# Patient Record
Sex: FEMALE | Race: AMERICAN INDIAN OR ALASKA NATIVE | NOT HISPANIC OR LATINO | URBAN - METROPOLITAN AREA
[De-identification: names, ages, dates, MRNs, and addresses within clinical notes are randomized per-mention and may not be internally consistent; named-entity substitution may affect disease eponyms.]

---

## 2017-04-10 ENCOUNTER — EMERGENCY (EMERGENCY)
Facility: HOSPITAL | Age: 37
LOS: 1 days | Discharge: HOME | End: 2017-04-10
Attending: EMERGENCY MEDICINE | Admitting: EMERGENCY MEDICINE
Payer: COMMERCIAL

## 2017-04-10 ENCOUNTER — EMERGENCY (EMERGENCY)
Facility: HOSPITAL | Age: 37
LOS: 1 days | Discharge: LEFT BEFORE DISCHARGE | End: 2017-04-10
Admitting: EMERGENCY MEDICINE

## 2017-04-10 VITALS
HEART RATE: 104 BPM | OXYGEN SATURATION: 97 % | SYSTOLIC BLOOD PRESSURE: 130 MMHG | TEMPERATURE: 98 F | RESPIRATION RATE: 19 BRPM | DIASTOLIC BLOOD PRESSURE: 89 MMHG

## 2017-04-10 DIAGNOSIS — R07.89 OTHER CHEST PAIN: ICD-10-CM

## 2017-04-10 LAB
ALBUMIN SERPL ELPH-MCNC: 4.6 G/DL — SIGNIFICANT CHANGE UP (ref 3.3–5)
ALP SERPL-CCNC: 86 U/L — SIGNIFICANT CHANGE UP (ref 40–120)
ALT FLD-CCNC: 21 U/L RC — SIGNIFICANT CHANGE UP (ref 10–45)
ANION GAP SERPL CALC-SCNC: 20 MMOL/L — HIGH (ref 5–17)
APPEARANCE UR: CLEAR — SIGNIFICANT CHANGE UP
APTT BLD: 36.3 SEC — SIGNIFICANT CHANGE UP (ref 27.5–37.4)
AST SERPL-CCNC: 28 U/L — SIGNIFICANT CHANGE UP (ref 10–40)
BASE EXCESS BLDV CALC-SCNC: 3 MMOL/L — HIGH (ref -2–2)
BASOPHILS # BLD AUTO: 0.1 K/UL — SIGNIFICANT CHANGE UP (ref 0–0.2)
BASOPHILS NFR BLD AUTO: 1.5 % — SIGNIFICANT CHANGE UP (ref 0–2)
BILIRUB SERPL-MCNC: 0.1 MG/DL — LOW (ref 0.2–1.2)
BILIRUB UR-MCNC: NEGATIVE — SIGNIFICANT CHANGE UP
BLD GP AB SCN SERPL QL: NEGATIVE — SIGNIFICANT CHANGE UP
BUN SERPL-MCNC: 8 MG/DL — SIGNIFICANT CHANGE UP (ref 7–23)
CA-I BLD-SCNC: 1.04 MMOL/L — LOW (ref 1.05–1.34)
CA-I SERPL-SCNC: 1.17 MMOL/L — SIGNIFICANT CHANGE UP (ref 1.12–1.3)
CALCIUM SERPL-MCNC: 9.1 MG/DL — SIGNIFICANT CHANGE UP (ref 8.4–10.5)
CHLORIDE BLDV-SCNC: 104 MMOL/L — SIGNIFICANT CHANGE UP (ref 96–108)
CHLORIDE SERPL-SCNC: 100 MMOL/L — SIGNIFICANT CHANGE UP (ref 96–108)
CK MB CFR SERPL CALC: 1 NG/ML — SIGNIFICANT CHANGE UP (ref 0–3.8)
CO2 BLDV-SCNC: 30 MMOL/L — SIGNIFICANT CHANGE UP (ref 22–30)
CO2 SERPL-SCNC: 22 MMOL/L — SIGNIFICANT CHANGE UP (ref 22–31)
COLOR SPEC: YELLOW — SIGNIFICANT CHANGE UP
CREAT SERPL-MCNC: 0.65 MG/DL — SIGNIFICANT CHANGE UP (ref 0.5–1.3)
D DIMER BLD IA.RAPID-MCNC: <150 NG/ML DDU — SIGNIFICANT CHANGE UP
DIFF PNL FLD: NEGATIVE — SIGNIFICANT CHANGE UP
EOSINOPHIL # BLD AUTO: 0.2 K/UL — SIGNIFICANT CHANGE UP (ref 0–0.5)
EOSINOPHIL NFR BLD AUTO: 2.4 % — SIGNIFICANT CHANGE UP (ref 0–6)
EPI CELLS # UR: SIGNIFICANT CHANGE UP /HPF
ETHANOL SERPL-MCNC: 243 MG/DL — HIGH (ref 0–10)
GAS PNL BLDV: 143 MMOL/L — SIGNIFICANT CHANGE UP (ref 136–145)
GAS PNL BLDV: SIGNIFICANT CHANGE UP
GAS PNL BLDV: SIGNIFICANT CHANGE UP
GLUCOSE BLDV-MCNC: 101 MG/DL — HIGH (ref 70–99)
GLUCOSE SERPL-MCNC: 102 MG/DL — HIGH (ref 70–99)
GLUCOSE UR QL: NEGATIVE — SIGNIFICANT CHANGE UP
HCG SERPL-ACNC: <2 MIU/ML — SIGNIFICANT CHANGE UP
HCO3 BLDV-SCNC: 28 MMOL/L — SIGNIFICANT CHANGE UP (ref 21–29)
HCT VFR BLD CALC: 37.7 % — SIGNIFICANT CHANGE UP (ref 34.5–45)
HCT VFR BLDA CALC: 40 % — SIGNIFICANT CHANGE UP (ref 39–50)
HGB BLD CALC-MCNC: 13 G/DL — SIGNIFICANT CHANGE UP (ref 11.5–15.5)
HGB BLD-MCNC: 12.9 G/DL — SIGNIFICANT CHANGE UP (ref 11.5–15.5)
INR BLD: 0.92 RATIO — SIGNIFICANT CHANGE UP (ref 0.88–1.16)
KETONES UR-MCNC: ABNORMAL
LACTATE BLDV-MCNC: 3.1 MMOL/L — HIGH (ref 0.7–2)
LEUKOCYTE ESTERASE UR-ACNC: NEGATIVE — SIGNIFICANT CHANGE UP
LYMPHOCYTES # BLD AUTO: 3.7 K/UL — HIGH (ref 1–3.3)
LYMPHOCYTES # BLD AUTO: 51.3 % — HIGH (ref 13–44)
MAGNESIUM SERPL-MCNC: 2 MG/DL — SIGNIFICANT CHANGE UP (ref 1.6–2.6)
MCHC RBC-ENTMCNC: 25.5 PG — LOW (ref 27–34)
MCHC RBC-ENTMCNC: 34.4 GM/DL — SIGNIFICANT CHANGE UP (ref 32–36)
MCV RBC AUTO: 74.3 FL — LOW (ref 80–100)
MICROCYTES BLD QL: SLIGHT — SIGNIFICANT CHANGE UP
MONOCYTES # BLD AUTO: 0.3 K/UL — SIGNIFICANT CHANGE UP (ref 0–0.9)
MONOCYTES NFR BLD AUTO: 3.5 % — SIGNIFICANT CHANGE UP (ref 2–14)
NEUTROPHILS # BLD AUTO: 3 K/UL — SIGNIFICANT CHANGE UP (ref 1.8–7.4)
NEUTROPHILS NFR BLD AUTO: 41.3 % — LOW (ref 43–77)
NITRITE UR-MCNC: NEGATIVE — SIGNIFICANT CHANGE UP
OVALOCYTES BLD QL SMEAR: SLIGHT — SIGNIFICANT CHANGE UP
PCO2 BLDV: 49 MMHG — SIGNIFICANT CHANGE UP (ref 35–50)
PH BLDV: 7.38 — SIGNIFICANT CHANGE UP (ref 7.35–7.45)
PH UR: 6.5 — SIGNIFICANT CHANGE UP (ref 4.8–8)
PLAT MORPH BLD: NORMAL — SIGNIFICANT CHANGE UP
PLATELET # BLD AUTO: 276 K/UL — SIGNIFICANT CHANGE UP (ref 150–400)
PO2 BLDV: 39 MMHG — SIGNIFICANT CHANGE UP (ref 25–45)
POTASSIUM BLDV-SCNC: 3.9 MMOL/L — SIGNIFICANT CHANGE UP (ref 3.5–5)
POTASSIUM SERPL-MCNC: 4.2 MMOL/L — SIGNIFICANT CHANGE UP (ref 3.5–5.3)
POTASSIUM SERPL-SCNC: 4.2 MMOL/L — SIGNIFICANT CHANGE UP (ref 3.5–5.3)
PROT SERPL-MCNC: 7.9 G/DL — SIGNIFICANT CHANGE UP (ref 6–8.3)
PROT UR-MCNC: 30 MG/DL
PROTHROM AB SERPL-ACNC: 10 SEC — SIGNIFICANT CHANGE UP (ref 9.8–12.7)
RBC # BLD: 5.08 M/UL — SIGNIFICANT CHANGE UP (ref 3.8–5.2)
RBC # FLD: 12.8 % — SIGNIFICANT CHANGE UP (ref 10.3–14.5)
RBC BLD AUTO: ABNORMAL
RBC CASTS # UR COMP ASSIST: SIGNIFICANT CHANGE UP /HPF (ref 0–2)
RH IG SCN BLD-IMP: POSITIVE — SIGNIFICANT CHANGE UP
RH IG SCN BLD-IMP: POSITIVE — SIGNIFICANT CHANGE UP
SAO2 % BLDV: 64 % — LOW (ref 67–88)
SODIUM SERPL-SCNC: 142 MMOL/L — SIGNIFICANT CHANGE UP (ref 135–145)
SP GR SPEC: 1.02 — SIGNIFICANT CHANGE UP (ref 1.01–1.02)
TROPONIN T SERPL-MCNC: <0.01 NG/ML — SIGNIFICANT CHANGE UP (ref 0–0.06)
UROBILINOGEN FLD QL: NEGATIVE — SIGNIFICANT CHANGE UP
WBC # BLD: 7.2 K/UL — SIGNIFICANT CHANGE UP (ref 3.8–10.5)
WBC # FLD AUTO: 7.2 K/UL — SIGNIFICANT CHANGE UP (ref 3.8–10.5)
WBC UR QL: SIGNIFICANT CHANGE UP /HPF (ref 0–5)

## 2017-04-10 PROCEDURE — 99242 OFF/OP CONSLTJ NEW/EST SF 20: CPT | Mod: GC

## 2017-04-10 PROCEDURE — 70450 CT HEAD/BRAIN W/O DYE: CPT | Mod: 26

## 2017-04-10 PROCEDURE — 99285 EMERGENCY DEPT VISIT HI MDM: CPT

## 2017-04-10 PROCEDURE — 74174 CTA ABD&PLVS W/CONTRAST: CPT | Mod: 26

## 2017-04-10 PROCEDURE — 71275 CT ANGIOGRAPHY CHEST: CPT | Mod: 26

## 2017-04-10 NOTE — ED PROVIDER NOTE - NEUROLOGICAL, MLM
Alert and oriented, no focal deficits, no motor deficits. Decreased sensation to right ulnar distribution, elbow to shoulder.

## 2017-04-10 NOTE — ED PROVIDER NOTE - PROGRESS NOTE DETAILS
pt with nutcracker phenomenon on ct; surgery to evaluate; pt with nutcracker phenomenon on ct; surgery to evaluate; neuro consult pending pt evaluated by vascular at bedside and pt can follow-up as outpatient for nutcracker phenomenon; with Dr. Handley at [phone] Pt evaluated by neuro at bedside; will r/o dvt with ultrasound; if negative per neuro, can d/c home with outpatient follow-up emg/evaluation **ATTENDING ADDENDUM (Dr. Femi Gaspar): received handoff from Tutu earlier in ED course. PLAN: followup US and reevaluate patient. US noted: negative for deep venous thrombosis at this time. Anticipatory guidance provided. Agree with discharge home with close outpatient followup with primary care physician/provider and with medications (if appropriate, if clinically indicated, and as prescribed, as noted in EMR).

## 2017-04-10 NOTE — ED PROVIDER NOTE - CARE PLAN
Principal Discharge DX:	Arm pain  Goal:	ATTENDING ADDENDUM (Dr. Femi Gaspar): Goals of care include resolution of emergent/urgent symptoms and concerns, and restoration to baseline level of homeostasis.  Instructions for follow-up, activity and diet:	ATTENDING ADDENDUM (Dr. Femi Gaspar): (1) anticipatory guidance provided  (2) rest  (3) outpatient follow-up with your primary care physician/provider (4) return if symptoms worsen, persist, or do not resolve (5) medications, if indicated, as prescribed

## 2017-04-10 NOTE — ED PROVIDER NOTE - NS ED MD SCRIBE ATTENDING SCRIBE SECTIONS
VITAL SIGNS( Pullset)/PAST MEDICAL/SURGICAL/SOCIAL HISTORY/DISPOSITION/HIV/REVIEW OF SYSTEMS/HISTORY OF PRESENT ILLNESS/PHYSICAL EXAM

## 2017-04-10 NOTE — ED PROVIDER NOTE - DETAILS:
[age] year old female presents with chest pain, back pain, right arm numbness since 2:30PM today. No headache, nausea, vomiting. Occurred while on flight. Denies shortness of breath. On exam: decreased sensation in right arm, no motor deficits. DDx includes dissection. Plan: CTA Chest, Abd/Pel, CT Head, Cardiac enzymes, D-Dimer, EKG, reassess.

## 2017-04-10 NOTE — ED PROVIDER NOTE - CONDUCTED A DETAILED DISCUSSION WITH PATIENT AND/OR GUARDIAN REGARDING, MDM
radiology results/**ATTENDING ADDENDUM (Dr. Femi Gaspar): Anticipatory guidance provided./lab results

## 2017-04-10 NOTE — ED PROVIDER NOTE - OBJECTIVE STATEMENT
36 year old female with no significant past medical history presents to the Emergency Department complaining of chest pain, palpitations and right arm numbness since 14:30 while on flight from  today. Denies fever, chills, nausea, vomiting, diaphoresis, shortness of breath, headache, blurry vision, or any other complaints. 36 year old female with no significant past medical history presents to the Emergency Department complaining of chest pain, palpitations and right arm numbness since 14:30 while on flight from  today. Daily 3-6oz vodka intake (last drink today on flight). Daily smoker.   Denies fever, chills, nausea, vomiting, diaphoresis, shortness of breath, headache, blurry vision, or any other complaints. 36 year old female with no significant past medical history presents to the Emergency Department complaining of chest pain, palpitations and right arm numbness since 14:30 while on flight from  today. Daily 3-6oz vodka intake (last drink today on flight). Daily smoker. Also admits to lower back pain x3 days.   Denies fever, chills, nausea, vomiting, diaphoresis, shortness of breath, headache, blurry vision, or any other complaints.

## 2017-04-10 NOTE — ED ADULT NURSE NOTE - OBJECTIVE STATEMENT
36y female pt, no PMH, arrived to ED c/o chest pain with right arm numbness started around 1430 today while on flight from  today. Pt is daily drinker, mostly Vodka, +palpitation,  no chills/fever, no nausea/vomit, no sob, lungs are CTA, no neuro deficits.

## 2017-04-10 NOTE — ED PROVIDER NOTE - MEDICAL DECISION MAKING DETAILS
36 year old female presents with chest pain, back pain, right arm numbness since 2:30PM today. No headache, nausea, vomiting. Occurred while on flight. Denies shortness of breath. On exam: decreased sensation in right arm, no motor deficits. DDx includes dissection. Plan: CTA Chest, Abd/Pel, CT Head, Cardiac enzymes, D-Dimer, EKG, reassess.

## 2017-04-10 NOTE — ED PROVIDER NOTE - PLAN OF CARE
ATTENDING ADDENDUM (Dr. Femi Gaspar): Goals of care include resolution of emergent/urgent symptoms and concerns, and restoration to baseline level of homeostasis. ATTENDING ADDENDUM (Dr. Femi Gaspar): (1) anticipatory guidance provided  (2) rest  (3) outpatient follow-up with your primary care physician/provider (4) return if symptoms worsen, persist, or do not resolve (5) medications, if indicated, as prescribed

## 2017-04-11 VITALS
OXYGEN SATURATION: 96 % | DIASTOLIC BLOOD PRESSURE: 74 MMHG | RESPIRATION RATE: 16 BRPM | HEART RATE: 82 BPM | SYSTOLIC BLOOD PRESSURE: 122 MMHG | TEMPERATURE: 98 F

## 2017-04-11 LAB — TROPONIN T SERPL-MCNC: <0.01 NG/ML — SIGNIFICANT CHANGE UP (ref 0–0.06)

## 2017-04-11 PROCEDURE — 85730 THROMBOPLASTIN TIME PARTIAL: CPT

## 2017-04-11 PROCEDURE — 93971 EXTREMITY STUDY: CPT | Mod: 26

## 2017-04-11 PROCEDURE — 80053 COMPREHEN METABOLIC PANEL: CPT

## 2017-04-11 PROCEDURE — 99284 EMERGENCY DEPT VISIT MOD MDM: CPT | Mod: 25

## 2017-04-11 PROCEDURE — 82435 ASSAY OF BLOOD CHLORIDE: CPT

## 2017-04-11 PROCEDURE — 93971 EXTREMITY STUDY: CPT

## 2017-04-11 PROCEDURE — 84484 ASSAY OF TROPONIN QUANT: CPT

## 2017-04-11 PROCEDURE — 74174 CTA ABD&PLVS W/CONTRAST: CPT

## 2017-04-11 PROCEDURE — 81001 URINALYSIS AUTO W/SCOPE: CPT

## 2017-04-11 PROCEDURE — 83605 ASSAY OF LACTIC ACID: CPT

## 2017-04-11 PROCEDURE — 85027 COMPLETE CBC AUTOMATED: CPT

## 2017-04-11 PROCEDURE — 84132 ASSAY OF SERUM POTASSIUM: CPT

## 2017-04-11 PROCEDURE — 83735 ASSAY OF MAGNESIUM: CPT

## 2017-04-11 PROCEDURE — 82803 BLOOD GASES ANY COMBINATION: CPT

## 2017-04-11 PROCEDURE — 86850 RBC ANTIBODY SCREEN: CPT

## 2017-04-11 PROCEDURE — 86900 BLOOD TYPING SEROLOGIC ABO: CPT

## 2017-04-11 PROCEDURE — 82553 CREATINE MB FRACTION: CPT

## 2017-04-11 PROCEDURE — 84702 CHORIONIC GONADOTROPIN TEST: CPT

## 2017-04-11 PROCEDURE — 86901 BLOOD TYPING SEROLOGIC RH(D): CPT

## 2017-04-11 PROCEDURE — 85379 FIBRIN DEGRADATION QUANT: CPT

## 2017-04-11 PROCEDURE — 82330 ASSAY OF CALCIUM: CPT

## 2017-04-11 PROCEDURE — 85014 HEMATOCRIT: CPT

## 2017-04-11 PROCEDURE — 71275 CT ANGIOGRAPHY CHEST: CPT

## 2017-04-11 PROCEDURE — 84295 ASSAY OF SERUM SODIUM: CPT

## 2017-04-11 PROCEDURE — 80307 DRUG TEST PRSMV CHEM ANLYZR: CPT

## 2017-04-11 PROCEDURE — 85610 PROTHROMBIN TIME: CPT

## 2017-04-11 PROCEDURE — 70450 CT HEAD/BRAIN W/O DYE: CPT

## 2017-04-11 PROCEDURE — 82947 ASSAY GLUCOSE BLOOD QUANT: CPT

## 2020-03-06 NOTE — ED ADULT NURSE NOTE - NO SIGNIFICANT PAST SURGICAL HISTORY
Patient notified via [facility] message.     <<----- Click to add NO significant Past Surgical History

## 2020-09-23 NOTE — ED ADULT NURSE NOTE - TEMPLATE LIST FOR HEAD TO TOE ASSESSMENT
MEDICARE WELLNESS VISIT NOTE      HISTORY OF PRESENT ILLNESS:   Mamie Harrison presents for her Subsequent Annual Medicare Wellness Visit.   She has no current complaints or concerns.      Patient Care Team:  Viri Reyna NP as PCP - General (Nurse Practitioner)        Patient Active Problem List   Diagnosis   • Essential hypertension   • Hyperlipidemia   • Impaired fasting blood sugar   • Heart murmur, systolic         Past Medical History:   Diagnosis Date   • Essential hypertension    • Heart murmur, systolic 9/23/2020         History reviewed. No pertinent surgical history.      Social History     Tobacco Use   • Smoking status: Never Smoker   • Smokeless tobacco: Never Used   Substance Use Topics   • Alcohol use: No   • Drug use: No     Drug use:    Drug Use:    No              Family History   Problem Relation Age of Onset   • Cancer Mother         colon   • Other Father         sepsis   • Heart disease Brother    • COPD Brother    • Cancer Sister         peritoneal   • Cancer Sister         oral/smoker   • Other Sister         health concerns/ patient unsure       Current Outpatient Medications   Medication Sig Dispense Refill   • metoPROLOL succinate (TOPROL-XL) 100 MG 24 hr tablet Take 1 tablet by mouth daily. 90 tablet 3   • metoPROLOL succinate (TOPROL-XL) 50 MG 24 hr tablet Take 1 tablet by mouth daily. 90 tablet 0   • spironolactone (ALDACTONE) 50 MG tablet Take 1 tablet by mouth daily. 90 tablet 3   • chlorthalidone (HYGROTEN) 50 MG tablet Take 2 tablets by mouth daily. 180 tablet 3   • Multiple Vitamins-Minerals (MULTIVITAMIN & MINERAL PO)      • VITAMIN D, CHOLECALCIFEROL, PO      • CALCIUM CARBONATE PO        No current facility-administered medications for this visit.         The following items on the Medicare Health Risk Assessment were found to be positive  1.) Do you have an Advance directive, living will, or power of  for health care document that contains your wishes for end  of life care?: No, refused     6 b.) How many servings of High Fiber / Whole Grain Foods to you have each day ( 1 serving = 1 cup cold cereal, 1/2 cup cooked cereal, 1 slice bread): 1 per day.  Encouraged to get 2-3 servings daily         Vision and Hearing screens:    Hearing Screening    125Hz 250Hz 500Hz 1000Hz 2000Hz 3000Hz 4000Hz 6000Hz 8000Hz   Right ear:            Left ear:            Comments: Patient passed bilateral finger rub test     Vision Screening Comments: Patient sees opthalmology yearly.       Advance Directive:   The patient has the following documents:  No Advance Directives on file. Patient offered documents.    Cognitive Assessment: no evidence of cognitive dysfunction by direct observation    Recent PHQ 2/9 Score    PHQ 2:  Date Adult PHQ 2 Score Adult PHQ 2 Interpretation   9/23/2020 0 No further screening needed         DEPRESSION ASSESSMENT/PLAN:  Depression screening is negative no further plan needed.     Body mass index is 24.75 kg/m².    BMI ASSESSMENT/PLAN:  Patient BMI is within normal range.       Needed follow up:  None    See orders.   See Patient Instructions section.   Return in about 1 year (around 9/23/2021) for Medicare Wellness Visit.     OUTPATIENT PROGRESS NOTE    CHIEF COMPLAINT  Medicare Wellness Visit      SUBJECTIVE  She presents for follow-up of chronic medical conditions.  She is accompanied by her  with permission.  She has hypertension which is well controlled on metoprolol 150 mg daily, chlorthalidone 100 mg daily, and spironolactone 50 mg daily.  Denies any medication side effects.  She has been working toward weight reduction which has been successful since she was noted to have an elevated blood sugar.  Most recently was 122 fasting with A1c of 5.5.  She is due for several preventive health procedures but refuses all of them stating she is not interested in doing any of them.  She does not have an Advance directive and feels she does not need 1 as she  wants everything done.  She was previously found to have systolic heart murmur but refuses echocardiogram.    MEDICATIONS  Medications were reviewed and updated today.    HISTORIES  I have personally reviewed and updated the following EMR sections: Allergies, Problem List, Past Medical History, Past Surgical History, Social History and Family History    REVIEW OF SYSTEMS  Balance of 13 systems are reviewed and otherwise negative unless listed above    PHYSICAL EXAM  Visit Vitals  /82 (BP Location: LUE - Left upper extremity, Patient Position: Sitting, Cuff Size: Regular)   Pulse 76   Temp 98 °F (36.7 °C) (Temporal)   Resp 17   Ht 5' 5.5\" (1.664 m)   Wt 68.5 kg   SpO2 98%   BMI 24.75 kg/m²     PHYSICAL EXAM  HEENT: Normocephalic, atraumatic. External ear canals within normal limits. Tympanic membranes pearly gray with good light reflex bilaterally. Nasal mucosa normal. Buccal mucosa pink and moist. Posterior oropharynx within normal limits.  Eyes: PERRLA (pupils equal, round, and reactive to light and accommodation). EOMs (extraocular movements) intact. Sclerae nonicteric.  Neck: Supple without lymphadenopathy, thyromegaly or bruits.  Lungs: Clear to auscultation bilaterally.  Heart: Rate and rhythm regular with grade 1/6 systolic murmur.  Abdomen: Soft, nontender, nondistended. No masses or HSM (hepatosplenomegaly) noted. Bowel sounds normoactive.  Extremities: Without edema.  Skin: Warm and dry without rash.  Neurologic: Cranial nerves II through XII grossly intact. Gait normal.      LABORATORY  I have reviewed the pertinent laboratory tests. These are the pertinent findings:  Component      Latest Ref Rng & Units 9/17/2020 9/17/2020           8:18 AM  8:18 AM   Fasting Status      Hours 12 12   Sodium      135 - 145 mmol/L 139    Potassium      3.4 - 5.1 mmol/L 4.2    Chloride      98 - 107 mmol/L 101    CO2      21 - 32 mmol/L 28    ANION GAP      10 - 20 mmol/L 14    Glucose      65 - 99 mg/dL 122 (H)     BUN      6 - 20 mg/dL 23 (H)    Creatinine      0.51 - 0.95 mg/dL 0.93    Glomerular Filtration Rate      >90 mL/min/1.73m2 61 (L)    BUN/CREATININE RATIO      7 - 25 25    TOTAL BILIRUBIN      0.2 - 1.0 mg/dL 0.6    AST/SGOT      <=37 Units/L 19    ALK PHOSPHATASE      45 - 117 Units/L 71    Albumin      3.6 - 5.1 g/dL 4.4    TOTAL PROTEIN      6.4 - 8.2 g/dL 7.8    GLOBULIN      2.0 - 4.0 g/dL 3.4    A/G Ratio, Serum      1.0 - 2.4 1.3    ALT/SGPT      <64 Units/L 21    CALCIUM      8.4 - 10.2 mg/dL 9.6    CHOLESTEROL      <=199 mg/dL  210 (H)   TRIGLYCERIDE      <=149 mg/dL  90   HDL      >=50 mg/dL  71   CALCULATED LDL      <=129 mg/dL  121   CALCULATED NON HDL      mg/dL  139   CHOL/HDL      <=4.4  3.0   GLYCOHEMOGLOBIN A1C      4.5 - 5.6 % 5.5    ·     IMAGING  I have reviewed the pertinent imaging study reports. These are the pertinent findings:  · Refuses mammogram bone density echocardiogram.    ASSESSMENT/PLAN  Essential hypertension  - metoPROLOL succinate (TOPROL-XL) 50 MG 24 hr tablet; Take 1 tablet by mouth daily.  Dispense: 90 tablet; Refill: 0  - spironolactone (ALDACTONE) 50 MG tablet; Take 1 tablet by mouth daily.  Dispense: 90 tablet; Refill: 3  - chlorthalidone (HYGROTEN) 50 MG tablet; Take 2 tablets by mouth daily.  Dispense: 180 tablet; Refill: 3  - COMPREHENSIVE METABOLIC PANEL; Future  - OFFICE/OUTPT VISIT EST LEVEL IV  Blood pressure at goal.  No medication changes.  Mixed hyperlipidemia  - COMPREHENSIVE METABOLIC PANEL; Future  - LIPID PANEL WITH REFLEX; Future  - OFFICE/OUTPT VISIT EST LEVEL IV  At goal.  Continue with healthy diet and lifestyle  Impaired fasting blood sugar  - COMPREHENSIVE METABOLIC PANEL; Future  - GLYCOHEMOGLOBIN; Future  - OFFICE/OUTPT VISIT EST LEVEL IV   Diet controlled.  Encouraged to continue working on healthy diet and exercise to reduce the risk of diabetes  Medicare annual wellness visit, subsequent    Heart murmur, systolic  Encouraged obtaining  echocardiogram for further evaluation however she “want to think about it”   Cardiac

## 2022-07-30 ENCOUNTER — INPATIENT (INPATIENT)
Facility: HOSPITAL | Age: 42
LOS: 1 days | Discharge: LEFT BEFORE DISCHARGE | DRG: 600 | End: 2022-08-01
Attending: INTERNAL MEDICINE | Admitting: STUDENT IN AN ORGANIZED HEALTH CARE EDUCATION/TRAINING PROGRAM
Payer: MEDICAID

## 2022-07-30 VITALS
DIASTOLIC BLOOD PRESSURE: 94 MMHG | SYSTOLIC BLOOD PRESSURE: 154 MMHG | HEART RATE: 91 BPM | WEIGHT: 100.09 LBS | HEIGHT: 61 IN | RESPIRATION RATE: 20 BRPM | TEMPERATURE: 99 F | OXYGEN SATURATION: 98 %

## 2022-07-30 LAB
ALBUMIN SERPL ELPH-MCNC: 4.5 G/DL — SIGNIFICANT CHANGE UP (ref 3.3–5)
ALP SERPL-CCNC: 258 U/L — HIGH (ref 40–120)
ALT FLD-CCNC: 80 U/L — HIGH (ref 10–45)
ANION GAP SERPL CALC-SCNC: 16 MMOL/L — SIGNIFICANT CHANGE UP (ref 5–17)
ANISOCYTOSIS BLD QL: SIGNIFICANT CHANGE UP
AST SERPL-CCNC: 397 U/L — HIGH (ref 10–40)
BASOPHILS # BLD AUTO: 0 K/UL — SIGNIFICANT CHANGE UP (ref 0–0.2)
BASOPHILS NFR BLD AUTO: 0 % — SIGNIFICANT CHANGE UP (ref 0–2)
BILIRUB SERPL-MCNC: 1.5 MG/DL — HIGH (ref 0.2–1.2)
BUN SERPL-MCNC: 6 MG/DL — LOW (ref 7–23)
CALCIUM SERPL-MCNC: 9.4 MG/DL — SIGNIFICANT CHANGE UP (ref 8.4–10.5)
CHLORIDE SERPL-SCNC: 97 MMOL/L — SIGNIFICANT CHANGE UP (ref 96–108)
CO2 SERPL-SCNC: 27 MMOL/L — SIGNIFICANT CHANGE UP (ref 22–31)
CREAT SERPL-MCNC: 0.44 MG/DL — LOW (ref 0.5–1.3)
EGFR: 123 ML/MIN/1.73M2 — SIGNIFICANT CHANGE UP
ELLIPTOCYTES BLD QL SMEAR: SLIGHT — SIGNIFICANT CHANGE UP
EOSINOPHIL # BLD AUTO: 0 K/UL — SIGNIFICANT CHANGE UP (ref 0–0.5)
EOSINOPHIL NFR BLD AUTO: 0 % — SIGNIFICANT CHANGE UP (ref 0–6)
ETHANOL SERPL-MCNC: <10 MG/DL — SIGNIFICANT CHANGE UP (ref 0–10)
GLUCOSE SERPL-MCNC: 120 MG/DL — HIGH (ref 70–99)
HCT VFR BLD CALC: 32.3 % — LOW (ref 34.5–45)
HGB BLD-MCNC: 10.6 G/DL — LOW (ref 11.5–15.5)
HIV 1 & 2 AB SERPL IA.RAPID: SIGNIFICANT CHANGE UP
HYPOCHROMIA BLD QL: SLIGHT — SIGNIFICANT CHANGE UP
LYMPHOCYTES # BLD AUTO: 0.88 K/UL — LOW (ref 1–3.3)
LYMPHOCYTES # BLD AUTO: 9.6 % — LOW (ref 13–44)
MANUAL SMEAR VERIFICATION: SIGNIFICANT CHANGE UP
MCHC RBC-ENTMCNC: 25.4 PG — LOW (ref 27–34)
MCHC RBC-ENTMCNC: 32.8 GM/DL — SIGNIFICANT CHANGE UP (ref 32–36)
MCV RBC AUTO: 77.3 FL — LOW (ref 80–100)
MICROCYTES BLD QL: SLIGHT — SIGNIFICANT CHANGE UP
MONOCYTES # BLD AUTO: 0.39 K/UL — SIGNIFICANT CHANGE UP (ref 0–0.9)
MONOCYTES NFR BLD AUTO: 4.3 % — SIGNIFICANT CHANGE UP (ref 2–14)
NEUTROPHILS # BLD AUTO: 7.89 K/UL — HIGH (ref 1.8–7.4)
NEUTROPHILS NFR BLD AUTO: 86.1 % — HIGH (ref 43–77)
OVALOCYTES BLD QL SMEAR: SLIGHT — SIGNIFICANT CHANGE UP
PLAT MORPH BLD: NORMAL — SIGNIFICANT CHANGE UP
PLATELET # BLD AUTO: 202 K/UL — SIGNIFICANT CHANGE UP (ref 150–400)
POIKILOCYTOSIS BLD QL AUTO: SLIGHT — SIGNIFICANT CHANGE UP
POTASSIUM SERPL-MCNC: 4 MMOL/L — SIGNIFICANT CHANGE UP (ref 3.5–5.3)
POTASSIUM SERPL-SCNC: 4 MMOL/L — SIGNIFICANT CHANGE UP (ref 3.5–5.3)
PROT SERPL-MCNC: 7.7 G/DL — SIGNIFICANT CHANGE UP (ref 6–8.3)
RBC # BLD: 4.18 M/UL — SIGNIFICANT CHANGE UP (ref 3.8–5.2)
RBC # FLD: 21.7 % — HIGH (ref 10.3–14.5)
RBC BLD AUTO: ABNORMAL
SARS-COV-2 RNA SPEC QL NAA+PROBE: SIGNIFICANT CHANGE UP
SCHISTOCYTES BLD QL AUTO: SLIGHT — SIGNIFICANT CHANGE UP
SODIUM SERPL-SCNC: 140 MMOL/L — SIGNIFICANT CHANGE UP (ref 135–145)
STOMATOCYTES BLD QL SMEAR: SLIGHT — SIGNIFICANT CHANGE UP
TARGETS BLD QL SMEAR: SIGNIFICANT CHANGE UP
TROPONIN T, HIGH SENSITIVITY RESULT: <6 NG/L — SIGNIFICANT CHANGE UP (ref 0–51)
WBC # BLD: 9.16 K/UL — SIGNIFICANT CHANGE UP (ref 3.8–10.5)
WBC # FLD AUTO: 9.16 K/UL — SIGNIFICANT CHANGE UP (ref 3.8–10.5)

## 2022-07-30 PROCEDURE — 76642 ULTRASOUND BREAST LIMITED: CPT | Mod: 26,RT

## 2022-07-30 PROCEDURE — 99285 EMERGENCY DEPT VISIT HI MDM: CPT | Mod: 25

## 2022-07-30 PROCEDURE — 71046 X-RAY EXAM CHEST 2 VIEWS: CPT | Mod: 26

## 2022-07-30 RX ORDER — SODIUM CHLORIDE 9 MG/ML
1000 INJECTION, SOLUTION INTRAVENOUS ONCE
Refills: 0 | Status: COMPLETED | OUTPATIENT
Start: 2022-07-30 | End: 2022-07-30

## 2022-07-30 RX ORDER — DIAZEPAM 5 MG
5 TABLET ORAL ONCE
Refills: 0 | Status: DISCONTINUED | OUTPATIENT
Start: 2022-07-30 | End: 2022-07-30

## 2022-07-30 RX ADMIN — Medication 5 MILLIGRAM(S): at 21:04

## 2022-07-30 RX ADMIN — SODIUM CHLORIDE 1000 MILLILITER(S): 9 INJECTION, SOLUTION INTRAVENOUS at 21:04

## 2022-07-30 NOTE — ED PROVIDER NOTE - CLINICAL SUMMARY MEDICAL DECISION MAKING FREE TEXT BOX
43yo F w/ no pmh presenting with chest tightness and palpitations. Started yesterday, L sided, non radiating. Pt currently has symptoms however it is better than before. She has breast cyst that appears infected, c/f abscess given erythema and ttp spreading towards nipple. Will get labs, trop, US breast.

## 2022-07-30 NOTE — ED PROVIDER NOTE - CARE PLAN
1 Principal Discharge DX:	Alcohol withdrawal  Secondary Diagnosis:	Breast abscess  Secondary Diagnosis:	Cellulitis of breast

## 2022-07-30 NOTE — ED PROVIDER NOTE - OBJECTIVE STATEMENT
41yo F w/ no pmh presenting with palpitations. Last night pt started having chest tightness, L sided, non radiating associated with palpitations. Vomited once this morning. She has no sob, fever, abd pain. Also had R breast cyst that was drained last year, now for 2w it's been getting bigger, more red and painful. Redness has spread towards nipple.

## 2022-07-30 NOTE — ED ADULT TRIAGE NOTE - CHIEF COMPLAINT QUOTE
palpitations since last night that woke her up from her sleep. also complaining of a R sided breast abscess x 2 weeks getting bigger and tender to touch

## 2022-07-30 NOTE — ED PROVIDER NOTE - CHILD ABUSE FACILITY
Mental Health Visit Note    Patient: Anahi Stockton    : 1980 MRN: [id number]    2020    Start time: 3:05pm    Stop Time: 3:58pm   Session # Intake Session 1    Session Type: Patient is presenting for an Individual session.    Anahi Stockton is a 40 y.o. female is being seen today for    Chief Complaint   Patient presents with     Intake     Initial visit to establish care   .     Telemedicine Visit: The patient's condition can be safely assessed and treated via synchronous audio and visual telemedicine encounter.      Reason for Telemedicine Visit: Services only offered telehealth    Originating Site (Patient Location): Patient's home    Distant Site (Provider Location): Provider Remote Setting    Consent:  The patient/guardian has verbally consented to: the potential risks and benefits of telemedicine (video visit) versus in person care; bill my insurance or make self-payment for services provided; and responsibility for payment of non-covered services.     Mode of Communication:  Video Conference via [employer] | VIP    As the provider I attest to compliance with applicable laws and regulations related to telemedicine.    Those present for this visit patient and therapist    New symptoms or complaints: body aches, body pain, dizziness, depression and anxiety    Functional Impairment:   Personal: 4  Family: 4  Work: 4  Social:4          ASSESSMENT: Current Emotional / Mental Status (status of significant symptoms):              Risk status (Self / Other harm or suicidal ideation)              Patient denies risks to personal safety              Patient reports the following current or recent suicidal ideation or behaviors: thoughts of being better off dead with no intent to self-harm.              Patient denies current or recent homicidal ideation or behaviors.              Patient denies current or recent self injurious behavior or ideation.              Patient denies other safety concerns.               "Patient denies risk factors              Patient identifies protective factors including her family relationships              Recommended that patient call 911 or go to the local ED should there be a change in any of these risk factors.                Attitude:                                   Cooperative               Orientation:                             x3              Speech                          Rate / Production:       Normal/ Responsive Emotional Talkative                          Volume:                       Normal               Mood:                                      Depressed               Thought Content:                    Clear               Thought Form:                        Coherent  Logical               Insight:                                     Good       Patient's impression of their current status:   The patient described reasons for engaging in therapy services during today's session. Patient reports pain all over, body aches, dizziness. Restless leg syndrome. Pain at night causing her to have difficulty staying asleep (describes it as nerve pain or weird sensation like a tingling - pins and needles). Patient believes that she may be overeating and this is impacting her body image and sense of self. Patient used to enjoy cooking and no longer has the energy to do this activity. Patient reports thoughts of being better off dead or not waking up but no plans or intent to self-harm. \"I want to live for my family.\"    The patient believes that the symptoms began in December.   The patient has engaged in psychotherapy services before over 10 years ago when her  was going through chemotherapy.   Patient states \"Right now I am just desperate to get better and get my life back.\"    Therapist impression of patients current state:   The patient presented for a virtual initial intake session with this provider via [employer] Patient is a 40 year old female. Patient was referred by  " "Esdras to engage in individual psychotherapy to address symptoms of anxiety and depression related to undiagnosed medical problems. The patient was cooperative and open-minded throughout the intake and easily engaged in dialogue. Therapist and patient reviewed consent and privacy policy. Patient provided consent per verbal agreement to participate in services. The patient has not engaged in outpatient psychotherapy services in the community in the past 10 years so there is no diagnostic assessment on file or available. The patient completed the following questionnaires for session today: WHODAS, PHQ-9, MING-7, and C-SSRS. No concerns about patient's safety or the safety of others per assessment today.  Therapist and patient will further review patient's history during the next follow-up encounter in order to complete a standard diagnostic assessment. Goals for treatment will be established after the 2nd or 3rd follow-up session with this provider. The patient plans to follow-up with psychotropic medication management with her PCP. Patient is scheduled to initiate psychiatry services next month.     Type of psychotherapeutic technique provided: CBT and supportive counseling and information gathering    Progress toward short term goals: Progress as expected, with patient openly expressing and sharing her story with the therapist. Patient appears motivated and eager to participate in services.    Review of long term goals: Not done at today's visit  \"I want to get back to work\"    Diagnosis:  1. Adjustment disorder with mixed anxiety and depressed mood      Plan and Follow up:   Therapist and patient will conduct an additional session needed to complete a Standard Diagnostic Assessment.  Patient is willing to participate in ongoing psychotherapy sessions in order to manage symptoms of anxiety and depression associated with medical condition.  Patient has requested help in process of filing for short-term disability " leave from work.   Therapist will contact patient and provide following contact information to request medical records:  [phone]  Fax - [phone]  [email or web address]    Discharge Criteria/Planning: Patient will continue with follow-up until therapy can be discontinued without return of signs and symptoms.    I have reviewed the note as documented above.  This accurately captures the substance of my conversation with the patient.  As the provider I attest to compliance with applicable laws and regulations related to telemedicine.  Performed and documented by Performed and documented by IK Josue 5/20/2020   [facility]

## 2022-07-30 NOTE — ED PROVIDER NOTE - ATTENDING CONTRIBUTION TO CARE
The patient is a 30y Female complaining of  I, Doug Martinez, performed a history and physical exam of the patient and discussed their management with the resident and /or advanced care provider. I reviewed the resident and /or ACP's note and agree with the documented findings and plan of care. I was present and available for all procedures.    Last drink was 2 days ago and endorses withdrawal like sxs and would like detox and given 5 of valium. Patient with rt breast mass with redness and will obtain US to eval and surgery c/s. Patient will need admission.

## 2022-07-30 NOTE — ED PROVIDER NOTE - PROGRESS NOTE DETAILS
Raj MOON (PGY-3)  pt noted to have elevated LFT's. upon speaking to pt, she states she has had this in the past due to her chronic heavy drinking. drinks 1 pint vodka daily; last use 2 days ago. will give iv valium and reassess symptoms. currently pending breast U/S to eval for abscess Raj MOON (PGY-3)  pt is attempting to stop drinking and would require admission for etoh w/drawal. spoke to gen surg team regarding breast abscess. will order pt for doxy Raj MOON (PGY-3)  spoke to hospitalist who will accept admission under tele for etoh w/drawal. surgery currently at bedside draining abscess

## 2022-07-31 DIAGNOSIS — D64.9 ANEMIA, UNSPECIFIED: ICD-10-CM

## 2022-07-31 DIAGNOSIS — R74.01 ELEVATION OF LEVELS OF LIVER TRANSAMINASE LEVELS: ICD-10-CM

## 2022-07-31 DIAGNOSIS — F10.239 ALCOHOL DEPENDENCE WITH WITHDRAWAL, UNSPECIFIED: ICD-10-CM

## 2022-07-31 DIAGNOSIS — Z29.9 ENCOUNTER FOR PROPHYLACTIC MEASURES, UNSPECIFIED: ICD-10-CM

## 2022-07-31 DIAGNOSIS — N61.1 ABSCESS OF THE BREAST AND NIPPLE: ICD-10-CM

## 2022-07-31 LAB
ALBUMIN SERPL ELPH-MCNC: 4.2 G/DL — SIGNIFICANT CHANGE UP (ref 3.3–5)
ALP SERPL-CCNC: 236 U/L — HIGH (ref 40–120)
ALT FLD-CCNC: 58 U/L — HIGH (ref 10–45)
AMPHET UR-MCNC: NEGATIVE — SIGNIFICANT CHANGE UP
ANION GAP SERPL CALC-SCNC: 13 MMOL/L — SIGNIFICANT CHANGE UP (ref 5–17)
AST SERPL-CCNC: 217 U/L — HIGH (ref 10–40)
BARBITURATES UR SCN-MCNC: NEGATIVE — SIGNIFICANT CHANGE UP
BASOPHILS # BLD AUTO: 0.04 K/UL — SIGNIFICANT CHANGE UP (ref 0–0.2)
BASOPHILS NFR BLD AUTO: 0.6 % — SIGNIFICANT CHANGE UP (ref 0–2)
BENZODIAZ UR-MCNC: NEGATIVE — SIGNIFICANT CHANGE UP
BILIRUB SERPL-MCNC: 2.1 MG/DL — HIGH (ref 0.2–1.2)
BUN SERPL-MCNC: 5 MG/DL — LOW (ref 7–23)
CALCIUM SERPL-MCNC: 9.5 MG/DL — SIGNIFICANT CHANGE UP (ref 8.4–10.5)
CHLORIDE SERPL-SCNC: 99 MMOL/L — SIGNIFICANT CHANGE UP (ref 96–108)
CO2 SERPL-SCNC: 26 MMOL/L — SIGNIFICANT CHANGE UP (ref 22–31)
COCAINE METAB.OTHER UR-MCNC: NEGATIVE — SIGNIFICANT CHANGE UP
CREAT SERPL-MCNC: 0.33 MG/DL — LOW (ref 0.5–1.3)
EGFR: 132 ML/MIN/1.73M2 — SIGNIFICANT CHANGE UP
EOSINOPHIL # BLD AUTO: 0.07 K/UL — SIGNIFICANT CHANGE UP (ref 0–0.5)
EOSINOPHIL NFR BLD AUTO: 1 % — SIGNIFICANT CHANGE UP (ref 0–6)
GLUCOSE SERPL-MCNC: 142 MG/DL — HIGH (ref 70–99)
HCT VFR BLD CALC: 32.1 % — LOW (ref 34.5–45)
HGB BLD-MCNC: 10.4 G/DL — LOW (ref 11.5–15.5)
IMM GRANULOCYTES NFR BLD AUTO: 0.3 % — SIGNIFICANT CHANGE UP (ref 0–1.5)
LYMPHOCYTES # BLD AUTO: 1.17 K/UL — SIGNIFICANT CHANGE UP (ref 1–3.3)
LYMPHOCYTES # BLD AUTO: 17.5 % — SIGNIFICANT CHANGE UP (ref 13–44)
MAGNESIUM SERPL-MCNC: 1.3 MG/DL — LOW (ref 1.6–2.6)
MCHC RBC-ENTMCNC: 25.4 PG — LOW (ref 27–34)
MCHC RBC-ENTMCNC: 32.4 GM/DL — SIGNIFICANT CHANGE UP (ref 32–36)
MCV RBC AUTO: 78.3 FL — LOW (ref 80–100)
METHADONE UR-MCNC: NEGATIVE — SIGNIFICANT CHANGE UP
MONOCYTES # BLD AUTO: 0.53 K/UL — SIGNIFICANT CHANGE UP (ref 0–0.9)
MONOCYTES NFR BLD AUTO: 7.9 % — SIGNIFICANT CHANGE UP (ref 2–14)
NEUTROPHILS # BLD AUTO: 4.87 K/UL — SIGNIFICANT CHANGE UP (ref 1.8–7.4)
NEUTROPHILS NFR BLD AUTO: 72.7 % — SIGNIFICANT CHANGE UP (ref 43–77)
NRBC # BLD: 0 /100 WBCS — SIGNIFICANT CHANGE UP (ref 0–0)
OPIATES UR-MCNC: NEGATIVE — SIGNIFICANT CHANGE UP
OXYCODONE UR-MCNC: NEGATIVE — SIGNIFICANT CHANGE UP
PCP SPEC-MCNC: SIGNIFICANT CHANGE UP
PCP UR-MCNC: NEGATIVE — SIGNIFICANT CHANGE UP
PHOSPHATE SERPL-MCNC: 2.6 MG/DL — SIGNIFICANT CHANGE UP (ref 2.5–4.5)
PLATELET # BLD AUTO: 161 K/UL — SIGNIFICANT CHANGE UP (ref 150–400)
POTASSIUM SERPL-MCNC: 3.4 MMOL/L — LOW (ref 3.5–5.3)
POTASSIUM SERPL-SCNC: 3.4 MMOL/L — LOW (ref 3.5–5.3)
PROT SERPL-MCNC: 7.3 G/DL — SIGNIFICANT CHANGE UP (ref 6–8.3)
RBC # BLD: 4.1 M/UL — SIGNIFICANT CHANGE UP (ref 3.8–5.2)
RBC # FLD: 21.4 % — HIGH (ref 10.3–14.5)
SODIUM SERPL-SCNC: 138 MMOL/L — SIGNIFICANT CHANGE UP (ref 135–145)
THC UR QL: NEGATIVE — SIGNIFICANT CHANGE UP
TSH SERPL-MCNC: 1.78 UIU/ML — SIGNIFICANT CHANGE UP (ref 0.27–4.2)
WBC # BLD: 6.7 K/UL — SIGNIFICANT CHANGE UP (ref 3.8–10.5)
WBC # FLD AUTO: 6.7 K/UL — SIGNIFICANT CHANGE UP (ref 3.8–10.5)

## 2022-07-31 PROCEDURE — 99222 1ST HOSP IP/OBS MODERATE 55: CPT

## 2022-07-31 PROCEDURE — 99223 1ST HOSP IP/OBS HIGH 75: CPT | Mod: GC

## 2022-07-31 PROCEDURE — 10061 I&D ABSCESS COMP/MULTIPLE: CPT

## 2022-07-31 RX ORDER — DIAZEPAM 5 MG
5 TABLET ORAL
Refills: 0 | Status: DISCONTINUED | OUTPATIENT
Start: 2022-07-31 | End: 2022-08-01

## 2022-07-31 RX ORDER — MAGNESIUM SULFATE 500 MG/ML
2 VIAL (ML) INJECTION ONCE
Refills: 0 | Status: COMPLETED | OUTPATIENT
Start: 2022-07-31 | End: 2022-07-31

## 2022-07-31 RX ORDER — FOLIC ACID 0.8 MG
1 TABLET ORAL DAILY
Refills: 0 | Status: DISCONTINUED | OUTPATIENT
Start: 2022-07-31 | End: 2022-08-01

## 2022-07-31 RX ORDER — VANCOMYCIN HCL 1 G
750 VIAL (EA) INTRAVENOUS EVERY 12 HOURS
Refills: 0 | Status: DISCONTINUED | OUTPATIENT
Start: 2022-07-31 | End: 2022-08-01

## 2022-07-31 RX ORDER — THIAMINE MONONITRATE (VIT B1) 100 MG
500 TABLET ORAL
Refills: 0 | Status: DISCONTINUED | OUTPATIENT
Start: 2022-07-31 | End: 2022-08-01

## 2022-07-31 RX ORDER — NICOTINE POLACRILEX 2 MG
1 GUM BUCCAL ONCE
Refills: 0 | Status: COMPLETED | OUTPATIENT
Start: 2022-07-31 | End: 2022-07-31

## 2022-07-31 RX ORDER — DIAZEPAM 5 MG
5 TABLET ORAL ONCE
Refills: 0 | Status: DISCONTINUED | OUTPATIENT
Start: 2022-07-31 | End: 2022-07-31

## 2022-07-31 RX ORDER — ACETAMINOPHEN 500 MG
650 TABLET ORAL EVERY 6 HOURS
Refills: 0 | Status: DISCONTINUED | OUTPATIENT
Start: 2022-07-31 | End: 2022-08-01

## 2022-07-31 RX ADMIN — Medication 500 MILLIGRAM(S): at 18:43

## 2022-07-31 RX ADMIN — Medication 1 PATCH: at 11:30

## 2022-07-31 RX ADMIN — Medication 1 PATCH: at 19:25

## 2022-07-31 RX ADMIN — Medication 25 GRAM(S): at 18:44

## 2022-07-31 RX ADMIN — Medication 100 MILLIGRAM(S): at 00:05

## 2022-07-31 RX ADMIN — Medication 1 MILLIGRAM(S): at 12:06

## 2022-07-31 RX ADMIN — Medication 500 MILLIGRAM(S): at 12:06

## 2022-07-31 RX ADMIN — Medication 5 MILLIGRAM(S): at 04:40

## 2022-07-31 RX ADMIN — Medication 1 PATCH: at 00:50

## 2022-07-31 RX ADMIN — Medication 250 MILLIGRAM(S): at 18:43

## 2022-07-31 RX ADMIN — Medication 1 TABLET(S): at 12:06

## 2022-07-31 NOTE — CONSULT NOTE ADULT - ATTENDING COMMENTS
42 year old lady w/ PMHx of alcohol use disorder who presents with 2 days of chest pain and palpitations a/w palpitations, hand tremors, "jitters" and 1 ep of vomiting yesterday morning. pt. also c/o of a R sided breast cyst that has gotten larger, more painful/tender, and redder over the past 2 weeks. She states she had a breast abscess that was drained by dermatology last year. She denies fevers, chills, nipple discharge. Breast u/s was performed showing a "heterogenous collection in the R breast s/p  bedside I+D and ID consulted for the same    WORKUP   Xray Chest (07.30): Clear lungs.  US Breast Limited, Right (07.30.): Heterogeneous collection in the right breast with increased vascularity and with tract extending to the skin surface, suggestive of abscess. Neoplasm difficult to exclude.    DIAGNOSIS and IMPRESSION  Rt Breast cellulitis and Abscess s/p I&D  Elevated Transaminases  Alcohol Withdrawal    Afebrile with no leukocytosis  s/p bedside I&D today  s/p doxy IV x 1  Currently on vancomycin  IVPB 750 every 12 hours (7/31 - )  Pt unclear how the infection started, possibly from an ingrown hair.     RECOMMENDATIONS  Will f/u breast abscess cx   f/u blood Cx  c/w Vancomycin for now  Please check Vancomycin trough before 4th sequential dose. Target trough levels 10-15  c/w wound care  trend LFT's , likely elevated due to Etoh abuse.     Micha Grewal  Please contact through MS Teams   If no response or past 5 pm/weekend call [phone].

## 2022-07-31 NOTE — ED ADULT NURSE REASSESSMENT NOTE - NS ED NURSE REASSESS COMMENT FT1
Pt resting, pt calm, reports slight anxiety but overall calm, VSS, CIWA score of 2, will cont to monitor, comfort and safety maintained.

## 2022-07-31 NOTE — ED ADULT NURSE NOTE - DOES PATIENT HAVE ADVANCE DIRECTIVE
Discharge Summary/Instructions after an Endoscopic Procedure  Patient Name: Forest Lopez  Patient MRN: [id number]  Patient YOB: 1951 Friday, March 25, 2022  Carlos Uriarte MD  RESTRICTIONS:  During your procedure today, you received medications for sedation.  These   medications may affect your judgment, balance and coordination.  Therefore,   for 24 hours, you have the following restrictions:   - DO NOT drive a car, operate machinery, make legal/financial decisions,   sign important papers or drink alcohol.    ACTIVITY:  Today: no heavy lifting, straining or running due to procedural   sedation/anesthesia.  The following day: return to full activity including work.  DIET:  Eat and drink normally unless instructed otherwise.     TREATMENT FOR COMMON SIDE EFFECTS:  - Mild abdominal pain, nausea, belching, bloating or excessive gas:  rest,   eat lightly and use a heating pad.  - Sore Throat: treat with throat lozenges and/or gargle with warm salt   water.  - Because air was used during the procedure, expelling large amounts of air   from your rectum or belching is normal.  - If a bowel prep was taken, you may not have a bowel movement for 1-3 days.    This is normal.  SYMPTOMS TO WATCH FOR AND REPORT TO YOUR PHYSICIAN:  1. Abdominal pain or bloating, other than gas cramps.  2. Chest pain.  3. Back pain.  4. Signs of infection such as: chills or fever occurring within 24 hours   after the procedure.  5. Rectal bleeding, which would show as bright red, maroon, or black stools.   (A tablespoon of blood from the rectum is not serious, especially if   hemorrhoids are present.)  6. Vomiting.  7. Weakness or dizziness.  GO DIRECTLY TO THE NEAREST EMERGENCY ROOM IF YOU HAVE ANY OF THE FOLLOWING:      Difficulty breathing              Chills and/or fever over 101 F   Persistent vomiting and/or vomiting blood   Severe abdominal pain   Severe chest pain   Black, tarry stools   Bleeding- more than one  tablespoon   Any other symptom or condition that you feel may need urgent attention  Your doctor recommends these additional instructions:  If any biopsies were taken, your doctors clinic will contact you in 1 to 2   weeks with any results.  - [facility] to [facility] for [facility] care.   - Resume previous diet.   - Continue present medications.  For questions, problems or results please call your physician - Carlos Uriarte MD at Work:  [phone].  [facility], EMERGENCY ROOM PHONE NUMBER: [phone], [facility]   [phone].  IF A COMPLICATION OR EMERGENCY SITUATION ARISES AND YOU ARE UNABLE TO REACH   YOUR PHYSICIAN - GO DIRECTLY TO THE EMERGENCY ROOM.  Carlos Uriarte MD  3/25/2022 7:08:01 AM  This report has been verified and signed electronically.  Dear patient,  As a result of recent federal legislation (The Federal Cures Act), you may   receive lab or pathology results from your procedure in your [facility]   account before your physician is able to contact you. Your physician or   their representative will relay the results to you with their   recommendations at their soonest availability.  Thank you,  [employer]ATION   No

## 2022-07-31 NOTE — CONSULT NOTE ADULT - SUBJECTIVE AND OBJECTIVE BOX
Patient is a 42y old  Female who presents with a chief complaint of alcohol withdrawal, breast abscess (31 Jul 2022 10:03)    HPI: Ms Zepeda is a 42 year old lady w/ PMHx of alcohol use disorder who presents with 2 days of chest pain and palpitations. On 7/30, pt. reported having L sided nonradiating, nonpleuritic chest tightness a/w palpitations, hand tremors, "jitters" and 1 ep of vomiting yesterday morning. Pt. states she has a long-standing hx of alcohol use since the age of 27, about 1 pint of vodka daily (last drink Thursday 7/28). The longest the pt. has gone without consuming alcohol was 7 days of detox followed by 30 days of rehab in 2018. She states she lost her job as a  this past May and her binge drinking episodes have worsened since then. Pt. also reports poor appetite, stating she eats one small meal per day. She states she is interested in detoxing during this admission. She denies headaches, visual changes, fevers, chills, cough, SOB, nausea, seizure-like activity, visual or tactile hallucinations, suicidal or homicidal ideation.  Of note, pt. also c/o of a R sided breast cyst that has gotten larger, more painful/tender, and redder over the past 2 weeks. She states she had a breast abscess that was drained by dermatology last year. She denies fevers, chills, nipple discharge.   In the ED, the pt was afebrile and hemodynamically stable. Labs notable for transaminitis (, ALT 80, , Tbili 1.5). Breast u/s was performed showing a "heterogenous collection in the R breast with increased vascularity and tract extending to the skin surface, suggestive of abscess." Surgery was consulted and a bedside I+D was performed. Pt. given doxycyline 100mg x 1 and diazepam x 2. ID consulted for the same       REVIEW OF SYSTEMS  [  ] ROS unobtainable because:    [ x ] All other systems negative except as noted below    Constitutional:  [ ] fever [ ] chills  [ ] weight loss  [ ]night sweat  [ ]poor appetite/PO intake [ ]fatigue   Skin:  [ ] rash [ ] phlebitis	  Eyes: [ ] icterus [ ] pain  [ ] discharge	  ENMT: [ ] sore throat  [ ] thrush [ ] ulcers [ ] exudates [ ]anosmia  Respiratory: [ ] dyspnea [ ] hemoptysis [ ] cough [ ] sputum	  Cardiovascular:  [ ] chest pain [ ] palpitations [ ] edema	  Gastrointestinal:  [ ] nausea [ ] vomiting [ ] diarrhea [ ] constipation [ ] pain	  Genitourinary:  [ ] dysuria [ ] frequency [ ] hematuria [ ] discharge [ ] flank pain  [ ] incontinence  Musculoskeletal:  [ ] myalgias [ ] arthralgias [ ] arthritis  [ ] back pain  Neurological:  [ ] headache [ ] weakness [ ] seizures  [ ] confusion/altered mental status    prior hospital charts reviewed [V]  primary team notes reviewed [V]  other consultant notes reviewed [V]    PAST MEDICAL & SURGICAL HISTORY:  History of alcohol use disorder 1 pint of vodka/day  No significant past surgical history      SOCIAL HISTORY:  - Denied smoking/vaping/alcohol/recreational drug use    FAMILY HISTORY:  No pertinent family history in first degree relatives        Allergies  No Known Allergies        ANTIMICROBIALS:  vancomycin  IVPB 750 every 12 hours      ANTIMICROBIALS (past 90 days):  MEDICATIONS  (STANDING):  doxycycline IVPB   100 mL/Hr IV Intermittent (07-31-22 @ 00:05)        OTHER MEDS:   MEDICATIONS  (STANDING):  acetaminophen     Tablet .. 650 every 6 hours PRN  diazepam  Injectable 5 every 1 hour PRN      VITALS:  Vital Signs Last 24 Hrs  T(F): 98.1 (07-31-22 @ 16:08), Max: 99.4 (07-30-22 @ 17:15)    Vital Signs Last 24 Hrs  HR: 68 (07-31-22 @ 16:08) (66 - 91)  BP: 129/79 (07-31-22 @ 16:08) (118/65 - 154/94)  RR: 18 (07-31-22 @ 16:08)  SpO2: 99% (07-31-22 @ 16:08) (94% - 99%)  Wt(kg): --    EXAM:    GA: NAD, AOx3  HEENT: oral cavity no lesion  CV: nl S1/S2, no RMG  Lungs: CTAB, No distress  Abd: BS+, soft, nontender, no rebounding pain  Ext: no edema  Neuro: No focal deficits  Skin: Intact  IV: no phlebitis    Labs:                        10.4   6.70  )-----------( 161      ( 31 Jul 2022 11:02 )             32.1     07-31    138  |  99  |  5<L>  ----------------------------<  142<H>  3.4<L>   |  26  |  0.33<L>    Ca    9.5      31 Jul 2022 11:02  Phos  2.6     07-31  Mg     1.3     07-31    TPro  7.3  /  Alb  4.2  /  TBili  2.1<H>  /  DBili  x   /  AST  217<H>  /  ALT  58<H>  /  AlkPhos  236<H>  07-31      WBC Trend:  WBC Count: 6.70 (07-31-22 @ 11:02)  WBC Count: 9.16 (07-30-22 @ 19:22)      Auto Neutrophil #: 4.87 K/uL (07-31-22 @ 11:02)  Auto Neutrophil #: 7.89 K/uL (07-30-22 @ 19:22)      Creatine Trend:  Creatinine, Serum: 0.33 (07-31)  Creatinine, Serum: 0.44 (07-30)      Liver Biochemical Testing Trend:  Alanine Aminotransferase (ALT/SGPT): 58 *H* (07-31)  Alanine Aminotransferase (ALT/SGPT): 80 *H* (07-30)  Aspartate Aminotransferase (AST/SGOT): 217 (07-31-22 @ 11:02)  Aspartate Aminotransferase (AST/SGOT): 397 (07-30-22 @ 19:22)  Bilirubin Total, Serum: 2.1 (07-31)  Bilirubin Total, Serum: 1.5 (07-30)      Trend LDH      Auto Eosinophil %: 1.0 % (07-31-22 @ 11:02)  Auto Eosinophil %: 0.0 % (07-30-22 @ 19:22)          MICROBIOLOGY:        COVID-19 PCR: NotDetec (07-30-22 @ 19:23)      RADIOLOGY:  imaging below personally reviewed    < from: Xray Chest 2 Views PA/Lat (07.30.22 @ 18:57) >  Clear lungs.  < end of copied text >    < from: US Breast Limited, Right (07.30.22 @ 21:56) >  Heterogeneous collection in the right breast with increased vascularity and with tract extending to the skin surface, suggestive of abscess. Neoplasm difficult to exclude.  < end of copied text >     Patient is a 42y old  Female who presents with a chief complaint of alcohol withdrawal, breast abscess (31 Jul 2022 10:03)    HPI: Ms Zepeda is a 42 year old lady w/ PMHx of alcohol use disorder who presents with 2 days of chest pain and palpitations. On 7/30, pt. reported having L sided nonradiating, nonpleuritic chest tightness a/w palpitations, hand tremors, "jitters" and 1 ep of vomiting yesterday morning. Pt. states she has a long-standing hx of alcohol use since the age of 27, about 1 pint of vodka daily (last drink Thursday 7/28). Of note, pt. also c/o of a R sided breast cyst that has gotten larger, more painful/tender, and redder over the past 2 weeks. She states she had a breast abscess that was drained by dermatology last year. She denies fevers, chills, nipple discharge.   In the ED, the pt was afebrile and hemodynamically stable. Labs notable for transaminitis (, ALT 80, , Tbili 1.5). Breast u/s was performed showing a "heterogenous collection in the R breast with increased vascularity and tract extending to the skin surface, suggestive of abscess." Surgery was consulted and a bedside I+D was performed. Pt. given doxycyline 100mg x 1 and diazepam x 2. ID consulted for the same.  Pt unclear how the infection started, possibly from an ingrown hair.        REVIEW OF SYSTEMS  [  ] ROS unobtainable because:    [ x ] All other systems negative except as noted below    Constitutional:  [ ] fever [ ] chills  [ ] weight loss  [ ]night sweat  [ ]poor appetite/PO intake [ ]fatigue   Skin:  [ ] rash [ ] phlebitis	  Eyes: [ ] icterus [ ] pain  [ ] discharge	  ENMT: [ ] sore throat  [ ] thrush [ ] ulcers [ ] exudates [ ]anosmia  Respiratory: [ ] dyspnea [ ] hemoptysis [ ] cough [ ] sputum	  Cardiovascular:  [ ] chest pain [ ] palpitations [ ] edema	  Gastrointestinal:  [ ] nausea [ ] vomiting [ ] diarrhea [ ] constipation [ ] pain	  Genitourinary:  [ ] dysuria [ ] frequency [ ] hematuria [ ] discharge [ ] flank pain  [ ] incontinence  Musculoskeletal:  [ ] myalgias [ ] arthralgias [ ] arthritis  [ ] back pain  Neurological:  [ ] headache [ ] weakness [ ] seizures  [ ] confusion/altered mental status    prior hospital charts reviewed [V]  primary team notes reviewed [V]  other consultant notes reviewed [V]    PAST MEDICAL & SURGICAL HISTORY:  History of alcohol use disorder 1 pint of vodka/day  No significant past surgical history      SOCIAL HISTORY:  - Denied smoking/vaping/alcohol/recreational drug use    FAMILY HISTORY:  No pertinent family history in first degree relatives        Allergies  No Known Allergies        ANTIMICROBIALS:  vancomycin  IVPB 750 every 12 hours      ANTIMICROBIALS (past 90 days):  MEDICATIONS  (STANDING):  doxycycline IVPB   100 mL/Hr IV Intermittent (07-31-22 @ 00:05)        OTHER MEDS:   MEDICATIONS  (STANDING):  acetaminophen     Tablet .. 650 every 6 hours PRN  diazepam  Injectable 5 every 1 hour PRN      VITALS:  Vital Signs Last 24 Hrs  T(F): 98.1 (07-31-22 @ 16:08), Max: 99.4 (07-30-22 @ 17:15)    Vital Signs Last 24 Hrs  HR: 68 (07-31-22 @ 16:08) (66 - 91)  BP: 129/79 (07-31-22 @ 16:08) (118/65 - 154/94)  RR: 18 (07-31-22 @ 16:08)  SpO2: 99% (07-31-22 @ 16:08) (94% - 99%)  Wt(kg): --    EXAM:    Constitutional: Not in acute distress  Eyes: pupils bilaterally reactive to light. No icterus.  Oral cavity: Clear, no lesions  Neck: No neck vein distension noted  RS: Chest clear to auscultation bilaterally. No wheeze/rhonchi/crepitations.  CVS: S1, S2 heard. Regular rate and rhythm. No murmurs/rubs/gallops.  Abdomen: Soft. No guarding/rigidity/tenderness.  : No acute abnormalities  Extremities: Warm. No pedal edema  Skin: Right breast medial upper quadrant with a 3x2 cm area of erythema warmth, tenderness and induration with drain  Vascular: No evidence of phlebitis  Neuro: Alert, oriented to time/place/person  Cranial nerves 2-12 grossly normal. No focal abnormalities      Labs:                        10.4   6.70  )-----------( 161      ( 31 Jul 2022 11:02 )             32.1     07-31    138  |  99  |  5<L>  ----------------------------<  142<H>  3.4<L>   |  26  |  0.33<L>    Ca    9.5      31 Jul 2022 11:02  Phos  2.6     07-31  Mg     1.3     07-31    TPro  7.3  /  Alb  4.2  /  TBili  2.1<H>  /  DBili  x   /  AST  217<H>  /  ALT  58<H>  /  AlkPhos  236<H>  07-31      WBC Trend:  WBC Count: 6.70 (07-31-22 @ 11:02)  WBC Count: 9.16 (07-30-22 @ 19:22)      Auto Neutrophil #: 4.87 K/uL (07-31-22 @ 11:02)  Auto Neutrophil #: 7.89 K/uL (07-30-22 @ 19:22)      Creatine Trend:  Creatinine, Serum: 0.33 (07-31)  Creatinine, Serum: 0.44 (07-30)      Liver Biochemical Testing Trend:  Alanine Aminotransferase (ALT/SGPT): 58 *H* (07-31)  Alanine Aminotransferase (ALT/SGPT): 80 *H* (07-30)  Aspartate Aminotransferase (AST/SGOT): 217 (07-31-22 @ 11:02)  Aspartate Aminotransferase (AST/SGOT): 397 (07-30-22 @ 19:22)  Bilirubin Total, Serum: 2.1 (07-31)  Bilirubin Total, Serum: 1.5 (07-30)      Trend LDH      Auto Eosinophil %: 1.0 % (07-31-22 @ 11:02)  Auto Eosinophil %: 0.0 % (07-30-22 @ 19:22)          MICROBIOLOGY:        COVID-19 PCR: NotDetec (07-30-22 @ 19:23)      RADIOLOGY:  imaging below personally reviewed    < from: Xray Chest 2 Views PA/Lat (07.30.22 @ 18:57) >  Clear lungs.  < end of copied text >    < from: US Breast Limited, Right (07.30.22 @ 21:56) >  Heterogeneous collection in the right breast with increased vascularity and with tract extending to the skin surface, suggestive of abscess. Neoplasm difficult to exclude.  < end of copied text >     Patient is a 42y old  Female who presents with a chief complaint of alcohol withdrawal, breast abscess (31 Jul 2022 10:03)    HPI: Ms Zepeda is a 42 year old lady w/ PMHx of alcohol use disorder who presents with 2 days of chest pain and palpitations. On 7/30, pt. reported having L sided nonradiating, nonpleuritic chest tightness a/w palpitations, hand tremors, "jitters" and 1 ep of vomiting yesterday morning. Pt. states she has a long-standing hx of alcohol use since the age of 27, about 1 pint of vodka daily (last drink Thursday 7/28). Of note, pt. also c/o of a R sided breast cyst that has gotten larger, more painful/tender, and redder over the past 2 weeks. She states she had a breast abscess that was drained by dermatology last year. She denies fevers, chills, nipple discharge.   In the ED, the pt was afebrile and hemodynamically stable. Labs notable for transaminitis (, ALT 80, , Tbili 1.5). Breast u/s was performed showing a "heterogenous collection in the R breast with increased vascularity and tract extending to the skin surface, suggestive of abscess." Surgery was consulted and a bedside I+D was performed. Pt. given doxycyline 100mg x 1 and diazepam x 2. ID consulted for the same.  Pt unclear how the infection started, possibly from an ingrown hair.        REVIEW OF SYSTEMS  [  ] ROS unobtainable because:    [ x ] All other systems negative except as noted below    Constitutional:  [ ] fever [ ] chills    Skin:  [ ] rash [ ] phlebitis, rest per hpi 	  Eyes: [ ] icterus [ ] pain  [ ] discharge	  ENMT: [ ] sore throat  [ ] thrush  Respiratory: [ ] dyspnea [ ] cough [ ] sputum	  Cardiovascular:  [ ] chest pain [ ] palpitations   Gastrointestinal:  [ ] nausea [ ] vomiting [ ] diarrhea [ ] constipation [ ] pain	  Genitourinary:  [ ] dysuria [ ] frequency   Musculoskeletal: [ ] arthritis  [ ] back pain  Neurological:  [ ] headache  [ ] confusion/altered mental status  Extremities: No swelling       prior hospital charts reviewed [V]  primary team notes reviewed [V]  other consultant notes reviewed [V]    PAST MEDICAL & SURGICAL HISTORY:  History of alcohol use disorder 1 pint of vodka/day  No significant past surgical history      SOCIAL HISTORY:  - + EtOH abuse, current smoker. lives alone.       FAMILY HISTORY:  No pertinent family history of breast abscess in first degree relatives        Allergies  No Known Allergies        ANTIMICROBIALS:  vancomycin  IVPB 750 every 12 hours      ANTIMICROBIALS (past 90 days):  MEDICATIONS  (STANDING):  doxycycline IVPB   100 mL/Hr IV Intermittent (07-31-22 @ 00:05)        OTHER MEDS:   MEDICATIONS  (STANDING):  acetaminophen     Tablet .. 650 every 6 hours PRN  diazepam  Injectable 5 every 1 hour PRN      VITALS:  Vital Signs Last 24 Hrs  T(F): 98.1 (07-31-22 @ 16:08), Max: 99.4 (07-30-22 @ 17:15)    Vital Signs Last 24 Hrs  HR: 68 (07-31-22 @ 16:08) (66 - 91)  BP: 129/79 (07-31-22 @ 16:08) (118/65 - 154/94)  RR: 18 (07-31-22 @ 16:08)  SpO2: 99% (07-31-22 @ 16:08) (94% - 99%)  Wt(kg): --    EXAM:    Constitutional: Not in acute distress  Eyes: No icterus.  Oral cavity: Clear, no lesions  Neck: No neck vein distension noted  RS: Chest clear to auscultation bilaterally.   CVS: S1, S2 normal   Abdomen: Soft. No tenderness.  : No cordero   Extremities: Warm. No pedal edema  Skin: Right breast medial upper quadrant with a 3x2 cm area of erythema warmth, tenderness and induration with drain  Vascular: No evidence of phlebitis  Neuro: Alert, oriented to time/place/person        Labs:                        10.4   6.70  )-----------( 161      ( 31 Jul 2022 11:02 )             32.1     07-31    138  |  99  |  5<L>  ----------------------------<  142<H>  3.4<L>   |  26  |  0.33<L>    Ca    9.5      31 Jul 2022 11:02  Phos  2.6     07-31  Mg     1.3     07-31    TPro  7.3  /  Alb  4.2  /  TBili  2.1<H>  /  DBili  x   /  AST  217<H>  /  ALT  58<H>  /  AlkPhos  236<H>  07-31      WBC Trend:  WBC Count: 6.70 (07-31-22 @ 11:02)  WBC Count: 9.16 (07-30-22 @ 19:22)      Auto Neutrophil #: 4.87 K/uL (07-31-22 @ 11:02)  Auto Neutrophil #: 7.89 K/uL (07-30-22 @ 19:22)      Creatine Trend:  Creatinine, Serum: 0.33 (07-31)  Creatinine, Serum: 0.44 (07-30)      Liver Biochemical Testing Trend:  Alanine Aminotransferase (ALT/SGPT): 58 *H* (07-31)  Alanine Aminotransferase (ALT/SGPT): 80 *H* (07-30)  Aspartate Aminotransferase (AST/SGOT): 217 (07-31-22 @ 11:02)  Aspartate Aminotransferase (AST/SGOT): 397 (07-30-22 @ 19:22)  Bilirubin Total, Serum: 2.1 (07-31)  Bilirubin Total, Serum: 1.5 (07-30)      Trend LDH      Auto Eosinophil %: 1.0 % (07-31-22 @ 11:02)  Auto Eosinophil %: 0.0 % (07-30-22 @ 19:22)          MICROBIOLOGY:        COVID-19 PCR: NotDetec (07-30-22 @ 19:23)      RADIOLOGY:  imaging below personally reviewed except U/S     < from: Xray Chest 2 Views PA/Lat (07.30.22 @ 18:57) >  Clear lungs.      < from: US Breast Limited, Right (07.30.22 @ 21:56) >  Heterogeneous collection in the right breast with increased vascularity and with tract extending to the skin surface, suggestive of abscess. Neoplasm difficult to exclude.

## 2022-07-31 NOTE — H&P ADULT - NSHPPHYSICALEXAM_GEN_ALL_CORE
VITALS:   T(C): 36.7 (07-31-22 @ 07:12), Max: 37.4 (07-30-22 @ 17:15)  HR: 66 (07-31-22 @ 07:12) (66 - 91)  BP: 141/79 (07-31-22 @ 07:12) (125/87 - 154/94)  RR: 22 (07-31-22 @ 07:12) (17 - 22)  SpO2: 99% (07-31-22 @ 07:12) (94% - 99%)    GENERAL: NAD, anxious-appearing  HEENT: AT/NC, EOMI, PERRLA, conjunctiva and sclera clear; no scleral icterus  HEART: S1, S2, regular rate and rhythm, no murmurs, rubs, or gallops  LUNGS: Unlabored respirations.  Clear to auscultation bilaterally, no crackles, wheezing, or rhonchi  ABDOMEN: Soft, nondistended, +BS, +RUQ tenderness on deep palpation  EXTREMITIES: 2+ peripheral pulses bilaterally. No clubbing, cyanosis, or edema  NERVOUS SYSTEM:  A&Ox3; b/l hand tremors, no tongue fasciculations  SKIN: R breast erythema and tenderness overlying incision; small drain in place

## 2022-07-31 NOTE — H&P ADULT - PROBLEM SELECTOR PLAN 2
Pt. with R breast cyst with tenderness to touch, erythema, worse since the past 2 weeks; s/p surgical incision and drainage in ED and doxycycline 100mg x1  - US R breast (7/30): Heterogeneous collection in the right breast with increased vascularity   and with tract extending to the skin surface, suggestive of abscess; neoplasm cannot be ruled out  - f/u breast abscess cx  - f/u BCx  - c/w doxycycline 100mg BID  - possible mammogram to r/o neoplasm Pt. with R breast cyst with tenderness to touch, erythema, worse since the past 2 weeks; s/p surgical incision and drainage in ED and doxycycline 100mg x1  - US R breast (7/30): Heterogeneous collection in the right breast with increased vascularity   and with tract extending to the skin surface, suggestive of abscess; neoplasm cannot be ruled out  - ID consulted; appreciate recs  - f/u breast abscess cx  - f/u BCx  - c/w doxycycline 100mg BID  - possible mammogram to r/o neoplasm Pt. with R breast cyst with tenderness to touch, erythema, worse since the past 2 weeks; s/p surgical incision and drainage in ED and doxycycline 100mg x1  - US R breast (7/30): Heterogeneous collection in the right breast with increased vascularity   and with tract extending to the skin surface, suggestive of abscess; neoplasm cannot be ruled out  - ID consulted; appreciate recs  - f/u breast abscess cx  - f/u BCx  - started vancomycin for MRSA coverage for now  - possible mammogram to r/o neoplasm

## 2022-07-31 NOTE — H&P ADULT - NSHPREVIEWOFSYSTEMS_GEN_ALL_CORE
CONSTITUTIONAL: No weakness, fevers or chills  EYES/ENT: No visual changes;  No vertigo or throat pain   NECK: No pain or stiffness  RESPIRATORY: No cough, wheezing, hemoptysis; No shortness of breath  CARDIOVASCULAR: +chest pain, +palpitations  GASTROINTESTINAL: No abdominal or epigastric pain. No nausea, vomiting, or hematemesis; No diarrhea or constipation. No melena or hematochezia.  GENITOURINARY: No dysuria, frequency or hematuria  NEUROLOGICAL: +tremors; +anxiety; No numbness or weakness  SKIN: +R breast erythema

## 2022-07-31 NOTE — H&P ADULT - PROBLEM SELECTOR PLAN 3
Pt. noted to have elevated LFTs on admission (, ALT 80, ), likely in the setting of chronic alcohol use disorder  - trend LFTs  - f/u hepatitis panel  - f/u RUQ u/s  - continue to monitor Pt. noted to have elevated LFTs on admission (, ALT 80, ), likely in the setting of chronic alcohol use disorder  - trend LFTs and INR  - f/u hepatitis panel  - Maddrey score for possible steroid administration  - f/u RUQ u/s  - continue to monitor

## 2022-07-31 NOTE — CONSULT NOTE ADULT - ASSESSMENT
Ms Zepeda is a 42 year old lady w/ PMHx of alcohol use disorder who presents with 2 days of chest pain and palpitations a/w palpitations, hand tremors, "jitters" and 1 ep of vomiting yesterday morning. pt. also c/o of a R sided breast cyst that has gotten larger, more painful/tender, and redder over the past 2 weeks. She states she had a breast abscess that was drained by dermatology last year. She denies fevers, chills, nipple discharge. Breast u/s was performed showing a "heterogenous collection in the R breast s/p  bedside I+D and ID consulted for the same    WORKUP   Xray Chest (07.30): Clear lungs.  US Breast Limited, Right (07.30.): Heterogeneous collection in the right breast with increased vascularity and with tract extending to the skin surface, suggestive of abscess. Neoplasm difficult to exclude.    DIAGNOSIS and IMPRESSION  ·	Breast Abscess  ·	Elevated Transaminases  ·	Alcohol Withdrawal    Afebrile with no leukocytosis  s/p bedside I&D today  s/p doxy IV x 1  Currently on vancomycin  IVPB 750 every 12 hours (7/31 - )    RECOMMENDATIONS  Will f/u breast abscess cx and Blood Cx      PT TO BE SEEN. PRELIM NOTE  PENDING RECS. PLEASE WAIT FOR FINAL RECS AFTER DISCUSSION WITH ATTENDINGChris Brown MD, PGY5  ID fellow  [employer] Teams Preferred  After 5pm/weekends call [phone]   Ms Zepeda is a 42 year old lady w/ PMHx of alcohol use disorder who presents with 2 days of chest pain and palpitations a/w palpitations, hand tremors, "jitters" and 1 ep of vomiting yesterday morning. pt. also c/o of a R sided breast cyst that has gotten larger, more painful/tender, and redder over the past 2 weeks. She states she had a breast abscess that was drained by dermatology last year. She denies fevers, chills, nipple discharge. Breast u/s was performed showing a "heterogenous collection in the R breast s/p  bedside I+D and ID consulted for the same    WORKUP   Xray Chest (07.30): Clear lungs.  US Breast Limited, Right (07.30.): Heterogeneous collection in the right breast with increased vascularity and with tract extending to the skin surface, suggestive of abscess. Neoplasm difficult to exclude.    DIAGNOSIS and IMPRESSION  ·	Rt Breast Abscess s/p I&D  ·	Elevated Transaminases  ·	Alcohol Withdrawal    Afebrile with no leukocytosis  s/p bedside I&D today  s/p doxy IV x 1  Currently on vancomycin  IVPB 750 every 12 hours (7/31 - )  Pt unclear how the infection started, possibly from an ingrown hair.     RECOMMENDATIONS  Will f/u breast abscess cx and Blood Cx  c/w Vancomycin for now  Please check Vancomycin trough before 4th sequential dose. Target trough levels 10-15    Pt seen and examined. Case d/w attending and primary team    Aroldo Brown MD, PGY5  ID fellow  [employer] Teams Preferred  After 5pm/weekends call [phone]

## 2022-07-31 NOTE — CONSULT NOTE ADULT - SUBJECTIVE AND OBJECTIVE BOX
Breast Surgery Consult  Consulting surgical team: [facility]  Consulting attending: Dr. Barroso    HPI:  Pt is a 41 yo F w/ PMH of alcohol abuse who presents to the ER with palpitations and a R breast abscess. Pt reports she had a similar breast abscess a few years ago that was drained by her dermatologist. She denies fevers/chills. She reports pain at the site. She reports the site has been red, denies drainage.     PAST MEDICAL HISTORY:  No pertinent past medical history        PAST SURGICAL HISTORY:  No significant past surgical history        MEDICATIONS:      ALLERGIES:  No Known Allergies      VITALS & I/Os:  Vital Signs Last 24 Hrs  T(C): 37.3 (31 Jul 2022 00:00), Max: 37.4 (30 Jul 2022 17:15)  T(F): 99.1 (31 Jul 2022 00:00), Max: 99.4 (30 Jul 2022 17:15)  HR: 68 (31 Jul 2022 00:00) (68 - 91)  BP: 131/82 (31 Jul 2022 00:00) (125/87 - 154/94)  BP(mean): --  RR: 18 (31 Jul 2022 00:00) (17 - 20)  SpO2: 96% (31 Jul 2022 00:00) (94% - 98%)    Parameters below as of 31 Jul 2022 00:00  Patient On (Oxygen Delivery Method): room air        I&O's Summary      PHYSICAL EXAM:  General: No acute distress  Respiratory: Nonlabored  Cardiovascular: RRR  Breast: large 4cm x 4cm area of fluctuance over medial right breast, tender, no drainage, erythematous  Abdominal: Soft, nondistended, nontender. No rebound or guarding. No organomegaly, no palpable mass.  Extremities: Warm    LABS:                        10.6   9.16  )-----------( 202      ( 30 Jul 2022 19:22 )             32.3     07-30    140  |  97  |  6<L>  ----------------------------<  120<H>  4.0   |  27  |  0.44<L>    Ca    9.4      30 Jul 2022 19:22    TPro  7.7  /  Alb  4.5  /  TBili  1.5<H>  /  DBili  x   /  AST  397<H>  /  ALT  80<H>  /  AlkPhos  258<H>  07-30    Lactate:                  IMAGING:  < from: US Breast Limited, Right (07.30.22 @ 21:56) >  IMPRESSION:    Heterogeneous collection in the right breast with increased vascularity   and with tract extending to the skin surface, suggestive of abscess.   Neoplasm difficult to exclude.    Please note this is not a substitute for dedicated breast ultrasound. As   clinically indicated, breast surgery consultation should be obtained,   with referral to a mammography service for dedicated breast ultrasound   and or mammograms as warranted to exclude neoplasm.    < end of copied text >

## 2022-07-31 NOTE — H&P ADULT - NSHPLABSRESULTS_GEN_ALL_CORE
LABS:                        10.6   9.16  )-----------( 202      ( 30 Jul 2022 19:22 )             32.3     07-30    140  |  97  |  6<L>  ----------------------------<  120<H>  4.0   |  27  |  0.44<L>    Ca    9.4      30 Jul 2022 19:22    TPro  7.7  /  Alb  4.5  /  TBili  1.5<H>  /  DBili  x   /  AST  397<H>  /  ALT  80<H>  /  AlkPhos  258<H>  07-30    RADIOLOGY:  < from: US Breast Limited, Right (07.30.22 @ 21:56) >    IMPRESSION:    Heterogeneous collection in the right breast with increased vascularity   and with tract extending to the skin surface, suggestive of abscess.   Neoplasm difficult to exclude.    Please note this is not a substitute for dedicated breast ultrasound. As   clinically indicated, breast surgery consultation should be obtained,   with referral to a mammography service for dedicated breast ultrasound   and or mammograms as warranted to exclude neoplasm. LABS:                        10.4   6.70  )-----------( 161      ( 31 Jul 2022 11:02 )             32.1     07-31    138  |  99  |  5<L>  ----------------------------<  142<H>  3.4<L>   |  26  |  0.33<L>    Ca    9.5      31 Jul 2022 11:02  Phos  2.6     07-31  Mg     1.3     07-31    TPro  7.3  /  Alb  4.2  /  TBili  2.1<H>  /  DBili  x   /  AST  217<H>  /  ALT  58<H>  /  AlkPhos  236<H>  07-31    RADIOLOGY:  < from: US Breast Limited, Right (07.30.22 @ 21:56) >    IMPRESSION:    Heterogeneous collection in the right breast with increased vascularity   and with tract extending to the skin surface, suggestive of abscess.   Neoplasm difficult to exclude.    Please note this is not a substitute for dedicated breast ultrasound. As   clinically indicated, breast surgery consultation should be obtained,   with referral to a mammography service for dedicated breast ultrasound   and or mammograms as warranted to exclude neoplasm.

## 2022-07-31 NOTE — H&P ADULT - ATTENDING COMMENTS
42F with ETOH abuse disorder p/w L breast abscess, s/p I&D.  Concern for impending ETOH withdrawal given degree of daily ETOH use  Alcohol level 0. Elevated LFTs  -Given liver disease, valium PRN symptom triggered CIWA  -Trend LFTs daily; if uptrending, consider treatment for alcoholic hepatitis  -cont doxycycline for abscess; f/u cultures  -ID consult  -SW consult for ETOH cessation services  -F/u U/S RUQ  Rest as documented above    [facility] Division of Hospital Medicine  Marianne Gautam MD  Pager (M-F, 8A-5P): [phone]  Other Times:  [phone]

## 2022-07-31 NOTE — H&P ADULT - SOCIAL HISTORY: ALCOHOL USE
current alcohol use disorder, 1 pint of vodka/day since the age of 27, last drink 7/28  longest period without alcohol: 7 days of detox + 30 days of rehab in 2018

## 2022-07-31 NOTE — H&P ADULT - PROBLEM SELECTOR PLAN 1
Pt. w/ alcohol use disorder (1 pint of vodka/daily, last drink 7/28) who p/w hand tremors, jitters, anxiety, palpitations, and chest pain  - LILLIAN <10  - f/u tox screen  - c/w symptom-triggered Librium 50mg q1h PRN  - c/w thiamine 500mg TID, folic acid 1mg qd  - continue to monitor Pt. w/ alcohol use disorder (1 pint of vodka/daily, last drink 7/28) who p/w hand tremors, jitters, anxiety, palpitations, and chest pain  - LILLIAN <10; tox screen negative  - c/w symptom-triggered Librium 50mg q1h PRN  - c/w thiamine 500mg TID, folic acid 1mg qd  - continue to monitor Pt. w/ alcohol use disorder (1 pint of vodka/daily, last drink 7/28) who p/w hand tremors, jitters, anxiety, palpitations, and chest pain  - LILLIAN <10; tox screen negative  - c/w CIWA taper w/ Librium  - c/w thiamine 500mg TID, folic acid 1mg qd  - continue to monitor Pt. w/ alcohol use disorder (1 pint of vodka/daily, last drink 7/28) who p/w hand tremors, jitters, anxiety, palpitations, and chest pain  - LILLIAN <10; tox screen negative  - c/w symptom-triggered Valium  - c/w thiamine 500mg TID, folic acid 1mg qd  - continue to monitor

## 2022-07-31 NOTE — CONSULT NOTE ADULT - ASSESSMENT
Pt is a 41 yo F w/ PMH of alcohol abuse who presents to the ER with palpitations and a R breast abscess, drained at bedside and penrose left in place.     -if pt is admitted will follow  -no further surgical intervention    To be discussed w/ Dr. Barroso  [facility], [id number]

## 2022-07-31 NOTE — H&P ADULT - HISTORY OF PRESENT ILLNESS
42F w/ PMHx of alcohol use disorder who presents with 2 days of chest pain and palpitations. On 7/30, pt. reported having L sided nonradiating, nonpleuritic chest tightness a/w palpitations, hand tremors, "jitters" and 1 ep of vomiting yesterday morning. Pt. states she has a long-standing hx of alcohol use since the age of 27, about 1 pint of vodka daily (last drink Thursday 7/28). The longest the pt. has gone without consuming alcohol was 7 days of detox followed by 30 days of rehab in 2018. She states she lost her job as a  this past May and her binge drinking episodes have worsened since then. Pt. also reports poor appetite, stating she eats one small meal per day. She states she is interested in detoxing during this admission. She denies headaches, visual changes, fevers, chills, cough, SOB, nausea, seizure-like activity, visual or tactile hallucinations, suicidal or homicidal ideation.      Of note, pt. also c/o of a R sided breast cyst that has gotten larger, more painful/tender, and redder over the past 2 weeks. She states she had a breast abscess that was drained by dermatology last year. She denies fevers, chills, nipple discharge.     In the ED, the pt was afebrile and hemodynamically stable. Labs notable for transaminitis (, ALT 80, , Tbili 1.5). Breast u/s was performed showing a "heterogenous collection in the R breast with increased vascularity and tract extending to the skin surface, suggestive of abscess." Surgery was consulted and a bedside I+D was performed. Pt. given doxycyline 100mg x 1 and diazepam x 2.

## 2022-07-31 NOTE — ED ADULT NURSE NOTE - OBJECTIVE STATEMENT
Patient complains of chest pain, palpitations, and abscess on right breast and another on the back of her neck. Patient is AOx4, and ambulatory. She reports drinking frequently, and is a current smoker.

## 2022-07-31 NOTE — ED ADULT NURSE NOTE - NSFALLRSKPASTHIST_ED_ALL_ED
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  \par Testosterone level is pending.  He took IM testosterone 7 days prior.\par PSA is 1.05.\par 2.  Hashimoto's thyroiditis/primary hypothyroidism\par TFTs are normal.  Continue levothyroxine 100 mcg daily.\par 3.  Osteoporosis\par Check DEXA.\par He was told to start Prolia in the past but did not take it due to dental work.\par 4.  Thyroid nodule\par Thyroid ultrasound from February 1, 2019 showed 7 x 5 x 6 mm questionable exophytic thyroid nodule inferior to the right lower pole (parathyroid adenoma or lymph node).\par Check thyroid ultrasound.\par 5.  Hyperlipidemia/hypertriglyceridemia\par He is on Crestor 10 mg daily and ezetimibe 10 mg daily.  Per patient Vascepa is not covered by insurance and is currently awaiting appeal.\par 6.  Vitamin D insufficiency\par Replete on recent blood work.\par 7.  Prediabetes/obesity\par Continue lifestyle modification.  He recently joined weight watchers and purchased a "[employer] bike.
no

## 2022-07-31 NOTE — H&P ADULT - ASSESSMENT
42F w/ PMHx of alcohol use disorder (1 pint vodka/daily, last drink 7/28) who presents with 2 days of chest pain, palpitations, jitters, anxiety, hand tremors, found to have R breast abscess s/p I+D and transaminitis; admitted for alcohol withdrawal, on symptom-triggered Librium.  42F w/ PMHx of alcohol use disorder (1 pint vodka/daily, last drink 7/28) who presents with 2 days of chest pain, palpitations, jitters, anxiety, hand tremors, found to have R breast abscess s/p I+D and transaminitis; admitted for alcohol withdrawal, on CIWA taper.  42F w/ PMHx of alcohol use disorder (1 pint vodka/daily, last drink 7/28) who presents with 2 days of chest pain, palpitations, jitters, anxiety, hand tremors, found to have R breast abscess s/p I+D and transaminitis; admitted for alcohol withdrawal, on symptom-triggered Valium.

## 2022-07-31 NOTE — H&P ADULT - PROBLEM SELECTOR PLAN 4
DVT PPx: none; pt. is ambulatory  Diet: regular  Dispo: pending clinical improvement Pt. with microcytic anemia (Hgb 10.6 on admission); possibly iron deficiency in the setting of malnutrition  - f/u iron studies, B12, folate  - trend CBC and transfuse if Hgb <7

## 2022-08-01 ENCOUNTER — TRANSCRIPTION ENCOUNTER (OUTPATIENT)
Age: 42
End: 2022-08-01

## 2022-08-01 VITALS
RESPIRATION RATE: 18 BRPM | TEMPERATURE: 98 F | SYSTOLIC BLOOD PRESSURE: 118 MMHG | OXYGEN SATURATION: 96 % | DIASTOLIC BLOOD PRESSURE: 77 MMHG | HEART RATE: 87 BPM

## 2022-08-01 LAB
ALBUMIN SERPL ELPH-MCNC: 4.4 G/DL — SIGNIFICANT CHANGE UP (ref 3.3–5)
ALP SERPL-CCNC: 262 U/L — HIGH (ref 40–120)
ALT FLD-CCNC: 51 U/L — HIGH (ref 10–45)
ANION GAP SERPL CALC-SCNC: 13 MMOL/L — SIGNIFICANT CHANGE UP (ref 5–17)
AST SERPL-CCNC: 183 U/L — HIGH (ref 10–40)
BASOPHILS # BLD AUTO: 0.06 K/UL — SIGNIFICANT CHANGE UP (ref 0–0.2)
BASOPHILS NFR BLD AUTO: 0.9 % — SIGNIFICANT CHANGE UP (ref 0–2)
BILIRUB SERPL-MCNC: 1.9 MG/DL — HIGH (ref 0.2–1.2)
BUN SERPL-MCNC: 10 MG/DL — SIGNIFICANT CHANGE UP (ref 7–23)
CALCIUM SERPL-MCNC: 10.2 MG/DL — SIGNIFICANT CHANGE UP (ref 8.4–10.5)
CHLORIDE SERPL-SCNC: 98 MMOL/L — SIGNIFICANT CHANGE UP (ref 96–108)
CO2 SERPL-SCNC: 26 MMOL/L — SIGNIFICANT CHANGE UP (ref 22–31)
CREAT SERPL-MCNC: 0.44 MG/DL — LOW (ref 0.5–1.3)
EGFR: 123 ML/MIN/1.73M2 — SIGNIFICANT CHANGE UP
EOSINOPHIL # BLD AUTO: 0.1 K/UL — SIGNIFICANT CHANGE UP (ref 0–0.5)
EOSINOPHIL NFR BLD AUTO: 1.5 % — SIGNIFICANT CHANGE UP (ref 0–6)
FERRITIN SERPL-MCNC: 476 NG/ML — HIGH (ref 15–150)
FOLATE SERPL-MCNC: 4.4 NG/ML — LOW
GLUCOSE SERPL-MCNC: 105 MG/DL — HIGH (ref 70–99)
HAV IGM SER-ACNC: SIGNIFICANT CHANGE UP
HBV CORE IGM SER-ACNC: SIGNIFICANT CHANGE UP
HBV SURFACE AG SER-ACNC: SIGNIFICANT CHANGE UP
HCT VFR BLD CALC: 33.1 % — LOW (ref 34.5–45)
HCV AB S/CO SERPL IA: 0.09 S/CO — SIGNIFICANT CHANGE UP (ref 0–0.99)
HCV AB SERPL-IMP: SIGNIFICANT CHANGE UP
HGB BLD-MCNC: 10.7 G/DL — LOW (ref 11.5–15.5)
IMM GRANULOCYTES NFR BLD AUTO: 0.6 % — SIGNIFICANT CHANGE UP (ref 0–1.5)
IRON SATN MFR SERPL: 42 % — SIGNIFICANT CHANGE UP (ref 14–50)
IRON SATN MFR SERPL: 92 UG/DL — SIGNIFICANT CHANGE UP (ref 30–160)
LYMPHOCYTES # BLD AUTO: 1.92 K/UL — SIGNIFICANT CHANGE UP (ref 1–3.3)
LYMPHOCYTES # BLD AUTO: 29 % — SIGNIFICANT CHANGE UP (ref 13–44)
MAGNESIUM SERPL-MCNC: 1.9 MG/DL — SIGNIFICANT CHANGE UP (ref 1.6–2.6)
MCHC RBC-ENTMCNC: 25.2 PG — LOW (ref 27–34)
MCHC RBC-ENTMCNC: 32.3 GM/DL — SIGNIFICANT CHANGE UP (ref 32–36)
MCV RBC AUTO: 77.9 FL — LOW (ref 80–100)
MONOCYTES # BLD AUTO: 0.51 K/UL — SIGNIFICANT CHANGE UP (ref 0–0.9)
MONOCYTES NFR BLD AUTO: 7.7 % — SIGNIFICANT CHANGE UP (ref 2–14)
MRSA PCR RESULT.: SIGNIFICANT CHANGE UP
NEUTROPHILS # BLD AUTO: 3.98 K/UL — SIGNIFICANT CHANGE UP (ref 1.8–7.4)
NEUTROPHILS NFR BLD AUTO: 60.3 % — SIGNIFICANT CHANGE UP (ref 43–77)
NRBC # BLD: 0 /100 WBCS — SIGNIFICANT CHANGE UP (ref 0–0)
PHOSPHATE SERPL-MCNC: 4.8 MG/DL — HIGH (ref 2.5–4.5)
PLATELET # BLD AUTO: 205 K/UL — SIGNIFICANT CHANGE UP (ref 150–400)
POTASSIUM SERPL-MCNC: 4.2 MMOL/L — SIGNIFICANT CHANGE UP (ref 3.5–5.3)
POTASSIUM SERPL-SCNC: 4.2 MMOL/L — SIGNIFICANT CHANGE UP (ref 3.5–5.3)
PROT SERPL-MCNC: 7.6 G/DL — SIGNIFICANT CHANGE UP (ref 6–8.3)
RBC # BLD: 4.25 M/UL — SIGNIFICANT CHANGE UP (ref 3.8–5.2)
RBC # FLD: 21.6 % — HIGH (ref 10.3–14.5)
S AUREUS DNA NOSE QL NAA+PROBE: SIGNIFICANT CHANGE UP
SODIUM SERPL-SCNC: 137 MMOL/L — SIGNIFICANT CHANGE UP (ref 135–145)
TIBC SERPL-MCNC: 220 UG/DL — SIGNIFICANT CHANGE UP (ref 220–430)
TRANSFERRIN SERPL-MCNC: 184 MG/DL — LOW (ref 200–360)
UIBC SERPL-MCNC: 128 UG/DL — SIGNIFICANT CHANGE UP (ref 110–370)
VIT B12 SERPL-MCNC: 1401 PG/ML — HIGH (ref 232–1245)
WBC # BLD: 6.61 K/UL — SIGNIFICANT CHANGE UP (ref 3.8–10.5)
WBC # FLD AUTO: 6.61 K/UL — SIGNIFICANT CHANGE UP (ref 3.8–10.5)

## 2022-08-01 PROCEDURE — 99239 HOSP IP/OBS DSCHRG MGMT >30: CPT | Mod: GC

## 2022-08-01 PROCEDURE — 99232 SBSQ HOSP IP/OBS MODERATE 35: CPT

## 2022-08-01 PROCEDURE — 76705 ECHO EXAM OF ABDOMEN: CPT | Mod: 26

## 2022-08-01 RX ORDER — NICOTINE POLACRILEX 2 MG
1 GUM BUCCAL
Qty: 0 | Refills: 0 | DISCHARGE
Start: 2022-08-01

## 2022-08-01 RX ORDER — AMPICILLIN SODIUM AND SULBACTAM SODIUM 250; 125 MG/ML; MG/ML
3 INJECTION, POWDER, FOR SUSPENSION INTRAMUSCULAR; INTRAVENOUS ONCE
Refills: 0 | Status: COMPLETED | OUTPATIENT
Start: 2022-08-01 | End: 2022-08-01

## 2022-08-01 RX ORDER — LANOLIN ALCOHOL/MO/W.PET/CERES
5 CREAM (GRAM) TOPICAL AT BEDTIME
Refills: 0 | Status: DISCONTINUED | OUTPATIENT
Start: 2022-08-01 | End: 2022-08-01

## 2022-08-01 RX ORDER — THIAMINE MONONITRATE (VIT B1) 100 MG
1 TABLET ORAL
Qty: 30 | Refills: 0
Start: 2022-08-01 | End: 2022-08-30

## 2022-08-01 RX ORDER — FOLIC ACID 0.8 MG
1 TABLET ORAL
Qty: 30 | Refills: 0
Start: 2022-08-01 | End: 2022-08-30

## 2022-08-01 RX ORDER — NICOTINE POLACRILEX 2 MG
1 GUM BUCCAL DAILY
Refills: 0 | Status: DISCONTINUED | OUTPATIENT
Start: 2022-08-01 | End: 2022-08-01

## 2022-08-01 RX ORDER — AMPICILLIN SODIUM AND SULBACTAM SODIUM 250; 125 MG/ML; MG/ML
3 INJECTION, POWDER, FOR SUSPENSION INTRAMUSCULAR; INTRAVENOUS EVERY 6 HOURS
Refills: 0 | Status: DISCONTINUED | OUTPATIENT
Start: 2022-08-01 | End: 2022-08-01

## 2022-08-01 RX ORDER — AMPICILLIN SODIUM AND SULBACTAM SODIUM 250; 125 MG/ML; MG/ML
INJECTION, POWDER, FOR SUSPENSION INTRAMUSCULAR; INTRAVENOUS
Refills: 0 | Status: DISCONTINUED | OUTPATIENT
Start: 2022-08-01 | End: 2022-08-01

## 2022-08-01 RX ADMIN — Medication 1 PATCH: at 00:25

## 2022-08-01 RX ADMIN — Medication 1 PATCH: at 01:09

## 2022-08-01 RX ADMIN — Medication 5 MILLIGRAM(S): at 01:04

## 2022-08-01 RX ADMIN — Medication 500 MILLIGRAM(S): at 05:39

## 2022-08-01 RX ADMIN — Medication 250 MILLIGRAM(S): at 05:40

## 2022-08-01 NOTE — PROGRESS NOTE ADULT - PROBLEM SELECTOR PLAN 3
Pt. noted to have elevated LFTs on admission (, ALT 80, ), likely in the setting of chronic alcohol use disorder  - f/u hepatitis panel  - f/u RUQ u/s  - trend LFTs and INR  - continue to monitor Pt. noted to have elevated LFTs on admission (, ALT 80, ), likely in the setting of chronic alcohol use disorder  - f/u hepatitis panel  - RUQ u/s (8/1): smooth contour; hepatomegaly with diffuse hepatic steatosis  - trend LFTs and INR  - continue to monitor

## 2022-08-01 NOTE — CHART NOTE - NSCHARTNOTEFT_GEN_A_CORE
Called down to ED by nursing to speak to pt. who wishes to leave AMA. Pt. admitted for R breast abscess and alcohol withdrawal, s/p I+D and doxycycline in ED. Prelim cultures growing Enterococcus faecalis and abx were changed from vanc to unasyn. Explained to the pt. the need for admission in order to treat her breast abscess. Explained the risks of worsening infection, sepsis, and septic shock if the pt. left AMA without adequate antibiotic therapy. Also explained the need for a TTE given pt's pSVT overnight.     Pt. states she cannot stay in the ED for another night; states she has not had a proper night's sleep since Saturday. Pt. has full capacity and expresses understanding of the risks of signing out AMA. She states she will follow up outpatient with any specialists that she might require, including her PCP, infectious disease, and general surgery.

## 2022-08-01 NOTE — DISCHARGE NOTE PROVIDER - NSDCMRMEDTOKEN_GEN_ALL_CORE_FT
amoxicillin-clavulanate 500 mg-125 mg oral tablet: 1 tab(s) orally every 8 hours   folic acid 1 mg oral tablet: 1 tab(s) orally once a day  Multiple Vitamins oral tablet: 1 tab(s) orally once a day  nicotine 7 mg/24 hr transdermal film, extended release: 1 patch transdermal once a day, As Needed  thiamine 100 mg oral tablet: 1 tab(s) orally once a day

## 2022-08-01 NOTE — PROVIDER CONTACT NOTE (OTHER) - BACKGROUND
PMH alcohol use disorder; last drink 7/28. Current admission for chest pain, palpitations, jitters, anxiety, hand tremors. Admitted for alcohol withdrawal.

## 2022-08-01 NOTE — DISCHARGE NOTE NURSING/CASE MANAGEMENT/SOCIAL WORK - PATIENT PORTAL LINK FT
You can access the FollowMyHealth Patient Portal offered by [facility] by registering at the following website: [email or web address]. By joining [facility]’s FollowMyHealth portal, you will also be able to view your health information using other applications (apps) compatible with our system.

## 2022-08-01 NOTE — PROGRESS NOTE ADULT - PROBLEM SELECTOR PLAN 1
Pt. w/ alcohol use disorder (1 pint of vodka/daily, last drink 7/28) who p/w hand tremors, jitters, anxiety, palpitations, and chest pain  - LILLIAN <10; tox screen negative  - c/w symptom-triggered Valium 5mg IVP for CIWA > 8  - c/w thiamine 500mg TID, folic acid 1mg qd  - continue to monitor

## 2022-08-01 NOTE — DISCHARGE NOTE PROVIDER - NSDCFUADDAPPT_GEN_ALL_CORE_FT
Please follow up with surgery (Dr. Barbi Barroso) within 1 week.    Please follow up with your PCP (Dr. Kraus) within 2 weeks.    Please follow up infectious disease (Dr. Adam Donnelly) within 2 weeks.    Please follow up with gynecology to obtain a mammogram at your earliest convenience.

## 2022-08-01 NOTE — SBIRT NOTE ADULT - NSSBIRTALCPASSREFTXDET_GEN_A_CORE
LMSW met with patient to discuss her drinking. Patient noted she drinks 1 pint of vodka daily over the past 2 weeks, but prior she drank only on Fridays.  Patient noted due to recently losing her jib she drinks out of boredom. LMSW discussed with patient abstaining from drinking and tools needed. During the discussion patient was unable to identify triggers to her drinking.  Per patient she completed detox in 2018 at [facility] and she does not want to return.

## 2022-08-01 NOTE — PROGRESS NOTE ADULT - ASSESSMENT
42 year old lady w/ PMHx of alcohol use disorder who presents with 2 days of chest pain and palpitations a/w palpitations, hand tremors, "jitters" and 1 ep of vomiting yesterday morning. pt. also c/o of a R sided breast cyst that has gotten larger, more painful/tender, and redder over the past 2 weeks. She states she had a breast abscess that was drained by dermatology last year. She denies fevers, chills, nipple discharge. Breast u/s was performed showing a "heterogenous collection in the R breast s/p  bedside I+D and ID consulted for the same    WORKUP   Xray Chest (07.30): Clear lungs.  US Breast Limited, Right (07.30.): Heterogeneous collection in the right breast with increased vascularity and with tract extending to the skin surface, suggestive of abscess. Neoplasm difficult to exclude.    DIAGNOSIS and IMPRESSION  Rt Breast cellulitis and Abscess s/p I&D  Elevated Transaminases  Alcohol Withdrawal  positive culture finding       s/p bedside I&D  Pt unclear how the infection started, possibly from an ingrown hair.         RECOMMENDATIONS  prelim breast abscess cx with e fecalis   switched vanco to unasyn   f/u blood Cx, NTD   c/w wound care  trend LFT's , likely elevated due to Etoh abuse.     pt wants to sign out AMA, based on prelim cx can be switched to augmentin for 10 days     Plan discussed with Medicine resident     Micha Grewal  Please contact through MS Teams   If no response or past 5 pm/weekend call [phone].

## 2022-08-01 NOTE — PROGRESS NOTE ADULT - SUBJECTIVE AND OBJECTIVE BOX
***************************************************************  Rakesh Solis MD (PGY-1)  Internal Medicine  Pager: [phone]  ***************************************************************    PROGRESS NOTE:     Patient is a 42y old  Female who presents with a chief complaint of alcohol withdrawal, breast abscess (31 Jul 2022 16:54)      SUBJECTIVE / OVERNIGHT EVENTS: Patient seen and examined at bedside. No acute overnight events. This morning, the patient is comfortable and doing well. No acute complaints. Denies fevers, chills, N/V/D, chest pain, SOB, abdominal pain.    MEDICATIONS  (STANDING):  folic acid 1 milliGRAM(s) Oral daily  melatonin 5 milliGRAM(s) Oral at bedtime  multivitamin 1 Tablet(s) Oral daily  nicotine -   7 mG/24Hr(s) Patch 1 Patch Transdermal daily  thiamine 500 milliGRAM(s) Oral <User Schedule>  vancomycin  IVPB 750 milliGRAM(s) IV Intermittent every 12 hours    MEDICATIONS  (PRN):  acetaminophen     Tablet .. 650 milliGRAM(s) Oral every 6 hours PRN Temp greater or equal to 38C (100.4F), Mild Pain (1 - 3)  diazepam  Injectable 5 milliGRAM(s) IV Push every 1 hour PRN If CIWA >=8      CAPILLARY BLOOD GLUCOSE    I&O's Summary    PHYSICAL EXAM:  Vital Signs Last 24 Hrs  T(C): 36.8 (01 Aug 2022 05:10), Max: 37 (31 Jul 2022 12:10)  T(F): 98.2 (01 Aug 2022 05:10), Max: 98.6 (31 Jul 2022 12:10)  HR: 87 (01 Aug 2022 05:10) (68 - 98)  BP: 118/77 (01 Aug 2022 05:10) (118/65 - 137/80)  BP(mean): --  RR: 18 (01 Aug 2022 05:10) (18 - 18)  SpO2: 96% (01 Aug 2022 05:10) (96% - 99%)    Parameters below as of 01 Aug 2022 05:10  Patient On (Oxygen Delivery Method): room air    GENERAL: NAD, anxious-appearing  HEENT: AT/NC, EOMI, PERRLA, conjunctiva and sclera clear; no scleral icterus  HEART: S1, S2, regular rate and rhythm, no murmurs, rubs, or gallops  LUNGS: Unlabored respirations.  Clear to auscultation bilaterally, no crackles, wheezing, or rhonchi  ABDOMEN: Soft, nondistended, +BS, +RUQ tenderness on deep palpation  EXTREMITIES: 2+ peripheral pulses bilaterally. No clubbing, cyanosis, or edema  NERVOUS SYSTEM:  A&Ox3; b/l hand tremors, no tongue fasciculations  SKIN: R breast erythema and tenderness overlying incision; small drain in place    LABS:                        10.4   6.70  )-----------( 161      ( 31 Jul 2022 11:02 )             32.1     07-31    138  |  99  |  5<L>  ----------------------------<  142<H>  3.4<L>   |  26  |  0.33<L>    Ca    9.5      31 Jul 2022 11:02  Phos  2.6     07-31  Mg     1.3     07-31    TPro  7.3  /  Alb  4.2  /  TBili  2.1<H>  /  DBili  x   /  AST  217<H>  /  ALT  58<H>  /  AlkPhos  236<H>  07-31    RADIOLOGY & ADDITIONAL TESTS:  Results Reviewed:   Imaging Personally Reviewed:  Electrocardiogram Personally Reviewed:  Tele:    COORDINATION OF CARE:  Care Discussed with Consultants/Other Providers [Y/N]:  Prior or Outpatient Records Reviewed [Y/N]:   ***************************************************************  Rakesh Solis MD (PGY-1)  Internal Medicine  Pager: [phone]  ***************************************************************    PROGRESS NOTE:     Patient is a 42y old  Female who presents with a chief complaint of alcohol withdrawal, breast abscess (31 Jul 2022 16:54)    SUBJECTIVE / OVERNIGHT EVENTS: Patient seen and examined at bedside. Pt. w/ 1 ep of pSVT on tele; asymptomatic. This morning, the patient is comfortable and doing well. Reports extremely poor sleep. States the melatonin she got last night did not help. Wants to leave AMA. Denies fevers, chills, N/V/D, chest pain, SOB, abdominal pain.    MEDICATIONS  (STANDING):  ampicillin/sulbactam  IVPB      ampicillin/sulbactam  IVPB 3 Gram(s) IV Intermittent every 6 hours  folic acid 1 milliGRAM(s) Oral daily  melatonin 5 milliGRAM(s) Oral at bedtime  multivitamin 1 Tablet(s) Oral daily  nicotine -   7 mG/24Hr(s) Patch 1 Patch Transdermal daily  thiamine 500 milliGRAM(s) Oral <User Schedule>    MEDICATIONS  (PRN):  acetaminophen     Tablet .. 650 milliGRAM(s) Oral every 6 hours PRN Temp greater or equal to 38C (100.4F), Mild Pain (1 - 3)  diazepam  Injectable 5 milliGRAM(s) IV Push every 1 hour PRN If CIWA >=8    CAPILLARY BLOOD GLUCOSE    I&O's Summary    PHYSICAL EXAM:  Vital Signs Last 24 Hrs  T(C): 36.8 (01 Aug 2022 05:10), Max: 37 (31 Jul 2022 12:10)  T(F): 98.2 (01 Aug 2022 05:10), Max: 98.6 (31 Jul 2022 12:10)  HR: 87 (01 Aug 2022 05:10) (68 - 98)  BP: 118/77 (01 Aug 2022 05:10) (118/65 - 137/80)  BP(mean): --  RR: 18 (01 Aug 2022 05:10) (18 - 18)  SpO2: 96% (01 Aug 2022 05:10) (96% - 99%)    Parameters below as of 01 Aug 2022 05:10  Patient On (Oxygen Delivery Method): room air    GENERAL: NAD, anxious-appearing  HEENT: AT/NC, EOMI, PERRLA, conjunctiva and sclera clear; no scleral icterus  HEART: S1, S2, regular rate and rhythm, no murmurs, rubs, or gallops  LUNGS: Unlabored respirations.  Clear to auscultation bilaterally, no crackles, wheezing, or rhonchi  ABDOMEN: Soft, nondistended, +BS, +RUQ tenderness on deep palpation  EXTREMITIES: 2+ peripheral pulses bilaterally. No clubbing, cyanosis, or edema  NERVOUS SYSTEM:  A&Ox3; b/l hand tremors; improving, no tongue fasciculations  SKIN: R breast erythema and tenderness overlying incision; small drain in place    LABS:                        10.7   6.61  )-----------( 205      ( 01 Aug 2022 07:45 )             33.1     08-01    137  |  98  |  10  ----------------------------<  105<H>  4.2   |  26  |  0.44<L>    Ca    10.2      01 Aug 2022 07:45  Phos  4.8     08-01  Mg     1.9     08-01    TPro  7.6  /  Alb  4.4  /  TBili  1.9<H>  /  DBili  x   /  AST  183<H>  /  ALT  51<H>  /  AlkPhos  262<H>  08-01    RADIOLOGY & ADDITIONAL TESTS:  Results Reviewed:   Imaging Personally Reviewed:  Electrocardiogram Personally Reviewed:  Tele:    COORDINATION OF CARE:  Care Discussed with Consultants/Other Providers [Y/N]:  Prior or Outpatient Records Reviewed [Y/N]:

## 2022-08-01 NOTE — PROGRESS NOTE ADULT - PROBLEM SELECTOR PLAN 5
DVT PPx: none; pt. is ambulatory  Diet: regular  Dispo: pending clinical improvement Adequate: hears normal conversation without difficulty

## 2022-08-01 NOTE — PROGRESS NOTE ADULT - ATTENDING COMMENTS
42F w/ PMHx of alcohol use disorder (1 pint vodka/daily, last drink 7/28) who presents with 2 days of chest pain, palpitations, jitters, anxiety, hand tremors, found to have R breast abscess s/p I+D with growth of Enterococcus f on ABX,  admitted for alcohol withdrawal, on symptom-triggered  and antibitioc therapy.      # Alcohol use disorder with uncomplicated ETOH withdrawal ( CIWA 2-6 with last one =2) , LILLIAN <10     CW symptoms triggered Diazepam     MOnirot LFT     CW thiamine , Folic Acid , MVI      for oupt rehab or support group    # Rt Breast Abscess        S/P Incision and drainage with growth of enterococcus       ID rec is appreciated       CW Unasyn and f/up sensitivity       F./up Blood CX   # Microcytosis with normal iron       Noted to be present in 2017      F/up oupt, might need Hem Electrophoresis for possible Thalassemia      Hemo is stable       Transfuse for hem <7 , or acute bleed with hemodynamic instability or for symptomatic anemia  Rest is as per above     Marilee Wheeler   Hospitalist   [phone] 42F w/ PMHx of alcohol use disorder (1 pint vodka/daily, last drink 7/28) who presents with 2 days of chest pain, palpitations, jitters, anxiety, hand tremors, found to have R breast abscess s/p I+D with growth of Enterococcus f on ABX,  admitted for alcohol withdrawal, on symptom-triggered  and antibitioc therapy.      # Alcohol use disorder with uncomplicated ETOH withdrawal ( CIWA 2-6 with last one =2) , LILLIAN <10     CW symptoms triggered Diazepam     MOnirot LFT     CW thiamine , Folic Acid , MVI      for oupt rehab or support group    # Rt Breast Abscess        S/P Incision and drainage with growth of enterococcus       ID rec is appreciated       CW Unasyn and f/up sensitivity       F./up Blood CX   # Microcytosis with normal iron       Noted to be present in 2017      F/up oupt, might need Hem Electrophoresis for possible Thalassemia      Hemo is stable       Transfuse for hem <7 , or acute bleed with hemodynamic instability or for symptomatic anemia    Patient is requesting to leave the hosp AMA and the medical consequences are explained to her and she understands , need Med, ID, Surg, wound care f/up   Rest is as per above     Marilee Wheeler   Hospitalist   [phone]

## 2022-08-01 NOTE — DISCHARGE NOTE PROVIDER - NSFOLLOWUPCLINICS_GEN_ALL_ED_FT
[facility]  OB-GYN  [address] [place] [address]  Phone: [phone]  Fax:   Follow Up Time: Routine

## 2022-08-01 NOTE — DISCHARGE NOTE PROVIDER - NSDCCPTREATMENT_GEN_ALL_CORE_FT
PRINCIPAL PROCEDURE  Procedure: Limited ultrasound of right breast  Findings and Treatment: IMPRESSION:  Heterogeneous collection in the right breast with increased vascularity   and with tract extending to the skin surface, suggestive of abscess.   Neoplasm difficult to exclude.  Please note this is not a substitute for dedicated breast ultrasound. As   clinically indicated, breast surgery consultation should be obtained,   with referral to a mammography service for dedicated breast ultrasound   and or mammograms as warranted to exclude neoplasm      SECONDARY PROCEDURE  Procedure: Right upper quadrant ultrasound  Findings and Treatment: IMPRESSION:  Hepatomegaly with diffuse hepatic steatosis. Smooth contour.  Partially contracted gallbladder. Otherwise, unremarkable.

## 2022-08-01 NOTE — DISCHARGE NOTE PROVIDER - HOSPITAL COURSE
42F w/ PMHx of alcohol use disorder who presents with 2 days of chest pain and palpitations. On 7/30, pt. reported having L sided nonradiating, nonpleuritic chest tightness a/w palpitations, hand tremors, "jitters" and 1 ep of vomiting yesterday morning. Pt. states she has a long-standing hx of alcohol use since the age of 27, about 1 pint of vodka daily (last drink Thursday 7/28). The longest the pt. has gone without consuming alcohol was 7 days of detox followed by 30 days of rehab in 2018. She states she lost her job as a  this past May and her binge drinking episodes have worsened since then. Pt. also reports poor appetite, stating she eats one small meal per day. She states she is interested in detoxing during this admission. She denies headaches, visual changes, fevers, chills, cough, SOB, nausea, seizure-like activity, visual or tactile hallucinations, suicidal or homicidal ideation.      Of note, pt. also c/o of a R sided breast cyst that has gotten larger, more painful/tender, and redder over the past 2 weeks. She states she had a breast abscess that was drained by dermatology last year. She denies fevers, chills, nipple discharge.     In the ED, the pt was afebrile and hemodynamically stable. Labs notable for transaminitis (, ALT 80, , Tbili 1.5). Breast u/s was performed showing a "heterogenous collection in the R breast with increased vascularity and tract extending to the skin surface, suggestive of abscess." Surgery was consulted and a bedside I+D was performed. Pt. given doxycyline 100mg x 1 and diazepam x 2. 42F w/ PMHx of alcohol use disorder who presented with 2 days of chest pain and palpitations. On 7/30, pt. reported having L sided nonradiating, nonpleuritic chest tightness a/w palpitations, hand tremors, "jitters" and 1 episode of vomiting the morning before admission. Pt. states she has had a long-standing hx of alcohol use since the age of 27, about 1 pint of vodka daily (last drink  3 days before admission). The longest the pt. has gone without consuming alcohol was 7 days of detox followed by 30 days of rehab in 2018. She stated she lost her job as a  this past May and her binge drinking episodes have worsened since then. Pt. also reports poor appetite, stating she eats one small meal per day. She states she is interested in detoxing. Pt. also was complaining of a R sided breast cyst that has gotten larger, more painful/tender, and redder over the past 2 weeks. She stated that she had a breast abscess that was drained by dermatology last year. She denied fevers, chills, nipple discharge. Labs in the ED were notable for transaminitis (, ALT 80, , Tbili 1.5). Breast u/s was performed showing a "heterogenous collection in the R breast with increased vascularity and tract extending to the skin surface, suggestive of abscess." Surgery was consulted and a bedside I+D was performed. Pt was given doxycyline 100mg x 1 and diazepam x 2.  ID was consulted and recommended vancomycin.    Surgery was consulted and performed an I&D with drain in place. She was placed on  a CIWA symptom triggered protocol with CIWAs 6-7 at admission down to 1-2 the following morning. Wound cultures grew enterococcus and vancomycin was initially continued until MRSA swab was negative and then she was switched to unasyn.     Patient would like to leave AMA and understands the risks of leaving including sepsis progression, since we do not have sensitivities of the wound culture. She understands she will need nursing care for the drain in her breast and this may not be able to be set up in time. She will be discharged on 10 days of augmentin with recommended follow up with infectious disease, mammography/GYN consult to determine the underlying cause of her abscesses. Social work was consulted to provide patient with alcohol use disorder recommendations. 42F w/ PMHx of alcohol use disorder who presented with 2 days of chest pain and palpitations. On 7/30, pt. reported having L sided nonradiating, nonpleuritic chest tightness a/w palpitations, hand tremors, "jitters" and 1 episode of vomiting the morning before admission. Pt. states she has had a long-standing hx of alcohol use since the age of 27, about 1 pint of vodka daily (last drink  3 days before admission). The longest the pt. has gone without consuming alcohol was 7 days of detox followed by 30 days of rehab in 2018. She stated she lost her job as a  this past May and her binge drinking episodes have worsened since then. Pt. also reports poor appetite, stating she eats one small meal per day. She states she is interested in detoxing. Pt. also was complaining of a R sided breast cyst that has gotten larger, more painful/tender, and redder over the past 2 weeks. She stated that she had a breast abscess that was drained by dermatology last year. She denied fevers, chills, nipple discharge. Labs in the ED were notable for transaminitis (, ALT 80, , Tbili 1.5). Breast u/s was performed showing a "heterogenous collection in the R breast with increased vascularity and tract extending to the skin surface, suggestive of abscess." Surgery was consulted and a bedside I+D was performed. Pt was given doxycyline 100mg x 1 and diazepam x 2.  ID was consulted and recommended vancomycin.    Surgery was consulted and performed an I&D with drain in place. She was placed on  a CIWA symptom triggered protocol with CIWAs 6-7 at admission down to 1-2 the following morning. Wound cultures grew enterococcus and vancomycin was initially continued until MRSA swab was negative and then she was switched to unasyn.     Patient would like to leave AMA and understands the risks of leaving including sepsis progression, since we do not have sensitivities of the wound culture. She understands she will need nursing care for the drain in her breast and this may not be able to be set up in time. She will be discharged on 10 days of augmentin with recommended follow up with infectious disease, mammography/GYN consult to determine the underlying cause of her abscesses. Social work was consulted to provide patient with alcohol use disorder recommendations.  Patient understands the consequesnces of her decision to leave against medical advice

## 2022-08-01 NOTE — DISCHARGE NOTE PROVIDER - CARE PROVIDERS DIRECT ADDRESSES
,DirectAddress_Unknown,[email or web address],DirectAddress_Unknown

## 2022-08-01 NOTE — PROGRESS NOTE ADULT - ASSESSMENT
42F w/ PMHx of alcohol use disorder (1 pint vodka/daily, last drink 7/28) who presents with 2 days of chest pain, palpitations, jitters, anxiety, hand tremors, found to have R breast abscess s/p I+D and transaminitis; admitted for alcohol withdrawal, on symptom-triggered Valium.

## 2022-08-01 NOTE — PROGRESS NOTE ADULT - PROBLEM SELECTOR PLAN 4
Pt. with microcytic anemia (Hgb 10.6 on admission); possibly iron deficiency in the setting of malnutrition  - f/u iron studies, B12, folate  - trend CBC and transfuse if Hgb <7 Pt. with microcytic anemia (Hgb 10.6 on admission); possibly iron deficiency in the setting of malnutrition vs thalassemia  - elevated B12, normal Fe, low folate  - trend CBC and transfuse if Hgb <7

## 2022-08-01 NOTE — DISCHARGE NOTE NURSING/CASE MANAGEMENT/SOCIAL WORK - NSDCPEFALRISK_GEN_ALL_CORE
For information on Fall & Injury Prevention, visit: [email or web address] OR  [email or web address] OR  https://www.cdc.gov/steadi/patient.html

## 2022-08-01 NOTE — PROGRESS NOTE ADULT - SUBJECTIVE AND OBJECTIVE BOX
SURGERY  Pager: [phone]    STATUS POST:  I&D of breast abscess    POST OPERATIVE DAY #1      INTERVAL EVENTS/SUBJECTIVE: No acute events overnight. Patient last CIWA documented at 2, HDS, no complaints. Endorses some continued tenderness around abscess site.     ______________________________________________  OBJECTIVE:   T(C): 36.8 (08-01-22 @ 05:10), Max: 37 (07-31-22 @ 12:10)  HR: 87 (08-01-22 @ 05:10) (68 - 98)  BP: 118/77 (08-01-22 @ 05:10) (118/65 - 137/80)  RR: 18 (08-01-22 @ 05:10) (18 - 18)  SpO2: 96% (08-01-22 @ 05:10) (96% - 99%)  Wt(kg): --  CAPILLARY BLOOD GLUCOSE        I&O's Detail      Physical exam:  Gen: resting in bed comfortably in NAD  Chest: no increased WOB, regular inspiratory effort. Dressing c/d/i, penrose in place, some erythema with tenderness to palpation, no fluctuance appreciated  Abdomen: Soft, nontender, nondistended  Vascular: MONICOP, SATHISH x4  NEURO: awake, alert  ______________________________________________  LABS:  CBC Full  -  ( 01 Aug 2022 07:45 )  WBC Count : 6.61 K/uL  RBC Count : 4.25 M/uL  Hemoglobin : 10.7 g/dL  Hematocrit : 33.1 %  Platelet Count - Automated : 205 K/uL  Mean Cell Volume : 77.9 fl  Mean Cell Hemoglobin : 25.2 pg  Mean Cell Hemoglobin Concentration : 32.3 gm/dL  Auto Neutrophil # : 3.98 K/uL  Auto Lymphocyte # : 1.92 K/uL  Auto Monocyte # : 0.51 K/uL  Auto Eosinophil # : 0.10 K/uL  Auto Basophil # : 0.06 K/uL  Auto Neutrophil % : 60.3 %  Auto Lymphocyte % : 29.0 %  Auto Monocyte % : 7.7 %  Auto Eosinophil % : 1.5 %  Auto Basophil % : 0.9 %    08-01    137  |  98  |  10  ----------------------------<  105<H>  4.2   |  26  |  0.44<L>    Ca    10.2      01 Aug 2022 07:45  Phos  4.8     08-01  Mg     1.9     08-01    TPro  7.6  /  Alb  4.4  /  TBili  1.9<H>  /  DBili  x   /  AST  183<H>  /  ALT  51<H>  /  AlkPhos  262<H>  08-01    _____________________________________________  RADIOLOGY:

## 2022-08-01 NOTE — PROGRESS NOTE ADULT - PROBLEM SELECTOR PLAN 2
Pt. with R breast cyst with tenderness to touch, erythema, worse since the past 2 weeks; s/p surgical incision and drainage in ED and doxycycline 100mg x1  - US R breast (7/30): heterogeneous collection in the right breast with increased vascularity   and with tract extending to the skin surface, suggestive of abscess; neoplasm cannot be ruled out  - ID consulted; appreciate recs  - f/u breast abscess cx  - f/u BCx  - c/w vancomycin for MRSA coverage for now Pt. with R breast cyst with tenderness to touch, erythema, worse since the past 2 weeks; s/p surgical incision and drainage in ED and doxycycline 100mg x1  - US R breast (7/30): heterogeneous collection in the right breast with increased vascularity   and with tract extending to the skin surface, suggestive of abscess; neoplasm cannot be ruled out  - ID consulted; appreciate recs  - breast abscess cx (7/31): prelim growing enterococcus  - BCx (7/31): NGTD  - switched vancomycin to unasyn; pt. to be d/c on augmentin x 10d per ID

## 2022-08-01 NOTE — DISCHARGE NOTE PROVIDER - NSDCCPCAREPLAN_GEN_ALL_CORE_FT
PRINCIPAL DISCHARGE DIAGNOSIS  Diagnosis: Breast abscess  Assessment and Plan of Treatment: You came to the ED with chest pain and a Right breast cyst that was painful, tender, red, and getting larger for the past 2 weeks. You had a similar cyst/abscess that was drained by dermatology 1 year ago. In the ED, you had a breast ultrasound done which showed a fluid collection. You were seen by the surgery team who drained the abscess at bedside. The culture was sent which grew back Enterococcus. You were started on antibiotics which were adjusted after being seen by infectious disease. Please continue to take your antibiotics for 10 days and follow up with general surgery, infectious disease, and your primary care doctor when you leave the hospital. You also also follow up gynecology as an outpatient to obtain a mammogram. If you have worsening fevers, chills, breast pain/tenderness or discharge from the surgical site, please return back to the ED.      SECONDARY DISCHARGE DIAGNOSES  Diagnosis: Alcohol withdrawal  Assessment and Plan of Treatment: You have a history of long-standing alcohol use disorder and came in with jitters, chest pain, palpitations, anxiety, hand tremors. You received 2 doses of valium in the ED to help with withdrawal symptoms. Your symptoms were monitered and you improved over the course of your admission. Upon discharge, follow up with your primary care doctor. Continue to take folic acid and thiamine. If you have worsening jitters, tremors, anxiety, or seizures or hallucinations, please return to the hospital.

## 2022-08-01 NOTE — DISCHARGE NOTE PROVIDER - CARE PROVIDER_API CALL
Barbi Barroso)  Surgery  [address] [place] [address]  Phone: [phone]  Fax: [phone]  Follow Up Time: 1 week    Micha Lowe)  Infectious Disease; Internal Medicine  [address] [place] [address]  Phone: [phone]  Fax: [phone]  Follow Up Time: 2 weeks    EugeneElif [place]  [address] [place] [address]  Phone: [phone]  Fax: (   )    -  Follow Up Time: 2 weeks

## 2022-08-01 NOTE — PROGRESS NOTE ADULT - ASSESSMENT
42F w/ PMH of alcohol abuse p/w to the ER with palpitations and a R breast abscess, drained at bedside and penrose left in place. Admitted for EtOH withdrawal.     Recommendations:  - BCx, abscess cx pending  - Patient with no leukocytosis, afebrile, no systemic signs of infection, recommend transition to Augmentin PO on discharge   - Please follow up with Dr. Barbi Barroso at [address] [place] [address]; [phone] to make an appointment    [facility]  [facility] 42F w/ PMH of alcohol abuse p/w to the ER with palpitations and a R breast abscess, drained at bedside and penrose left in place. Admitted for EtOH withdrawal.     Recommendations:  - BCx, abscess cx pending  - Patient with no leukocytosis, afebrile, no systemic signs of infection, recommend transition to Augmentin PO on discharge   - Encourage warm compress and massage  - Please follow up with Dr. Barbi Barroso at [address] [place] [address]; [phone] to make an appointment    [facility]  [id number]

## 2022-08-01 NOTE — DISCHARGE NOTE PROVIDER - PROVIDER TOKENS
PROVIDER:[TOKEN:[[id number]],FOLLOWUP:[1 week]],PROVIDER:[TOKEN:[[id number]],FOLLOWUP:[2 weeks]],FREE:[LAST:[Eugene-Logan],FIRST:[Elif],PHONE:[phone]],FAX:[(   )    -],ADDRESS:[[address] [place] [address]],FOLLOWUP:[2 weeks]]

## 2022-08-01 NOTE — PROGRESS NOTE ADULT - REASON FOR ADMISSION
alcohol withdrawal, breast abscess

## 2022-08-01 NOTE — PROGRESS NOTE ADULT - SUBJECTIVE AND OBJECTIVE BOX
42y old  Female who presents with a chief complaint of alcohol withdrawal, breast abscess (01 Aug 2022 14:41)      Interval history:  Afebrile, upset about still being in ER, wants to be discharged.     Allergies:   No Known Allergies    Antimicrobials:  ampicillin/sulbactam  IVPB      ampicillin/sulbactam  IVPB 3 Gram(s) IV Intermittent every 6 hours      REVIEW OF SYSTEMS:  improving redness and pain.    No SOB  No abdominal pain  No rash.       Vital Signs Last 24 Hrs  T(C): 36.8 (08-01-22 @ 05:10), Max: 36.9 (08-01-22 @ 00:15)  T(F): 98.2 (08-01-22 @ 05:10), Max: 98.4 (08-01-22 @ 00:15)  HR: 87 (08-01-22 @ 05:10) (87 - 98)  BP: 118/77 (08-01-22 @ 05:10) (118/77 - 123/86)  BP(mean): --  RR: 18 (08-01-22 @ 05:10) (18 - 18)  SpO2: 96% (08-01-22 @ 05:10) (96% - 98%)      PHYSICAL EXAM:  Pt in no acute distress, alert, awake.   rt breast open wound with penrose, improved erythema and surrounding induration   non distended abdomen  no edema LE   no phlebitis                             10.7   6.61  )-----------( 205      ( 01 Aug 2022 07:45 )             33.1   08-01    137  |  98  |  10  ----------------------------<  105<H>  4.2   |  26  |  0.44<L>    Ca    10.2      01 Aug 2022 07:45  Phos  4.8     08-01  Mg     1.9     08-01    TPro  7.6  /  Alb  4.4  /  TBili  1.9<H>  /  DBili  x   /  AST  183<H>  /  ALT  51<H>  /  AlkPhos  262<H>  08-01      LIVER FUNCTIONS - ( 01 Aug 2022 07:45 )  Alb: 4.4 g/dL / Pro: 7.6 g/dL / ALK PHOS: 262 U/L / ALT: 51 U/L / AST: 183 U/L / GGT: x               Culture - Blood (collected 31 Jul 2022 10:50)  Source: .Blood Blood-Peripheral  Preliminary Report (01 Aug 2022 15:02):    No growth to date.    Culture - Blood (collected 31 Jul 2022 10:40)  Source: .Blood Blood-Peripheral  Preliminary Report (01 Aug 2022 15:02):    No growth to date.    Culture - Abscess with Gram Stain (collected 31 Jul 2022 01:44)  Source: .Abscess Other  Preliminary Report (01 Aug 2022 09:32):    Moderate Enterococcus faecalis        Radiology:  < from: US Abdomen Upper Quadrant Right (08.01.22 @ 12:50) >  IMPRESSION:    Hepatomegaly with diffuse hepatic steatosis. Smooth contour.    Partially contracted gallbladder. Otherwise, unremarkable.

## 2022-08-01 NOTE — CHART NOTE - NSCHARTNOTEFT_GEN_A_CORE
Called by RN for PSVT on tele and anxiety Pt seen and examined at bedside    Pt resting on bed comfortably. No longer in PSVT. Reported to have PSVT for about 1 min after she got up to use the restroom. Pt reports increased anxiety and sweats with hand tremor. She denied any palpitations, chest pain, SoB, nausea/vomiting, headache, audio/visual/tactile hallucinations.     Allergies    No Known Allergies    Intolerances        Vitals  Vital Signs Last 24 Hrs  T(C): 36.9 (01 Aug 2022 00:15), Max: 37 (31 Jul 2022 12:10)  T(F): 98.4 (01 Aug 2022 00:15), Max: 98.6 (31 Jul 2022 12:10)  HR: 98 (01 Aug 2022 00:15) (66 - 98)  BP: 123/86 (01 Aug 2022 00:15) (118/65 - 142/91)  BP(mean): --  RR: 18 (01 Aug 2022 00:15) (17 - 22)  SpO2: 98% (01 Aug 2022 00:15) (98% - 99%)    Parameters below as of 01 Aug 2022 00:15  Patient On (Oxygen Delivery Method): room air          General: WN/WD NAD  Respiratory: CTA B/L, no wheezes, rales, rhonchi  CV: RRR, S1S2, no murmurs, rubs or gallops  Abdominal: Soft, NT, ND +BS  Extremities: No edema, warm, well perfused,  + peripheral pulses  Neurology: A&Ox3, nonfocal       LABS:                        10.4   6.70  )-----------( 161      ( 31 Jul 2022 11:02 )             32.1     07-31    138  |  99  |  5<L>  ----------------------------<  142<H>  3.4<L>   |  26  |  0.33<L>    Ca    9.5      31 Jul 2022 11:02  Phos  2.6     07-31  Mg     1.3     07-31    TPro  7.3  /  Alb  4.2  /  TBili  2.1<H>  /  DBili  x   /  AST  217<H>  /  ALT  58<H>  /  AlkPhos  236<H>  07-31              RADIOLOGY      A/P:     #Alcohol withdrawal  -Increased anxiety with noticeable tremor  -Subjective sweats  -CIWA =8 (+3 tremor; +1 sweats; +4 anxiety)  -Valium per CIWA protocol  -Continue to monitor    #PSVT  -PSVT non sustained. Pt asymptomatic and HD stable  -Valsalva maneuver for further episodes   -Continue to monitor on tele for prolonged/sustained SVT. Called by RN for PSVT on tele and anxiety Pt seen and examined at bedside    Pt resting on bed comfortably. No longer in PSVT. Reported to have PSVT for about 1 min after she got up to use the restroom. Pt reports increased anxiety and sweats with hand tremor. She denied any palpitations, chest pain, SoB, nausea/vomiting, headache, audio/visual/tactile hallucinations.     Allergies    No Known Allergies    Intolerances        Vitals  Vital Signs Last 24 Hrs  T(C): 36.9 (01 Aug 2022 00:15), Max: 37 (31 Jul 2022 12:10)  T(F): 98.4 (01 Aug 2022 00:15), Max: 98.6 (31 Jul 2022 12:10)  HR: 98 (01 Aug 2022 00:15) (66 - 98)  BP: 123/86 (01 Aug 2022 00:15) (118/65 - 142/91)  BP(mean): --  RR: 18 (01 Aug 2022 00:15) (17 - 22)  SpO2: 98% (01 Aug 2022 00:15) (98% - 99%)    Parameters below as of 01 Aug 2022 00:15  Patient On (Oxygen Delivery Method): room air          General: WN/WD NAD  Respiratory: CTA B/L, no wheezes, rales, rhonchi  CV: RRR, S1S2, no murmurs, rubs or gallops  Abdominal: Soft, NT, ND +BS  Extremities: No edema, warm, well perfused,  + peripheral pulses  Neurology: A&Ox3, nonfocal       LABS:                        10.4   6.70  )-----------( 161      ( 31 Jul 2022 11:02 )             32.1     07-31    138  |  99  |  5<L>  ----------------------------<  142<H>  3.4<L>   |  26  |  0.33<L>    Ca    9.5      31 Jul 2022 11:02  Phos  2.6     07-31  Mg     1.3     07-31    TPro  7.3  /  Alb  4.2  /  TBili  2.1<H>  /  DBili  x   /  AST  217<H>  /  ALT  58<H>  /  AlkPhos  236<H>  07-31              RADIOLOGY      A/P:     #Alcohol withdrawal  -Increased anxiety with noticeable tremor  -Subjective sweats  -CIWA =7-8 (+3 tremor; +1 sweats; +3-4 anxiety)  -Continue to monitor. If increasing CIWA give Valium per protocol    #PSVT  -PSVT non sustained. Pt asymptomatic and HD stable  -Valsalva maneuver for further episodes   -Continue to monitor on tele for prolonged/sustained SVT can consider adenosine.

## 2022-08-02 LAB
-  AMPICILLIN: SIGNIFICANT CHANGE UP
-  TETRACYCLINE: SIGNIFICANT CHANGE UP
-  VANCOMYCIN: SIGNIFICANT CHANGE UP
CULTURE RESULTS: SIGNIFICANT CHANGE UP
METHOD TYPE: SIGNIFICANT CHANGE UP
ORGANISM # SPEC MICROSCOPIC CNT: SIGNIFICANT CHANGE UP
ORGANISM # SPEC MICROSCOPIC CNT: SIGNIFICANT CHANGE UP
SPECIMEN SOURCE: SIGNIFICANT CHANGE UP

## 2022-08-05 LAB
CULTURE RESULTS: SIGNIFICANT CHANGE UP
CULTURE RESULTS: SIGNIFICANT CHANGE UP
SPECIMEN SOURCE: SIGNIFICANT CHANGE UP
SPECIMEN SOURCE: SIGNIFICANT CHANGE UP

## 2022-08-08 ENCOUNTER — NON-APPOINTMENT (OUTPATIENT)
Age: 42
End: 2022-08-08

## 2022-08-08 ENCOUNTER — TRANSCRIPTION ENCOUNTER (OUTPATIENT)
Age: 42
End: 2022-08-08

## 2022-08-08 ENCOUNTER — APPOINTMENT (OUTPATIENT)
Dept: SURGICAL ONCOLOGY | Facility: CLINIC | Age: 42
End: 2022-08-08

## 2022-08-08 VITALS
HEART RATE: 95 BPM | HEIGHT: 61 IN | WEIGHT: 99 LBS | DIASTOLIC BLOOD PRESSURE: 72 MMHG | OXYGEN SATURATION: 98 % | BODY MASS INDEX: 18.69 KG/M2 | RESPIRATION RATE: 15 BRPM | TEMPERATURE: 98.6 F | SYSTOLIC BLOOD PRESSURE: 107 MMHG

## 2022-08-08 DIAGNOSIS — N61.1 ABSCESS OF THE BREAST AND NIPPLE: ICD-10-CM

## 2022-08-08 DIAGNOSIS — Z78.9 OTHER SPECIFIED HEALTH STATUS: ICD-10-CM

## 2022-08-08 DIAGNOSIS — Z72.89 OTHER PROBLEMS RELATED TO LIFESTYLE: ICD-10-CM

## 2022-08-08 DIAGNOSIS — F17.200 NICOTINE DEPENDENCE, UNSPECIFIED, UNCOMPLICATED: ICD-10-CM

## 2022-08-08 PROBLEM — Z00.00 ENCOUNTER FOR PREVENTIVE HEALTH EXAMINATION: Status: ACTIVE | Noted: 2022-08-08

## 2022-08-08 PROBLEM — Z87.898 PERSONAL HISTORY OF OTHER SPECIFIED CONDITIONS: Chronic | Status: ACTIVE | Noted: 2022-07-31

## 2022-08-08 PROCEDURE — 99024 POSTOP FOLLOW-UP VISIT: CPT

## 2022-08-08 RX ORDER — AMOXICILLIN AND CLAVULANATE POTASSIUM 500; 125 MG/1; MG/1
500-125 TABLET, FILM COATED ORAL
Qty: 30 | Refills: 0 | Status: ACTIVE | COMMUNITY
Start: 2022-08-01

## 2022-08-08 RX ORDER — FOLIC ACID 1 MG/1
1 TABLET ORAL
Qty: 30 | Refills: 0 | Status: ACTIVE | COMMUNITY
Start: 2022-08-01

## 2022-08-08 NOTE — PHYSICAL EXAM
[Normocephalic] : normocephalic [Atraumatic] : atraumatic [EOMI] : extra ocular movement intact [PERRL] : pupils equal, round and reactive to light [Sclera nonicteric] : sclera nonicteric [Supple] : supple [No Supraclavicular Adenopathy] : no supraclavicular adenopathy [No Cervical Adenopathy] : no cervical adenopathy [No Thyromegaly] : no thyromegaly [Examined in the supine and seated position] : examined in the supine and seated position [No dominant masses] : no dominant masses in right breast  [No dominant masses] : no dominant masses left breast [No Nipple Retraction] : no left nipple retraction [No Nipple Discharge] : no left nipple discharge [No Axillary Lymphadenopathy] : no left axillary lymphadenopathy [No Edema] : no edema [No Rashes] : no rashes [No Ulceration] : no ulceration [Symmetrical] : symmetrical [Bra Size: ___] : Bra Size: [unfilled] [Grade 2] : Ptosis Grade 2 [Breast Mass Right Breast ___cm] : no masses [Breast Mass Left Breast ___cm] : no masses [Breast Nipple Inversion] : nipples not inverted [Breast Nipple Retraction] : nipples not retracted [Breast Nipple Flattening] : nipples not flattened [Breast Nipple Fissures] : nipples not fissured [de-identified] : non-labored respirations  [de-identified] : upper inner breast I&D site with penrose. No surrounding erythema, no fluctuance

## 2022-08-08 NOTE — HISTORY OF PRESENT ILLNESS
[FreeTextEntry1] : The patient is a 42 year F is here for evaluation regarding Right breast abscess, here for initial visit. \par \par She presented to the ER on 7/30/2022 for breast abscess and ETOH withdrawal and discharged on 8/1/2022. Breast abscess was I&D at bedside and drain was placed. She was discharged home on 10 days of Augmentin. \par \par Most recent imaging: \par \par 7/30/2022 R US (NW)\par - R 3:00 near chest wall 4.8 x 1.3 x 4.3 cm heterogeneous collection containing internal isoechoic foci w/ increase vascularity and a thin tract to the skin surface. Suggestive of abscess. Neoplasm difficult to exclude.\par \par 7/30/2022 Culture\par  - Enteroccoccus faecalis, sensitive to ampicillin\par  - Numerous peptoniphilus harei\par \par She reports that she has had a similar episode in the same area of the same breast that was drained by a dermatologist at the time. This is her second episode.\par She notes that since drainage, she has not had very much output. Denies fevers/chills. She continues to drink alcohol.\par \par She denies any other medical or surgical history. She was recently told she had alcoholic hepatitis.\par \par She is currently taking thiamine, folic acid, and Augmentin.\par \par She is currently smoking 1/2 ppd x 25 years. She drinks 8 oz of liquor daily, her usual amount.\par \par She denies any family history of breast cancer or other cancers.

## 2022-08-08 NOTE — CONSULT LETTER
[Dear  ___] : Dear  [unfilled], [Courtesy Letter:] : I had the pleasure of seeing your patient, [unfilled], in my office today. [Please see my note below.] : Please see my note below. [Sincerely,] : Sincerely, [FreeTextEntry3] : Barbi Barroso MD\par Breast Surgeon\par Division of Surgical Oncology\par Department of Surgery\par [address] [place] [address]\[place] [address] \par Tel: [phone]\par Fax: [phone]\par Email: [email or web address]

## 2022-08-08 NOTE — ASSESSMENT
[FreeTextEntry1] : The patient is a 42 year F here with a Right breast abscess, here for evaluation and drain removal.\par \par Exam today shows a clean I&D site with penrose drain, removed without incident. Cavity was irrigated with normal saline, and dry dressing placed on top.\par \par We discussed that she is due for a baseline screening mammogram which she can start as soon as this wound heals on her breast. She will follow-up with her PCP, as well as ID.\par \par Plan: \par - warm compress, massage TID\par - Complete course of antibiotics\par - screening mammogram once wound heals\par - RTO PRN

## 2022-08-08 NOTE — PAST MEDICAL HISTORY
[Menstruating] : The patient is menstruating [Menarche Age ____] : age at menarche was [unfilled] [Approximately ___] : the LMP was approximately [unfilled] [Irregular Cycle Intervals] : are  irregular [Total Preg ___] : G[unfilled] [FreeTextEntry4] : longstanding irregular periods [FreeTextEntry6] : none [FreeTextEntry7] : 1 month, not currently [FreeTextEntry8] : n/a

## 2022-08-11 PROCEDURE — 84466 ASSAY OF TRANSFERRIN: CPT

## 2022-08-11 PROCEDURE — 87205 SMEAR GRAM STAIN: CPT

## 2022-08-11 PROCEDURE — 82607 VITAMIN B-12: CPT

## 2022-08-11 PROCEDURE — 87077 CULTURE AEROBIC IDENTIFY: CPT

## 2022-08-11 PROCEDURE — 86703 HIV-1/HIV-2 1 RESULT ANTBDY: CPT

## 2022-08-11 PROCEDURE — 82746 ASSAY OF FOLIC ACID SERUM: CPT

## 2022-08-11 PROCEDURE — 84484 ASSAY OF TROPONIN QUANT: CPT

## 2022-08-11 PROCEDURE — 99285 EMERGENCY DEPT VISIT HI MDM: CPT

## 2022-08-11 PROCEDURE — 83735 ASSAY OF MAGNESIUM: CPT

## 2022-08-11 PROCEDURE — U0005: CPT

## 2022-08-11 PROCEDURE — 87040 BLOOD CULTURE FOR BACTERIA: CPT

## 2022-08-11 PROCEDURE — 80307 DRUG TEST PRSMV CHEM ANLYZR: CPT

## 2022-08-11 PROCEDURE — 80074 ACUTE HEPATITIS PANEL: CPT

## 2022-08-11 PROCEDURE — 82728 ASSAY OF FERRITIN: CPT

## 2022-08-11 PROCEDURE — 76705 ECHO EXAM OF ABDOMEN: CPT

## 2022-08-11 PROCEDURE — 85025 COMPLETE CBC W/AUTO DIFF WBC: CPT

## 2022-08-11 PROCEDURE — U0003: CPT

## 2022-08-11 PROCEDURE — 83540 ASSAY OF IRON: CPT

## 2022-08-11 PROCEDURE — 36415 COLL VENOUS BLD VENIPUNCTURE: CPT

## 2022-08-11 PROCEDURE — 87641 MR-STAPH DNA AMP PROBE: CPT

## 2022-08-11 PROCEDURE — 80053 COMPREHEN METABOLIC PANEL: CPT

## 2022-08-11 PROCEDURE — 96375 TX/PRO/DX INJ NEW DRUG ADDON: CPT

## 2022-08-11 PROCEDURE — 87640 STAPH A DNA AMP PROBE: CPT

## 2022-08-11 PROCEDURE — 87075 CULTR BACTERIA EXCEPT BLOOD: CPT

## 2022-08-11 PROCEDURE — 71046 X-RAY EXAM CHEST 2 VIEWS: CPT

## 2022-08-11 PROCEDURE — 87070 CULTURE OTHR SPECIMN AEROBIC: CPT

## 2022-08-11 PROCEDURE — 76642 ULTRASOUND BREAST LIMITED: CPT

## 2022-08-11 PROCEDURE — 84100 ASSAY OF PHOSPHORUS: CPT

## 2022-08-11 PROCEDURE — 96374 THER/PROPH/DIAG INJ IV PUSH: CPT

## 2022-08-11 PROCEDURE — 83550 IRON BINDING TEST: CPT

## 2022-08-11 PROCEDURE — 84443 ASSAY THYROID STIM HORMONE: CPT

## 2022-08-11 PROCEDURE — 87186 SC STD MICRODIL/AGAR DIL: CPT

## 2023-01-26 ENCOUNTER — INPATIENT (INPATIENT)
Facility: HOSPITAL | Age: 43
LOS: 3 days | Discharge: HOME | DRG: 177 | End: 2023-01-30
Attending: INTERNAL MEDICINE | Admitting: STUDENT IN AN ORGANIZED HEALTH CARE EDUCATION/TRAINING PROGRAM
Payer: MEDICAID

## 2023-01-26 VITALS
OXYGEN SATURATION: 97 % | HEART RATE: 120 BPM | HEIGHT: 61 IN | TEMPERATURE: 98 F | DIASTOLIC BLOOD PRESSURE: 80 MMHG | WEIGHT: 89.95 LBS | RESPIRATION RATE: 20 BRPM | SYSTOLIC BLOOD PRESSURE: 114 MMHG

## 2023-01-26 DIAGNOSIS — R11.2 NAUSEA WITH VOMITING, UNSPECIFIED: ICD-10-CM

## 2023-01-26 LAB
ALBUMIN SERPL ELPH-MCNC: 5.2 G/DL — HIGH (ref 3.3–5)
ALP SERPL-CCNC: 102 U/L — SIGNIFICANT CHANGE UP (ref 40–120)
ALT FLD-CCNC: 59 U/L — HIGH (ref 10–45)
ANION GAP SERPL CALC-SCNC: 20 MMOL/L — HIGH (ref 5–17)
ANISOCYTOSIS BLD QL: SLIGHT — SIGNIFICANT CHANGE UP
APPEARANCE UR: ABNORMAL
AST SERPL-CCNC: 142 U/L — HIGH (ref 10–40)
BACTERIA # UR AUTO: NEGATIVE — SIGNIFICANT CHANGE UP
BASE EXCESS BLDV CALC-SCNC: 25.5 MMOL/L — HIGH (ref -2–3)
BASOPHILS # BLD AUTO: 0 K/UL — SIGNIFICANT CHANGE UP (ref 0–0.2)
BASOPHILS NFR BLD AUTO: 0 % — SIGNIFICANT CHANGE UP (ref 0–2)
BILIRUB SERPL-MCNC: 1 MG/DL — SIGNIFICANT CHANGE UP (ref 0.2–1.2)
BILIRUB UR-MCNC: NEGATIVE — SIGNIFICANT CHANGE UP
BUN SERPL-MCNC: 33 MG/DL — HIGH (ref 7–23)
CA-I SERPL-SCNC: 0.98 MMOL/L — LOW (ref 1.15–1.33)
CALCIUM SERPL-MCNC: 11.1 MG/DL — HIGH (ref 8.4–10.5)
CHLORIDE BLDV-SCNC: 72 MMOL/L — LOW (ref 96–108)
CHLORIDE SERPL-SCNC: <70 MMOL/L — LOW (ref 96–108)
CO2 BLDV-SCNC: 52 MMOL/L — HIGH (ref 22–26)
CO2 SERPL-SCNC: 37 MMOL/L — HIGH (ref 22–31)
COLOR SPEC: YELLOW — SIGNIFICANT CHANGE UP
CREAT SERPL-MCNC: 1.31 MG/DL — HIGH (ref 0.5–1.3)
DIFF PNL FLD: NEGATIVE — SIGNIFICANT CHANGE UP
EGFR: 52 ML/MIN/1.73M2 — LOW
EOSINOPHIL # BLD AUTO: 0 K/UL — SIGNIFICANT CHANGE UP (ref 0–0.5)
EOSINOPHIL NFR BLD AUTO: 0 % — SIGNIFICANT CHANGE UP (ref 0–6)
EPI CELLS # UR: 5 /HPF — SIGNIFICANT CHANGE UP
FLUAV AG NPH QL: SIGNIFICANT CHANGE UP
FLUBV AG NPH QL: SIGNIFICANT CHANGE UP
GAS PNL BLDV: 120 MMOL/L — CRITICAL LOW (ref 136–145)
GAS PNL BLDV: SIGNIFICANT CHANGE UP
GAS PNL BLDV: SIGNIFICANT CHANGE UP
GLUCOSE BLDV-MCNC: 114 MG/DL — HIGH (ref 70–99)
GLUCOSE SERPL-MCNC: 119 MG/DL — HIGH (ref 70–99)
GLUCOSE UR QL: NEGATIVE — SIGNIFICANT CHANGE UP
HCG SERPL-ACNC: <2 MIU/ML — SIGNIFICANT CHANGE UP
HCO3 BLDV-SCNC: 50 MMOL/L — CRITICAL HIGH (ref 22–29)
HCT VFR BLD CALC: 40.4 % — SIGNIFICANT CHANGE UP (ref 34.5–45)
HCT VFR BLDA CALC: 42 % — SIGNIFICANT CHANGE UP (ref 34.5–46.5)
HGB BLD CALC-MCNC: 14.1 G/DL — SIGNIFICANT CHANGE UP (ref 11.7–16.1)
HGB BLD-MCNC: 13.5 G/DL — SIGNIFICANT CHANGE UP (ref 11.5–15.5)
HYALINE CASTS # UR AUTO: 0 /LPF — SIGNIFICANT CHANGE UP (ref 0–2)
HYPOCHROMIA BLD QL: SLIGHT — SIGNIFICANT CHANGE UP
KETONES UR-MCNC: ABNORMAL
LACTATE BLDV-MCNC: 2.5 MMOL/L — HIGH (ref 0.5–2)
LEUKOCYTE ESTERASE UR-ACNC: NEGATIVE — SIGNIFICANT CHANGE UP
LIDOCAIN IGE QN: 44 U/L — SIGNIFICANT CHANGE UP (ref 7–60)
LYMPHOCYTES # BLD AUTO: 1.35 K/UL — SIGNIFICANT CHANGE UP (ref 1–3.3)
LYMPHOCYTES # BLD AUTO: 11.3 % — LOW (ref 13–44)
MACROCYTES BLD QL: SLIGHT — SIGNIFICANT CHANGE UP
MAGNESIUM SERPL-MCNC: 1.8 MG/DL — SIGNIFICANT CHANGE UP (ref 1.6–2.6)
MANUAL SMEAR VERIFICATION: SIGNIFICANT CHANGE UP
MCHC RBC-ENTMCNC: 23.9 PG — LOW (ref 27–34)
MCHC RBC-ENTMCNC: 33.4 GM/DL — SIGNIFICANT CHANGE UP (ref 32–36)
MCV RBC AUTO: 71.4 FL — LOW (ref 80–100)
MONOCYTES # BLD AUTO: 0.31 K/UL — SIGNIFICANT CHANGE UP (ref 0–0.9)
MONOCYTES NFR BLD AUTO: 2.6 % — SIGNIFICANT CHANGE UP (ref 2–14)
NEUTROPHILS # BLD AUTO: 10.32 K/UL — HIGH (ref 1.8–7.4)
NEUTROPHILS NFR BLD AUTO: 84.4 % — HIGH (ref 43–77)
NEUTS BAND # BLD: 1.7 % — SIGNIFICANT CHANGE UP (ref 0–8)
NITRITE UR-MCNC: NEGATIVE — SIGNIFICANT CHANGE UP
OVALOCYTES BLD QL SMEAR: SLIGHT — SIGNIFICANT CHANGE UP
PCO2 BLDV: 49 MMHG — HIGH (ref 39–42)
PH BLDV: 7.62 — CRITICAL HIGH (ref 7.32–7.43)
PH UR: 6.5 — SIGNIFICANT CHANGE UP (ref 5–8)
PHOSPHATE SERPL-MCNC: 1.5 MG/DL — LOW (ref 2.5–4.5)
PLAT MORPH BLD: NORMAL — SIGNIFICANT CHANGE UP
PLATELET # BLD AUTO: 129 K/UL — LOW (ref 150–400)
PO2 BLDV: 37 MMHG — SIGNIFICANT CHANGE UP (ref 25–45)
POIKILOCYTOSIS BLD QL AUTO: SIGNIFICANT CHANGE UP
POLYCHROMASIA BLD QL SMEAR: SLIGHT — SIGNIFICANT CHANGE UP
POTASSIUM BLDV-SCNC: 5.6 MMOL/L — HIGH (ref 3.5–5.1)
POTASSIUM SERPL-MCNC: 2.9 MMOL/L — CRITICAL LOW (ref 3.5–5.3)
POTASSIUM SERPL-SCNC: 2.9 MMOL/L — CRITICAL LOW (ref 3.5–5.3)
PROT SERPL-MCNC: 9.2 G/DL — HIGH (ref 6–8.3)
PROT UR-MCNC: ABNORMAL
RBC # BLD: 5.66 M/UL — HIGH (ref 3.8–5.2)
RBC # FLD: 13.5 % — SIGNIFICANT CHANGE UP (ref 10.3–14.5)
RBC BLD AUTO: ABNORMAL
RBC CASTS # UR COMP ASSIST: 5 /HPF — HIGH (ref 0–4)
RSV RNA NPH QL NAA+NON-PROBE: SIGNIFICANT CHANGE UP
SAO2 % BLDV: 70.5 % — SIGNIFICANT CHANGE UP (ref 67–88)
SARS-COV-2 RNA SPEC QL NAA+PROBE: DETECTED
SODIUM SERPL-SCNC: 125 MMOL/L — LOW (ref 135–145)
SP GR SPEC: 1.01 — SIGNIFICANT CHANGE UP (ref 1.01–1.02)
TARGETS BLD QL SMEAR: SIGNIFICANT CHANGE UP
TROPONIN T, HIGH SENSITIVITY RESULT: 8 NG/L — SIGNIFICANT CHANGE UP (ref 0–51)
UROBILINOGEN FLD QL: NEGATIVE — SIGNIFICANT CHANGE UP
WBC # BLD: 11.99 K/UL — HIGH (ref 3.8–10.5)
WBC # FLD AUTO: 11.99 K/UL — HIGH (ref 3.8–10.5)
WBC UR QL: 3 /HPF — SIGNIFICANT CHANGE UP (ref 0–5)

## 2023-01-26 PROCEDURE — 71046 X-RAY EXAM CHEST 2 VIEWS: CPT | Mod: 26

## 2023-01-26 PROCEDURE — 99285 EMERGENCY DEPT VISIT HI MDM: CPT

## 2023-01-26 PROCEDURE — 99223 1ST HOSP IP/OBS HIGH 75: CPT

## 2023-01-26 RX ORDER — POTASSIUM CHLORIDE 20 MEQ
40 PACKET (EA) ORAL ONCE
Refills: 0 | Status: COMPLETED | OUTPATIENT
Start: 2023-01-26 | End: 2023-01-26

## 2023-01-26 RX ORDER — SODIUM CHLORIDE 9 MG/ML
1000 INJECTION, SOLUTION INTRAVENOUS ONCE
Refills: 0 | Status: COMPLETED | OUTPATIENT
Start: 2023-01-26 | End: 2023-01-26

## 2023-01-26 RX ORDER — ONDANSETRON 8 MG/1
4 TABLET, FILM COATED ORAL ONCE
Refills: 0 | Status: COMPLETED | OUTPATIENT
Start: 2023-01-26 | End: 2023-01-26

## 2023-01-26 RX ORDER — ACETAMINOPHEN 500 MG
600 TABLET ORAL ONCE
Refills: 0 | Status: COMPLETED | OUTPATIENT
Start: 2023-01-26 | End: 2023-01-26

## 2023-01-26 RX ORDER — POTASSIUM PHOSPHATE, MONOBASIC POTASSIUM PHOSPHATE, DIBASIC 236; 224 MG/ML; MG/ML
15 INJECTION, SOLUTION INTRAVENOUS ONCE
Refills: 0 | Status: COMPLETED | OUTPATIENT
Start: 2023-01-26 | End: 2023-01-26

## 2023-01-26 RX ORDER — FAMOTIDINE 10 MG/ML
20 INJECTION INTRAVENOUS ONCE
Refills: 0 | Status: COMPLETED | OUTPATIENT
Start: 2023-01-26 | End: 2023-01-26

## 2023-01-26 RX ADMIN — SODIUM CHLORIDE 1000 MILLILITER(S): 9 INJECTION, SOLUTION INTRAVENOUS at 16:59

## 2023-01-26 RX ADMIN — POTASSIUM PHOSPHATE, MONOBASIC POTASSIUM PHOSPHATE, DIBASIC 62.5 MILLIMOLE(S): 236; 224 INJECTION, SOLUTION INTRAVENOUS at 21:01

## 2023-01-26 RX ADMIN — SODIUM CHLORIDE 1000 MILLILITER(S): 9 INJECTION, SOLUTION INTRAVENOUS at 21:15

## 2023-01-26 RX ADMIN — Medication 1 TABLET(S): at 19:53

## 2023-01-26 RX ADMIN — Medication 600 MILLIGRAM(S): at 23:22

## 2023-01-26 RX ADMIN — Medication 600 MILLIGRAM(S): at 22:36

## 2023-01-26 RX ADMIN — Medication 40 MILLIEQUIVALENT(S): at 19:53

## 2023-01-26 RX ADMIN — FAMOTIDINE 20 MILLIGRAM(S): 10 INJECTION INTRAVENOUS at 17:46

## 2023-01-26 RX ADMIN — SODIUM CHLORIDE 2000 MILLILITER(S): 9 INJECTION, SOLUTION INTRAVENOUS at 19:58

## 2023-01-26 RX ADMIN — SODIUM CHLORIDE 1000 MILLILITER(S): 9 INJECTION, SOLUTION INTRAVENOUS at 21:14

## 2023-01-26 RX ADMIN — ONDANSETRON 4 MILLIGRAM(S): 8 TABLET, FILM COATED ORAL at 16:55

## 2023-01-26 RX ADMIN — Medication 240 MILLIGRAM(S): at 21:25

## 2023-01-26 NOTE — ED PROVIDER NOTE - CARE PLAN
Principal Discharge DX:	Nausea and vomiting  Secondary Diagnosis:	Moderate dehydration   1 Principal Discharge DX:	Nausea and vomiting  Secondary Diagnosis:	Moderate dehydration  Secondary Diagnosis:	Acute kidney failure  Secondary Diagnosis:	Dehydration with hyponatremia  Secondary Diagnosis:	Acute hypokalemia

## 2023-01-26 NOTE — ED PROVIDER NOTE - OBJECTIVE STATEMENT
43 y/o F presents to the emergency department for 4 days of chest pain, fevers, chills, nausea and vomiting.  Patient states she tested positive for COVID about 10 days ago.  Patient endorses 4 days ago started having midsternal sharp chest pain worse after vomiting.  Patient states she has vomited more than 20 times in the last 4 days, unable to keep any solids or liquids down.  Patient states she has had fevers over the last 4 days, worse at night.  Patient states symptoms are minimally relieved with Tylenol.  Patient denies headaches, vision changes, syncope, hemoptysis, hematemesis, diarrhea/constipation, dysuria, hematuria.  Patient denies any recent travel.

## 2023-01-26 NOTE — H&P ADULT - PROBLEM SELECTOR PLAN 4
-likely iso volume contraction d/t intractable n/v  -improved s/p 2L LR in ED  -will c/w NS at 125cc  -repeat VBG in AM

## 2023-01-26 NOTE — ED PROVIDER NOTE - PHYSICAL EXAMINATION
Constitutional: VS reviewed, tachycardic. Alert and orientedx3, nontoxic appearing  HEENT: Atraumatic, EOMI, PERRL  CV: Tachycardic, TTP of midsternal chest, no bruising or rashes  Lungs: Clear and equal bilaterally, no wheezes, rales or crackles  Abdomen: Soft, nondistended, nontender  MSK: No deformities  Skin: Warm and dry. As visualized no rashes, lesions, bruising or erythema  Neuro: Strength 5/5 in all extremities.   Lymph: No pitting edema in extremities.

## 2023-01-26 NOTE — H&P ADULT - PROBLEM SELECTOR PLAN 5
-most likely d/t volume depletion iso vomiting  -now improving s/p IVF  -c/w IV hydration for few hours  -avoid nephrotoxins and renally dose meds

## 2023-01-26 NOTE — H&P ADULT - NSHPPHYSICALEXAM_GEN_ALL_CORE
Vital Signs Last 24 Hrs  T(C): 37 (26 Jan 2023 23:37), Max: 37.3 (26 Jan 2023 17:13)  T(F): 98.6 (26 Jan 2023 23:37), Max: 99.2 (26 Jan 2023 17:13)  HR: 80 (27 Jan 2023 00:05) (80 - 120)  BP: 97/66 (27 Jan 2023 00:05) (97/66 - 114/80)  BP(mean): 71 (27 Jan 2023 00:05) (69 - 74)  RR: 18 (27 Jan 2023 00:05) (16 - 20)  SpO2: 99% (27 Jan 2023 00:07) (91% - 99%)    Parameters below as of 27 Jan 2023 00:07  Patient On (Oxygen Delivery Method): nasal cannula  O2 Flow (L/min): 1

## 2023-01-26 NOTE — H&P ADULT - PROBLEM SELECTOR PLAN 3
-pt notably hypoxic down to 86% on RA  -will treat with dex 10mg x10-days and remdesivir x 5-days  -f/u inflammatory markers including D-dimer, CK, ferritin, ldh  -antitussives prn  -tylenol prn for fever

## 2023-01-26 NOTE — ED PROVIDER NOTE - PROGRESS NOTE DETAILS
Mariela Hewitt PGY1: Pt reassessed and endorses improvement of symptoms. Abnormal labs noted and pt started on electrolyte repletion. Pt will require admission for electrolyte abnormalities.

## 2023-01-26 NOTE — H&P ADULT - HISTORY OF PRESENT ILLNESS
42F no PMH p/w 4-day h/o intractable nausea and vomiting. She tested positive for covid last week after having flu-like sxs including body aches and fever. However, in the past 4-days her course took a turn for the worse when she started having multiple episodes of  nbnb emesis associated with oral intake to the point even drinking water would cause her to vomit.  She reports up to 20 episodes per day. She's also had a non-productive cough and a pressure-like chest pain associated with a sensation of her heart beat. She reports minimal oral intake. States having covid back in November but sxs were not this severe. Pt is vaxxed x2 but not boosted.

## 2023-01-26 NOTE — H&P ADULT - PROBLEM SELECTOR PLAN 2
-suspect GI manifestations of covid-19  -c/b electrolyte abnormalities and metabolic alkalosis d/t volume contraction  -now improved and able to tolerate PO including eating half a sandwich  -c/w zofran prn

## 2023-01-26 NOTE — H&P ADULT - NSHPLABSRESULTS_GEN_ALL_CORE
13.5   11.99 )-----------( 129      ( 2023 17:21 )             40.4           132<L>  |  81<L>  |  28<H>  ----------------------------<  109<H>  6.4<HH>   |  35<H>  |  1.27    Ca    9.4      2023 00:08  Phos  10.3       Mg     1.8         TPro  6.7  /  Alb  4.3  /  TBili  0.8  /  DBili  x   /  AST  102<H>  /  ALT  44  /  AlkPhos  77              LIVER FUNCTIONS - ( 2023 00:08 )  Alb: 4.3 g/dL / Pro: 6.7 g/dL / ALK PHOS: 77 U/L / ALT: 44 U/L / AST: 102 U/L / GGT: x                 Urinalysis Basic - ( 2023 20:49 )    Color: Yellow / Appearance: Slightly Turbid / S.014 / pH: x  Gluc: x / Ketone: Small  / Bili: Negative / Urobili: Negative   Blood: x / Protein: 100 mg/dl / Nitrite: Negative   Leuk Esterase: Negative / RBC: 5 /hpf / WBC 3 /HPF   Sq Epi: x / Non Sq Epi: 5 /hpf / Bacteria: Negative      I have personally reviewed imaging, CXR w/o e/o acute pulmonary disease

## 2023-01-26 NOTE — H&P ADULT - PROBLEM SELECTOR PLAN 1
-pt notably hypoxic to 86% on RA  -likely iso covid-19  -improved with 2L NC saturating >95%   -c/w supplemental O2 and wean as tolerated  -continuous pulse ox monitoring  -will manage for covid-19 as below

## 2023-01-26 NOTE — ED ADULT NURSE NOTE - OBJECTIVE STATEMENT
Pt complaining of multiple complaints.  Pt reports chest pain, sensation of heart racing, fevers, nausea, vomiting x 4 days, reportedly tested positive for covid on home test 6 days ago, pt also had confirmed covid infection in Nov 2022.  Hx alcohol abuse reports that she is now only drinking once a week, last drink 1/20.  Pt conversive, abdomen soft/non-distended/non-tender, skin warm, dry, intact, no LE edema, no rashes, denies shortness of breath, cough, abdominal pain,  diarrhea, urinary symptoms.  Took 2 advil at 1300.  Refusing rectal temMD Isauro pineda aware.

## 2023-01-26 NOTE — ED PROVIDER NOTE - CLINICAL SUMMARY MEDICAL DECISION MAKING FREE TEXT BOX
42-year-old female presents emergency department for 4 days of chest pain, fevers, chills, nausea and vomiting more than 20 times.  Patient unable to tolerate p.o. intake.  Fevers worse at night.  Patient states chest pain is sharp, worse after vomiting.  Lungs clear, heart no murmurs.  No crepitus of chest.  Differential diagnoses include but not limited to viral illness, myocarditis, pneumomediastinum, gastroenteritis.  Will get labs, CXR.  Will give IV fluids for dehydration, Zofran and IV Tylenol. Dispo admission if patient unable to tolerate p.o. versus home with outpatient follow-up.

## 2023-01-27 DIAGNOSIS — R11.2 NAUSEA WITH VOMITING, UNSPECIFIED: ICD-10-CM

## 2023-01-27 DIAGNOSIS — Z29.9 ENCOUNTER FOR PROPHYLACTIC MEASURES, UNSPECIFIED: ICD-10-CM

## 2023-01-27 DIAGNOSIS — U07.1 COVID-19: ICD-10-CM

## 2023-01-27 DIAGNOSIS — E87.3 ALKALOSIS: ICD-10-CM

## 2023-01-27 DIAGNOSIS — N17.9 ACUTE KIDNEY FAILURE, UNSPECIFIED: ICD-10-CM

## 2023-01-27 DIAGNOSIS — J96.01 ACUTE RESPIRATORY FAILURE WITH HYPOXIA: ICD-10-CM

## 2023-01-27 DIAGNOSIS — E87.8 OTHER DISORDERS OF ELECTROLYTE AND FLUID BALANCE, NOT ELSEWHERE CLASSIFIED: ICD-10-CM

## 2023-01-27 LAB
ALBUMIN SERPL ELPH-MCNC: 4.3 G/DL — SIGNIFICANT CHANGE UP (ref 3.3–5)
ALP SERPL-CCNC: 77 U/L — SIGNIFICANT CHANGE UP (ref 40–120)
ALT FLD-CCNC: 44 U/L — SIGNIFICANT CHANGE UP (ref 10–45)
ANION GAP SERPL CALC-SCNC: 10 MMOL/L — SIGNIFICANT CHANGE UP (ref 5–17)
ANION GAP SERPL CALC-SCNC: 13 MMOL/L — SIGNIFICANT CHANGE UP (ref 5–17)
ANION GAP SERPL CALC-SCNC: 16 MMOL/L — SIGNIFICANT CHANGE UP (ref 5–17)
AST SERPL-CCNC: 102 U/L — HIGH (ref 10–40)
BASE EXCESS BLDV CALC-SCNC: 13.1 MMOL/L — HIGH (ref -2–3)
BASE EXCESS BLDV CALC-SCNC: 20.5 MMOL/L — HIGH (ref -2–3)
BILIRUB SERPL-MCNC: 0.8 MG/DL — SIGNIFICANT CHANGE UP (ref 0.2–1.2)
BLOOD GAS VENOUS - CREATININE: SIGNIFICANT CHANGE UP MG/DL (ref 0.5–1.3)
BLOOD GAS VENOUS - CREATININE: SIGNIFICANT CHANGE UP MG/DL (ref 0.5–1.3)
BUN SERPL-MCNC: 28 MG/DL — HIGH (ref 7–23)
BUN SERPL-MCNC: 28 MG/DL — HIGH (ref 7–23)
BUN SERPL-MCNC: 31 MG/DL — HIGH (ref 7–23)
CA-I SERPL-SCNC: 1.07 MMOL/L — LOW (ref 1.15–1.33)
CA-I SERPL-SCNC: 1.12 MMOL/L — LOW (ref 1.15–1.33)
CALCIUM SERPL-MCNC: 9.2 MG/DL — SIGNIFICANT CHANGE UP (ref 8.4–10.5)
CALCIUM SERPL-MCNC: 9.4 MG/DL — SIGNIFICANT CHANGE UP (ref 8.4–10.5)
CALCIUM SERPL-MCNC: 9.8 MG/DL — SIGNIFICANT CHANGE UP (ref 8.4–10.5)
CHLORIDE BLDV-SCNC: 86 MMOL/L — LOW (ref 96–108)
CHLORIDE BLDV-SCNC: 91 MMOL/L — LOW (ref 96–108)
CHLORIDE SERPL-SCNC: 81 MMOL/L — LOW (ref 96–108)
CHLORIDE SERPL-SCNC: 85 MMOL/L — LOW (ref 96–108)
CHLORIDE SERPL-SCNC: 89 MMOL/L — LOW (ref 96–108)
CHLORIDE UR-SCNC: <20 MMOL/L — SIGNIFICANT CHANGE UP
CK SERPL-CCNC: 45 U/L — SIGNIFICANT CHANGE UP (ref 25–170)
CO2 BLDV-SCNC: 40 MMOL/L — HIGH (ref 22–26)
CO2 BLDV-SCNC: 48 MMOL/L — HIGH (ref 22–26)
CO2 SERPL-SCNC: 35 MMOL/L — HIGH (ref 22–31)
CO2 SERPL-SCNC: 35 MMOL/L — HIGH (ref 22–31)
CO2 SERPL-SCNC: 37 MMOL/L — HIGH (ref 22–31)
CREAT SERPL-MCNC: 0.99 MG/DL — SIGNIFICANT CHANGE UP (ref 0.5–1.3)
CREAT SERPL-MCNC: 1.23 MG/DL — SIGNIFICANT CHANGE UP (ref 0.5–1.3)
CREAT SERPL-MCNC: 1.27 MG/DL — SIGNIFICANT CHANGE UP (ref 0.5–1.3)
CRP SERPL-MCNC: <3 MG/L — SIGNIFICANT CHANGE UP (ref 0–4)
D DIMER BLD IA.RAPID-MCNC: 212 NG/ML DDU — SIGNIFICANT CHANGE UP
EGFR: 54 ML/MIN/1.73M2 — LOW
EGFR: 56 ML/MIN/1.73M2 — LOW
EGFR: 73 ML/MIN/1.73M2 — SIGNIFICANT CHANGE UP
FERRITIN SERPL-MCNC: 427 NG/ML — HIGH (ref 15–150)
FOLATE SERPL-MCNC: 5.7 NG/ML — SIGNIFICANT CHANGE UP
GAS PNL BLDV: 128 MMOL/L — LOW (ref 136–145)
GAS PNL BLDV: 131 MMOL/L — LOW (ref 136–145)
GAS PNL BLDV: SIGNIFICANT CHANGE UP
GLUCOSE BLDC GLUCOMTR-MCNC: 110 MG/DL — HIGH (ref 70–99)
GLUCOSE BLDC GLUCOMTR-MCNC: 125 MG/DL — HIGH (ref 70–99)
GLUCOSE BLDC GLUCOMTR-MCNC: 136 MG/DL — HIGH (ref 70–99)
GLUCOSE BLDC GLUCOMTR-MCNC: 225 MG/DL — HIGH (ref 70–99)
GLUCOSE BLDV-MCNC: 106 MG/DL — HIGH (ref 70–99)
GLUCOSE BLDV-MCNC: 116 MG/DL — HIGH (ref 70–99)
GLUCOSE SERPL-MCNC: 108 MG/DL — HIGH (ref 70–99)
GLUCOSE SERPL-MCNC: 109 MG/DL — HIGH (ref 70–99)
GLUCOSE SERPL-MCNC: 112 MG/DL — HIGH (ref 70–99)
HCO3 BLDV-SCNC: 38 MMOL/L — HIGH (ref 22–29)
HCO3 BLDV-SCNC: 46 MMOL/L — CRITICAL HIGH (ref 22–29)
HCT VFR BLDA CALC: 32 % — LOW (ref 34.5–46.5)
HCT VFR BLDA CALC: 34 % — LOW (ref 34.5–46.5)
HGB BLD CALC-MCNC: 10.5 G/DL — LOW (ref 11.7–16.1)
HGB BLD CALC-MCNC: 11.3 G/DL — LOW (ref 11.7–16.1)
LACTATE BLDV-MCNC: 0.9 MMOL/L — SIGNIFICANT CHANGE UP (ref 0.5–2)
LACTATE BLDV-MCNC: 2.2 MMOL/L — HIGH (ref 0.5–2)
LDH SERPL L TO P-CCNC: 174 U/L — SIGNIFICANT CHANGE UP (ref 50–242)
MAGNESIUM SERPL-MCNC: 3 MG/DL — HIGH (ref 1.6–2.6)
OSMOLALITY SERPL: 286 MOSMOL/KG — SIGNIFICANT CHANGE UP (ref 275–300)
OSMOLALITY UR: 348 MOS/KG — SIGNIFICANT CHANGE UP (ref 300–900)
PCO2 BLDV: 50 MMHG — HIGH (ref 39–42)
PCO2 BLDV: 55 MMHG — HIGH (ref 39–42)
PH BLDV: 7.49 — HIGH (ref 7.32–7.43)
PH BLDV: 7.53 — HIGH (ref 7.32–7.43)
PHOSPHATE SERPL-MCNC: 10.3 MG/DL — HIGH (ref 2.5–4.5)
PHOSPHATE SERPL-MCNC: 2.7 MG/DL — SIGNIFICANT CHANGE UP (ref 2.5–4.5)
PO2 BLDV: 121 MMHG — HIGH (ref 25–45)
PO2 BLDV: 57 MMHG — HIGH (ref 25–45)
POTASSIUM BLDV-SCNC: 3.2 MMOL/L — LOW (ref 3.5–5.1)
POTASSIUM BLDV-SCNC: 6.4 MMOL/L — CRITICAL HIGH (ref 3.5–5.1)
POTASSIUM SERPL-MCNC: 3.2 MMOL/L — LOW (ref 3.5–5.3)
POTASSIUM SERPL-MCNC: 3.5 MMOL/L — SIGNIFICANT CHANGE UP (ref 3.5–5.3)
POTASSIUM SERPL-MCNC: 6.4 MMOL/L — CRITICAL HIGH (ref 3.5–5.3)
POTASSIUM SERPL-SCNC: 3.2 MMOL/L — LOW (ref 3.5–5.3)
POTASSIUM SERPL-SCNC: 3.5 MMOL/L — SIGNIFICANT CHANGE UP (ref 3.5–5.3)
POTASSIUM SERPL-SCNC: 6.4 MMOL/L — CRITICAL HIGH (ref 3.5–5.3)
PROT SERPL-MCNC: 6.7 G/DL — SIGNIFICANT CHANGE UP (ref 6–8.3)
SAO2 % BLDV: 100 % — HIGH (ref 67–88)
SAO2 % BLDV: 89.2 % — HIGH (ref 67–88)
SODIUM SERPL-SCNC: 132 MMOL/L — LOW (ref 135–145)
SODIUM SERPL-SCNC: 134 MMOL/L — LOW (ref 135–145)
SODIUM SERPL-SCNC: 135 MMOL/L — SIGNIFICANT CHANGE UP (ref 135–145)
SODIUM UR-SCNC: 22 MMOL/L — SIGNIFICANT CHANGE UP

## 2023-01-27 PROCEDURE — 99233 SBSQ HOSP IP/OBS HIGH 50: CPT

## 2023-01-27 RX ORDER — FOLIC ACID 0.8 MG
1 TABLET ORAL DAILY
Refills: 0 | Status: DISCONTINUED | OUTPATIENT
Start: 2023-01-27 | End: 2023-01-30

## 2023-01-27 RX ORDER — MAGNESIUM SULFATE 500 MG/ML
2 VIAL (ML) INJECTION ONCE
Refills: 0 | Status: COMPLETED | OUTPATIENT
Start: 2023-01-27 | End: 2023-01-27

## 2023-01-27 RX ORDER — CHLORHEXIDINE GLUCONATE 213 G/1000ML
1 SOLUTION TOPICAL DAILY
Refills: 0 | Status: DISCONTINUED | OUTPATIENT
Start: 2023-01-27 | End: 2023-01-30

## 2023-01-27 RX ORDER — ACETAMINOPHEN 500 MG
650 TABLET ORAL EVERY 6 HOURS
Refills: 0 | Status: DISCONTINUED | OUTPATIENT
Start: 2023-01-27 | End: 2023-01-30

## 2023-01-27 RX ORDER — DEXAMETHASONE 0.5 MG/5ML
6 ELIXIR ORAL DAILY
Refills: 0 | Status: DISCONTINUED | OUTPATIENT
Start: 2023-01-27 | End: 2023-01-29

## 2023-01-27 RX ORDER — DEXTROSE 50 % IN WATER 50 %
25 SYRINGE (ML) INTRAVENOUS ONCE
Refills: 0 | Status: DISCONTINUED | OUTPATIENT
Start: 2023-01-27 | End: 2023-01-29

## 2023-01-27 RX ORDER — LANOLIN ALCOHOL/MO/W.PET/CERES
3 CREAM (GRAM) TOPICAL AT BEDTIME
Refills: 0 | Status: DISCONTINUED | OUTPATIENT
Start: 2023-01-27 | End: 2023-01-30

## 2023-01-27 RX ORDER — GUAIFENESIN/DEXTROMETHORPHAN 600MG-30MG
10 TABLET, EXTENDED RELEASE 12 HR ORAL EVERY 4 HOURS
Refills: 0 | Status: DISCONTINUED | OUTPATIENT
Start: 2023-01-27 | End: 2023-01-30

## 2023-01-27 RX ORDER — SODIUM CHLORIDE 9 MG/ML
1000 INJECTION INTRAMUSCULAR; INTRAVENOUS; SUBCUTANEOUS
Refills: 0 | Status: DISCONTINUED | OUTPATIENT
Start: 2023-01-27 | End: 2023-01-29

## 2023-01-27 RX ORDER — REMDESIVIR 5 MG/ML
200 INJECTION INTRAVENOUS EVERY 24 HOURS
Refills: 0 | Status: COMPLETED | OUTPATIENT
Start: 2023-01-27 | End: 2023-01-27

## 2023-01-27 RX ORDER — HEPARIN SODIUM 5000 [USP'U]/ML
5000 INJECTION INTRAVENOUS; SUBCUTANEOUS EVERY 8 HOURS
Refills: 0 | Status: DISCONTINUED | OUTPATIENT
Start: 2023-01-27 | End: 2023-01-30

## 2023-01-27 RX ORDER — GLUCAGON INJECTION, SOLUTION 0.5 MG/.1ML
1 INJECTION, SOLUTION SUBCUTANEOUS ONCE
Refills: 0 | Status: DISCONTINUED | OUTPATIENT
Start: 2023-01-27 | End: 2023-01-30

## 2023-01-27 RX ORDER — DEXTROSE 50 % IN WATER 50 %
15 SYRINGE (ML) INTRAVENOUS ONCE
Refills: 0 | Status: DISCONTINUED | OUTPATIENT
Start: 2023-01-27 | End: 2023-01-29

## 2023-01-27 RX ORDER — POTASSIUM CHLORIDE 20 MEQ
40 PACKET (EA) ORAL EVERY 4 HOURS
Refills: 0 | Status: COMPLETED | OUTPATIENT
Start: 2023-01-27 | End: 2023-01-27

## 2023-01-27 RX ORDER — INSULIN LISPRO 100/ML
VIAL (ML) SUBCUTANEOUS
Refills: 0 | Status: DISCONTINUED | OUTPATIENT
Start: 2023-01-27 | End: 2023-01-29

## 2023-01-27 RX ORDER — ALBUTEROL 90 UG/1
2 AEROSOL, METERED ORAL EVERY 4 HOURS
Refills: 0 | Status: DISCONTINUED | OUTPATIENT
Start: 2023-01-27 | End: 2023-01-30

## 2023-01-27 RX ORDER — SODIUM CHLORIDE 9 MG/ML
1000 INJECTION, SOLUTION INTRAVENOUS
Refills: 0 | Status: DISCONTINUED | OUTPATIENT
Start: 2023-01-27 | End: 2023-01-29

## 2023-01-27 RX ORDER — THIAMINE MONONITRATE (VIT B1) 100 MG
100 TABLET ORAL DAILY
Refills: 0 | Status: DISCONTINUED | OUTPATIENT
Start: 2023-01-27 | End: 2023-01-30

## 2023-01-27 RX ORDER — REMDESIVIR 5 MG/ML
INJECTION INTRAVENOUS
Refills: 0 | Status: DISCONTINUED | OUTPATIENT
Start: 2023-01-27 | End: 2023-01-30

## 2023-01-27 RX ORDER — ENOXAPARIN SODIUM 100 MG/ML
40 INJECTION SUBCUTANEOUS EVERY 24 HOURS
Refills: 0 | Status: DISCONTINUED | OUTPATIENT
Start: 2023-01-27 | End: 2023-01-27

## 2023-01-27 RX ORDER — ONDANSETRON 8 MG/1
4 TABLET, FILM COATED ORAL EVERY 8 HOURS
Refills: 0 | Status: DISCONTINUED | OUTPATIENT
Start: 2023-01-27 | End: 2023-01-30

## 2023-01-27 RX ORDER — REMDESIVIR 5 MG/ML
100 INJECTION INTRAVENOUS EVERY 24 HOURS
Refills: 0 | Status: DISCONTINUED | OUTPATIENT
Start: 2023-01-28 | End: 2023-01-30

## 2023-01-27 RX ADMIN — Medication 100 MILLIGRAM(S): at 11:50

## 2023-01-27 RX ADMIN — Medication 1 TABLET(S): at 17:51

## 2023-01-27 RX ADMIN — HEPARIN SODIUM 5000 UNIT(S): 5000 INJECTION INTRAVENOUS; SUBCUTANEOUS at 11:51

## 2023-01-27 RX ADMIN — REMDESIVIR 200 MILLIGRAM(S): 5 INJECTION INTRAVENOUS at 09:36

## 2023-01-27 RX ADMIN — Medication 40 MILLIEQUIVALENT(S): at 09:37

## 2023-01-27 RX ADMIN — Medication 3 MILLIGRAM(S): at 21:45

## 2023-01-27 RX ADMIN — HEPARIN SODIUM 5000 UNIT(S): 5000 INJECTION INTRAVENOUS; SUBCUTANEOUS at 05:50

## 2023-01-27 RX ADMIN — SODIUM CHLORIDE 125 MILLILITER(S): 9 INJECTION INTRAMUSCULAR; INTRAVENOUS; SUBCUTANEOUS at 01:24

## 2023-01-27 RX ADMIN — Medication 40 MILLIEQUIVALENT(S): at 11:50

## 2023-01-27 RX ADMIN — Medication 6 MILLIGRAM(S): at 05:50

## 2023-01-27 RX ADMIN — SODIUM CHLORIDE 125 MILLILITER(S): 9 INJECTION INTRAMUSCULAR; INTRAVENOUS; SUBCUTANEOUS at 18:15

## 2023-01-27 RX ADMIN — Medication 2: at 11:50

## 2023-01-27 RX ADMIN — Medication 25 GRAM(S): at 03:46

## 2023-01-27 RX ADMIN — SODIUM CHLORIDE 125 MILLILITER(S): 9 INJECTION INTRAMUSCULAR; INTRAVENOUS; SUBCUTANEOUS at 09:36

## 2023-01-27 RX ADMIN — HEPARIN SODIUM 5000 UNIT(S): 5000 INJECTION INTRAVENOUS; SUBCUTANEOUS at 21:45

## 2023-01-27 RX ADMIN — Medication 1 MILLIGRAM(S): at 11:51

## 2023-01-27 NOTE — ED ADULT NURSE REASSESSMENT NOTE - NS ED NURSE REASSESS COMMENT FT1
Lab called for critical value on CMP K+ 6.4 not hemolyzed. ACP Surinder called and aware. Inpt MD Ward also notified via teams. RN awaiting orders.

## 2023-01-27 NOTE — PATIENT PROFILE ADULT - NSTOBACCOCESSATIONEDU3_GEN_A_NUR
SYLVIA Devries alerted This RN to pt having 2.8 sec of PSVT up to 160's. MD Gregorio notified in rounds at 0050 due to no previous history of PSVT. No new orders placed at this time.   Learning behavioral activities to cope with urges.  For example, distraction and changing routines

## 2023-01-27 NOTE — ED ADULT NURSE REASSESSMENT NOTE - NS ED NURSE REASSESS COMMENT FT1
Lab called for critical value on VBG of K+ 6.4 and BiCarb 46. MD Ward aware of values. No RN interventions at this time, will wait for further labs to result.

## 2023-01-27 NOTE — CHART NOTE - NSCHARTNOTEFT_GEN_A_CORE
Medicine Np note    CC: Critical K+  k+: 6.4    Pt s/p K+ Supplementation  Asymptomatic  Will repeat BMP satt  Will continue to monitor    Surinder Morrison P [place]  [id number]

## 2023-01-28 LAB
ALBUMIN SERPL ELPH-MCNC: 4.3 G/DL — SIGNIFICANT CHANGE UP (ref 3.3–5)
ALP SERPL-CCNC: 98 U/L — SIGNIFICANT CHANGE UP (ref 40–120)
ALT FLD-CCNC: 72 U/L — HIGH (ref 10–45)
ANION GAP SERPL CALC-SCNC: 14 MMOL/L — SIGNIFICANT CHANGE UP (ref 5–17)
AST SERPL-CCNC: 135 U/L — HIGH (ref 10–40)
BILIRUB SERPL-MCNC: 0.7 MG/DL — SIGNIFICANT CHANGE UP (ref 0.2–1.2)
BUN SERPL-MCNC: 17 MG/DL — SIGNIFICANT CHANGE UP (ref 7–23)
CALCIUM SERPL-MCNC: 9.1 MG/DL — SIGNIFICANT CHANGE UP (ref 8.4–10.5)
CHLORIDE SERPL-SCNC: 98 MMOL/L — SIGNIFICANT CHANGE UP (ref 96–108)
CO2 SERPL-SCNC: 24 MMOL/L — SIGNIFICANT CHANGE UP (ref 22–31)
CREAT SERPL-MCNC: 0.58 MG/DL — SIGNIFICANT CHANGE UP (ref 0.5–1.3)
EGFR: 115 ML/MIN/1.73M2 — SIGNIFICANT CHANGE UP
GLUCOSE BLDC GLUCOMTR-MCNC: 113 MG/DL — HIGH (ref 70–99)
GLUCOSE SERPL-MCNC: 184 MG/DL — HIGH (ref 70–99)
HCT VFR BLD CALC: 34.6 % — SIGNIFICANT CHANGE UP (ref 34.5–45)
HGB BLD-MCNC: 10.9 G/DL — LOW (ref 11.5–15.5)
MAGNESIUM SERPL-MCNC: 1.6 MG/DL — SIGNIFICANT CHANGE UP (ref 1.6–2.6)
MCHC RBC-ENTMCNC: 23.6 PG — LOW (ref 27–34)
MCHC RBC-ENTMCNC: 31.5 GM/DL — LOW (ref 32–36)
MCV RBC AUTO: 75.1 FL — LOW (ref 80–100)
MRSA PCR RESULT.: SIGNIFICANT CHANGE UP
NRBC # BLD: 0 /100 WBCS — SIGNIFICANT CHANGE UP (ref 0–0)
PHOSPHATE SERPL-MCNC: 1.1 MG/DL — LOW (ref 2.5–4.5)
PLATELET # BLD AUTO: 91 K/UL — LOW (ref 150–400)
POTASSIUM SERPL-MCNC: 3.8 MMOL/L — SIGNIFICANT CHANGE UP (ref 3.5–5.3)
POTASSIUM SERPL-SCNC: 3.8 MMOL/L — SIGNIFICANT CHANGE UP (ref 3.5–5.3)
PROT SERPL-MCNC: 7 G/DL — SIGNIFICANT CHANGE UP (ref 6–8.3)
RBC # BLD: 4.61 M/UL — SIGNIFICANT CHANGE UP (ref 3.8–5.2)
RBC # FLD: 14.2 % — SIGNIFICANT CHANGE UP (ref 10.3–14.5)
S AUREUS DNA NOSE QL NAA+PROBE: SIGNIFICANT CHANGE UP
SODIUM SERPL-SCNC: 136 MMOL/L — SIGNIFICANT CHANGE UP (ref 135–145)
WBC # BLD: 9.45 K/UL — SIGNIFICANT CHANGE UP (ref 3.8–10.5)
WBC # FLD AUTO: 9.45 K/UL — SIGNIFICANT CHANGE UP (ref 3.8–10.5)

## 2023-01-28 PROCEDURE — 99232 SBSQ HOSP IP/OBS MODERATE 35: CPT

## 2023-01-28 RX ORDER — MAGNESIUM SULFATE 500 MG/ML
1 VIAL (ML) INJECTION ONCE
Refills: 0 | Status: COMPLETED | OUTPATIENT
Start: 2023-01-28 | End: 2023-01-28

## 2023-01-28 RX ORDER — POTASSIUM PHOSPHATE, MONOBASIC POTASSIUM PHOSPHATE, DIBASIC 236; 224 MG/ML; MG/ML
30 INJECTION, SOLUTION INTRAVENOUS ONCE
Refills: 0 | Status: COMPLETED | OUTPATIENT
Start: 2023-01-28 | End: 2023-01-28

## 2023-01-28 RX ADMIN — Medication 3 MILLIGRAM(S): at 22:38

## 2023-01-28 RX ADMIN — Medication 100 MILLIGRAM(S): at 10:44

## 2023-01-28 RX ADMIN — HEPARIN SODIUM 5000 UNIT(S): 5000 INJECTION INTRAVENOUS; SUBCUTANEOUS at 06:21

## 2023-01-28 RX ADMIN — HEPARIN SODIUM 5000 UNIT(S): 5000 INJECTION INTRAVENOUS; SUBCUTANEOUS at 15:15

## 2023-01-28 RX ADMIN — REMDESIVIR 200 MILLIGRAM(S): 5 INJECTION INTRAVENOUS at 11:59

## 2023-01-28 RX ADMIN — Medication 6 MILLIGRAM(S): at 06:21

## 2023-01-28 RX ADMIN — POTASSIUM PHOSPHATE, MONOBASIC POTASSIUM PHOSPHATE, DIBASIC 83.33 MILLIMOLE(S): 236; 224 INJECTION, SOLUTION INTRAVENOUS at 22:32

## 2023-01-28 RX ADMIN — Medication 1 MILLIGRAM(S): at 10:44

## 2023-01-28 RX ADMIN — Medication 1 TABLET(S): at 10:44

## 2023-01-28 RX ADMIN — HEPARIN SODIUM 5000 UNIT(S): 5000 INJECTION INTRAVENOUS; SUBCUTANEOUS at 22:32

## 2023-01-28 RX ADMIN — Medication 100 GRAM(S): at 10:44

## 2023-01-28 RX ADMIN — SODIUM CHLORIDE 125 MILLILITER(S): 9 INJECTION INTRAMUSCULAR; INTRAVENOUS; SUBCUTANEOUS at 10:43

## 2023-01-28 RX ADMIN — CHLORHEXIDINE GLUCONATE 1 APPLICATION(S): 213 SOLUTION TOPICAL at 10:29

## 2023-01-29 LAB
ANION GAP SERPL CALC-SCNC: 12 MMOL/L — SIGNIFICANT CHANGE UP (ref 5–17)
BUN SERPL-MCNC: 13 MG/DL — SIGNIFICANT CHANGE UP (ref 7–23)
CALCIUM SERPL-MCNC: 8.7 MG/DL — SIGNIFICANT CHANGE UP (ref 8.4–10.5)
CHLORIDE SERPL-SCNC: 101 MMOL/L — SIGNIFICANT CHANGE UP (ref 96–108)
CK SERPL-CCNC: 37 U/L — SIGNIFICANT CHANGE UP (ref 25–170)
CO2 SERPL-SCNC: 23 MMOL/L — SIGNIFICANT CHANGE UP (ref 22–31)
CREAT SERPL-MCNC: 0.52 MG/DL — SIGNIFICANT CHANGE UP (ref 0.5–1.3)
D DIMER BLD IA.RAPID-MCNC: 294 NG/ML DDU — HIGH
EGFR: 118 ML/MIN/1.73M2 — SIGNIFICANT CHANGE UP
FERRITIN SERPL-MCNC: 273 NG/ML — HIGH (ref 15–150)
GLUCOSE BLDC GLUCOMTR-MCNC: 116 MG/DL — HIGH (ref 70–99)
GLUCOSE BLDC GLUCOMTR-MCNC: 154 MG/DL — HIGH (ref 70–99)
GLUCOSE SERPL-MCNC: 87 MG/DL — SIGNIFICANT CHANGE UP (ref 70–99)
HCT VFR BLD CALC: 31.5 % — LOW (ref 34.5–45)
HGB BLD-MCNC: 10.1 G/DL — LOW (ref 11.5–15.5)
LDH SERPL L TO P-CCNC: 174 U/L — SIGNIFICANT CHANGE UP (ref 50–242)
MAGNESIUM SERPL-MCNC: 1.4 MG/DL — LOW (ref 1.6–2.6)
MCHC RBC-ENTMCNC: 23.5 PG — LOW (ref 27–34)
MCHC RBC-ENTMCNC: 32.1 GM/DL — SIGNIFICANT CHANGE UP (ref 32–36)
MCV RBC AUTO: 73.4 FL — LOW (ref 80–100)
NRBC # BLD: 0 /100 WBCS — SIGNIFICANT CHANGE UP (ref 0–0)
PHOSPHATE SERPL-MCNC: 5.3 MG/DL — HIGH (ref 2.5–4.5)
PLATELET # BLD AUTO: 81 K/UL — LOW (ref 150–400)
POTASSIUM SERPL-MCNC: 4.1 MMOL/L — SIGNIFICANT CHANGE UP (ref 3.5–5.3)
POTASSIUM SERPL-SCNC: 4.1 MMOL/L — SIGNIFICANT CHANGE UP (ref 3.5–5.3)
RBC # BLD: 4.29 M/UL — SIGNIFICANT CHANGE UP (ref 3.8–5.2)
RBC # FLD: 14.2 % — SIGNIFICANT CHANGE UP (ref 10.3–14.5)
SODIUM SERPL-SCNC: 136 MMOL/L — SIGNIFICANT CHANGE UP (ref 135–145)
WBC # BLD: 12.09 K/UL — HIGH (ref 3.8–10.5)
WBC # FLD AUTO: 12.09 K/UL — HIGH (ref 3.8–10.5)

## 2023-01-29 PROCEDURE — 99233 SBSQ HOSP IP/OBS HIGH 50: CPT

## 2023-01-29 RX ADMIN — CHLORHEXIDINE GLUCONATE 1 APPLICATION(S): 213 SOLUTION TOPICAL at 12:23

## 2023-01-29 RX ADMIN — Medication 1 MILLIGRAM(S): at 11:30

## 2023-01-29 RX ADMIN — HEPARIN SODIUM 5000 UNIT(S): 5000 INJECTION INTRAVENOUS; SUBCUTANEOUS at 06:13

## 2023-01-29 RX ADMIN — Medication 100 MILLIGRAM(S): at 11:30

## 2023-01-29 RX ADMIN — REMDESIVIR 200 MILLIGRAM(S): 5 INJECTION INTRAVENOUS at 11:24

## 2023-01-29 RX ADMIN — Medication 6 MILLIGRAM(S): at 07:24

## 2023-01-29 RX ADMIN — HEPARIN SODIUM 5000 UNIT(S): 5000 INJECTION INTRAVENOUS; SUBCUTANEOUS at 21:15

## 2023-01-29 RX ADMIN — HEPARIN SODIUM 5000 UNIT(S): 5000 INJECTION INTRAVENOUS; SUBCUTANEOUS at 11:25

## 2023-01-29 RX ADMIN — Medication 1 TABLET(S): at 12:17

## 2023-01-29 RX ADMIN — Medication 1: at 12:20

## 2023-01-29 NOTE — PROGRESS NOTE ADULT - NSPROGADDITIONALINFOA_GEN_ALL_CORE
time spent reviewing prior charts, meds, discussing plan with patient= 54    d/w Medicine ACP Ariella Pryor
Discussed with Roe GLASS
Discussed with Roe GLASS

## 2023-01-29 NOTE — PROGRESS NOTE ADULT - PROBLEM SELECTOR PLAN 7
Diet: Regular  -DVT ppx: Lovenox qd

## 2023-01-30 ENCOUNTER — TRANSCRIPTION ENCOUNTER (OUTPATIENT)
Age: 43
End: 2023-01-30

## 2023-01-30 VITALS
RESPIRATION RATE: 18 BRPM | DIASTOLIC BLOOD PRESSURE: 75 MMHG | OXYGEN SATURATION: 100 % | HEART RATE: 79 BPM | TEMPERATURE: 98 F | SYSTOLIC BLOOD PRESSURE: 109 MMHG

## 2023-01-30 DIAGNOSIS — Z02.9 ENCOUNTER FOR ADMINISTRATIVE EXAMINATIONS, UNSPECIFIED: ICD-10-CM

## 2023-01-30 LAB
ANION GAP SERPL CALC-SCNC: 13 MMOL/L — SIGNIFICANT CHANGE UP (ref 5–17)
BUN SERPL-MCNC: 19 MG/DL — SIGNIFICANT CHANGE UP (ref 7–23)
CALCIUM SERPL-MCNC: 9.3 MG/DL — SIGNIFICANT CHANGE UP (ref 8.4–10.5)
CHLORIDE SERPL-SCNC: 99 MMOL/L — SIGNIFICANT CHANGE UP (ref 96–108)
CO2 SERPL-SCNC: 22 MMOL/L — SIGNIFICANT CHANGE UP (ref 22–31)
CREAT SERPL-MCNC: 0.71 MG/DL — SIGNIFICANT CHANGE UP (ref 0.5–1.3)
EGFR: 108 ML/MIN/1.73M2 — SIGNIFICANT CHANGE UP
GLUCOSE SERPL-MCNC: 89 MG/DL — SIGNIFICANT CHANGE UP (ref 70–99)
HCT VFR BLD CALC: 31.5 % — LOW (ref 34.5–45)
HGB BLD-MCNC: 10.4 G/DL — LOW (ref 11.5–15.5)
MCHC RBC-ENTMCNC: 23.7 PG — LOW (ref 27–34)
MCHC RBC-ENTMCNC: 33 GM/DL — SIGNIFICANT CHANGE UP (ref 32–36)
MCV RBC AUTO: 71.9 FL — LOW (ref 80–100)
NRBC # BLD: 0 /100 WBCS — SIGNIFICANT CHANGE UP (ref 0–0)
PLATELET # BLD AUTO: 152 K/UL — SIGNIFICANT CHANGE UP (ref 150–400)
POTASSIUM SERPL-MCNC: 3.5 MMOL/L — SIGNIFICANT CHANGE UP (ref 3.5–5.3)
POTASSIUM SERPL-SCNC: 3.5 MMOL/L — SIGNIFICANT CHANGE UP (ref 3.5–5.3)
RBC # BLD: 4.38 M/UL — SIGNIFICANT CHANGE UP (ref 3.8–5.2)
RBC # FLD: 14.3 % — SIGNIFICANT CHANGE UP (ref 10.3–14.5)
SODIUM SERPL-SCNC: 134 MMOL/L — LOW (ref 135–145)
WBC # BLD: 10.07 K/UL — SIGNIFICANT CHANGE UP (ref 3.8–10.5)
WBC # FLD AUTO: 10.07 K/UL — SIGNIFICANT CHANGE UP (ref 3.8–10.5)

## 2023-01-30 PROCEDURE — 85018 HEMOGLOBIN: CPT

## 2023-01-30 PROCEDURE — 83690 ASSAY OF LIPASE: CPT

## 2023-01-30 PROCEDURE — 85025 COMPLETE CBC W/AUTO DIFF WBC: CPT

## 2023-01-30 PROCEDURE — 96375 TX/PRO/DX INJ NEW DRUG ADDON: CPT

## 2023-01-30 PROCEDURE — 80048 BASIC METABOLIC PNL TOTAL CA: CPT

## 2023-01-30 PROCEDURE — 71046 X-RAY EXAM CHEST 2 VIEWS: CPT

## 2023-01-30 PROCEDURE — 82947 ASSAY GLUCOSE BLOOD QUANT: CPT

## 2023-01-30 PROCEDURE — 82565 ASSAY OF CREATININE: CPT

## 2023-01-30 PROCEDURE — 82803 BLOOD GASES ANY COMBINATION: CPT

## 2023-01-30 PROCEDURE — 80053 COMPREHEN METABOLIC PANEL: CPT

## 2023-01-30 PROCEDURE — 85379 FIBRIN DEGRADATION QUANT: CPT

## 2023-01-30 PROCEDURE — 84702 CHORIONIC GONADOTROPIN TEST: CPT

## 2023-01-30 PROCEDURE — 36415 COLL VENOUS BLD VENIPUNCTURE: CPT

## 2023-01-30 PROCEDURE — 83735 ASSAY OF MAGNESIUM: CPT

## 2023-01-30 PROCEDURE — 84145 PROCALCITONIN (PCT): CPT

## 2023-01-30 PROCEDURE — 83935 ASSAY OF URINE OSMOLALITY: CPT

## 2023-01-30 PROCEDURE — 84425 ASSAY OF VITAMIN B-1: CPT

## 2023-01-30 PROCEDURE — 82728 ASSAY OF FERRITIN: CPT

## 2023-01-30 PROCEDURE — 96374 THER/PROPH/DIAG INJ IV PUSH: CPT

## 2023-01-30 PROCEDURE — 81001 URINALYSIS AUTO W/SCOPE: CPT

## 2023-01-30 PROCEDURE — 84300 ASSAY OF URINE SODIUM: CPT

## 2023-01-30 PROCEDURE — 84484 ASSAY OF TROPONIN QUANT: CPT

## 2023-01-30 PROCEDURE — 82746 ASSAY OF FOLIC ACID SERUM: CPT

## 2023-01-30 PROCEDURE — 85014 HEMATOCRIT: CPT

## 2023-01-30 PROCEDURE — 86140 C-REACTIVE PROTEIN: CPT

## 2023-01-30 PROCEDURE — 83605 ASSAY OF LACTIC ACID: CPT

## 2023-01-30 PROCEDURE — 84132 ASSAY OF SERUM POTASSIUM: CPT

## 2023-01-30 PROCEDURE — 82435 ASSAY OF BLOOD CHLORIDE: CPT

## 2023-01-30 PROCEDURE — 82550 ASSAY OF CK (CPK): CPT

## 2023-01-30 PROCEDURE — 87640 STAPH A DNA AMP PROBE: CPT

## 2023-01-30 PROCEDURE — 84295 ASSAY OF SERUM SODIUM: CPT

## 2023-01-30 PROCEDURE — 82962 GLUCOSE BLOOD TEST: CPT

## 2023-01-30 PROCEDURE — 82330 ASSAY OF CALCIUM: CPT

## 2023-01-30 PROCEDURE — 83930 ASSAY OF BLOOD OSMOLALITY: CPT

## 2023-01-30 PROCEDURE — 99239 HOSP IP/OBS DSCHRG MGMT >30: CPT

## 2023-01-30 PROCEDURE — 82436 ASSAY OF URINE CHLORIDE: CPT

## 2023-01-30 PROCEDURE — 85027 COMPLETE CBC AUTOMATED: CPT

## 2023-01-30 PROCEDURE — 87637 SARSCOV2&INF A&B&RSV AMP PRB: CPT

## 2023-01-30 PROCEDURE — 83615 LACTATE (LD) (LDH) ENZYME: CPT

## 2023-01-30 PROCEDURE — 99285 EMERGENCY DEPT VISIT HI MDM: CPT

## 2023-01-30 PROCEDURE — 87641 MR-STAPH DNA AMP PROBE: CPT

## 2023-01-30 PROCEDURE — 84100 ASSAY OF PHOSPHORUS: CPT

## 2023-01-30 RX ORDER — FOLIC ACID 0.8 MG
1 TABLET ORAL
Qty: 30 | Refills: 0
Start: 2023-01-30 | End: 2023-02-28

## 2023-01-30 RX ORDER — ALBUTEROL 90 UG/1
2 AEROSOL, METERED ORAL
Qty: 360 | Refills: 0
Start: 2023-01-30 | End: 2023-02-28

## 2023-01-30 RX ORDER — GUAIFENESIN/DEXTROMETHORPHAN 600MG-30MG
10 TABLET, EXTENDED RELEASE 12 HR ORAL
Qty: 1800 | Refills: 0
Start: 2023-01-30 | End: 2023-02-28

## 2023-01-30 RX ORDER — ACETAMINOPHEN 500 MG
2 TABLET ORAL
Qty: 0 | Refills: 0 | DISCHARGE
Start: 2023-01-30

## 2023-01-30 RX ORDER — THIAMINE MONONITRATE (VIT B1) 100 MG
1 TABLET ORAL
Qty: 30 | Refills: 0
Start: 2023-01-30 | End: 2023-02-28

## 2023-01-30 RX ADMIN — Medication 1 MILLIGRAM(S): at 11:35

## 2023-01-30 RX ADMIN — HEPARIN SODIUM 5000 UNIT(S): 5000 INJECTION INTRAVENOUS; SUBCUTANEOUS at 05:40

## 2023-01-30 RX ADMIN — Medication 100 MILLIGRAM(S): at 11:36

## 2023-01-30 RX ADMIN — Medication 1 TABLET(S): at 11:36

## 2023-01-30 NOTE — DISCHARGE NOTE PROVIDER - NSDCMRMEDTOKEN_GEN_ALL_CORE_FT
acetaminophen 325 mg oral tablet: 2 tab(s) orally every 6 hours, As needed, Temp greater or equal to 38C (100.4F), Mild Pain (1 - 3)  benzonatate 100 mg oral capsule: 1 cap(s) orally 3 times a day, As needed, Cough  folic acid 1 mg oral tablet: 1 tab(s) orally once a day  Multiple Vitamins oral tablet: 1 tab(s) orally once a day  thiamine 100 mg oral tablet: 1 tab(s) orally once a day

## 2023-01-30 NOTE — DISCHARGE NOTE PROVIDER - CONDITIONS AT DISCHARGE
ELSA LEDBETTER    Patient Age: 56 year old   Refill request by: Pharmacy fax  Refill to be: ePrescribed to [address] pharmacy    Medication requested to be refilled: Pharmacy requesting 90 day supply    LOSARTAN 100MG TABS       WEIGHT AND HEIGHT:   Wt Readings from Last 1 Encounters:   03/08/19 (!) 157.4 kg (347 lb)     Ht Readings from Last 1 Encounters:   06/16/16 6' 1.5\" (1.867 m)     BMI Readings from Last 1 Encounters:   03/08/19 45.16 kg/m²       ALLERGIES: no known allergies.  Current Outpatient Medications   Medication   • metoPROLOL succinate (TOPROL-XL) 100 MG 24 hr tablet   • losartan (COZAAR) 100 MG tablet   • amLODIPine (NORVASC) 5 MG tablet   • Blood Glucose Monitoring Suppl ([employer] CONTOUR NEXT MONITOR) w/Device Kit   • blood glucose (ITALIA CONTOUR NEXT TEST) test strip   • Lancets (MICROLET) Misc   • MULTIPLE VITAMIN PO   • cyclobenzaprine (FLEXERIL) 5 MG tablet   • naproxen (NAPROSYN) 500 MG tablet   • metFORMIN (GLUCOPHAGE) 500 MG tablet   • doxazosin (CARDURA) 4 MG tablet   • hydrochlorothiazide (HYDRODIURIL) 25 MG tablet   • sildenafil (VIAGRA) 100 MG tablet     No current facility-administered medications for this visit.      PHARMACY to use: SEE BELOW          Pharmacy preference(s) on file:   [facility] [address] [place] [id number]  Phone: [phone] Fax: [phone]      CALL BACK INFO: Ok to leave response (including medical information) on answering machine  ROUTING: Patient's physician/staff        PCP: Julio Cesar Melo MD         INS: Payor: [employer]UE [employer] [place] / Plan: PPO [id number] / Product Type: PPO MISC   PATIENT ADDRESS:  [address] [place], [place] [id number]   Cleared by Dr. Gaines

## 2023-01-30 NOTE — DISCHARGE NOTE NURSING/CASE MANAGEMENT/SOCIAL WORK - NSDCPEEMAIL_GEN_ALL_CORE
[facility] for Tobacco Control email [email or web address]

## 2023-01-30 NOTE — PROGRESS NOTE ADULT - PROBLEM SELECTOR PROBLEM 1
Acute respiratory failure with hypoxia
2019 novel coronavirus disease (COVID-19)
Acute respiratory failure with hypoxia
Acute respiratory failure with hypoxia

## 2023-01-30 NOTE — DISCHARGE NOTE PROVIDER - PROVIDER RX CONTACT NUMBER
Pt arrived crying and moaning and complaining of pain.  Shivering also and complained of being cold.  Reassurance given, pain medicine given.   [phone]

## 2023-01-30 NOTE — PROGRESS NOTE ADULT - PROBLEM SELECTOR PROBLEM 2
Intractable vomiting with nausea

## 2023-01-30 NOTE — PROGRESS NOTE ADULT - PROBLEM SELECTOR PLAN 6
D/C home today  45 minutes spent discharging the patient
-K, repleted   -resolved  - monitor electrolytes and supplement as needed
-K, repleted   -f/u repeats in AM and replete as needed
-K, repleted   -resolved  - monitor electrolytes and supplement as needed

## 2023-01-30 NOTE — DISCHARGE NOTE NURSING/CASE MANAGEMENT/SOCIAL WORK - HAS THE PATIENT USED TOBACCO IN THE PAST 30 DAYS?
" Disp Refills Start End RONY   Yes

## 2023-01-30 NOTE — DISCHARGE NOTE NURSING/CASE MANAGEMENT/SOCIAL WORK - HAVE YOU RECEIVED AT LEAST TWO PFIZER AND/OR MODERNA VACCINATIONS (IN ANY COMBINATION) AND/OR ONE JOHNSON & JOHNSON VACCINATION?
====================  CCU MIDNIGHT ROUNDS  ====================    DEUCE BALL  [id number]  Patient is a 75y old  Male who presents with a chief complaint of Shortness of Breath (04 Feb 2018 17:51)      ====================  SUMMARY:  ====================      ====================  NEW EVENTS:  ====================    MEDICATIONS  (STANDING):  aspirin enteric coated 81 milliGRAM(s) Oral daily  atorvastatin 80 milliGRAM(s) Oral at bedtime  aztreonam  IVPB 500 milliGRAM(s) IV Intermittent every 12 hours  docusate sodium 100 milliGRAM(s) Oral daily  glycerin Suppository - Adult 1 Suppository(s) Rectal once  hydrALAZINE 100 milliGRAM(s) Oral every 8 hours  isosorbide   dinitrate Tablet (ISORDIL) 10 milliGRAM(s) Oral three times a day  metoprolol     tartrate 25 milliGRAM(s) Oral two times a day  senna 1 Tablet(s) Oral at bedtime    MEDICATIONS  (PRN):  diazepam    Tablet 10 milliGRAM(s) Oral two times a day PRN anxiety  polyethylene glycol 3350 17 Gram(s) Oral daily PRN Constipation      ====================  VITALS (Last 12 hrs):  ====================    T(C): 36.7 (02-08-18 @ 20:00), Max: 36.8 (02-08-18 @ 16:50)  T(F): 98 (02-08-18 @ 20:00), Max: 98.3 (02-08-18 @ 16:50)  HR: 100 (02-08-18 @ 22:00) (95 - 110)  BP: --  BP(mean): --  ABP: 122/54 (02-08-18 @ 22:00) (122/54 - 152/74)  ABP(mean): 78 (02-08-18 @ 22:00) (78 - 104)  RR: 14 (02-08-18 @ 22:00) (12 - 28)  SpO2: 92% (02-08-18 @ 22:00) (91% - 98%)  Wt(kg): --  CVP(mm Hg): --  CVP(cm H2O): --  CO: --  CI: --  PA: --  PA(mean): --  PCWP: --  SVR: --  PVR: --    I&O's Summary    07 Feb 2018 07:01  -  08 Feb 2018 07:00  --------------------------------------------------------  IN: 162 mL / OUT: 470 mL / NET: -308 mL    08 Feb 2018 07:01  -  08 Feb 2018 22:38  --------------------------------------------------------  IN: 323 mL / OUT: 0 mL / NET: 323 mL    ====================  NEW LABS:  ====================    02-08    134<L>  |  95<L>  |  52<H>  ----------------------------<  138<H>  3.9   |  24  |  5.92<H>    Ca    8.4      08 Feb 2018 03:38  Phos  3.3     02-08  Mg     2.5     02-08    TPro  6.3  /  Alb  3.0<L>  /  TBili  0.4  /  DBili  x   /  AST  20  /  ALT  16  /  AlkPhos  55  02-08    PT/INR - ( 08 Feb 2018 03:39 )   PT: 13.6 sec;   INR: 1.25 ratio       PTT - ( 08 Feb 2018 03:39 )  PTT:41.7 sec    ====================  PLAN:  ====================  -       Lety PATELU NP  Beeper #[id number]  Spectra # [phone] ====================  CCU MIDNIGHT ROUNDS  ====================    DEUCE BALL  [id number]  Patient is a 75y old  Male who presents with a chief complaint of Shortness of Breath (04 Feb 2018 17:51)  ====================  SUMMARY:  ====================  76 yo M AAA (5.5 cm s/p EVAAR 1/29), R kidney neoplasm (incidental finding), prior L kidney neoplasm (L nephrectomy 2008), bladder Ca, known LBBB, HTN, HLD, transferred from [place] w/ SOB, found to have ADHF, multifocal PNA, AKA on CKD req CVV (stopped 1200 2/6)   ====================  NEW EVENTS:  ====================  Euvolemic on exam.  On room air with no orthopnea.  s/p diagnostic cath, found to have TVD.  Denies CP.  No cardiac arrhythmias.    MEDICATIONS  (STANDING):  aspirin enteric coated 81 milliGRAM(s) Oral daily  atorvastatin 80 milliGRAM(s) Oral at bedtime  aztreonam  IVPB 500 milliGRAM(s) IV Intermittent every 12 hours  docusate sodium 100 milliGRAM(s) Oral daily  glycerin Suppository - Adult 1 Suppository(s) Rectal once  hydrALAZINE 100 milliGRAM(s) Oral every 8 hours  isosorbide   dinitrate Tablet (ISORDIL) 10 milliGRAM(s) Oral three times a day  metoprolol     tartrate 25 milliGRAM(s) Oral two times a day  senna 1 Tablet(s) Oral at bedtime    MEDICATIONS  (PRN):  diazepam    Tablet 10 milliGRAM(s) Oral two times a day PRN anxiety  polyethylene glycol 3350 17 Gram(s) Oral daily PRN Constipation      ====================  VITALS (Last 12 hrs):  ====================    T(C): 36.7 (02-08-18 @ 20:00), Max: 36.8 (02-08-18 @ 16:50)  T(F): 98 (02-08-18 @ 20:00), Max: 98.3 (02-08-18 @ 16:50)  HR: 100 (02-08-18 @ 22:00) (95 - 110)  BP: --  BP(mean): --  ABP: 122/54 (02-08-18 @ 22:00) (122/54 - 152/74)  ABP(mean): 78 (02-08-18 @ 22:00) (78 - 104)  RR: 14 (02-08-18 @ 22:00) (12 - 28)  SpO2: 92% (02-08-18 @ 22:00) (91% - 98%)  Wt(kg): --  CVP(mm Hg): --  CVP(cm H2O): --  CO: --  CI: --  PA: --  PA(mean): --  PCWP: --  SVR: --  PVR: --    I&O's Summary    07 Feb 2018 07:01  -  08 Feb 2018 07:00  --------------------------------------------------------  IN: 162 mL / OUT: 470 mL / NET: -308 mL    08 Feb 2018 07:01  -  08 Feb 2018 22:38  --------------------------------------------------------  IN: 323 mL / OUT: 0 mL / NET: 323 mL    ====================  NEW LABS:  ====================    02-08    134<L>  |  95<L>  |  52<H>  ----------------------------<  138<H>  3.9   |  24  |  5.92<H>    Ca    8.4      08 Feb 2018 03:38  Phos  3.3     02-08  Mg     2.5     02-08    TPro  6.3  /  Alb  3.0<L>  /  TBili  0.4  /  DBili  x   /  AST  20  /  ALT  16  /  AlkPhos  55  02-08    PT/INR - ( 08 Feb 2018 03:39 )   PT: 13.6 sec;   INR: 1.25 ratio       PTT - ( 08 Feb 2018 03:39 )  PTT:41.7 sec    ====================  PLAN:  ====================  - Patient with new renal artery stenosis  - s/p C today, found to have TVD (LAD, diag, OM1).  s/p renal angiogram, found to have 99% right renal stenosis  - Plan for CTA of abdomen and pelvis.  Unable to proceed tonight due to no IV access.  Multiple attempts done but only able to obtain g#22.  - s/p HD today with no fluid removal.  Will likely need another one in am after CTA  - Start Heparin drip (Full AC nomogram) for Afib  - Continue ASA, BB, and statin  - Continue Hydralazine and Isordil.  Uptitrate Isordil as BP tolerates  - Monitor strict I & O's  - Monitor electrolytes  - Patient s/p EVAR on 1/29.  Follow up timing of removal of staples.  - Continue Vanco; dose by level    Lety Dyer CCU NP  Beeper #[id number]  Spectra # [phone] ====================  CCU MIDNIGHT ROUNDS  ====================    DEUCE BALL  [id number]  Patient is a 75y old  Male who presents with a chief complaint of Shortness of Breath (04 Feb 2018 17:51)  ====================  SUMMARY:  ====================  74 yo M AAA (5.5 cm s/p EVAAR 1/29), R kidney neoplasm (incidental finding), prior L kidney neoplasm (L nephrectomy 2008), bladder Ca, known LBBB, HTN, HLD, transferred from [place] w/ SOB, found to have ADHF, multifocal PNA, AKA on CKD req CVV (stopped 1200 2/6)   ====================  NEW EVENTS:  ====================  Euvolemic on exam.  On room air with no orthopnea.  s/p diagnostic cath, found to have TVD.  Denies CP.  No cardiac arrhythmias.    MEDICATIONS  (STANDING):  aspirin enteric coated 81 milliGRAM(s) Oral daily  atorvastatin 80 milliGRAM(s) Oral at bedtime  aztreonam  IVPB 500 milliGRAM(s) IV Intermittent every 12 hours  docusate sodium 100 milliGRAM(s) Oral daily  glycerin Suppository - Adult 1 Suppository(s) Rectal once  hydrALAZINE 100 milliGRAM(s) Oral every 8 hours  isosorbide   dinitrate Tablet (ISORDIL) 10 milliGRAM(s) Oral three times a day  metoprolol     tartrate 25 milliGRAM(s) Oral two times a day  senna 1 Tablet(s) Oral at bedtime    MEDICATIONS  (PRN):  diazepam    Tablet 10 milliGRAM(s) Oral two times a day PRN anxiety  polyethylene glycol 3350 17 Gram(s) Oral daily PRN Constipation      ====================  VITALS (Last 12 hrs):  ====================    T(C): 36.7 (02-08-18 @ 20:00), Max: 36.8 (02-08-18 @ 16:50)  T(F): 98 (02-08-18 @ 20:00), Max: 98.3 (02-08-18 @ 16:50)  HR: 100 (02-08-18 @ 22:00) (95 - 110)  BP: --  BP(mean): --  ABP: 122/54 (02-08-18 @ 22:00) (122/54 - 152/74)  ABP(mean): 78 (02-08-18 @ 22:00) (78 - 104)  RR: 14 (02-08-18 @ 22:00) (12 - 28)  SpO2: 92% (02-08-18 @ 22:00) (91% - 98%)  Wt(kg): --  CVP(mm Hg): --  CVP(cm H2O): --  CO: --  CI: --  PA: --  PA(mean): --  PCWP: --  SVR: --  PVR: --    I&O's Summary    07 Feb 2018 07:01  -  08 Feb 2018 07:00  --------------------------------------------------------  IN: 162 mL / OUT: 470 mL / NET: -308 mL    08 Feb 2018 07:01  -  08 Feb 2018 22:38  --------------------------------------------------------  IN: 323 mL / OUT: 0 mL / NET: 323 mL    ====================  NEW LABS:  ====================    02-08    134<L>  |  95<L>  |  52<H>  ----------------------------<  138<H>  3.9   |  24  |  5.92<H>    Ca    8.4      08 Feb 2018 03:38  Phos  3.3     02-08  Mg     2.5     02-08    TPro  6.3  /  Alb  3.0<L>  /  TBili  0.4  /  DBili  x   /  AST  20  /  ALT  16  /  AlkPhos  55  02-08    PT/INR - ( 08 Feb 2018 03:39 )   PT: 13.6 sec;   INR: 1.25 ratio       PTT - ( 08 Feb 2018 03:39 )  PTT:41.7 sec    ====================  PLAN:  ====================  - Patient with new renal artery stenosis  - s/p C today, found to have TVD (LAD, diag, OM1).  s/p renal angiogram, found to have 99% right renal stenosis  - Plan for CTA of abdomen and pelvis.  Unable to proceed tonight due to no IV access.  Multiple attempts done but only able to obtain g#22.  - s/p HD today with no fluid removal.  Will likely need another one in am after CTA  - Start Heparin drip (Full AC nomogram) for Afib  - Continue ASA, BB, and statin  - Continue Hydralazine and Isordil.  Uptitrate Isordil as BP tolerates  - Monitor strict I & O's  - Monitor electrolytes  - Patient s/p EVAR on 1/29.  Follow up timing of removal of staples.  - Continue Vanco; dose by level.  Continue azactam    Lety Dyer CCU NP  Beeper #[id number]  Spectra # [phone] Yes

## 2023-01-30 NOTE — PROGRESS NOTE ADULT - PROBLEM SELECTOR PLAN 5
resolved   -most likely d/t volume depletion iso vomiting  -c/w IV hydration for few hours  -avoid nephrotoxins and renally dose meds
Resolved
resolved   -most likely d/t volume depletion iso vomiting  -avoid nephrotoxins and renally dose meds
resolved   -most likely d/t volume depletion iso vomiting  -avoid nephrotoxins and renally dose meds

## 2023-01-30 NOTE — PROGRESS NOTE ADULT - PROBLEM SELECTOR PLAN 4
-likely iso volume contraction d/t intractable n/v  -improved s/p 2L LR in ED  - continue to monitor now off IVF
Resolved - volume depletion due to nausea and vomiting
-likely iso volume contraction d/t intractable n/v  -improved s/p 2L LR in ED  -will c/w NS at 125cc  -repeat VBG with improvement
-likely iso volume contraction d/t intractable n/v  -improved s/p 2L LR in ED  - continue to monitor

## 2023-01-30 NOTE — DISCHARGE NOTE PROVIDER - NSDCCPCAREPLAN_GEN_ALL_CORE_FT
PRINCIPAL DISCHARGE DIAGNOSIS  Diagnosis: 2019 novel coronavirus disease (COVID-19)  Assessment and Plan of Treatment: You have tested POSITIVE for the novel coronavirus (COVID-19).  You were given Remdesivir in the hospital.   You no longer require hospitalization.  Follow up with your doctor within 2-3 days.   - Return to the ED for any new or worsening symptoms including but not limited to difficulty breathing, severe weakness, confusion, etc.  - Take Tylenol (Acetaminophen) 650mg as needed for pain or fever  - Stay well hydrated.   - Avoid contact with anybody who is sick OR at risk populations including elderly, young, pregnant patients, immune compromised people  - Wash hands frequently in warm soapy water for at least 20 seconds   - Quarantine yourself at home for at least 2 weeks  - If you would like an appointment to be tested for COVID-19 (coronavirus) at one of the testing sites, contact [phone] for an appointment.    Viral Respiratory Infection  A viral respiratory infection is an illness that affects parts of the body used for breathing, like the lungs, nose, and throat. It is caused by a germ called a virus. Symptoms can include runny nose, coughing, sneezing, fatigue, body aches, sore throat, fever, or headache. Over the counter medicine can be used to manage the symptoms but the infection typically goes away on its own in 5 to 10 days.   SEEK IMMEDIATE MEDICAL CARE IF YOU HAVE ANY OF THE FOLLOWING SYMPTOMS: shortness of breath, chest pain, fever over 10 days, or lightheadedness/dizziness.        SECONDARY DISCHARGE DIAGNOSES  Diagnosis: Acute kidney failure  Assessment and Plan of Treatment: Resolved after you were given fluids in the hospital.

## 2023-01-30 NOTE — DISCHARGE NOTE NURSING/CASE MANAGEMENT/SOCIAL WORK - PATIENT PORTAL LINK FT
You can access the FollowMyHealth Patient Portal offered by [facility] by registering at the following website: [email or web address]. By joining [facility]’s FollowMyHealth portal, you will also be able to view your health information using other applications (apps) compatible with our system.

## 2023-01-30 NOTE — PROGRESS NOTE ADULT - PROBLEM SELECTOR PLAN 3
Resolved- volume contraction due to nausea and vomiting
-Can cont with remdesivir for another day (today is D3) but would stop decadron  Suspect steroid related symptoms (inc appetite, anxiety, vivid dreams, shakiness)   currently no long on O2 without SOB, CXR clear on admission.   -f/u inflammatory markers including D-dimer, CK, ferritin, ldh  -antitussives prn, tylenol prn for fever    d/c likely in 24 hours
-will treat with dex 10mg x10-days and remdesivir x 5-days  -f/u inflammatory markers including D-dimer, CK, ferritin, ldh  -antitussives prn  -tylenol prn for fever  - likely DC home once completed with course of remdesivir
-will treat with dex 10mg x10-days and remdesivir x 5-days  -f/u inflammatory markers including D-dimer, CK, ferritin, ldh  -antitussives prn  -tylenol prn for fever

## 2023-01-30 NOTE — PROGRESS NOTE ADULT - PROBLEM SELECTOR PROBLEM 3
2019 novel coronavirus disease (COVID-19)
Metabolic alkalosis
2019 novel coronavirus disease (COVID-19)
2019 novel coronavirus disease (COVID-19)

## 2023-01-30 NOTE — PROGRESS NOTE ADULT - PROBLEM SELECTOR PLAN 1
Complete remdesivir   Satting well on RA- off decadron
-now off NC and saturating well on room air; continue to monitor   -likely iso covid-19   -continuous pulse ox monitoring  -will manage for covid-19 as below
-Satting well on 2L NS 99%  -likely iso covid-19  -improved with 2L NC saturating >95%   -c/w supplemental O2 and wean as tolerated  -continuous pulse ox monitoring  -will manage for covid-19 as below
-now off NC and saturating well on room air; continue to monitor   -likely iso covid-19   -continuous pulse ox monitoring  -will manage for covid-19 as below

## 2023-01-30 NOTE — PROGRESS NOTE ADULT - SUBJECTIVE AND OBJECTIVE BOX
Stewart Gaines MD    Patient is a 42y old  Female who presents with a chief complaint of Fever, N/V (30 Jan 2023 11:09)        SUBJECTIVE / OVERNIGHT EVENTS: Patient feels jittery still from steroids but otherwise well- anxious to be discharged  TELEMETRY: SB/SR/ST 's      MEDICATIONS  (STANDING):  chlorhexidine 2% Cloths 1 Application(s) Topical daily  folic acid 1 milliGRAM(s) Oral daily  glucagon  Injectable 1 milliGRAM(s) IntraMuscular once  heparin   Injectable 5000 Unit(s) SubCutaneous every 8 hours  multivitamin 1 Tablet(s) Oral daily  remdesivir  IVPB   IV Intermittent   remdesivir  IVPB 100 milliGRAM(s) IV Intermittent every 24 hours  thiamine 100 milliGRAM(s) Oral daily    MEDICATIONS  (PRN):  acetaminophen     Tablet .. 650 milliGRAM(s) Oral every 6 hours PRN Temp greater or equal to 38C (100.4F), Mild Pain (1 - 3)  albuterol    90 MICROgram(s) HFA Inhaler 2 Puff(s) Inhalation every 4 hours PRN Shortness of Breath and/or Wheezing  aluminum hydroxide/magnesium hydroxide/simethicone Suspension 30 milliLiter(s) Oral every 4 hours PRN Dyspepsia  benzonatate 100 milliGRAM(s) Oral three times a day PRN Cough  guaifenesin/dextromethorphan Oral Liquid 10 milliLiter(s) Oral every 4 hours PRN Cough  melatonin 3 milliGRAM(s) Oral at bedtime PRN Insomnia  ondansetron Injectable 4 milliGRAM(s) IV Push every 8 hours PRN Nausea and/or Vomiting      Vital Signs Last 24 Hrs  T(C): 36.7 (30 Jan 2023 11:49), Max: 36.8 (29 Jan 2023 21:50)  T(F): 98 (30 Jan 2023 11:49), Max: 98.3 (30 Jan 2023 04:50)  HR: 79 (30 Jan 2023 11:49) (66 - 84)  BP: 109/75 (30 Jan 2023 11:49) (109/75 - 129/83)  BP(mean): --  RR: 18 (30 Jan 2023 11:49) (18 - 18)  SpO2: 100% (30 Jan 2023 11:49) (97% - 100%)    Parameters below as of 30 Jan 2023 11:49  Patient On (Oxygen Delivery Method): room air      CAPILLARY BLOOD GLUCOSE        I&O's Summary    29 Jan 2023 07:01  -  30 Jan 2023 07:00  --------------------------------------------------------  IN: 607 mL / OUT: 0 mL / NET: 607 mL          PHYSICAL EXAM  GENERAL: NAD, well-developed  HEAD:  Atraumatic, normocephalic  EYES: EOMI, PERRLA, conjunctiva and sclera clear  CHEST/LUNG: Clear to auscultation bilaterally; no wheezes  HEART: +S1+S2, regular rate and rhythm  ABDOMEN: Soft, nontender, nondistended; bowel sounds present  EXTREMITIES:  2+ peripheral pulses; no clubbing, cyanosis, or edema  PSYCH: AAOx3      LABS:                        10.4   10.07 )-----------( 152      ( 30 Jan 2023 06:29 )             31.5     01-30    134<L>  |  99  |  19  ----------------------------<  89  3.5   |  22  |  0.71    Ca    9.3      30 Jan 2023 06:29  Phos  5.3     01-29  Mg     1.4     01-29        CARDIAC MARKERS ( 29 Jan 2023 07:00 )  x     / x     / 37 U/L / x     / x                RADIOLOGY & ADDITIONAL TESTS:    Imaging Personally Reviewed:  Consultant(s) Notes Reviewed:    Care Discussed with Consultants/Other Providers:  
[facility] Division of [facility] Medicine  John Toth MD  Available via MS Teams    SUBJECTIVE / OVERNIGHT EVENTS: No acute events overnight. Has some body aches. Patient denies any other new complaints or concerns. Feeling well overall. No SOB or chest pain. Currently saturating well on room air.     Review of Systems:   CONSTITUTIONAL: No fever   EYES: No eye pain, visual disturbances, or discharge  ENMT: No difficulty hearing   RESPIRATORY: No SOB. No cough   CARDIOVASCULAR: No chest pain   GASTROINTESTINAL: No abdominal or epigastric pain. No nausea, vomiting, or hematemesis; No diarrhea    GENITOURINARY: No dysuria   NEUROLOGICAL: No headaches   SKIN: No itching   MUSCULOSKELETAL: mild body aches   PSYCHIATRIC: No depression or anxiety   HEME/LYMPH: No easy bruising or bleeding gums    MEDICATIONS  (STANDING):  chlorhexidine 2% Cloths 1 Application(s) Topical daily  dexAMETHasone  Injectable 6 milliGRAM(s) IV Push daily  dextrose 5%. 1000 milliLiter(s) (100 mL/Hr) IV Continuous <Continuous>  dextrose 50% Injectable 25 Gram(s) IV Push once  folic acid 1 milliGRAM(s) Oral daily  glucagon  Injectable 1 milliGRAM(s) IntraMuscular once  heparin   Injectable 5000 Unit(s) SubCutaneous every 8 hours  insulin lispro (ADMELOG) corrective regimen sliding scale   SubCutaneous three times a day before meals  multivitamin 1 Tablet(s) Oral daily  potassium phosphate IVPB 30 milliMole(s) IV Intermittent once  remdesivir  IVPB   IV Intermittent   remdesivir  IVPB 100 milliGRAM(s) IV Intermittent every 24 hours  sodium chloride 0.9%. 1000 milliLiter(s) (125 mL/Hr) IV Continuous <Continuous>  thiamine 100 milliGRAM(s) Oral daily    MEDICATIONS  (PRN):  acetaminophen     Tablet .. 650 milliGRAM(s) Oral every 6 hours PRN Temp greater or equal to 38C (100.4F), Mild Pain (1 - 3)  albuterol    90 MICROgram(s) HFA Inhaler 2 Puff(s) Inhalation every 4 hours PRN Shortness of Breath and/or Wheezing  aluminum hydroxide/magnesium hydroxide/simethicone Suspension 30 milliLiter(s) Oral every 4 hours PRN Dyspepsia  benzonatate 100 milliGRAM(s) Oral three times a day PRN Cough  dextrose Oral Gel 15 Gram(s) Oral once PRN Blood Glucose LESS THAN 70 milliGRAM(s)/deciliter  guaifenesin/dextromethorphan Oral Liquid 10 milliLiter(s) Oral every 4 hours PRN Cough  melatonin 3 milliGRAM(s) Oral at bedtime PRN Insomnia  ondansetron Injectable 4 milliGRAM(s) IV Push every 8 hours PRN Nausea and/or Vomiting      I&O's Summary    2023 07:01  -  2023 13:39  --------------------------------------------------------  IN: 240 mL / OUT: 0 mL / NET: 240 mL      PHYSICAL EXAM:  Vital Signs Last 24 Hrs  T(C): 36.6 (2023 11:30), Max: 36.7 (2023 21:04)  T(F): 97.8 (2023 11:30), Max: 98.1 (2023 21:04)  HR: 61 (2023 11:30) (61 - 81)  BP: 122/73 (2023 11:30) (110/75 - 137/87)  RR: 18 (2023 11:30) (18 - 18)  SpO2: 96% (2023 11:30) (95% - 98%)    Parameters below as of 2023 11:30  Patient On (Oxygen Delivery Method): room air      CONSTITUTIONAL: NAD, well-developed   EYES: PERRLA; conjunctiva and sclera clear  ENMT: Moist oral mucosa, no pharyngeal injection or exudates   NECK: Supple   RESPIRATORY: Normal respiratory effort; lungs are clear to auscultation bilaterally  CARDIOVASCULAR: Regular rate and rhythm, normal S1 and S2, no murmur   ABDOMEN: Nontender to palpation, normoactive bowel sounds   MUSCULOSKELETAL: no clubbing or cyanosis of digits; no joint swelling or tenderness to palpation  PSYCH: A+O to person, place, and time; affect appropriate  NEUROLOGY: no gross sensory deficits   SKIN: No rashes     LABS:                        10.9   9.45  )-----------( 91       ( 2023 09:27 )             34.6         136  |  98  |  17  ----------------------------<  184<H>  3.8   |  24  |  0.58    Ca    9.1      2023 09:27  Phos  1.1       Mg     1.6         TPro  7.0  /  Alb  4.3  /  TBili  0.7  /  DBili  x   /  AST  135<H>  /  ALT  72<H>  /  AlkPhos  98        CARDIAC MARKERS ( 2023 06:27 )  x     / x     / 45 U/L / x     / x          Urinalysis Basic - ( 2023 20:49 )    Color: Yellow / Appearance: Slightly Turbid / S.014 / pH: x  Gluc: x / Ketone: Small  / Bili: Negative / Urobili: Negative   Blood: x / Protein: 100 mg/dl / Nitrite: Negative   Leuk Esterase: Negative / RBC: 5 /hpf / WBC 3 /HPF   Sq Epi: x / Non Sq Epi: 5 /hpf / Bacteria: Negative        RADIOLOGY & ADDITIONAL TESTS:  New Imaging Personally Reviewed Today:  New Electrocardiogram Personally Reviewed Today:  Other Results Reviewed Today:   Prior or Outpatient Records Reviewed Today with Summary:    COORDINATION OF CARE:  Consultant Communication and Details of Discussion (where applicable):    
[facility] Division of Hospital Medicine  Ciara Kirkpatrick MD  Pager (CHRIS-ISIS, 3T-4G): [phone]  Other Times:  [phone]    Patient is a 42y old  Female who presents with a chief complaint of Fever, N/V (28 Jan 2023 13:21)      SUBJECTIVE / OVERNIGHT EVENTS:  c/o anxiety, vivid dreams and increase in appetite . still with congestion, sore throat bodyaches.   no additional N/V or diarrhea . increase urine freq. no SOB      ADDITIONAL REVIEW OF SYSTEMS: otherwise  neg    MEDICATIONS  (STANDING):  chlorhexidine 2% Cloths 1 Application(s) Topical daily  folic acid 1 milliGRAM(s) Oral daily  glucagon  Injectable 1 milliGRAM(s) IntraMuscular once  heparin   Injectable 5000 Unit(s) SubCutaneous every 8 hours  multivitamin 1 Tablet(s) Oral daily  remdesivir  IVPB   IV Intermittent   remdesivir  IVPB 100 milliGRAM(s) IV Intermittent every 24 hours  thiamine 100 milliGRAM(s) Oral daily    MEDICATIONS  (PRN):  acetaminophen     Tablet .. 650 milliGRAM(s) Oral every 6 hours PRN Temp greater or equal to 38C (100.4F), Mild Pain (1 - 3)  albuterol    90 MICROgram(s) HFA Inhaler 2 Puff(s) Inhalation every 4 hours PRN Shortness of Breath and/or Wheezing  aluminum hydroxide/magnesium hydroxide/simethicone Suspension 30 milliLiter(s) Oral every 4 hours PRN Dyspepsia  benzonatate 100 milliGRAM(s) Oral three times a day PRN Cough  guaifenesin/dextromethorphan Oral Liquid 10 milliLiter(s) Oral every 4 hours PRN Cough  melatonin 3 milliGRAM(s) Oral at bedtime PRN Insomnia  ondansetron Injectable 4 milliGRAM(s) IV Push every 8 hours PRN Nausea and/or Vomiting      CAPILLARY BLOOD GLUCOSE      POCT Blood Glucose.: 154 mg/dL (29 Jan 2023 12:14)  POCT Blood Glucose.: 116 mg/dL (29 Jan 2023 08:16)    I&O's Summary    28 Jan 2023 07:01  -  29 Jan 2023 07:00  --------------------------------------------------------  IN: 700 mL / OUT: 0 mL / NET: 700 mL    29 Jan 2023 07:01  -  29 Jan 2023 14:32  --------------------------------------------------------  IN: 265 mL / OUT: 0 mL / NET: 265 mL        PHYSICAL EXAM:  Vital Signs Last 24 Hrs  T(C): 36.4 (29 Jan 2023 11:48), Max: 36.8 (28 Jan 2023 21:55)  T(F): 97.5 (29 Jan 2023 11:48), Max: 98.3 (29 Jan 2023 05:20)  HR: 74 (29 Jan 2023 11:48) (68 - 85)  BP: 117/78 (29 Jan 2023 11:48) (117/78 - 133/78)  BP(mean): --  RR: 18 (29 Jan 2023 11:48) (17 - 18)  SpO2: 99% (29 Jan 2023 11:48) (96% - 100%)    Parameters below as of 29 Jan 2023 11:48  Patient On (Oxygen Delivery Method): room air      CONSTITUTIONAL: NAD, well-developed   EYES: PERRLA; conjunctiva and sclera clear  ENMT: Moist oral mucosa, no pharyngeal injection or exudates   NECK: Supple   RESPIRATORY: Normal respiratory effort; lungs are clear to auscultation bilaterally  CARDIOVASCULAR: Regular rate and rhythm, normal S1 and S2, no murmur   ABDOMEN: Nontender to palpation, normoactive bowel sounds   MUSCULOSKELETAL: no clubbing or cyanosis of digits; no joint swelling or tenderness to palpation  PSYCH: A+O to person, place, and time; affect appropriate  NEUROLOGY: no gross sensory deficits   SKIN: No rashes     LABS:                        10.1   12.09 )-----------( 81       ( 29 Jan 2023 07:00 )             31.5     01-29    136  |  101  |  13  ----------------------------<  87  4.1   |  23  |  0.52    Ca    8.7      29 Jan 2023 07:00  Phos  5.3     01-29  Mg     1.4     01-29    TPro  7.0  /  Alb  4.3  /  TBili  0.7  /  DBili  x   /  AST  135<H>  /  ALT  72<H>  /  AlkPhos  98  01-28      CARDIAC MARKERS ( 29 Jan 2023 07:00 )  x     / x     / 37 U/L / x     / x                RADIOLOGY & ADDITIONAL TESTS:  Results Reviewed:   Imaging Personally Reviewed:  Electrocardiogram Personally Reviewed:    COORDINATION OF CARE:  Care Discussed with Consultants/Other Providers [Y/N]:  Prior or Outpatient Records Reviewed [Y/N]:  
Patient is a 42y old  Female who presents with a chief complaint of Fever, N/V (2023 23:58)      SUBJECTIVE / OVERNIGHT EVENTS: Patient seen and examined at bedside. She denies any CP, SOB, abd pain and  n/v. Her symptoms have improved, she is now able to tolerate diet well     ROS:  All other review of systems negative    Allergies    No Known Allergies    Intolerances        MEDICATIONS  (STANDING):  dexAMETHasone  Injectable 6 milliGRAM(s) IV Push daily  dextrose 5%. 1000 milliLiter(s) (100 mL/Hr) IV Continuous <Continuous>  dextrose 50% Injectable 25 Gram(s) IV Push once  folic acid 1 milliGRAM(s) Oral daily  glucagon  Injectable 1 milliGRAM(s) IntraMuscular once  heparin   Injectable 5000 Unit(s) SubCutaneous every 8 hours  insulin lispro (ADMELOG) corrective regimen sliding scale   SubCutaneous three times a day before meals  multivitamin 1 Tablet(s) Oral daily  remdesivir  IVPB   IV Intermittent   sodium chloride 0.9%. 1000 milliLiter(s) (125 mL/Hr) IV Continuous <Continuous>  thiamine 100 milliGRAM(s) Oral daily    MEDICATIONS  (PRN):  acetaminophen     Tablet .. 650 milliGRAM(s) Oral every 6 hours PRN Temp greater or equal to 38C (100.4F), Mild Pain (1 - 3)  albuterol    90 MICROgram(s) HFA Inhaler 2 Puff(s) Inhalation every 4 hours PRN Shortness of Breath and/or Wheezing  aluminum hydroxide/magnesium hydroxide/simethicone Suspension 30 milliLiter(s) Oral every 4 hours PRN Dyspepsia  benzonatate 100 milliGRAM(s) Oral three times a day PRN Cough  dextrose Oral Gel 15 Gram(s) Oral once PRN Blood Glucose LESS THAN 70 milliGRAM(s)/deciliter  guaifenesin/dextromethorphan Oral Liquid 10 milliLiter(s) Oral every 4 hours PRN Cough  melatonin 3 milliGRAM(s) Oral at bedtime PRN Insomnia  ondansetron Injectable 4 milliGRAM(s) IV Push every 8 hours PRN Nausea and/or Vomiting      Vital Signs Last 24 Hrs  T(C): 36.7 (2023 12:03), Max: 37.3 (2023 17:13)  T(F): 98.1 (2023 12:03), Max: 99.2 (2023 17:13)  HR: 69 (2023 12:03) (65 - 120)  BP: 107/72 (2023 12:03) (97/66 - 114/80)  BP(mean): 84 (2023 06:47) (69 - 783)  RR: 18 (2023 12:03) (16 - 20)  SpO2: 98% (2023 12:03) (91% - 100%)    Parameters below as of 2023 12:03  Patient On (Oxygen Delivery Method): nasal cannula      CAPILLARY BLOOD GLUCOSE      POCT Blood Glucose.: 225 mg/dL (2023 11:35)  POCT Blood Glucose.: 136 mg/dL (2023 07:57)    I&O's Summary      PHYSICAL EXAM:  GENERAL: NAD, well-developed  HEAD:  Atraumatic, Normocephalic  EYES: EOMI, PERRLA, conjunctiva and sclera clear  NECK: Supple, No JVD  CHEST/LUNG: Clear to auscultation bilaterally; No wheeze  HEART: Regular rate and rhythm; No murmurs, rubs, or gallops  ABDOMEN: Soft, Nontender, Nondistended; Bowel sounds present  EXTREMITIES:  2+ Peripheral Pulses, No clubbing, cyanosis, or edema  NEUROLOGY: AAOx3, non-focal  PSYCH: calm  SKIN: No rashes or lesions    LABS:                        13.5   11.99 )-----------( 129      ( 2023 17:21 )             40.4     27    134<L>  |  89<L>  |  28<H>  ----------------------------<  112<H>  3.2<L>   |  35<H>  |  0.99    Ca    9.2      2023 06:27  Phos  2.7       Mg     3.0         TPro  6.7  /  Alb  4.3  /  TBili  0.8  /  DBili  x   /  AST  102<H>  /  ALT  44  /  AlkPhos  77  27      CARDIAC MARKERS ( 2023 06:27 )  x     / x     / 45 U/L / x     / x          Urinalysis Basic - ( 2023 20:49 )    Color: Yellow / Appearance: Slightly Turbid / S.014 / pH: x  Gluc: x / Ketone: Small  / Bili: Negative / Urobili: Negative   Blood: x / Protein: 100 mg/dl / Nitrite: Negative   Leuk Esterase: Negative / RBC: 5 /hpf / WBC 3 /HPF   Sq Epi: x / Non Sq Epi: 5 /hpf / Bacteria: Negative        RADIOLOGY & ADDITIONAL TESTS:    Care Discussed with Consultants/Other Providers: Medicine ACP

## 2023-01-30 NOTE — DISCHARGE NOTE PROVIDER - HOSPITAL COURSE
42F no PMH p/w 4-day h/o intractable nausea and vomiting.      Problem/Plan - 1:  ·  Problem: Acute respiratory failure with hypoxia.   ·  Plan: -now off NC and saturating well on room air; continue to monitor   -likely iso covid-19   -continuous pulse ox monitoring  -will manage for covid-19 as below.     Problem/Plan - 2:  ·  Problem: Intractable vomiting with nausea.   ·  Plan: -suspect GI manifestations of covid-19, now resolved   -c/b electrolyte abnormalities and metabolic alkalosis d/t volume contraction  - able to tolerate PO - stop IVF  -c/w zofran prn.     Problem/Plan - 3:  ·  Problem: 2019 novel coronavirus disease (COVID-19).   ·  Plan: -Can cont with remdesivir for another day (today is D3) but would stop decadron  Suspect steroid related symptoms (inc appetite, anxiety, vivid dreams, shakiness)   currently no long on O2 without SOB, CXR clear on admission.   -f/u inflammatory markers including D-dimer, CK, ferritin, ldh  -antitussives prn, tylenol prn for fever    d/c likely in 24 hours.     Problem/Plan - 4:  ·  Problem: Metabolic alkalosis.   ·  Plan: -likely iso volume contraction d/t intractable n/v  -improved s/p 2L LR in ED  - continue to monitor now off IVF.     Problem/Plan - 5:  ·  Problem: HANNAH (acute kidney injury).   ·  Plan: resolved   -most likely d/t volume depletion iso vomiting  -avoid nephrotoxins and renally dose meds.     Problem/Plan - 6:  ·  Problem: Electrolyte imbalance.   ·  Plan: -K, repleted   -resolved  - monitor electrolytes and supplement as needed.     Problem/Plan - 7:  ·  Problem: Prophylactic measure.   ·  Plan: Diet: Regular  -DVT ppx: Lovenox qd.    Medically cleared by Dr. Gaines for discharge home.    42F no PMH p/w 4-day h/o intractable nausea and vomiting, found to be COVID+ with multiple electrolyte abnormalities and alkalosis. She was treated with IV fluids and initially with decadron and remdesivir, decadron was discontinued as oxygen requirements returned to RA. She completed course of remdesivir, felt well, had normal electrolyte panel and renal function, and was discharged home.

## 2023-01-30 NOTE — PROGRESS NOTE ADULT - ASSESSMENT
42F no PMH p/w 4-day h/o intractable nausea and vomiting. 
42F no PMH p/w 4-day h/o intractable nausea and vomiting. 
42F p/w COVID in setting of intractable nausea and vomiting with alkalosis and electrolyte abnormalities 
42F no PMH p/w 4-day h/o intractable nausea and vomiting.

## 2023-01-30 NOTE — PROGRESS NOTE ADULT - PROBLEM SELECTOR PLAN 2
-suspect GI manifestations of covid-19, now resolved   -c/b electrolyte abnormalities and metabolic alkalosis d/t volume contraction  - able to tolerate PO - stop IVF  -c/w zofran prn
-suspect GI manifestations of covid-19, now resolved   -c/b electrolyte abnormalities and metabolic alkalosis d/t volume contraction  - able to tolerate PO   -c/w zofran prn
Suspect manifestations of COVID-19, now resolved
-suspect GI manifestations of covid-19, now resolved   -c/b electrolyte abnormalities and metabolic alkalosis d/t volume contraction  - able to tolerate PO   -c/w zofran prn

## 2023-01-30 NOTE — PROGRESS NOTE ADULT - PROBLEM SELECTOR PROBLEM 5
HANNAH (acute kidney injury)
Electrolyte imbalance

## 2023-02-01 LAB — VIT B1 SERPL-MCNC: 51.7 NMOL/L — LOW (ref 66.5–200)

## 2023-05-05 NOTE — DISCHARGE NOTE PROVIDER - NSDCQMPCI_CARD_ALL_CORE
No PAST MEDICAL HISTORY:  Anxiety     Depression     Diverticulitis     Hyperlipidemia     Macular degeneration right eye    MVP (mitral valve prolapse)     OA (osteoarthritis) knee and hip

## 2023-06-29 NOTE — PATIENT PROFILE ADULT - NSTOBACCOCESSATIONEDU2_GEN_A_NUR
Back pain
Develop coping skills.  For example, strategies and lifestyle changes that reduce negative moods, stress, and exposure to smoking cues.

## 2023-08-21 ENCOUNTER — INPATIENT (INPATIENT)
Facility: HOSPITAL | Age: 43
LOS: 6 days | Discharge: HOME | DRG: 896 | End: 2023-08-28
Attending: STUDENT IN AN ORGANIZED HEALTH CARE EDUCATION/TRAINING PROGRAM | Admitting: HOSPITALIST
Payer: MEDICAID

## 2023-08-21 VITALS
TEMPERATURE: 98 F | WEIGHT: 95.02 LBS | HEIGHT: 61 IN | RESPIRATION RATE: 15 BRPM | SYSTOLIC BLOOD PRESSURE: 106 MMHG | HEART RATE: 108 BPM | OXYGEN SATURATION: 98 % | DIASTOLIC BLOOD PRESSURE: 76 MMHG

## 2023-08-21 LAB
ALBUMIN SERPL ELPH-MCNC: 5.2 G/DL — HIGH (ref 3.3–5)
ALP SERPL-CCNC: 198 U/L — HIGH (ref 40–120)
ALT FLD-CCNC: 98 U/L — HIGH (ref 10–45)
ANION GAP SERPL CALC-SCNC: 23 MMOL/L — HIGH (ref 5–17)
ANISOCYTOSIS BLD QL: SLIGHT — SIGNIFICANT CHANGE UP
APAP SERPL-MCNC: <15 UG/ML — SIGNIFICANT CHANGE UP (ref 10–30)
APTT BLD: 35.9 SEC — HIGH (ref 24.5–35.6)
AST SERPL-CCNC: 353 U/L — HIGH (ref 10–40)
B-OH-BUTYR SERPL-SCNC: 1.3 MMOL/L — HIGH
BASE EXCESS BLDV CALC-SCNC: 1.3 MMOL/L — SIGNIFICANT CHANGE UP (ref -2–3)
BASOPHILS # BLD AUTO: 0.17 K/UL — SIGNIFICANT CHANGE UP (ref 0–0.2)
BASOPHILS NFR BLD AUTO: 2.7 % — HIGH (ref 0–2)
BILIRUB SERPL-MCNC: 0.9 MG/DL — SIGNIFICANT CHANGE UP (ref 0.2–1.2)
BUN SERPL-MCNC: 5 MG/DL — LOW (ref 7–23)
CA-I SERPL-SCNC: 1.1 MMOL/L — LOW (ref 1.15–1.33)
CALCIUM SERPL-MCNC: 9.7 MG/DL — SIGNIFICANT CHANGE UP (ref 8.4–10.5)
CHLORIDE BLDV-SCNC: 99 MMOL/L — SIGNIFICANT CHANGE UP (ref 96–108)
CHLORIDE SERPL-SCNC: 94 MMOL/L — LOW (ref 96–108)
CO2 BLDV-SCNC: 28 MMOL/L — HIGH (ref 22–26)
CO2 SERPL-SCNC: 20 MMOL/L — LOW (ref 22–31)
CREAT SERPL-MCNC: 0.39 MG/DL — LOW (ref 0.5–1.3)
EGFR: 126 ML/MIN/1.73M2 — SIGNIFICANT CHANGE UP
ELLIPTOCYTES BLD QL SMEAR: SLIGHT — SIGNIFICANT CHANGE UP
EOSINOPHIL # BLD AUTO: 0.06 K/UL — SIGNIFICANT CHANGE UP (ref 0–0.5)
EOSINOPHIL NFR BLD AUTO: 0.9 % — SIGNIFICANT CHANGE UP (ref 0–6)
ETHANOL SERPL-MCNC: 277 MG/DL — HIGH (ref 0–10)
GAS PNL BLDV: 137 MMOL/L — SIGNIFICANT CHANGE UP (ref 136–145)
GAS PNL BLDV: SIGNIFICANT CHANGE UP
GLUCOSE BLDV-MCNC: 91 MG/DL — SIGNIFICANT CHANGE UP (ref 70–99)
GLUCOSE SERPL-MCNC: 93 MG/DL — SIGNIFICANT CHANGE UP (ref 70–99)
HCO3 BLDV-SCNC: 27 MMOL/L — SIGNIFICANT CHANGE UP (ref 22–29)
HCT VFR BLD CALC: 34.5 % — SIGNIFICANT CHANGE UP (ref 34.5–45)
HCT VFR BLDA CALC: 34 % — LOW (ref 34.5–46.5)
HGB BLD CALC-MCNC: 11.3 G/DL — LOW (ref 11.7–16.1)
HGB BLD-MCNC: 11.4 G/DL — LOW (ref 11.5–15.5)
INR BLD: 1.08 RATIO — SIGNIFICANT CHANGE UP (ref 0.85–1.18)
LACTATE BLDV-MCNC: 4.5 MMOL/L — CRITICAL HIGH (ref 0.5–2)
LYMPHOCYTES # BLD AUTO: 2.46 K/UL — SIGNIFICANT CHANGE UP (ref 1–3.3)
LYMPHOCYTES # BLD AUTO: 39.3 % — SIGNIFICANT CHANGE UP (ref 13–44)
MACROCYTES BLD QL: SLIGHT — SIGNIFICANT CHANGE UP
MAGNESIUM SERPL-MCNC: 1.9 MG/DL — SIGNIFICANT CHANGE UP (ref 1.6–2.6)
MANUAL SMEAR VERIFICATION: SIGNIFICANT CHANGE UP
MCHC RBC-ENTMCNC: 25.7 PG — LOW (ref 27–34)
MCHC RBC-ENTMCNC: 33 GM/DL — SIGNIFICANT CHANGE UP (ref 32–36)
MCV RBC AUTO: 77.7 FL — LOW (ref 80–100)
MONOCYTES # BLD AUTO: 0.33 K/UL — SIGNIFICANT CHANGE UP (ref 0–0.9)
MONOCYTES NFR BLD AUTO: 5.3 % — SIGNIFICANT CHANGE UP (ref 2–14)
NEUTROPHILS # BLD AUTO: 3.24 K/UL — SIGNIFICANT CHANGE UP (ref 1.8–7.4)
NEUTROPHILS NFR BLD AUTO: 50.9 % — SIGNIFICANT CHANGE UP (ref 43–77)
NEUTS BAND # BLD: 0.9 % — SIGNIFICANT CHANGE UP (ref 0–8)
OVALOCYTES BLD QL SMEAR: SLIGHT — SIGNIFICANT CHANGE UP
PCO2 BLDV: 44 MMHG — HIGH (ref 39–42)
PH BLDV: 7.39 — SIGNIFICANT CHANGE UP (ref 7.32–7.43)
PLAT MORPH BLD: NORMAL — SIGNIFICANT CHANGE UP
PLATELET # BLD AUTO: 93 K/UL — LOW (ref 150–400)
PO2 BLDV: 44 MMHG — SIGNIFICANT CHANGE UP (ref 25–45)
POIKILOCYTOSIS BLD QL AUTO: SLIGHT — SIGNIFICANT CHANGE UP
POTASSIUM BLDV-SCNC: 4.5 MMOL/L — SIGNIFICANT CHANGE UP (ref 3.5–5.1)
POTASSIUM SERPL-MCNC: 4.4 MMOL/L — SIGNIFICANT CHANGE UP (ref 3.5–5.3)
POTASSIUM SERPL-SCNC: 4.4 MMOL/L — SIGNIFICANT CHANGE UP (ref 3.5–5.3)
PROT SERPL-MCNC: 9 G/DL — HIGH (ref 6–8.3)
PROTHROM AB SERPL-ACNC: 11.9 SEC — SIGNIFICANT CHANGE UP (ref 9.5–13)
RBC # BLD: 4.44 M/UL — SIGNIFICANT CHANGE UP (ref 3.8–5.2)
RBC # FLD: 20.3 % — HIGH (ref 10.3–14.5)
RBC BLD AUTO: ABNORMAL
SALICYLATES SERPL-MCNC: <2 MG/DL — LOW (ref 15–30)
SAO2 % BLDV: 71.5 % — SIGNIFICANT CHANGE UP (ref 67–88)
SODIUM SERPL-SCNC: 137 MMOL/L — SIGNIFICANT CHANGE UP (ref 135–145)
TARGETS BLD QL SMEAR: SLIGHT — SIGNIFICANT CHANGE UP
WBC # BLD: 6.26 K/UL — SIGNIFICANT CHANGE UP (ref 3.8–10.5)
WBC # FLD AUTO: 6.26 K/UL — SIGNIFICANT CHANGE UP (ref 3.8–10.5)

## 2023-08-21 PROCEDURE — 71045 X-RAY EXAM CHEST 1 VIEW: CPT | Mod: 26

## 2023-08-21 PROCEDURE — 70450 CT HEAD/BRAIN W/O DYE: CPT | Mod: 26,MA

## 2023-08-21 PROCEDURE — 99285 EMERGENCY DEPT VISIT HI MDM: CPT

## 2023-08-21 PROCEDURE — 73620 X-RAY EXAM OF FOOT: CPT | Mod: 26,RT

## 2023-08-21 RX ORDER — SODIUM CHLORIDE 9 MG/ML
2000 INJECTION, SOLUTION INTRAVENOUS
Refills: 0 | Status: DISCONTINUED | OUTPATIENT
Start: 2023-08-21 | End: 2023-08-25

## 2023-08-21 RX ORDER — DIAZEPAM 5 MG
5 TABLET ORAL ONCE
Refills: 0 | Status: DISCONTINUED | OUTPATIENT
Start: 2023-08-21 | End: 2023-08-21

## 2023-08-21 RX ORDER — SODIUM CHLORIDE 9 MG/ML
2000 INJECTION, SOLUTION INTRAVENOUS ONCE
Refills: 0 | Status: DISCONTINUED | OUTPATIENT
Start: 2023-08-21 | End: 2023-08-21

## 2023-08-21 RX ADMIN — Medication 5 MILLIGRAM(S): at 21:52

## 2023-08-21 RX ADMIN — Medication 50 MILLIGRAM(S): at 21:52

## 2023-08-21 RX ADMIN — Medication 5 MILLIGRAM(S): at 20:30

## 2023-08-21 RX ADMIN — SODIUM CHLORIDE 500 MILLILITER(S): 9 INJECTION, SOLUTION INTRAVENOUS at 20:21

## 2023-08-21 NOTE — ED ADULT NURSE NOTE - NSFALLRISKINTERV_ED_ALL_ED
Assistance OOB with selected safe patient handling equipment if applicable/Assistance with ambulation/Communicate fall risk and risk factors to all staff, patient, and family/Monitor gait and stability/Monitor for mental status changes and reorient to person, place, and time, as needed/Provide visual cue: yellow wristband, yellow gown, etc/Reinforce activity limits and safety measures with patient and family/Toileting schedule using arm’s reach rule for commode and bathroom/Use of alarms - bed, stretcher, chair and/or video monitoring/Call bell, personal items and telephone in reach/Instruct patient to call for assistance before getting out of bed/chair/stretcher/Non-slip footwear applied when patient is off stretcher/[place] to call system/Physically safe environment - no spills, clutter or unnecessary equipment/Purposeful Proactive Rounding/Room/bathroom lighting operational, light cord in reach

## 2023-08-21 NOTE — ED PROVIDER NOTE - CLINICAL SUMMARY MEDICAL DECISION MAKING FREE TEXT BOX
middle aged female presents with cp/palps/dizziness and fall in after discontinuing etoh today. no hx withdrawal seizures, but in apparent withdrawal on exam. ecg sinus tachy with no ischemic changes. pt on tele box at time of arrival. will hydrate  and eval CTH for trauma in the setting of likely coagulopathy from etoh. dispo pending pt response.

## 2023-08-21 NOTE — ED PROVIDER NOTE - RAPID ASSESSMENT
44 y/o female hx etoh abuse prior rehab/detox with recent admission to [facility] for withdrawal presenting to the ED for concern of withdrawal. patient reports drinking 1 liter of vodka per day trying to cut back over the last 4-5 days. patient with many episodes of vomiting. patient reports anxiety and palptations, last drink prior to arrival due to severity of symptoms. reports on her last admission she was advised she was hyponatremic, hyperglycemic to 600. no hx seizure. patient somewhat anxious appearing in triage.

## 2023-08-21 NOTE — ED PROVIDER NOTE - PHYSICAL EXAMINATION
general: non-toxic, NAD  HEENT: NCAT, PERRL, oropharyngeal AW patent +tongue fasciculations   Cardiac: tachycardic, no murmurs, 2+ peripheral pulses  Resp: CTAB, normal rate  Abdomen: soft, non-distended, bowel sounds present, no ttp, no rebound or guarding. no organomegaly  Extremities: no peripheral edema, calf tenderness, or leg size discrepancies. ecchymosis appears partially healed to R midfoot. no overlying swelling.   Skin: warm and dry no rashes  Neuro: AAOx4, 5+motor, sensation grossly intact CN 2-12 intact. steady gait.   Psych: mood and affect appropriate. mildly anxious

## 2023-08-21 NOTE — ED ADULT TRIAGE NOTE - CHIEF COMPLAINT QUOTE
pt drinks every day Vodkaq 1 liter  has been drinking since June when she was sober for 25 cold turkey    pt is tremulous

## 2023-08-21 NOTE — ED PROVIDER NOTE - PROGRESS NOTE DETAILS
MARIYA Linares PGY3 pt had worsening palps and tremors. IV valium given with improvement. labs with starvation/alcoholic ketosis. unsafe dc. pt accepted to hospitalist. hospitalist requesting podiatry consult for cuboid fracture. pods paged.

## 2023-08-21 NOTE — ED ADULT NURSE REASSESSMENT NOTE - NS ED NURSE REASSESS COMMENT FT1
1815 Pt requested manager sister came and pt multiple times for her sister needing to see md Pt has tremors  but has been sitting in the wheelchair the whole time vs stable ans documented x Gennaro

## 2023-08-21 NOTE — ED PROVIDER NOTE - OBJECTIVE STATEMENT
42yo female hx etoh use (1L vodka/day) no hx withdrawal seizures presents with cp/palps/dizziness today. she stopped drinking at approximately noon. on the way into the ER fell and hit her head secondary to her dizziness. witnessed fall. no loc. no numbness weakness vomiting or vision changes. no pleurisy or hormone use. has not eaten in 5d because etoh use supresses her appetite. no abd pain.

## 2023-08-21 NOTE — ED PROVIDER NOTE - ATTENDING CONTRIBUTION TO CARE
I was the supervising attending. I have independently seen face-to-face and examined the patient. I have reviewed the history and physical and discussed the MDM with the resident, fellow, RADHA and/or student. I agree with the assessment and plan as presented unless otherwise documented as follows:    43F hx EtOH use disorder presenting with chest discomfort and palpitations. Additionally continuing to drink EtOH, poor appetite, nausea but no vomiting. Had witnessed fall in the ED prior to being evaluated by main team with HT but no LOC. On initial evaluation, appears anxious, with mild signs of EtOH withdrawal, tachycardic. EKG with sinus tachycardia, no ischemic changes. Will follow up on QPA labs/UA, added on additional imaging to evaluate for traumatic injury. Will give fluids and nausea medication and reassess. Likely EtOH withdrawal, possible alcoholic/starvation ketosis. -Deepti Burris MD (Attending)

## 2023-08-21 NOTE — ED PROVIDER NOTE - NS ED ROS FT
Constitutional: no fevers, chills  HEENT: no HA, vision changes, rhinorrhea, sore throat  Cardiac: +chest pain, palpitations  Respiratory: no SOB, cough or hemoptysis  GI: no n/v/d/c, abd pain, bloody or dark stools  : no dysuria, frequency, or hematuria  MSK: no joint pain, neck pain or back pain  Skin: no rashes, jaundice, pruritis  Neuro: no numbness/tingling, weakness, unsteady gait  Psych: no suicidal thoughts  ROS otherwise negative except per HPI

## 2023-08-22 ENCOUNTER — TRANSCRIPTION ENCOUNTER (OUTPATIENT)
Age: 43
End: 2023-08-22

## 2023-08-22 DIAGNOSIS — F10.939 ALCOHOL USE, UNSPECIFIED WITH WITHDRAWAL, UNSPECIFIED: ICD-10-CM

## 2023-08-22 DIAGNOSIS — F17.200 NICOTINE DEPENDENCE, UNSPECIFIED, UNCOMPLICATED: ICD-10-CM

## 2023-08-22 DIAGNOSIS — E88.89 OTHER SPECIFIED METABOLIC DISORDERS: ICD-10-CM

## 2023-08-22 DIAGNOSIS — S92.213A DISPLACED FRACTURE OF CUBOID BONE OF UNSPECIFIED FOOT, INITIAL ENCOUNTER FOR CLOSED FRACTURE: ICD-10-CM

## 2023-08-22 DIAGNOSIS — K70.10 ALCOHOLIC HEPATITIS WITHOUT ASCITES: ICD-10-CM

## 2023-08-22 DIAGNOSIS — D69.6 THROMBOCYTOPENIA, UNSPECIFIED: ICD-10-CM

## 2023-08-22 DIAGNOSIS — Z29.9 ENCOUNTER FOR PROPHYLACTIC MEASURES, UNSPECIFIED: ICD-10-CM

## 2023-08-22 LAB
ALBUMIN SERPL ELPH-MCNC: 4.2 G/DL — SIGNIFICANT CHANGE UP (ref 3.3–5)
ALP SERPL-CCNC: 161 U/L — HIGH (ref 40–120)
ALT FLD-CCNC: 73 U/L — HIGH (ref 10–45)
ANION GAP SERPL CALC-SCNC: 15 MMOL/L — SIGNIFICANT CHANGE UP (ref 5–17)
AST SERPL-CCNC: 222 U/L — HIGH (ref 10–40)
BASE EXCESS BLDV CALC-SCNC: -0.7 MMOL/L — SIGNIFICANT CHANGE UP (ref -2–3)
BASE EXCESS BLDV CALC-SCNC: 1.5 MMOL/L — SIGNIFICANT CHANGE UP (ref -2–3)
BILIRUB DIRECT SERPL-MCNC: 0.3 MG/DL — SIGNIFICANT CHANGE UP (ref 0–0.3)
BILIRUB INDIRECT FLD-MCNC: 0.4 MG/DL — SIGNIFICANT CHANGE UP (ref 0.2–1)
BILIRUB SERPL-MCNC: 0.7 MG/DL — SIGNIFICANT CHANGE UP (ref 0.2–1.2)
BUN SERPL-MCNC: <4 MG/DL — LOW (ref 7–23)
CA-I SERPL-SCNC: 1.05 MMOL/L — LOW (ref 1.15–1.33)
CA-I SERPL-SCNC: 1.11 MMOL/L — LOW (ref 1.15–1.33)
CALCIUM SERPL-MCNC: 8.6 MG/DL — SIGNIFICANT CHANGE UP (ref 8.4–10.5)
CHLORIDE BLDV-SCNC: 103 MMOL/L — SIGNIFICANT CHANGE UP (ref 96–108)
CHLORIDE BLDV-SCNC: 103 MMOL/L — SIGNIFICANT CHANGE UP (ref 96–108)
CHLORIDE SERPL-SCNC: 103 MMOL/L — SIGNIFICANT CHANGE UP (ref 96–108)
CO2 BLDV-SCNC: 25 MMOL/L — SIGNIFICANT CHANGE UP (ref 22–26)
CO2 BLDV-SCNC: 27 MMOL/L — HIGH (ref 22–26)
CO2 SERPL-SCNC: 23 MMOL/L — SIGNIFICANT CHANGE UP (ref 22–31)
CREAT SERPL-MCNC: 0.44 MG/DL — LOW (ref 0.5–1.3)
EGFR: 122 ML/MIN/1.73M2 — SIGNIFICANT CHANGE UP
GAS PNL BLDV: 138 MMOL/L — SIGNIFICANT CHANGE UP (ref 136–145)
GAS PNL BLDV: 140 MMOL/L — SIGNIFICANT CHANGE UP (ref 136–145)
GAS PNL BLDV: SIGNIFICANT CHANGE UP
GLUCOSE BLDC GLUCOMTR-MCNC: 108 MG/DL — HIGH (ref 70–99)
GLUCOSE BLDV-MCNC: 243 MG/DL — HIGH (ref 70–99)
GLUCOSE BLDV-MCNC: 64 MG/DL — LOW (ref 70–99)
GLUCOSE SERPL-MCNC: 68 MG/DL — LOW (ref 70–99)
HCO3 BLDV-SCNC: 24 MMOL/L — SIGNIFICANT CHANGE UP (ref 22–29)
HCO3 BLDV-SCNC: 26 MMOL/L — SIGNIFICANT CHANGE UP (ref 22–29)
HCT VFR BLDA CALC: 30 % — LOW (ref 34.5–46.5)
HCT VFR BLDA CALC: 31 % — LOW (ref 34.5–46.5)
HGB BLD CALC-MCNC: 10.1 G/DL — LOW (ref 11.7–16.1)
HGB BLD CALC-MCNC: 10.2 G/DL — LOW (ref 11.7–16.1)
HOROWITZ INDEX BLDV+IHG-RTO: SIGNIFICANT CHANGE UP
LACTATE BLDV-MCNC: 2.9 MMOL/L — HIGH (ref 0.5–2)
LACTATE BLDV-MCNC: 4.2 MMOL/L — CRITICAL HIGH (ref 0.5–2)
MAGNESIUM SERPL-MCNC: 1.4 MG/DL — LOW (ref 1.6–2.6)
PCO2 BLDV: 39 MMHG — SIGNIFICANT CHANGE UP (ref 39–42)
PCO2 BLDV: 40 MMHG — SIGNIFICANT CHANGE UP (ref 39–42)
PH BLDV: 7.39 — SIGNIFICANT CHANGE UP (ref 7.32–7.43)
PH BLDV: 7.43 — SIGNIFICANT CHANGE UP (ref 7.32–7.43)
PHOSPHATE SERPL-MCNC: 1.7 MG/DL — LOW (ref 2.5–4.5)
PO2 BLDV: 37 MMHG — SIGNIFICANT CHANGE UP (ref 25–45)
PO2 BLDV: 42 MMHG — SIGNIFICANT CHANGE UP (ref 25–45)
POTASSIUM BLDV-SCNC: 3.5 MMOL/L — SIGNIFICANT CHANGE UP (ref 3.5–5.1)
POTASSIUM BLDV-SCNC: 4 MMOL/L — SIGNIFICANT CHANGE UP (ref 3.5–5.1)
POTASSIUM SERPL-MCNC: 3.5 MMOL/L — SIGNIFICANT CHANGE UP (ref 3.5–5.3)
POTASSIUM SERPL-SCNC: 3.5 MMOL/L — SIGNIFICANT CHANGE UP (ref 3.5–5.3)
PROT SERPL-MCNC: 7.2 G/DL — SIGNIFICANT CHANGE UP (ref 6–8.3)
SAO2 % BLDV: 56.5 % — LOW (ref 67–88)
SAO2 % BLDV: 72.9 % — SIGNIFICANT CHANGE UP (ref 67–88)
SODIUM SERPL-SCNC: 141 MMOL/L — SIGNIFICANT CHANGE UP (ref 135–145)

## 2023-08-22 PROCEDURE — 99223 1ST HOSP IP/OBS HIGH 75: CPT

## 2023-08-22 PROCEDURE — 99406 BEHAV CHNG SMOKING 3-10 MIN: CPT

## 2023-08-22 PROCEDURE — 76705 ECHO EXAM OF ABDOMEN: CPT | Mod: 26

## 2023-08-22 RX ORDER — MAGNESIUM SULFATE 500 MG/ML
2 VIAL (ML) INJECTION ONCE
Refills: 0 | Status: COMPLETED | OUTPATIENT
Start: 2023-08-22 | End: 2023-08-22

## 2023-08-22 RX ORDER — LANOLIN ALCOHOL/MO/W.PET/CERES
3 CREAM (GRAM) TOPICAL AT BEDTIME
Refills: 0 | Status: DISCONTINUED | OUTPATIENT
Start: 2023-08-22 | End: 2023-08-28

## 2023-08-22 RX ORDER — THIAMINE MONONITRATE (VIT B1) 100 MG
500 TABLET ORAL THREE TIMES A DAY
Refills: 0 | Status: COMPLETED | OUTPATIENT
Start: 2023-08-22 | End: 2023-08-25

## 2023-08-22 RX ORDER — DIAZEPAM 5 MG
10 TABLET ORAL ONCE
Refills: 0 | Status: DISCONTINUED | OUTPATIENT
Start: 2023-08-22 | End: 2023-08-22

## 2023-08-22 RX ORDER — DIAZEPAM 5 MG
10 TABLET ORAL EVERY 4 HOURS
Refills: 0 | Status: DISCONTINUED | OUTPATIENT
Start: 2023-08-22 | End: 2023-08-27

## 2023-08-22 RX ORDER — POTASSIUM PHOSPHATE, MONOBASIC POTASSIUM PHOSPHATE, DIBASIC 236; 224 MG/ML; MG/ML
15 INJECTION, SOLUTION INTRAVENOUS ONCE
Refills: 0 | Status: COMPLETED | OUTPATIENT
Start: 2023-08-22 | End: 2023-08-22

## 2023-08-22 RX ORDER — NICOTINE POLACRILEX 2 MG
1 GUM BUCCAL DAILY
Refills: 0 | Status: DISCONTINUED | OUTPATIENT
Start: 2023-08-22 | End: 2023-08-28

## 2023-08-22 RX ORDER — DIAZEPAM 5 MG
5 TABLET ORAL EVERY 4 HOURS
Refills: 0 | Status: DISCONTINUED | OUTPATIENT
Start: 2023-08-22 | End: 2023-08-28

## 2023-08-22 RX ORDER — ACETAMINOPHEN 500 MG
650 TABLET ORAL EVERY 6 HOURS
Refills: 0 | Status: DISCONTINUED | OUTPATIENT
Start: 2023-08-22 | End: 2023-08-22

## 2023-08-22 RX ORDER — FOLIC ACID 0.8 MG
1 TABLET ORAL DAILY
Refills: 0 | Status: DISCONTINUED | OUTPATIENT
Start: 2023-08-22 | End: 2023-08-28

## 2023-08-22 RX ORDER — THIAMINE MONONITRATE (VIT B1) 100 MG
100 TABLET ORAL DAILY
Refills: 0 | Status: DISCONTINUED | OUTPATIENT
Start: 2023-08-25 | End: 2023-08-28

## 2023-08-22 RX ADMIN — Medication 105 MILLIGRAM(S): at 22:32

## 2023-08-22 RX ADMIN — Medication 1 PATCH: at 17:09

## 2023-08-22 RX ADMIN — Medication 10 MILLIGRAM(S): at 11:30

## 2023-08-22 RX ADMIN — Medication 25 GRAM(S): at 09:19

## 2023-08-22 RX ADMIN — Medication 105 MILLIGRAM(S): at 16:59

## 2023-08-22 RX ADMIN — POTASSIUM PHOSPHATE, MONOBASIC POTASSIUM PHOSPHATE, DIBASIC 62.5 MILLIMOLE(S): 236; 224 INJECTION, SOLUTION INTRAVENOUS at 11:29

## 2023-08-22 RX ADMIN — Medication 10 MILLIGRAM(S): at 19:51

## 2023-08-22 RX ADMIN — Medication 1 TABLET(S): at 11:30

## 2023-08-22 RX ADMIN — Medication 10 MILLIGRAM(S): at 02:07

## 2023-08-22 RX ADMIN — Medication 1 MILLIGRAM(S): at 11:30

## 2023-08-22 RX ADMIN — Medication 10 MILLIGRAM(S): at 23:05

## 2023-08-22 RX ADMIN — SODIUM CHLORIDE 2000 MILLILITER(S): 9 INJECTION, SOLUTION INTRAVENOUS at 01:55

## 2023-08-22 NOTE — H&P ADULT - TIME BILLING
The necessity of the time spent during the encounter on this date of service was due to:   - Ordering, reviewing, and interpreting labs, testing, and imaging.  - Independently obtaining a review of systems and performing a physical exam  - Reviewing prior hospitalization and where necessary, outpatient records.  - Reviewing consultant recommendations/communicating with consultants  - Counselling and educating patient and family regarding interpretation of aforementioned items and plan of care.

## 2023-08-22 NOTE — H&P ADULT - PROBLEM SELECTOR PLAN 1
- S/p valium 5 mg IV x 2, 10 mg IV x 2, librium 50 mg x 1  - Start high dose thiamine, multivitamin, folic acid  - Symptom triggered valium 5 mg IV q4 for CIWA < 8 and increase of 2 points and valium 10 mg IV q4 for CIWA > 8

## 2023-08-22 NOTE — H&P ADULT - HISTORY OF PRESENT ILLNESS
43F with hx of multiple admissions for etoh use disorder although never had seizures or ICU admission sent to ED by patient's sister for etoh withdrawal. Just prior to admission, pt tripped over uneven sidewalk and fell face forward, lightly striking her head. Her R foot was also bruised but patient does not know if the bruising was present prior to this fall. She describes feeling anxious, nauseated and not eating for 5 days, dizziness. She is interested in etoh rehab on discharge. Pt's last drink was yesterday, she drinks about 1L of vodka per day. She currently feels lightheaded, tremulous, anxious, diaphoretic, but nausea has improved.     In the ED, pt was normotensive, mildly tachycardic, breathing comfortably on RA. She was given diazepam 5 mg IV x 2, 10 mg IV x 1, and librium 50 mg PO x 1.

## 2023-08-22 NOTE — DISCHARGE NOTE PROVIDER - DETAILS OF MALNUTRITION DIAGNOSIS/DIAGNOSES
This patient has been assessed with a concern for Malnutrition and was treated during this hospitalization for the following Nutrition diagnosis/diagnoses:     -  08/23/2023: Severe protein-calorie malnutrition   -  08/23/2023: Underweight (BMI < 19)

## 2023-08-22 NOTE — DISCHARGE NOTE PROVIDER - HOSPITAL COURSE
43F with hx of multiple admissions for etoh use disorder although never had seizures or ICU admission sent to ED by patient's sister for etoh withdrawal. Just prior to admission, pt tripped over uneven sidewalk and fell face forward, lightly striking her head. Her R foot was also bruised but patient does not know if the bruising was present prior to this fall. She describes feeling anxious, nauseated and not eating for 5 days, dizziness. She is interested in etoh rehab on discharge. Pt's last drink was yesterday, she drinks about 1L of vodka per day. She currently feels lightheaded, tremulous, anxious, diaphoretic, but nausea has improved.     In the ED, pt was normotensive, mildly tachycardic, breathing comfortably on RA. She was given diazepam 5 mg IV x 2, 10 mg IV x 1, and librium 50 mg PO x 1.   ENT called to evaluate for left ear discharge. 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 She gets recurrent ear infections associated with tonsillitis during which she gets drainage from left ear. She states this episode of clear/yellow ear drainage began 1 month ago, that was initially associated with ear pain, which has since resolved. She also c/o hearing loss during these episodes. Pt denies fever, chills, n/v, tinnitus, vertigo, HA, SOB, dysphagia, odynophagia, hemoptysis, hoarseness, unintentional weight loss. Afebrile, WBC normal.   Ciprodex ear drops 4 drops to left ear bid for 14 days    - no open lesions or signs of infection b/l, ecchymosis present on the dorsal lateral aspect of right foot.   - right foot xray: acute nondisplaced cuboid fracture   - Ordered cam boot to begin WBAT as patient fairly comfortable, questionable fracture per radiology  - Stable to d/c, follow up in office, will follow while admitted    Pt requesting to wait at her sister's house for admission to substance abuse rehab. She will be discharged to her sister's house.

## 2023-08-22 NOTE — DISCHARGE NOTE PROVIDER - NSDCMRMEDTOKEN_GEN_ALL_CORE_FT
acetaminophen 325 mg oral tablet: 2 tab(s) orally every 6 hours, As needed, Temp greater or equal to 38C (100.4F), Mild Pain (1 - 3)  benzonatate 100 mg oral capsule: 1 cap(s) orally 3 times a day, As needed, Cough  folic acid 1 mg oral tablet: 1 tab(s) orally once a day  Multiple Vitamins oral tablet: 1 tab(s) orally once a day  thiamine 100 mg oral tablet: 1 tab(s) orally once a day   folic acid 1 mg oral tablet: 1 tab(s) orally once a day  Multiple Vitamins oral tablet: 1 tab(s) orally once a day  RLE CAM: Please dispense ONE R  lower extremity prefabricated AFO walker  Ind: s/p R Foot cuboid fracture   Disp: 1 prefabricated AFO walker  thiamine 100 mg oral tablet: 1 tab(s) orally once a day   Ciprodex 0.3%-0.1% otic suspension: 4 drop(s) in each affected ear every 12 hours  famotidine 20 mg oral tablet: 1 tab(s) orally once a day  folic acid 1 mg oral tablet: 1 tab(s) orally once a day  Multiple Vitamins oral tablet: 1 tab(s) orally once a day  nicotine 14 mg/24 hr transdermal film, extended release: 1 patch transdermally once a day  ocular lubricant ophthalmic solution: 1 drop(s) to each affected eye 4 times a day  out patient physical therapy: as directed  pantoprazole 40 mg oral delayed release tablet: 1 tab(s) orally once a day  thiamine 100 mg oral tablet: 1 tab(s) orally once a day

## 2023-08-22 NOTE — CONSULT NOTE ADULT - SUBJECTIVE AND OBJECTIVE BOX
Patient is a 43y old  Female who presents with a chief complaint of alcohol withdrawl     HPI:  44 y/o female hx etoh abuse prior rehab/detox with recent admission to [facility] for withdrawal presenting to the ED for concern of withdrawal. patient reports drinking 1 liter of vodka per day trying to cut back over the last 4-5 days. patient with many episodes of vomiting. patient reports anxiety and palptations, last drink prior to arrival due to severity of symptoms. reports on her last admission she was advised she was hyponatremic, hyperglycemic to 600. no hx seizure. patient somewhat anxious appearing in triage.    PAST MEDICAL & SURGICAL HISTORY:  History of alcohol use disorder  1 pint of vodka/day      No significant past surgical history          MEDICATIONS  (STANDING):  dextrose 5% + sodium chloride 0.9%. 2000 milliLiter(s) (500 mL/Hr) IV Continuous <Continuous>    MEDICATIONS  (PRN):      Allergies    No Known Allergies    Intolerances        VITALS:    Vital Signs Last 24 Hrs  T(C): 37 (22 Aug 2023 04:54), Max: 37 (21 Aug 2023 20:33)  T(F): 98.6 (22 Aug 2023 04:54), Max: 98.6 (21 Aug 2023 20:33)  HR: 99 (22 Aug 2023 04:54) (79 - 108)  BP: 124/85 (22 Aug 2023 04:54) (104/75 - 129/91)  BP(mean): --  RR: 18 (22 Aug 2023 04:54) (15 - 21)  SpO2: 96% (22 Aug 2023 04:54) (95% - 99%)    Parameters below as of 22 Aug 2023 04:54  Patient On (Oxygen Delivery Method): room air        LABS:                          11.4   6.26  )-----------( 93       ( 21 Aug 2023 19:18 )             34.5       08-22    141  |  103  |  <4<L>  ----------------------------<  68<L>  3.5   |  23  |  0.44<L>    Ca    8.6      22 Aug 2023 02:44  Phos  1.7     08-22  Mg     1.4     08-22    TPro  7.2  /  Alb  4.2  /  TBili  0.7  /  DBili  0.3  /  AST  222<H>  /  ALT  73<H>  /  AlkPhos  161<H>  08-22      CAPILLARY BLOOD GLUCOSE      POCT Blood Glucose.: 108 mg/dL (22 Aug 2023 04:08)      PT/INR - ( 21 Aug 2023 19:18 )   PT: 11.9 sec;   INR: 1.08 ratio         PTT - ( 21 Aug 2023 19:18 )  PTT:35.9 sec    LOWER EXTREMITY PHYSICAL EXAM:    Vascular: DP/PT 2/4, B/L, CFT <3 seconds B/L, Temperature gradient warm to cool , B/L.   Neuro: Epicritic sensation intact to the level of digits, B/L.  Musculoskeletal/Ortho: pain upon palpation of lateral aspect of right foot   Skin: no open lesions or signs of infection b/l, ecchymosis present on the dorsal lateral aspect of right foot.       RADIOLOGY & ADDITIONAL STUDIES:    < from: Xray Foot AP + Lateral, Right (08.21.23 @ 20:40) >  ACC: [id number] EXAM:  XR FOOT 2 VIEWS RT   ORDERED BY: CRYSTAL SANDOVAL     PROCEDURE DATE:  08/21/2023          INTERPRETATION:  CLINICAL INDICATION: Right foot pain.    TECHNIQUE: 2 views of the right foot.    COMPARISON: No similar prior comparisons available.    FINDINGS:  Cortical irregularity along the inferolateral aspect of the cuboid.  Soft tissue swelling about the midfoot and hindfoot.    IMPRESSION:  Cortical irregularity along the inferolateral aspect of the cuboid which   may represent an acute nondisplaced fracture.    --- End of Report ---          TALHA MENDEZ MD; Resident Radiologist  This document has been electronically signed.  VANESSA MARIE MD; Attending Radiologist  This document has been electronically signed. Aug 21 2023 9:23PM    < end of copied text >

## 2023-08-22 NOTE — DISCHARGE NOTE PROVIDER - CARE PROVIDER_API CALL
Waterhouse, Joseph Cameron  Podiatric Medicine and Surgery  [address] [place] [address]  Phone: [phone]  Fax: [phone]  Follow Up Time: 2 weeks    Lisandro Monaco  Otolaryngology  [address] [place] [address], [address]  [place] [address]  Phone: [phone]  Fax: [phone]  Follow Up Time: 2 weeks

## 2023-08-22 NOTE — H&P ADULT - PROBLEM SELECTOR PLAN 4
Unclear etiology, given extensive drinking history, may represent early cirrhosis  - f/u abdominal US

## 2023-08-22 NOTE — ED ADULT NURSE REASSESSMENT NOTE - NS ED NURSE REASSESS COMMENT FT1
Pt blood sugar 68 on CMP labwork, notified MD Linares and gave pt crackers and apple juice. Rechecked BGL and now 108. Pt denies feeling cool, diaphoretic, or dizzy.

## 2023-08-22 NOTE — DISCHARGE NOTE PROVIDER - NSDCCPCAREPLAN_GEN_ALL_CORE_FT
PRINCIPAL DISCHARGE DIAGNOSIS  Diagnosis: Alcoholic ketosis  Assessment and Plan of Treatment: attend alcohol abuse treatment center  You will not be receiving any Valium or other controlled substances  avoid alcohol in all forms      SECONDARY DISCHARGE DIAGNOSES  Diagnosis: Cuboid fracture  Assessment and Plan of Treatment: Please follow up with Dr. Waterhouse within 1 week of discharge from the hospital, please call [phone] for appointment and discuss that you recently were seen in the hospital.  Wound Care: Please leave your dressing clean dry intact until your follow up appointment   Weight bearing: Non-weight bearing on right lower extremity    Diagnosis: Tympanic membrane perforation  Assessment and Plan of Treatment: Ciprodex ear drops 4 drops to left ear bid for 14 days  - Follow up outpatient with Otology Dr. Asim Ren or Dr. Lisandro Monaco [address] [place] [address] Rd  [phone].

## 2023-08-22 NOTE — H&P ADULT - NSHPLABSRESULTS_GEN_ALL_CORE
11.4   6.26  )-----------( 93       ( 21 Aug 2023 19:18 )             34.5     08-22    141  |  103  |  <4<L>  ----------------------------<  68<L>  3.5   |  23  |  0.44<L>    Ca    8.6      22 Aug 2023 02:44  Phos  1.7     08-22  Mg     1.4     08-22    TPro  7.2  /  Alb  4.2  /  TBili  0.7  /  DBili  0.3  /  AST  222<H>  /  ALT  73<H>  /  AlkPhos  161<H>  08-22    PT/INR - ( 21 Aug 2023 19:18 )   PT: 11.9 sec;   INR: 1.08 ratio      PTT - ( 21 Aug 2023 19:18 )  PTT:35.9 sec    Venous: 08-22-23 @ 02:35 FiO2: -- Oxygen Sat% 72.9  Venous: 08-21-23 @ 22:35 FiO2: UNKNOWN Oxygen Sat% 56.5  Venous: 08-21-23 @ 19:18 FiO2: -- Oxygen Sat% 71.5    Urinalysis Basic - ( 22 Aug 2023 02:44 )    Color: x / Appearance: x / SG: x / pH: x  Gluc: 68 mg/dL / Ketone: x  / Bili: x / Urobili: x   Blood: x / Protein: x / Nitrite: x   Leuk Esterase: x / RBC: x / WBC x   Sq Epi: x / Non Sq Epi: x / Bacteria: x    CAPILLARY BLOOD GLUCOSE    POCT Blood Glucose.: 108 mg/dL (22 Aug 2023 04:08)    CXR personally reviewed clear lungs  EKG personally reviewed sinus tachy, , QTc 470

## 2023-08-22 NOTE — H&P ADULT - PROBLEM SELECTOR PLAN 2
Extensive smoking hx, 1/2 PPD. 5 minutes of smoking cessation counseling given  - start nicotine patch step 2 (14 mcg/day) x 6 weeks, followed by 7 mcg/day x 2 weeks

## 2023-08-22 NOTE — DISCHARGE NOTE PROVIDER - PROVIDER TOKENS
PROVIDER:[TOKEN:[[id number]],FOLLOWUP:[2 weeks]],PROVIDER:[TOKEN:[[id number]],FOLLOWUP:[2 weeks]]

## 2023-08-22 NOTE — H&P ADULT - NSHPREVIEWOFSYSTEMS_GEN_ALL_CORE
REVIEW OF SYSTEMS:  CONSTITUTIONAL: No fever, chills  EYES: No eye pain, visual disturbances  ENMT:  No difficulty hearing, sinus or throat pain  NECK: No pain or stiffness  RESPIRATORY: No cough, wheezing; No shortness of breath  CARDIOVASCULAR: +chest tightness, palpitations. No dizziness, or leg swelling  GASTROINTESTINAL: No abdominal pain. No nausea, vomiting; No diarrhea or constipation  GENITOURINARY: No dysuria, frequency  NEUROLOGICAL: +lightheadedness, tremors. No headaches  SKIN: No itching, burning, rashes, or lesions   LYMPH NODES: No enlarged glands  ENDOCRINE: No heat or cold intolerance; No hair loss  MUSCULOSKELETAL: No joint pain or swelling; No muscle, back, or extremity pain  PSYCHIATRIC: +anxious  HEME/LYMPH: No easy bruising, or bleeding gums  ALLERGY AND IMMUNOLOGIC: No hives or eczema

## 2023-08-22 NOTE — H&P ADULT - NSHPPHYSICALEXAM_GEN_ALL_CORE
Vital Signs Last 24 Hrs  T(C): 37 (22 Aug 2023 04:54), Max: 37 (21 Aug 2023 20:33)  T(F): 98.6 (22 Aug 2023 04:54), Max: 98.6 (21 Aug 2023 20:33)  HR: 99 (22 Aug 2023 04:54) (79 - 108)  BP: 124/85 (22 Aug 2023 04:54) (104/75 - 129/91)  BP(mean): --  RR: 18 (22 Aug 2023 04:54) (15 - 21)  SpO2: 96% (22 Aug 2023 04:54) (95% - 99%)    Parameters below as of 22 Aug 2023 04:54  Patient On (Oxygen Delivery Method): room air    GENERAL: NAD, well-developed  HEAD:  Atraumatic, Normocephalic  EYES: EOMI, PERRLA, conjunctiva and sclera clear  NECK: Supple, No JVD  CHEST/LUNG: Clear to auscultation bilaterally; No wheeze  HEART: Regular rate and rhythm; No murmurs, rubs, or gallops  ABDOMEN: Soft, Nontender, Nondistended; Bowel sounds present  EXTREMITIES:  2+ Peripheral Pulses, No clubbing, cyanosis, or edema  PSYCH: AAOx3, appropriate affect  NEUROLOGY: non-focal, holland  SKIN: No rashes or lesions Vital Signs Last 24 Hrs  T(C): 37 (22 Aug 2023 04:54), Max: 37 (21 Aug 2023 20:33)  T(F): 98.6 (22 Aug 2023 04:54), Max: 98.6 (21 Aug 2023 20:33)  HR: 99 (22 Aug 2023 04:54) (79 - 108)  BP: 124/85 (22 Aug 2023 04:54) (104/75 - 129/91)  RR: 18 (22 Aug 2023 04:54) (15 - 21)  SpO2: 96% (22 Aug 2023 04:54) (95% - 99%)    Parameters below as of 22 Aug 2023 04:54  Patient On (Oxygen Delivery Method): room air    GENERAL: NAD, comfortable appearing, supine in bed  CHEST/LUNG: Clear to auscultation bilaterally; No wheeze  HEART: Regular rate and rhythm; No murmurs, rubs, or gallops  ABDOMEN: Soft, Nontender, Nondistended; Bowel sounds present  EXTREMITIES:  2+ Peripheral Pulses, No clubbing, cyanosis, or edema. R foot in soft splint  PSYCH: AAOx3, appropriate affect, mildly anxious  NEUROLOGY: non-focal, holland  SKIN: No rashes or lesions

## 2023-08-22 NOTE — DISCHARGE NOTE PROVIDER - NSDCFUADDAPPT_GEN_ALL_CORE_FT
Podiatry Discharge Instructions:  Follow up: Please follow up with Dr. Waterhouse within 1 week of discharge from the hospital, please call [phone] for appointment and discuss that you recently were seen in the hospital.  Wound Care: Please leave your dressing clean dry intact until your follow up appointment   Weight bearing: Non-weight bearing on right lower extremity   Antibiotics: Please continue as instructed.

## 2023-08-22 NOTE — CONSULT NOTE ADULT - ASSESSMENT
43F presents with right foot cuboid fracture   - patient seen and evaluated  - afebrile   - no open lesions or signs of infection b/l, ecchymosis present on the dorsal lateral aspect of right foot.   - right foot xray: acute nondisplaced cuboid fracture   - applied pruett compression and posterior splint  - patient to remain non-weightbearing to right lower extremity   - dressings to remain clean, dry, and intact   - placed PT consult, nonweightbearing status, and elevation orders   - recommend over the counter pain medication as needed for pain  - follow up information provided in discharge provider note   - discussed with attending

## 2023-08-23 DIAGNOSIS — H72.90 UNSPECIFIED PERFORATION OF TYMPANIC MEMBRANE, UNSPECIFIED EAR: ICD-10-CM

## 2023-08-23 DIAGNOSIS — Z86.69 PERSONAL HISTORY OF OTHER DISEASES OF THE NERVOUS SYSTEM AND SENSE ORGANS: ICD-10-CM

## 2023-08-23 LAB
ALBUMIN SERPL ELPH-MCNC: 4.2 G/DL — SIGNIFICANT CHANGE UP (ref 3.3–5)
ALP SERPL-CCNC: 173 U/L — HIGH (ref 40–120)
ALT FLD-CCNC: 61 U/L — HIGH (ref 10–45)
ANION GAP SERPL CALC-SCNC: 15 MMOL/L — SIGNIFICANT CHANGE UP (ref 5–17)
AST SERPL-CCNC: 173 U/L — HIGH (ref 10–40)
BILIRUB SERPL-MCNC: 1.2 MG/DL — SIGNIFICANT CHANGE UP (ref 0.2–1.2)
BUN SERPL-MCNC: <4 MG/DL — LOW (ref 7–23)
CALCIUM SERPL-MCNC: 9.3 MG/DL — SIGNIFICANT CHANGE UP (ref 8.4–10.5)
CHLORIDE SERPL-SCNC: 102 MMOL/L — SIGNIFICANT CHANGE UP (ref 96–108)
CO2 SERPL-SCNC: 20 MMOL/L — LOW (ref 22–31)
CREAT SERPL-MCNC: 0.44 MG/DL — LOW (ref 0.5–1.3)
EGFR: 122 ML/MIN/1.73M2 — SIGNIFICANT CHANGE UP
GLUCOSE SERPL-MCNC: 99 MG/DL — SIGNIFICANT CHANGE UP (ref 70–99)
HCT VFR BLD CALC: 32.1 % — LOW (ref 34.5–45)
HGB BLD-MCNC: 10.3 G/DL — LOW (ref 11.5–15.5)
MAGNESIUM SERPL-MCNC: 1.8 MG/DL — SIGNIFICANT CHANGE UP (ref 1.6–2.6)
MCHC RBC-ENTMCNC: 25.4 PG — LOW (ref 27–34)
MCHC RBC-ENTMCNC: 32.1 GM/DL — SIGNIFICANT CHANGE UP (ref 32–36)
MCV RBC AUTO: 79.3 FL — LOW (ref 80–100)
NRBC # BLD: 0 /100 WBCS — SIGNIFICANT CHANGE UP (ref 0–0)
PHOSPHATE SERPL-MCNC: 3.2 MG/DL — SIGNIFICANT CHANGE UP (ref 2.5–4.5)
PLATELET # BLD AUTO: 54 K/UL — LOW (ref 150–400)
POTASSIUM SERPL-MCNC: 3.5 MMOL/L — SIGNIFICANT CHANGE UP (ref 3.5–5.3)
POTASSIUM SERPL-SCNC: 3.5 MMOL/L — SIGNIFICANT CHANGE UP (ref 3.5–5.3)
PROT SERPL-MCNC: 7.3 G/DL — SIGNIFICANT CHANGE UP (ref 6–8.3)
RBC # BLD: 4.05 M/UL — SIGNIFICANT CHANGE UP (ref 3.8–5.2)
RBC # FLD: 19.7 % — HIGH (ref 10.3–14.5)
SODIUM SERPL-SCNC: 137 MMOL/L — SIGNIFICANT CHANGE UP (ref 135–145)
WBC # BLD: 4 K/UL — SIGNIFICANT CHANGE UP (ref 3.8–10.5)
WBC # FLD AUTO: 4 K/UL — SIGNIFICANT CHANGE UP (ref 3.8–10.5)

## 2023-08-23 PROCEDURE — 99222 1ST HOSP IP/OBS MODERATE 55: CPT

## 2023-08-23 PROCEDURE — 99233 SBSQ HOSP IP/OBS HIGH 50: CPT

## 2023-08-23 RX ORDER — PANTOPRAZOLE SODIUM 20 MG/1
40 TABLET, DELAYED RELEASE ORAL
Refills: 0 | Status: DISCONTINUED | OUTPATIENT
Start: 2023-08-23 | End: 2023-08-28

## 2023-08-23 RX ORDER — NICOTINE POLACRILEX 2 MG
2 GUM BUCCAL
Refills: 0 | Status: DISCONTINUED | OUTPATIENT
Start: 2023-08-23 | End: 2023-08-27

## 2023-08-23 RX ORDER — CIPROFLOXACIN HCL 0.3 %
4 DROPS OPHTHALMIC (EYE) EVERY 12 HOURS
Refills: 0 | Status: DISCONTINUED | OUTPATIENT
Start: 2023-08-23 | End: 2023-08-28

## 2023-08-23 RX ORDER — SODIUM CHLORIDE 9 MG/ML
1000 INJECTION INTRAMUSCULAR; INTRAVENOUS; SUBCUTANEOUS
Refills: 0 | Status: DISCONTINUED | OUTPATIENT
Start: 2023-08-23 | End: 2023-08-25

## 2023-08-23 RX ADMIN — Medication 1 PATCH: at 18:18

## 2023-08-23 RX ADMIN — Medication 1 PATCH: at 11:33

## 2023-08-23 RX ADMIN — Medication 5 MILLIGRAM(S): at 06:15

## 2023-08-23 RX ADMIN — Medication 5 MILLIGRAM(S): at 14:29

## 2023-08-23 RX ADMIN — Medication 1 PATCH: at 14:29

## 2023-08-23 RX ADMIN — Medication 1 PATCH: at 19:01

## 2023-08-23 RX ADMIN — Medication 1 TABLET(S): at 14:30

## 2023-08-23 RX ADMIN — Medication 10 MILLIGRAM(S): at 10:42

## 2023-08-23 RX ADMIN — Medication 1 DROP(S): at 19:01

## 2023-08-23 RX ADMIN — Medication 105 MILLIGRAM(S): at 05:48

## 2023-08-23 RX ADMIN — SODIUM CHLORIDE 75 MILLILITER(S): 9 INJECTION INTRAMUSCULAR; INTRAVENOUS; SUBCUTANEOUS at 16:09

## 2023-08-23 RX ADMIN — Medication 105 MILLIGRAM(S): at 21:40

## 2023-08-23 RX ADMIN — Medication 1 MILLIGRAM(S): at 14:30

## 2023-08-23 RX ADMIN — Medication 105 MILLIGRAM(S): at 14:30

## 2023-08-23 RX ADMIN — Medication 5 MILLIGRAM(S): at 20:18

## 2023-08-23 RX ADMIN — Medication 4 DROP(S): at 20:37

## 2023-08-23 RX ADMIN — Medication 3 MILLIGRAM(S): at 00:37

## 2023-08-23 NOTE — CONSULT NOTE ADULT - ASSESSMENT
42yo female with PMHx etoh use disorder, current smoker admitted for etoh withdrawal with R cuboid fracture. ENT called to evaluate for left ear discharge. 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 She gets recurrent ear infections associated with tonsillitis during which she gets drainage from left ear. She states this episode of clear/yellow ear drainage began 1 month ago, that was initially associated with ear pain, which has since resolved. She also c/o hearing loss during these episodes. Pt denies fever, chills, n/v, tinnitus, vertigo, HA, SOB, dysphagia, odynophagia, hemoptysis, hoarseness, unintentional weight loss. Afebrile, WBC normal.

## 2023-08-23 NOTE — CONSULT NOTE ADULT - PROBLEM SELECTOR RECOMMENDATION 2
- Educated patient on keeping ears as dry as possible, Avoid sticking anything into the ear canal (i.e. q-tips, hairpins to clean ears), Avoid swimming in polluted wells, To keep ears dry patient can use hair dryer to blow warm air back and forth over the ear canal.

## 2023-08-23 NOTE — PHYSICAL THERAPY INITIAL EVALUATION ADULT - GAIT DEVIATIONS NOTED, PT EVAL
Neurosurgery clinic post-op follow-up  Date of visit: 07/25/19       Procedure:    07/24/2018 Dima Suyapa  DIAGNOSES:     1.  L4 to L5 spondylolisthesis with radiculopathy.   2.  Flat back syndrome with pelvic incidence to lumbar lordosis mismatch.       PROCEDURE PERFORMED:   1.  Stealth image-guided L4 to L5 posterior instrumented fusion, modifier 22 for twice normal procedural length due to difficult anatomy with triangulated L5 vertebral body and severely hypertrophic facets.   2.  Insertion of bilateral intervertebral fusion device, L4 to L5, with local harvest autograft.   3.  Bilateral Rodriguez-Cespedes osteotomies, L4 to L5, for kyphosis deformity correction with decompressive bilateral foraminotomies and laminectomies  4.  Bilateral L4 to L5 posterior lateral fusion with local harvest autograft and allograft.       INDICATIONS FOR SURGERY:  Kate Hartman is a very pleasant 60-year-old woman who presented with low back pain and right leg pain and was found to have a grade I, L4 to L5 spondylolisthesis with severe lateral recess and foraminal stenosis, refractory to conservative management.  She had a pelvic incidence of 63 degrees and a lumbar lordosis of 45 degrees for a mismatch of 18 degrees.  She also had a synovial cyst on the right at L4 to L5 causing lateral recess compression.  The patient had a non-symptomatic cervical stenosis, for which we planned to use neural monitoring to ensure no worsening with prone positioning.  After a full discussion of risks versus benefits, she elected to proceed with surgery for L4 to L5 posterior instrumented fusion with interbody fusion and Rodriguez-Cespedes osteotomies for correction of kyphosis at the L4 to L5 level.        HPI: doing well. Denies significant back or leg pain  Review of Systems:  Answers for HPI/ROS submitted by the patient on 7/25/2019   General Symptoms: No  Skin Symptoms: No  HENT Symptoms: No  EYE SYMPTOMS: No  HEART SYMPTOMS: No  LUNG SYMPTOMS:  No  INTESTINAL SYMPTOMS: No  URINARY SYMPTOMS: No  GYNECOLOGIC SYMPTOMS: No  BREAST SYMPTOMS: No  SKELETAL SYMPTOMS: No  BLOOD SYMPTOMS: No  NERVOUS SYSTEM SYMPTOMS: No  MENTAL HEALTH SYMPTOMS: No    Past Medical History:   Diagnosis Date     Arthritis      Hypertension      PONV (postoperative nausea and vomiting)      Past Surgical History:   Procedure Laterality Date     ANKLE SURGERY       C BREAST AUGMENTATION       OPTICAL TRACKING SYSTEM FUSION POSTERIOR SPINE LUMBAR N/A 2018    Procedure: OPTICAL TRACKING SYSTEM FUSION SPINE POSTERIOR LUMBAR ONE LEVEL;  O-Arm/Stealth Assisted Lumbar 4-5 Decompression And Bilateral Transforaminal Interbody Fusion With Rodriguez-Cespedes Osteotomies, Autograft,  Allograft, Neuromonitoring Due To Cervical Spine Stenosis;  Surgeon: Fartun Ch MD;  Location: [facility] OR     ROTATOR CUFF REPAIR RT/LT       Social History     Socioeconomic History     Marital status:      Spouse name: Not on file     Number of children: Not on file     Years of education: Not on file     Highest education level: Not on file   Occupational History     Not on file   Social Needs     Financial resource strain: Not on file     Food insecurity:     Worry: Not on file     Inability: Not on file     Transportation needs:     Medical: Not on file     Non-medical: Not on file   Tobacco Use     Smoking status: Former Smoker     Packs/day: 1.00     Years: 20.00     Pack years: 20.00     Last attempt to quit: 2018     Years since quittin.2     Smokeless tobacco: Never Used   Substance and Sexual Activity     Alcohol use: Yes     Alcohol/week: 5.4 oz     Types: 9 Glasses of wine per week     Drug use: No     Sexual activity: Never   Lifestyle     Physical activity:     Days per week: Not on file     Minutes per session: Not on file     Stress: Not on file   Relationships     Social connections:     Talks on phone: Not on file     Gets together: Not on file     Attends Scientologist  "service: Not on file     Active member of club or organization: Not on file     Attends meetings of clubs or organizations: Not on file     Relationship status: Not on file     Intimate partner violence:     Fear of current or ex partner: Not on file     Emotionally abused: Not on file     Physically abused: Not on file     Forced sexual activity: Not on file   Other Topics Concern     Not on file   Social History Narrative     Not on file     No family history on file.       IMAGING:  xrays 07/25/19 show no instrumentation loosening and stable alignment     EXAM:  /67 (BP Location: Left arm, Patient Position: Sitting, Cuff Size: Adult Regular)   Pulse 68   Temp 97.8  F (36.6  C) (Oral)   Resp 16   Ht 1.554 m (5' 1.18\")   Wt 68.7 kg (151 lb 6.4 oz)   SpO2 97%   BMI 28.44 kg/m      5/5 BLE, incision well-healed     Assessment/Plan:     Kate Hartman is a 61 year old female doing well at 12 months postop from L4-L5 TLIF    PLAN:  Lifting restriction 50 pounds lifelong  followup prn      Fartun Ch MD    [facility] Department of Neurosurgery  Office: [phone]    7/25/2019        " decreased dionna/decreased weight-shifting ability

## 2023-08-23 NOTE — DIETITIAN INITIAL EVALUATION ADULT - OTHER CALCULATIONS
Fluid needs deferred to team.  Estimated needs using upper IBW with consideration for BMI <19, Malnutrition factor, ETOH abuse.

## 2023-08-23 NOTE — DIETITIAN INITIAL EVALUATION ADULT - REASON FOR ADMISSION
Other specified disorder of metabolism    Per chart: 43F with hx of multiple admissions for etoh use disorder although never had seizures or ICU admission sent to ED by patient's sister for etoh withdrawal.

## 2023-08-23 NOTE — DIETITIAN INITIAL EVALUATION ADULT - NSFNSGIIOFT_GEN_A_CORE
Pt endorses ongoing N/V related to ETOH withdrawal; endorses vomiting after breakfast this AM. No Bm documented x admission. No current bowel regimen in place.

## 2023-08-23 NOTE — PHYSICAL THERAPY INITIAL EVALUATION ADULT - ACTIVE RANGE OF MOTION EXAMINATION, REHAB EVAL
R foot n/t in splint/bilateral upper extremity Active ROM was WFL (within functional limits)/Left LE Active ROM was WFL (within functional limits)

## 2023-08-23 NOTE — DIETITIAN INITIAL EVALUATION ADULT - ADD RECOMMEND
1) Continue current diet free of therapeutic restrictions as tolerated.   	- Defer texture/consistency to SLP/team.   2) Recommend providing Ensure Plus 3x/day to optimize protein-energy intake.   3) Continue Multivitamin, thiamine, folic acid daily pending no medical contraindications to address hx of ETOH use.  4) Continue to monitor PO intake, weight, labs, skin, GI status, and diet.  5) Honor food preferences as able.   6) RD remains available for diet education PRN.  7) Malnutrition sticker placed in chart.

## 2023-08-23 NOTE — DIETITIAN INITIAL EVALUATION ADULT - ORAL NUTRITION SUPPLEMENTS
Pt amenable to receiving Ensure Plus oral nutrition supplements 3x/day to optimize protein-energy intake.

## 2023-08-23 NOTE — PHYSICAL THERAPY INITIAL EVALUATION ADULT - ADDITIONAL COMMENTS
JOSE RAMON HAYES  85y Male    Hospital Course: 84 yo Male with PMHx HTN, prostate cancer treated with seed implant, CAD s/p CABG, GERD, diverticulitis, bronchitis, and dementia who was BIBA for AMS after fall vs. syncope. Pt lives alone, has multiples falls in the past year, last seen functioning at baseline on Friday evening by son. On day of admission, pt was found by his son on the floor next to bed, unknown duration of time and any precipitating events. In the ER, he was found dehydrated, CKMB 3666, Troponin 0.02, elevated LFTs, BUN 31, WBC 15.5, afebrile, VSS. CT Head and CT spine negative for infarct/hemorrhage/fracture; CXR negative, Hip XRay negative. He received 3 Liters NS and became somewhat more alert then ripped off his IV line, became agitated, requiring Ativan 1mg and soft restraints to all 4 limbs. He is admitted for observation and possible safe discharge. ECHO shows severe LVH, LVEF 55%, no valvular disease. Last night, pt had 3 episodes of coffee ground emesis. GI was consulted and recommended PPI. Pt has not had any more coffee ground emesis since last night. Psych was also consulted for agitation, recommended Haldol PRN. Per note, pt's daughter reports that pt has been taking Nyquil nightly for sleep. Medicine is consulted for transfer for further management    SUBJECTIVE: Pt seen and evaluated at bedside. Pt says he feels thirsty, otherwise denies headaches, dizziness, CP, SOB, palpitations, cough, abdominal pain, n/v/d/c      Home Medications:  Atorvastatin 40mg qd  Citalopram 40mg qd  Donepezil 10mg qd  Latanoprost ophthalmic drops     PAST MEDICAL & SURGICAL HISTORY:  Falls frequently  Hypertension  Prostate cancer  Depression  Osteopenia  Bronchitis  Diverticulitis  CAD (coronary artery disease)  Dementia  S/P CABG (coronary artery bypass graft)      Family History:  Father - prostate cancer    Social History:   Former smoker - quit 50 years ago  Occasional wine  No illicit drug use  Currently lives alone      T(C): 37.1 (11-28-18 @ 13:40), Max: 37.1 (11-28-18 @ 06:04)  HR: 84 (11-28-18 @ 09:00) (84 - 105)  BP: 121/64 (11-28-18 @ 09:00) (119/66 - 200/89)  RR: 18 (11-28-18 @ 09:00) (16 - 18)  SpO2: 97% (11-28-18 @ 06:54) (94% - 97%)  Wt(kg): --Vital Signs Last 24 Hrs  T(C): 37.1 (28 Nov 2018 13:40), Max: 37.1 (28 Nov 2018 06:04)  T(F): 98.8 (28 Nov 2018 13:40), Max: 98.8 (28 Nov 2018 13:40)  HR: 84 (28 Nov 2018 09:00) (84 - 105)  BP: 121/64 (28 Nov 2018 09:00) (119/66 - 200/89)  BP(mean): --  RR: 18 (28 Nov 2018 09:00) (16 - 18)  SpO2: 97% (28 Nov 2018 06:54) (94% - 97%)    PHYSICAL EXAM:  GENERAL: NAD, well-groomed, well-developed  HEAD:  Atraumatic, Normocephalic  EYES: EOMI, PERRLA, conjunctiva and sclera clear  ENMT: No tonsillar erythema, exudates, or enlargement; Dry mucous membrane  NECK: Supple, No JVD  CV: Regular rate and rhythm; No murmurs, rubs, or gallops  NERVOUS SYSTEM:  Alert & Oriented to person and place, almost states the correct date (states it was 11/29/2018), moving all extremities  PULM: Clear to percussion bilaterally; No rales, rhonchi, wheezing, or rubs  ABDOMEN: Soft, Nontender, Nondistended; Bowel sounds present  EXTREMITIES:  2+ Peripheral Pulses, No clubbing, cyanosis, or edema  SKIN: No rashes or lesions    Consultant(s) Notes Reviewed:  [x ] YES  [ ] NO  Care Discussed with Consultants/Other Providers [ x] YES  [ ] NO    LABS:                        13.3   11.1  )-----------( 212      ( 28 Nov 2018 06:35 )             41.8     11-28    141  |  103  |  29<H>  ----------------------------<  127<H>  3.5   |  25  |  0.67    Ca    8.5      28 Nov 2018 06:36  Mg     1.9     11-28    TPro  6.0  /  Alb  3.1<L>  /  TBili  0.7  /  DBili  <0.2  /  AST  71<H>  /  ALT  44  /  AlkPhos  61  11-28      Urinalysis Basic - ( 26 Nov 2018 16:50 )    Color: Yellow / Appearance: Clear / SG: >=1.030 / pH: x  Gluc: x / Ketone: 40 mg/dL  / Bili: Small / Urobili: 0.2 E.U./dL   Blood: x / Protein: 100 mg/dL / Nitrite: NEGATIVE   Leuk Esterase: NEGATIVE / RBC: 5-10 /HPF / WBC 5-10 /HPF   Sq Epi: x / Non Sq Epi: 5-10 /HPF / Bacteria: Present /HPF      CAPILLARY BLOOD GLUCOSE            Urinalysis Basic - ( 26 Nov 2018 16:50 )    Color: Yellow / Appearance: Clear / SG: >=1.030 / pH: x  Gluc: x / Ketone: 40 mg/dL  / Bili: Small / Urobili: 0.2 E.U./dL   Blood: x / Protein: 100 mg/dL / Nitrite: NEGATIVE   Leuk Esterase: NEGATIVE / RBC: 5-10 /HPF / WBC 5-10 /HPF   Sq Epi: x / Non Sq Epi: 5-10 /HPF / Bacteria: Present /HPF        RADIOLOGY & ADDITIONAL TESTS:    Imaging Personally Reviewed:  [ ] YES  [ ] NO Prior to admission pt reports being independent of all ADL's & functional mobility without AD. Pt resides in apt, 1 flight of stairs to enter, no elevator. Not working. Pt states she will detox in hospital and get d/c and go straight to inpatient rehab (for alcohol), following that pt will resided with sister.

## 2023-08-23 NOTE — PROGRESS NOTE ADULT - PROBLEM SELECTOR PLAN 1
- CIWA 5  - S/p valium 5 mg IV x 2, 10 mg IV x 2, librium 50 mg x 1  - c/w high dose thiamine, multivitamin, folic acid  - c/w Symptom triggered valium 5 mg IV q4 for CIWA < 8 and increase of 2 points and valium 10 mg IV q4 for CIWA > 8

## 2023-08-23 NOTE — DIETITIAN INITIAL EVALUATION ADULT - PERTINENT MEDS FT
MEDICATIONS  (STANDING):  dextrose 5% + sodium chloride 0.9%. 2000 milliLiter(s) (500 mL/Hr) IV Continuous <Continuous>  folic acid 1 milliGRAM(s) Oral daily  multivitamin 1 Tablet(s) Oral daily  nicotine -  14 mG/24Hr(s) Patch 1 Patch Transdermal daily  thiamine IVPB 500 milliGRAM(s) IV Intermittent three times a day    MEDICATIONS  (PRN):  diazepam  Injectable 5 milliGRAM(s) IV Push every 4 hours PRN CIWA score < 8 - Symptom-triggered 2 point increase in CIWA-Ar  diazepam  Injectable 10 milliGRAM(s) IV Push every 4 hours PRN Symptom-triggered when CIWA-Ar score 8 or Greater  melatonin 3 milliGRAM(s) Oral at bedtime PRN Insomnia

## 2023-08-23 NOTE — CONSULT NOTE ADULT - PROBLEM SELECTOR RECOMMENDATION 9
- f/u culture  - Follow up outpatient with Dr. Miner/Saige/John/Jordan [phone]   - call with questions or concerns x [phone] - ciprodex ear drops 4 drops to left ear bid  - CT IAC with contrast  - f/u culture  - Follow up outpatient with Dr. Miner/Saige/John/Jordan [phone]   - call with questions or concerns x [phone] - ciprodex ear drops 4 drops to left ear bid  - CT IAC with contrast  - f/u culture  - Follow up outpatient with Otology Dr. Asim Ren or Dr. Lisandro Monaco [address] [place] [address] Rd  [phone]  - call with questions or concerns x [phone]

## 2023-08-23 NOTE — DIETITIAN INITIAL EVALUATION ADULT - REASON
Nutrition Focused Physical Assessment deferred at this time; Pt distressed at time of visit. RD to reassess as able.

## 2023-08-23 NOTE — PHYSICAL THERAPY INITIAL EVALUATION ADULT - PATIENT/FAMILY AGREES WITH PLAN
pt stating she will be going straight to inpatient rehab (alcohol); if not, pt would benefit from home PT and Rolling walker/yes

## 2023-08-23 NOTE — PHYSICAL THERAPY INITIAL EVALUATION ADULT - PLANNED THERAPY INTERVENTIONS, PT EVAL
GOAL: Pt will be able to Negotiate up/down 10 steps, independently, w/ unilateral rail/and appropriate assistive device, w/reciprocal/step-to gait pattern, in 2 weeks./bed mobility training/strengthening

## 2023-08-23 NOTE — DIETITIAN INITIAL EVALUATION ADULT - PERTINENT LABORATORY DATA
08-23    137  |  102  |  <4<L>  ----------------------------<  99  3.5   |  20<L>  |  0.44<L>    Ca    9.3      23 Aug 2023 06:43  Phos  3.2     08-23  Mg     1.8     08-23    TPro  7.3  /  Alb  4.2  /  TBili  1.2  /  DBili  x   /  AST  173<H>  /  ALT  61<H>  /  AlkPhos  173<H>  08-23

## 2023-08-23 NOTE — DIETITIAN INITIAL EVALUATION ADULT - PERSON TAUGHT/METHOD
Encouraged adequate consumption of meals/supplements to optimize protein-energy intake. Encouraged small/frequent meals, nutrient dense snacks, prioritizing protein foods at meal time. Pt made aware RD remains available PRN./verbal instruction/patient instructed

## 2023-08-23 NOTE — CONSULT NOTE ADULT - SUBJECTIVE AND OBJECTIVE BOX
CC: left ear discharge    HPI: 44yo female with PMHx etoh use disorder, current smoker admitted for etoh withdrawal with R cuboid fracture. ENT called to evaluate for left ear discharge. 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 She gets recurrent ear infections associated with tonsillitis during which she gets drainage from left ear. Pt states she gets prescribed amoxicillin by PMD because ear drops cause a burning sensation in her ear and throat. She states this episode of clear/yellow ear drainage began 1 month ago, that was initially associated with ear pain, which has since resolved. She also c/o hearing loss during these episodes. Pt denies fever, chills, n/v, tinnitus, vertigo, HA, SOB, dysphagia, odynophagia, hemoptysis, hoarseness, unintentional weight loss.       PAST MEDICAL & SURGICAL HISTORY:  History of alcohol use disorder  1 pint of vodka/day      No significant past surgical history        Allergies    No Known Allergies    Intolerances      MEDICATIONS  (STANDING):  dextrose 5% + sodium chloride 0.9%. 2000 milliLiter(s) (500 mL/Hr) IV Continuous <Continuous>  folic acid 1 milliGRAM(s) Oral daily  multivitamin 1 Tablet(s) Oral daily  nicotine -  14 mG/24Hr(s) Patch 1 Patch Transdermal daily  sodium chloride 0.9%. 1000 milliLiter(s) (75 mL/Hr) IV Continuous <Continuous>  thiamine IVPB 500 milliGRAM(s) IV Intermittent three times a day    MEDICATIONS  (PRN):  diazepam  Injectable 10 milliGRAM(s) IV Push every 4 hours PRN Symptom-triggered when CIWA-Ar score 8 or Greater  diazepam  Injectable 5 milliGRAM(s) IV Push every 4 hours PRN CIWA score < 8 - Symptom-triggered 2 point increase in CIWA-Ar  melatonin 3 milliGRAM(s) Oral at bedtime PRN Insomnia      Social History: current smoker    Family history: Pt denies any significant family history     ROS:   ENT: all negative except as noted in HPI   CV: denies palpitations  Pulm: denies SOB, cough, hemoptysis  GI: denies change in apetite, indigestion, n/v  : denies pertinent urinary symptoms, urgency  Neuro: denies numbness/tingling, loss of sensation  Psych: denies anxiety  MS: denies muscle weakness, instability  Heme: denies easy bruising or bleeding  Endo: denies heat/cold intolerance, excessive sweating  Vascular: denies LE edema    Vital Signs Last 24 Hrs  T(C): 36.7 (23 Aug 2023 14:30), Max: 36.9 (23 Aug 2023 00:30)  T(F): 98 (23 Aug 2023 14:30), Max: 98.5 (23 Aug 2023 12:15)  HR: 96 (23 Aug 2023 14:30) (84 - 115)  BP: 113/77 (23 Aug 2023 14:30) (112/72 - 130/87)  BP(mean): --  RR: 18 (23 Aug 2023 14:30) (18 - 20)  SpO2: 98% (23 Aug 2023 14:30) (95% - 98%)    Parameters below as of 23 Aug 2023 14:30  Patient On (Oxygen Delivery Method): room air                              10.3   4.00  )-----------( 54       ( 23 Aug 2023 06:42 )             32.1    08-23    137  |  102  |  <4<L>  ----------------------------<  99  3.5   |  20<L>  |  0.44<L>    Ca    9.3      23 Aug 2023 06:43  Phos  3.2     08-23  Mg     1.8     08-23    TPro  7.3  /  Alb  4.2  /  TBili  1.2  /  DBili  x   /  AST  173<H>  /  ALT  61<H>  /  AlkPhos  173<H>  08-23   PT/INR - ( 21 Aug 2023 19:18 )   PT: 11.9 sec;   INR: 1.08 ratio         PTT - ( 21 Aug 2023 19:18 )  PTT:35.9 sec    PHYSICAL EXAM:  Gen: NAD  Skin: No rashes, bruises, or lesions  Head: Normocephalic, Atraumatic  Face: no edema, erythema, or fluctuance. Parotid glands soft without mass  Eyes: no scleral injection  Ears: Right: ear canal clear, TM intact without effusion or erythema. No evidence of any fluid drainage. No mastoid tenderness, erythema, or ear bulging            Left: ear canal with clear drainage, + TM perforation. No purulence or erythema. No mastoid tenderness, erythema, or ear bulging  Nose: Nares bilaterally patent, no discharge  Mouth: No Stridor / Drooling / Trismus.  Mucosa moist, tongue/uvula midline, oropharynx clear  Neck: Flat, supple, no lymphadenopathy, trachea midline, no masses  Lymphatic: No lymphadenopathy  Resp: breathing easily, no stridor  CV: no peripheral edema/cyanosis  GI: nondistended   Peripheral vascular: no JVD or edema  Neuro: facial nerve intact, no facial droop       CC: left ear discharge    HPI: 42yo female with PMHx etoh use disorder, current smoker admitted for etoh withdrawal with R cuboid fracture. ENT called to evaluate for left ear discharge. 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 She gets recurrent ear infections associated with tonsillitis during which she gets drainage from left ear. Pt states she gets prescribed amoxicillin by PMD because ear drops cause a burning sensation in her ear and throat. She states this episode of clear/yellow ear drainage began 1 month ago, that was initially associated with ear pain, which has since resolved. She also c/o hearing loss during these episodes. Pt denies fever, chills, n/v, tinnitus, vertigo, HA, SOB, dysphagia, odynophagia, hemoptysis, hoarseness, unintentional weight loss.       PAST MEDICAL & SURGICAL HISTORY:  History of alcohol use disorder  1 pint of vodka/day      No significant past surgical history        Allergies    No Known Allergies    Intolerances      MEDICATIONS  (STANDING):  dextrose 5% + sodium chloride 0.9%. 2000 milliLiter(s) (500 mL/Hr) IV Continuous <Continuous>  folic acid 1 milliGRAM(s) Oral daily  multivitamin 1 Tablet(s) Oral daily  nicotine -  14 mG/24Hr(s) Patch 1 Patch Transdermal daily  sodium chloride 0.9%. 1000 milliLiter(s) (75 mL/Hr) IV Continuous <Continuous>  thiamine IVPB 500 milliGRAM(s) IV Intermittent three times a day    MEDICATIONS  (PRN):  diazepam  Injectable 10 milliGRAM(s) IV Push every 4 hours PRN Symptom-triggered when CIWA-Ar score 8 or Greater  diazepam  Injectable 5 milliGRAM(s) IV Push every 4 hours PRN CIWA score < 8 - Symptom-triggered 2 point increase in CIWA-Ar  melatonin 3 milliGRAM(s) Oral at bedtime PRN Insomnia      Social History: current smoker    Family history: Pt denies any significant family history     ROS:   ENT: all negative except as noted in HPI   CV: denies palpitations  Pulm: denies SOB, cough, hemoptysis  GI: denies change in apetite, indigestion, n/v  : denies pertinent urinary symptoms, urgency  Neuro: denies numbness/tingling, loss of sensation  Psych: denies anxiety  MS: denies muscle weakness, instability  Heme: denies easy bruising or bleeding  Endo: denies heat/cold intolerance, excessive sweating  Vascular: denies LE edema    Vital Signs Last 24 Hrs  T(C): 36.7 (23 Aug 2023 14:30), Max: 36.9 (23 Aug 2023 00:30)  T(F): 98 (23 Aug 2023 14:30), Max: 98.5 (23 Aug 2023 12:15)  HR: 96 (23 Aug 2023 14:30) (84 - 115)  BP: 113/77 (23 Aug 2023 14:30) (112/72 - 130/87)  BP(mean): --  RR: 18 (23 Aug 2023 14:30) (18 - 20)  SpO2: 98% (23 Aug 2023 14:30) (95% - 98%)    Parameters below as of 23 Aug 2023 14:30  Patient On (Oxygen Delivery Method): room air                              10.3   4.00  )-----------( 54       ( 23 Aug 2023 06:42 )             32.1    08-23    137  |  102  |  <4<L>  ----------------------------<  99  3.5   |  20<L>  |  0.44<L>    Ca    9.3      23 Aug 2023 06:43  Phos  3.2     08-23  Mg     1.8     08-23    TPro  7.3  /  Alb  4.2  /  TBili  1.2  /  DBili  x   /  AST  173<H>  /  ALT  61<H>  /  AlkPhos  173<H>  08-23   PT/INR - ( 21 Aug 2023 19:18 )   PT: 11.9 sec;   INR: 1.08 ratio         PTT - ( 21 Aug 2023 19:18 )  PTT:35.9 sec    PHYSICAL EXAM:  Gen: NAD  Skin: No rashes, bruises, or lesions  Head: Normocephalic, Atraumatic  Face: no edema, erythema, or fluctuance. Parotid glands soft without mass  Eyes: no scleral injection  Ears: Right:  small amount of cerumen suctioned from right EAC, ear canal clear, TM intact without effusion or erythema. No evidence of any fluid drainage. No mastoid tenderness, erythema, or ear bulging            Left: ear canal with minimal amount of clear drainage, culture sent, + TM perforation. No purulence or erythema. No mastoid tenderness, erythema, or ear bulging  Nose: Nares bilaterally patent, no discharge  Mouth: No Stridor / Drooling / Trismus.  Mucosa moist, tongue/uvula midline, oropharynx clear  Neck: Flat, supple, no lymphadenopathy, trachea midline, no masses  Lymphatic: No lymphadenopathy  Resp: breathing easily, no stridor  CV: no peripheral edema/cyanosis  GI: nondistended   Peripheral vascular: no JVD or edema  Neuro: facial nerve intact, no facial droop

## 2023-08-23 NOTE — DIETITIAN INITIAL EVALUATION ADULT - REASON INDICATOR FOR ASSESSMENT
Nutrition Assessment Warranted for BMI <19.   Source: Pt, Electronic Medical Record.  Chart reviewed, events noted.

## 2023-08-23 NOTE — PHYSICAL THERAPY INITIAL EVALUATION ADULT - STRENGTHENING, PT EVAL
GOAL: Pt will improve RLE strength to 4+/5, for increased limb stability, to improve gait and facilitate stair negotiation in 2 weeks.

## 2023-08-23 NOTE — DIETITIAN INITIAL EVALUATION ADULT - ENERGY INTAKE
Poor (<50%) In house, pt reports poor appetite and PO intake in setting of dislike of institutional foods, lack of appetite, ETOH withdrawal causing N/V with eating. Pt reports consuming some eggs and juice this AM for breakfast and vomiting after. No PO intake information available per flowsheets at this time. Discussed food preferences; Pt reports her sister is bringing a nutrient dense smoothie (banana, peanut butter, almond milk) to the hospital for her today to trial.

## 2023-08-23 NOTE — DIETITIAN INITIAL EVALUATION ADULT - ORAL INTAKE PTA/DIET HISTORY
Pt reports having a low appetite and PO intake at baseline PTA secondary to ETOH abuse; reports when she was consuming very little food (only ~1 slice of pizza or a few chicken nuggets) daily with alcohol consumption that would curb her appetite. Reports consuming little-no food x ~5 days PTA. Denies any therapeutic restrictions; reports she does not eat pork. Denies any known food allergies or intolerances. Denies any micronutrient supplementation at home. Denies any difficulty chewing/swallowing at this time.

## 2023-08-23 NOTE — PHYSICAL THERAPY INITIAL EVALUATION ADULT - NSPTDISCHREC_GEN_A_CORE
Pt states she will detox in hospital and get d/c and go straight to inpatient rehab (for alcohol addiction), following that pt will resided with sister. If not to rehab, Home with physical therapy at home for safety assesssment within home environment and increase strength, balance and endurance to improve all functional mobility.

## 2023-08-23 NOTE — PHYSICAL THERAPY INITIAL EVALUATION ADULT - PERTINENT HX OF CURRENT PROBLEM, REHAB EVAL
43F with hx of multiple admissions for etoh use disorder although never had seizures or ICU admission sent to ED by patient's sister for etoh withdrawal. Just prior to admission, pt tripped over uneven sidewalk and fell face forward, lightly striking her head. Her R foot was also bruised but patient does not know if the bruising was present prior to this fall. She describes feeling anxious, nauseated and not eating for 5 days, dizziness. She is interested in etoh rehab on discharge. Pt's last drink was yesterday, she drinks about 1L of vodka per day. She currently feels lightheaded, tremulous, anxious, diaphoretic, but nausea has improved.     US R upper abdomen: Fatty infiltration of the liver without sonographic evidence of cirrhosis. Gallbladder sludge. CT head: No acute intracranial findings. CXR: Clear lungs. Xray R foot 8/21: Cortical irregularity along the inferolateral aspect of the cuboid which may represent an acute nondisplaced fracture. JAYDEN FINN.

## 2023-08-23 NOTE — DIETITIAN INITIAL EVALUATION ADULT - OTHER INFO
- UBW: ~103 pounds x 1 month ago per pt  - Dosing wt: 95 pounds (8/21)  - Wt hx per [facility] HIE in pounds: 89 (1/26), 99 (8/08/22), 100 (7/30/22).   	- Pt reports possible ~8 lb (7.7%) unintentional weight loss x ~1 month secondary to decreased PO intake with ETOH abuse.   - RD to continue to monitor weight trends as able.   - Nutritionally Pertinent Meds in-house: IVF, thiamine, folic acid, Multivitamin.   - Nutritionally Pertinent Labs: high POCT.

## 2023-08-23 NOTE — CONSULT NOTE ADULT - NS ATTEND AMEND GEN_ALL_CORE FT
ENT consulted for left otorrhea.     Patient with PMHx etoh use disorder, current smoker admitted for etoh withdrawal with R cuboid fracture. 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 She gets recurrent ear infections associated with tonsillitis during which she gets drainage from left ear. She states this episode of clear/yellow ear drainage began 1 month ago, that was initially associated with ear pain, which has since resolved. She also c/o hearing loss during these episodes. Pt denies fever, chills, n/v, tinnitus, vertigo, HA, SOB, dysphagia, odynophagia, hemoptysis, hoarseness, unintentional weight loss. Afebrile, WBC normal.     Left Acute on Chronic Otitis Media with effusion and Left TM perforation  - ciprodex ear drops 4 drops to left ear bid  - CT IAC with contrast  - f/u culture  - Follow up outpatient with Otology Dr. Asim Ren or Dr. Lisandro Monaco [address] [place] [address] Rd  [phone]  - call with questions or concerns x [phone].

## 2023-08-24 DIAGNOSIS — H72.90 UNSPECIFIED PERFORATION OF TYMPANIC MEMBRANE, UNSPECIFIED EAR: ICD-10-CM

## 2023-08-24 LAB
ALBUMIN SERPL ELPH-MCNC: 4.1 G/DL — SIGNIFICANT CHANGE UP (ref 3.3–5)
ALP SERPL-CCNC: 145 U/L — HIGH (ref 40–120)
ALT FLD-CCNC: 65 U/L — HIGH (ref 10–45)
ANION GAP SERPL CALC-SCNC: 13 MMOL/L — SIGNIFICANT CHANGE UP (ref 5–17)
AST SERPL-CCNC: 183 U/L — HIGH (ref 10–40)
BILIRUB SERPL-MCNC: 0.9 MG/DL — SIGNIFICANT CHANGE UP (ref 0.2–1.2)
BUN SERPL-MCNC: <4 MG/DL — LOW (ref 7–23)
CALCIUM SERPL-MCNC: 9.2 MG/DL — SIGNIFICANT CHANGE UP (ref 8.4–10.5)
CHLORIDE SERPL-SCNC: 108 MMOL/L — SIGNIFICANT CHANGE UP (ref 96–108)
CO2 SERPL-SCNC: 21 MMOL/L — LOW (ref 22–31)
CREAT SERPL-MCNC: 0.44 MG/DL — LOW (ref 0.5–1.3)
EGFR: 122 ML/MIN/1.73M2 — SIGNIFICANT CHANGE UP
GLUCOSE SERPL-MCNC: 99 MG/DL — SIGNIFICANT CHANGE UP (ref 70–99)
GRAM STN FLD: SIGNIFICANT CHANGE UP
HCT VFR BLD CALC: 32.8 % — LOW (ref 34.5–45)
HGB BLD-MCNC: 11 G/DL — LOW (ref 11.5–15.5)
MCHC RBC-ENTMCNC: 27.2 PG — SIGNIFICANT CHANGE UP (ref 27–34)
MCHC RBC-ENTMCNC: 33.5 GM/DL — SIGNIFICANT CHANGE UP (ref 32–36)
MCV RBC AUTO: 81.2 FL — SIGNIFICANT CHANGE UP (ref 80–100)
NRBC # BLD: 0 /100 WBCS — SIGNIFICANT CHANGE UP (ref 0–0)
PLATELET # BLD AUTO: 64 K/UL — LOW (ref 150–400)
POTASSIUM SERPL-MCNC: 3.7 MMOL/L — SIGNIFICANT CHANGE UP (ref 3.5–5.3)
POTASSIUM SERPL-SCNC: 3.7 MMOL/L — SIGNIFICANT CHANGE UP (ref 3.5–5.3)
PROT SERPL-MCNC: 6.9 G/DL — SIGNIFICANT CHANGE UP (ref 6–8.3)
RBC # BLD: 4.04 M/UL — SIGNIFICANT CHANGE UP (ref 3.8–5.2)
RBC # FLD: 20.3 % — HIGH (ref 10.3–14.5)
SODIUM SERPL-SCNC: 142 MMOL/L — SIGNIFICANT CHANGE UP (ref 135–145)
SPECIMEN SOURCE: SIGNIFICANT CHANGE UP
WBC # BLD: 5.2 K/UL — SIGNIFICANT CHANGE UP (ref 3.8–10.5)
WBC # FLD AUTO: 5.2 K/UL — SIGNIFICANT CHANGE UP (ref 3.8–10.5)

## 2023-08-24 PROCEDURE — 99232 SBSQ HOSP IP/OBS MODERATE 35: CPT

## 2023-08-24 PROCEDURE — 99239 HOSP IP/OBS DSCHRG MGMT >30: CPT

## 2023-08-24 PROCEDURE — 70481 CT ORBIT/EAR/FOSSA W/DYE: CPT | Mod: 26

## 2023-08-24 RX ORDER — DIAZEPAM 5 MG
5 TABLET ORAL ONCE
Refills: 0 | Status: DISCONTINUED | OUTPATIENT
Start: 2023-08-24 | End: 2023-08-24

## 2023-08-24 RX ADMIN — Medication 1 MILLIGRAM(S): at 12:37

## 2023-08-24 RX ADMIN — Medication 1 DROP(S): at 00:27

## 2023-08-24 RX ADMIN — Medication 105 MILLIGRAM(S): at 21:27

## 2023-08-24 RX ADMIN — Medication 2 MILLIGRAM(S): at 06:49

## 2023-08-24 RX ADMIN — Medication 4 DROP(S): at 19:10

## 2023-08-24 RX ADMIN — Medication 1 DROP(S): at 06:48

## 2023-08-24 RX ADMIN — Medication 1 DROP(S): at 12:38

## 2023-08-24 RX ADMIN — Medication 2 MILLIGRAM(S): at 08:38

## 2023-08-24 RX ADMIN — Medication 1 TABLET(S): at 12:37

## 2023-08-24 RX ADMIN — Medication 2 MILLIGRAM(S): at 15:07

## 2023-08-24 RX ADMIN — Medication 105 MILLIGRAM(S): at 15:07

## 2023-08-24 RX ADMIN — Medication 1 DROP(S): at 19:11

## 2023-08-24 RX ADMIN — Medication 1 PATCH: at 14:23

## 2023-08-24 RX ADMIN — Medication 1 DROP(S): at 23:29

## 2023-08-24 RX ADMIN — Medication 1 PATCH: at 19:49

## 2023-08-24 RX ADMIN — PANTOPRAZOLE SODIUM 40 MILLIGRAM(S): 20 TABLET, DELAYED RELEASE ORAL at 06:49

## 2023-08-24 RX ADMIN — Medication 10 MILLIGRAM(S): at 23:29

## 2023-08-24 RX ADMIN — Medication 2 MILLIGRAM(S): at 19:11

## 2023-08-24 RX ADMIN — Medication 3 MILLIGRAM(S): at 00:27

## 2023-08-24 RX ADMIN — Medication 5 MILLIGRAM(S): at 00:53

## 2023-08-24 RX ADMIN — Medication 5 MILLIGRAM(S): at 08:37

## 2023-08-24 RX ADMIN — Medication 1 PATCH: at 08:33

## 2023-08-24 RX ADMIN — SODIUM CHLORIDE 75 MILLILITER(S): 9 INJECTION INTRAMUSCULAR; INTRAVENOUS; SUBCUTANEOUS at 06:47

## 2023-08-24 RX ADMIN — Medication 4 DROP(S): at 06:48

## 2023-08-24 RX ADMIN — Medication 5 MILLIGRAM(S): at 15:07

## 2023-08-24 RX ADMIN — Medication 2 MILLIGRAM(S): at 00:51

## 2023-08-24 RX ADMIN — Medication 5 MILLIGRAM(S): at 20:33

## 2023-08-24 RX ADMIN — Medication 5 MILLIGRAM(S): at 12:36

## 2023-08-24 RX ADMIN — SODIUM CHLORIDE 75 MILLILITER(S): 9 INJECTION INTRAMUSCULAR; INTRAVENOUS; SUBCUTANEOUS at 23:48

## 2023-08-24 RX ADMIN — Medication 1 PATCH: at 12:37

## 2023-08-24 RX ADMIN — Medication 105 MILLIGRAM(S): at 06:47

## 2023-08-24 RX ADMIN — Medication 2 MILLIGRAM(S): at 12:37

## 2023-08-24 RX ADMIN — Medication 10 MILLIGRAM(S): at 17:38

## 2023-08-24 NOTE — SBIRT NOTE ADULT - NSSBIRTALCPASSREFTXDET_GEN_A_CORE
Patient to attend [facility] (resource recovery center [facility]) [address] [place] [address], [facility], [place] [phone].

## 2023-08-24 NOTE — PROGRESS NOTE ADULT - PROBLEM SELECTOR PLAN 1
- CIWA 5  - c/w high dose thiamine, multivitamin, folic acid  - c/w Symptom triggered valium 5 mg IV q4 for CIWA < 8 and increase of 2 points and valium 10 mg IV q4 for CIWA > 8

## 2023-08-25 LAB
-  AMIKACIN: SIGNIFICANT CHANGE UP
-  AZTREONAM: SIGNIFICANT CHANGE UP
-  CEFEPIME: SIGNIFICANT CHANGE UP
-  CEFTAZIDIME: SIGNIFICANT CHANGE UP
-  CIPROFLOXACIN: SIGNIFICANT CHANGE UP
-  GENTAMICIN: SIGNIFICANT CHANGE UP
-  IMIPENEM: SIGNIFICANT CHANGE UP
-  LEVOFLOXACIN: SIGNIFICANT CHANGE UP
-  MEROPENEM: SIGNIFICANT CHANGE UP
-  PIPERACILLIN/TAZOBACTAM: SIGNIFICANT CHANGE UP
-  TOBRAMYCIN: SIGNIFICANT CHANGE UP
ALBUMIN SERPL ELPH-MCNC: 3.7 G/DL — SIGNIFICANT CHANGE UP (ref 3.3–5)
ALP SERPL-CCNC: 116 U/L — SIGNIFICANT CHANGE UP (ref 40–120)
ALT FLD-CCNC: 74 U/L — HIGH (ref 10–45)
ANION GAP SERPL CALC-SCNC: 16 MMOL/L — SIGNIFICANT CHANGE UP (ref 5–17)
AST SERPL-CCNC: 187 U/L — HIGH (ref 10–40)
BILIRUB SERPL-MCNC: 0.6 MG/DL — SIGNIFICANT CHANGE UP (ref 0.2–1.2)
BUN SERPL-MCNC: 4 MG/DL — LOW (ref 7–23)
CALCIUM SERPL-MCNC: 9 MG/DL — SIGNIFICANT CHANGE UP (ref 8.4–10.5)
CHLORIDE SERPL-SCNC: 104 MMOL/L — SIGNIFICANT CHANGE UP (ref 96–108)
CO2 SERPL-SCNC: 21 MMOL/L — LOW (ref 22–31)
CREAT SERPL-MCNC: 0.43 MG/DL — LOW (ref 0.5–1.3)
EGFR: 123 ML/MIN/1.73M2 — SIGNIFICANT CHANGE UP
GLUCOSE SERPL-MCNC: 77 MG/DL — SIGNIFICANT CHANGE UP (ref 70–99)
HCT VFR BLD CALC: 31.5 % — LOW (ref 34.5–45)
HGB BLD-MCNC: 10 G/DL — LOW (ref 11.5–15.5)
MCHC RBC-ENTMCNC: 26.1 PG — LOW (ref 27–34)
MCHC RBC-ENTMCNC: 31.7 GM/DL — LOW (ref 32–36)
MCV RBC AUTO: 82.2 FL — SIGNIFICANT CHANGE UP (ref 80–100)
METHOD TYPE: SIGNIFICANT CHANGE UP
NRBC # BLD: 0 /100 WBCS — SIGNIFICANT CHANGE UP (ref 0–0)
PLATELET # BLD AUTO: 72 K/UL — LOW (ref 150–400)
POTASSIUM SERPL-MCNC: 3.3 MMOL/L — LOW (ref 3.5–5.3)
POTASSIUM SERPL-SCNC: 3.3 MMOL/L — LOW (ref 3.5–5.3)
PROT SERPL-MCNC: 6.7 G/DL — SIGNIFICANT CHANGE UP (ref 6–8.3)
RBC # BLD: 3.83 M/UL — SIGNIFICANT CHANGE UP (ref 3.8–5.2)
RBC # FLD: 20.3 % — HIGH (ref 10.3–14.5)
SODIUM SERPL-SCNC: 141 MMOL/L — SIGNIFICANT CHANGE UP (ref 135–145)
WBC # BLD: 4.76 K/UL — SIGNIFICANT CHANGE UP (ref 3.8–10.5)
WBC # FLD AUTO: 4.76 K/UL — SIGNIFICANT CHANGE UP (ref 3.8–10.5)

## 2023-08-25 PROCEDURE — 99232 SBSQ HOSP IP/OBS MODERATE 35: CPT

## 2023-08-25 RX ORDER — FAMOTIDINE 10 MG/ML
20 INJECTION INTRAVENOUS DAILY
Refills: 0 | Status: DISCONTINUED | OUTPATIENT
Start: 2023-08-25 | End: 2023-08-28

## 2023-08-25 RX ORDER — SODIUM CHLORIDE 9 MG/ML
1000 INJECTION, SOLUTION INTRAVENOUS
Refills: 0 | Status: DISCONTINUED | OUTPATIENT
Start: 2023-08-25 | End: 2023-08-26

## 2023-08-25 RX ORDER — POTASSIUM CHLORIDE 20 MEQ
40 PACKET (EA) ORAL ONCE
Refills: 0 | Status: COMPLETED | OUTPATIENT
Start: 2023-08-25 | End: 2023-08-25

## 2023-08-25 RX ADMIN — Medication 2 MILLIGRAM(S): at 13:32

## 2023-08-25 RX ADMIN — SODIUM CHLORIDE 100 MILLILITER(S): 9 INJECTION, SOLUTION INTRAVENOUS at 22:10

## 2023-08-25 RX ADMIN — Medication 1 PATCH: at 19:20

## 2023-08-25 RX ADMIN — Medication 1 DROP(S): at 06:38

## 2023-08-25 RX ADMIN — Medication 4 DROP(S): at 06:39

## 2023-08-25 RX ADMIN — Medication 2 MILLIGRAM(S): at 06:40

## 2023-08-25 RX ADMIN — Medication 1 PATCH: at 12:19

## 2023-08-25 RX ADMIN — Medication 5 MILLIGRAM(S): at 13:31

## 2023-08-25 RX ADMIN — Medication 1 TABLET(S): at 12:16

## 2023-08-25 RX ADMIN — PANTOPRAZOLE SODIUM 40 MILLIGRAM(S): 20 TABLET, DELAYED RELEASE ORAL at 06:38

## 2023-08-25 RX ADMIN — Medication 1 PATCH: at 06:55

## 2023-08-25 RX ADMIN — Medication 1 DROP(S): at 12:17

## 2023-08-25 RX ADMIN — Medication 2 MILLIGRAM(S): at 17:23

## 2023-08-25 RX ADMIN — Medication 4 DROP(S): at 17:24

## 2023-08-25 RX ADMIN — SODIUM CHLORIDE 100 MILLILITER(S): 9 INJECTION, SOLUTION INTRAVENOUS at 17:28

## 2023-08-25 RX ADMIN — Medication 2 MILLIGRAM(S): at 06:23

## 2023-08-25 RX ADMIN — Medication 2 MILLIGRAM(S): at 22:05

## 2023-08-25 RX ADMIN — Medication 105 MILLIGRAM(S): at 06:38

## 2023-08-25 RX ADMIN — Medication 40 MILLIEQUIVALENT(S): at 17:23

## 2023-08-25 RX ADMIN — Medication 1 MILLIGRAM(S): at 12:16

## 2023-08-25 RX ADMIN — Medication 5 MILLIGRAM(S): at 22:07

## 2023-08-25 RX ADMIN — Medication 100 MILLIGRAM(S): at 12:17

## 2023-08-25 RX ADMIN — Medication 10 MILLIGRAM(S): at 09:47

## 2023-08-25 RX ADMIN — Medication 2 MILLIGRAM(S): at 09:47

## 2023-08-25 RX ADMIN — Medication 3 MILLIGRAM(S): at 00:17

## 2023-08-25 RX ADMIN — FAMOTIDINE 20 MILLIGRAM(S): 10 INJECTION INTRAVENOUS at 17:32

## 2023-08-25 RX ADMIN — Medication 1 DROP(S): at 17:24

## 2023-08-25 NOTE — PROGRESS NOTE ADULT - PROBLEM SELECTOR PLAN 1
- ciprodex ear drops 4 drops to left ear bid  - Pt is to follow up at [facility] ENT clinic with Dr. Morris. Call [phone] to make appointment. - ciprodex ear drops 4 drops to left ear bid  - Pt is to follow up at [facility] ENT clinic with Dr. Morris. Call [phone] to make appointment, pt information sent to  for follow up. - Ciprodex ear drops 4 drops to left ear bid for 14 days  - F/U culture  - Follow up outpatient with Otology Dr. Asim Ren or Dr. Lisandro Monaco [address] [place] [address] Rd  [phone]

## 2023-08-25 NOTE — PROGRESS NOTE ADULT - NS ATTEND AMEND GEN_ALL_CORE FT
ENT following for left otorrhea.     Patient with PMHx etoh use disorder, current smoker admitted for etoh withdrawal with R cuboid fracture. 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 She gets recurrent ear infections associated with tonsillitis during which she gets drainage from left ear. She states this episode of clear/yellow ear drainage began 1 month ago, that was initially associated with ear pain, which has since resolved. She also c/o hearing loss during these episodes. Pt denies fever, chills, n/v, tinnitus, vertigo, HA, SOB, dysphagia, odynophagia, hemoptysis, hoarseness, unintentional weight loss. Afebrile, WBC normal.     As per radiology CT IAC w/contrast shows Large left-sided tympanic membrane perforation along with chronic left-sided otomastoiditis. No associated bony destruction or erosive change, however, superimposed cholesteatoma formation cannot be fully excluded.    Left Acute on Chronic Otitis Media with effusion and Left TM perforation  - Ciprodex ear drops 4 drops to left ear bid for 14 days  - f/u culture  - Follow [facility] Ot[facility] Dr. Asim Ren [facility] [address]  [phone]  - call with questions or concerns [phone].

## 2023-08-26 LAB
ALBUMIN SERPL ELPH-MCNC: 4.1 G/DL — SIGNIFICANT CHANGE UP (ref 3.3–5)
ALP SERPL-CCNC: 128 U/L — HIGH (ref 40–120)
ALT FLD-CCNC: 85 U/L — HIGH (ref 10–45)
ANION GAP SERPL CALC-SCNC: 13 MMOL/L — SIGNIFICANT CHANGE UP (ref 5–17)
AST SERPL-CCNC: 167 U/L — HIGH (ref 10–40)
BILIRUB SERPL-MCNC: 0.4 MG/DL — SIGNIFICANT CHANGE UP (ref 0.2–1.2)
BUN SERPL-MCNC: 5 MG/DL — LOW (ref 7–23)
CALCIUM SERPL-MCNC: 9.8 MG/DL — SIGNIFICANT CHANGE UP (ref 8.4–10.5)
CHLORIDE SERPL-SCNC: 103 MMOL/L — SIGNIFICANT CHANGE UP (ref 96–108)
CO2 SERPL-SCNC: 23 MMOL/L — SIGNIFICANT CHANGE UP (ref 22–31)
CREAT SERPL-MCNC: 0.42 MG/DL — LOW (ref 0.5–1.3)
EGFR: 124 ML/MIN/1.73M2 — SIGNIFICANT CHANGE UP
GLUCOSE SERPL-MCNC: 93 MG/DL — SIGNIFICANT CHANGE UP (ref 70–99)
HCT VFR BLD CALC: 30.4 % — LOW (ref 34.5–45)
HGB BLD-MCNC: 9.7 G/DL — LOW (ref 11.5–15.5)
MAGNESIUM SERPL-MCNC: 1.5 MG/DL — LOW (ref 1.6–2.6)
MCHC RBC-ENTMCNC: 26.1 PG — LOW (ref 27–34)
MCHC RBC-ENTMCNC: 31.9 GM/DL — LOW (ref 32–36)
MCV RBC AUTO: 81.7 FL — SIGNIFICANT CHANGE UP (ref 80–100)
NRBC # BLD: 0 /100 WBCS — SIGNIFICANT CHANGE UP (ref 0–0)
PLATELET # BLD AUTO: 122 K/UL — LOW (ref 150–400)
POTASSIUM SERPL-MCNC: 3.8 MMOL/L — SIGNIFICANT CHANGE UP (ref 3.5–5.3)
POTASSIUM SERPL-SCNC: 3.8 MMOL/L — SIGNIFICANT CHANGE UP (ref 3.5–5.3)
PROT SERPL-MCNC: 7 G/DL — SIGNIFICANT CHANGE UP (ref 6–8.3)
RBC # BLD: 3.72 M/UL — LOW (ref 3.8–5.2)
RBC # FLD: 20.3 % — HIGH (ref 10.3–14.5)
SODIUM SERPL-SCNC: 139 MMOL/L — SIGNIFICANT CHANGE UP (ref 135–145)
WBC # BLD: 4.93 K/UL — SIGNIFICANT CHANGE UP (ref 3.8–10.5)
WBC # FLD AUTO: 4.93 K/UL — SIGNIFICANT CHANGE UP (ref 3.8–10.5)

## 2023-08-26 PROCEDURE — 99232 SBSQ HOSP IP/OBS MODERATE 35: CPT

## 2023-08-26 RX ORDER — MAGNESIUM SULFATE 500 MG/ML
1 VIAL (ML) INJECTION ONCE
Refills: 0 | Status: COMPLETED | OUTPATIENT
Start: 2023-08-26 | End: 2023-08-26

## 2023-08-26 RX ADMIN — Medication 2 MILLIGRAM(S): at 12:30

## 2023-08-26 RX ADMIN — Medication 10 MILLIGRAM(S): at 14:23

## 2023-08-26 RX ADMIN — Medication 4 DROP(S): at 17:50

## 2023-08-26 RX ADMIN — Medication 1 DROP(S): at 00:24

## 2023-08-26 RX ADMIN — Medication 1 PATCH: at 13:17

## 2023-08-26 RX ADMIN — Medication 1 PATCH: at 12:28

## 2023-08-26 RX ADMIN — Medication 1 TABLET(S): at 12:29

## 2023-08-26 RX ADMIN — Medication 1 PATCH: at 12:25

## 2023-08-26 RX ADMIN — Medication 100 GRAM(S): at 17:48

## 2023-08-26 RX ADMIN — Medication 1 DROP(S): at 17:49

## 2023-08-26 RX ADMIN — FAMOTIDINE 20 MILLIGRAM(S): 10 INJECTION INTRAVENOUS at 12:28

## 2023-08-26 RX ADMIN — Medication 1 DROP(S): at 06:28

## 2023-08-26 RX ADMIN — Medication 1 MILLIGRAM(S): at 12:29

## 2023-08-26 RX ADMIN — Medication 2 MILLIGRAM(S): at 17:49

## 2023-08-26 RX ADMIN — Medication 1 PATCH: at 19:00

## 2023-08-26 RX ADMIN — Medication 1 DROP(S): at 12:30

## 2023-08-26 RX ADMIN — Medication 3 MILLIGRAM(S): at 21:44

## 2023-08-26 RX ADMIN — Medication 10 MILLIGRAM(S): at 20:22

## 2023-08-26 RX ADMIN — Medication 10 MILLIGRAM(S): at 09:19

## 2023-08-26 RX ADMIN — Medication 100 MILLIGRAM(S): at 12:28

## 2023-08-26 RX ADMIN — PANTOPRAZOLE SODIUM 40 MILLIGRAM(S): 20 TABLET, DELAYED RELEASE ORAL at 06:27

## 2023-08-26 RX ADMIN — Medication 2 MILLIGRAM(S): at 09:19

## 2023-08-26 RX ADMIN — SODIUM CHLORIDE 100 MILLILITER(S): 9 INJECTION, SOLUTION INTRAVENOUS at 09:19

## 2023-08-26 RX ADMIN — Medication 4 DROP(S): at 06:29

## 2023-08-26 RX ADMIN — Medication 2 MILLIGRAM(S): at 14:23

## 2023-08-26 NOTE — CHART NOTE - NSCHARTNOTEFT_GEN_A_CORE
Pt seen at bedside to fit with camboot to used after cuboid fx right foot.  Posterior splint removed and boot sized and adjusted. Splint replaced until pt able to be WBAT.  Pt instructed to don and doff  Follow up as needed after discharge    Sha Reece CO  [phone]

## 2023-08-26 NOTE — PROGRESS NOTE ADULT - PROBLEM SELECTOR PLAN 1
- CIWA 5  - c/w high dose thiamine, multivitamin, folic acid  - c/w Symptom triggered valium 5 mg IV q4 for CIWA < 8 and increase of 2 points and valium 10 mg IV q4 for CIWA > 8  - in the last 24 hours, received 10 mg of valium at 9 am, and 5mg at 10 pm and 1 pm

## 2023-08-27 LAB
ANION GAP SERPL CALC-SCNC: 13 MMOL/L — SIGNIFICANT CHANGE UP (ref 5–17)
BUN SERPL-MCNC: 8 MG/DL — SIGNIFICANT CHANGE UP (ref 7–23)
CALCIUM SERPL-MCNC: 9.7 MG/DL — SIGNIFICANT CHANGE UP (ref 8.4–10.5)
CHLORIDE SERPL-SCNC: 102 MMOL/L — SIGNIFICANT CHANGE UP (ref 96–108)
CO2 SERPL-SCNC: 24 MMOL/L — SIGNIFICANT CHANGE UP (ref 22–31)
CREAT SERPL-MCNC: 0.46 MG/DL — LOW (ref 0.5–1.3)
EGFR: 121 ML/MIN/1.73M2 — SIGNIFICANT CHANGE UP
GLUCOSE SERPL-MCNC: 89 MG/DL — SIGNIFICANT CHANGE UP (ref 70–99)
HCT VFR BLD CALC: 32.2 % — LOW (ref 34.5–45)
HGB BLD-MCNC: 10.2 G/DL — LOW (ref 11.5–15.5)
MAGNESIUM SERPL-MCNC: 1.9 MG/DL — SIGNIFICANT CHANGE UP (ref 1.6–2.6)
MCHC RBC-ENTMCNC: 25.5 PG — LOW (ref 27–34)
MCHC RBC-ENTMCNC: 31.7 GM/DL — LOW (ref 32–36)
MCV RBC AUTO: 80.5 FL — SIGNIFICANT CHANGE UP (ref 80–100)
NRBC # BLD: 0 /100 WBCS — SIGNIFICANT CHANGE UP (ref 0–0)
PHOSPHATE SERPL-MCNC: 3.9 MG/DL — SIGNIFICANT CHANGE UP (ref 2.5–4.5)
PLATELET # BLD AUTO: 174 K/UL — SIGNIFICANT CHANGE UP (ref 150–400)
POTASSIUM SERPL-MCNC: 4 MMOL/L — SIGNIFICANT CHANGE UP (ref 3.5–5.3)
POTASSIUM SERPL-SCNC: 4 MMOL/L — SIGNIFICANT CHANGE UP (ref 3.5–5.3)
RBC # BLD: 4 M/UL — SIGNIFICANT CHANGE UP (ref 3.8–5.2)
RBC # FLD: 20 % — HIGH (ref 10.3–14.5)
SODIUM SERPL-SCNC: 139 MMOL/L — SIGNIFICANT CHANGE UP (ref 135–145)
WBC # BLD: 4.8 K/UL — SIGNIFICANT CHANGE UP (ref 3.8–10.5)
WBC # FLD AUTO: 4.8 K/UL — SIGNIFICANT CHANGE UP (ref 3.8–10.5)

## 2023-08-27 PROCEDURE — 99233 SBSQ HOSP IP/OBS HIGH 50: CPT

## 2023-08-27 RX ORDER — DIAZEPAM 5 MG
10 TABLET ORAL ONCE
Refills: 0 | Status: DISCONTINUED | OUTPATIENT
Start: 2023-08-27 | End: 2023-08-27

## 2023-08-27 RX ADMIN — Medication 100 MILLIGRAM(S): at 13:37

## 2023-08-27 RX ADMIN — Medication 1 DROP(S): at 18:12

## 2023-08-27 RX ADMIN — Medication 1 MILLIGRAM(S): at 13:38

## 2023-08-27 RX ADMIN — Medication 1 PATCH: at 19:00

## 2023-08-27 RX ADMIN — Medication 1 DROP(S): at 00:00

## 2023-08-27 RX ADMIN — Medication 4 DROP(S): at 18:12

## 2023-08-27 RX ADMIN — Medication 1 DROP(S): at 05:00

## 2023-08-27 RX ADMIN — Medication 1 DROP(S): at 13:39

## 2023-08-27 RX ADMIN — Medication 1 TABLET(S): at 13:38

## 2023-08-27 RX ADMIN — PANTOPRAZOLE SODIUM 40 MILLIGRAM(S): 20 TABLET, DELAYED RELEASE ORAL at 05:00

## 2023-08-27 RX ADMIN — Medication 1 DROP(S): at 21:48

## 2023-08-27 RX ADMIN — Medication 1 PATCH: at 13:37

## 2023-08-27 RX ADMIN — Medication 4 DROP(S): at 05:03

## 2023-08-27 RX ADMIN — Medication 5 MILLIGRAM(S): at 09:11

## 2023-08-27 RX ADMIN — Medication 10 MILLIGRAM(S): at 21:44

## 2023-08-27 RX ADMIN — Medication 3 MILLIGRAM(S): at 21:45

## 2023-08-27 RX ADMIN — FAMOTIDINE 20 MILLIGRAM(S): 10 INJECTION INTRAVENOUS at 13:38

## 2023-08-27 NOTE — PROVIDER CONTACT NOTE (MEDICATION) - ACTION/TREATMENT ORDERED:
NP contacted and aware. Patient encourage to drinks fluids. No order for Valium 10mg IV push.
Kirill Nevarez aware, pt care continues

## 2023-08-27 NOTE — PROGRESS NOTE ADULT - PROBLEM SELECTOR PLAN 1
- repeat CIWA pending, however most recent ciwa 5  - c/w high dose thiamine, multivitamin, folic acid  - c/w Symptom triggered valium 5 mg IV q4 for CIWA < 8  - in the last 24 hours, received 10 mg of valium at 2 pm and 8 pm; received 5 mg this morning-- will dc 10 mg as patients last drink was 6 days ago. Will likely stop the valium 5 mg on 8/28 and if patient with no signs of withdrawal, plan to DC   - GI consult for nausea and vomiting with po intake-- gastritis vs. PUD vs. GIB-- inpatient/ outpatient egd?

## 2023-08-27 NOTE — CHART NOTE - NSCHARTNOTEFT_GEN_A_CORE
MEDICINE NP- EPISODIC NOTE    Seen patient wanting to leave AMA because she wants valium 10mg IVP for withdrawal symptoms. CIWA score 2 and does not meet parameters. D/W [facility], can give valium 10mg PO x1 and if patient refuse will leave AMA.      [id number]

## 2023-08-27 NOTE — CONSULT NOTE ADULT - ATTENDING COMMENTS
44 yo F pmh etoh abuse c/b withdrawal presents for withdrawal c/b n/v.  Has resolved from withdrawal standpoint and n/v resolved as well at this time with supportive care.  No further inpatient GI workup, but should follow as outpatient for further workup

## 2023-08-27 NOTE — CONSULT NOTE ADULT - SUBJECTIVE AND OBJECTIVE BOX
HPI: 43F with hx of multiple admissions for etoh use disorder although never had seizures or ICU admission sent to ED by patient's sister for etoh withdrawal.    Last drink was day prior to admission     Allergies:  No Known Allergies      Home Medications:    Hospital Medications:  artificial  tears Solution 1 Drop(s) Both EYES four times a day  ciprofloxacin  0.3% Ophthalmic Solution for Otic Use 4 Drop(s) Left Ear every 12 hours  dexAMETHasone 0.1% Ophthalmic Solution for OTIC Use 4 Drop(s) Left Ear two times a day  diazepam  Injectable 5 milliGRAM(s) IV Push every 4 hours PRN  famotidine    Tablet 20 milliGRAM(s) Oral daily  folic acid 1 milliGRAM(s) Oral daily  melatonin 3 milliGRAM(s) Oral at bedtime PRN  multivitamin 1 Tablet(s) Oral daily  nicotine -  14 mG/24Hr(s) Patch 1 Patch Transdermal daily  pantoprazole    Tablet 40 milliGRAM(s) Oral before breakfast  thiamine 100 milliGRAM(s) Oral daily      PMHX/PSHX:  No pertinent past medical history    History of alcohol use disorder    No significant past surgical history        Family history:  Family history of type II diabetes mellitus    No pertinent family history in first degree relatives    FH: type 2 diabetes (Father)    FH: hypertension (Mother)        Denies family history of colon cancer/polyps, stomach cancer/polyps, pancreatic cancer/masses, liver cancer/disease, ovarian cancer and endometrial cancer.    Social History:   Tob: Denies  EtOH: Denies  Illicit Drugs: Denies    ROS:     General:  No wt loss, fevers, chills, night sweats, fatigue  Eyes:  Good vision, no reported pain  ENT:  No sore throat, pain, runny nose, dysphagia  CV:  No pain, palpitations, hypo/hypertension  Pulm:  No dyspnea, cough, tachypnea, wheezing  GI:  see HPI  :  No pain, bleeding, incontinence, nocturia  Muscle:  No pain, weakness  Neuro:  No weakness, tingling, memory problems  Psych:  No fatigue, insomnia, mood problems, depression  Endocrine:  No polyuria, polydipsia, cold/heat intolerance  Heme:  No petechiae, ecchymosis, easy bruisability  Skin:  No rash, tattoos, scars, edema    PHYSICAL EXAM:     GENERAL:  No acute distress  HEENT:  NCAT, no scleral icterus   CHEST:  no respiratory distress  HEART:  Regular rate and rhythm  ABDOMEN:  Soft, non-tender, non-distended, normoactive bowel sounds,  no masses  EXTREMITIES: No edema  SKIN:  No rash/erythema/ecchymoses/petechiae/wounds/abscess/warm/dry  NEURO:  Alert and oriented x 3, no asterixis    Vital Signs:  Vital Signs Last 24 Hrs  T(C): 36.4 (27 Aug 2023 05:00), Max: 36.9 (26 Aug 2023 13:00)  T(F): 97.6 (27 Aug 2023 05:00), Max: 98.4 (26 Aug 2023 13:00)  HR: 64 (27 Aug 2023 05:00) (64 - 92)  BP: 123/79 (27 Aug 2023 05:00) (104/69 - 126/79)  BP(mean): --  RR: 18 (27 Aug 2023 05:00) (18 - 18)  SpO2: 97% (27 Aug 2023 05:00) (95% - 98%)    Parameters below as of 27 Aug 2023 05:00  Patient On (Oxygen Delivery Method): room air      Daily     Daily     LABS:                        10.2   4.80  )-----------( 174      ( 27 Aug 2023 07:44 )             32.2     Mean Cell Volume: 80.5 fl (08-27-23 @ 07:44)    08-27    139  |  102  |  8   ----------------------------<  89  4.0   |  24  |  0.46<L>    Ca    9.7      27 Aug 2023 07:43  Phos  3.9     08-27  Mg     1.9     08-27    TPro  7.0  /  Alb  4.1  /  TBili  0.4  /  DBili  x   /  AST  167<H>  /  ALT  85<H>  /  AlkPhos  128<H>  08-26    LIVER FUNCTIONS - ( 26 Aug 2023 06:46 )  Alb: 4.1 g/dL / Pro: 7.0 g/dL / ALK PHOS: 128 U/L / ALT: 85 U/L / AST: 167 U/L / GGT: x             Urinalysis Basic - ( 27 Aug 2023 07:43 )    Color: x / Appearance: x / SG: x / pH: x  Gluc: 89 mg/dL / Ketone: x  / Bili: x / Urobili: x   Blood: x / Protein: x / Nitrite: x   Leuk Esterase: x / RBC: x / WBC x   Sq Epi: x / Non Sq Epi: x / Bacteria: x                              10.2   4.80  )-----------( 174      ( 27 Aug 2023 07:44 )             32.2                         9.7    4.93  )-----------( 122      ( 26 Aug 2023 06:46 )             30.4                         10.0   4.76  )-----------( 72       ( 25 Aug 2023 09:18 )             31.5       Imaging:             HPI: 43F with hx of multiple admissions for etoh use disorder although never had seizures or ICU admission sent to ED by patient's sister for etoh withdrawal. GI consulted for vomiting.     Patient reports that she has long standing history of alcohol use disorder. Reports that previous to coming to the hospital, she has required drinking alcohol in the morning in order to function. Alcoholic beverage of choice is vodka (approx 1L per day). Patient reports that she has no appetite when she is actively drinking but when she stops drinking alcohol, she will vomit and is unable to tolerate PO intake. She was recently at [facility] for alcohol withdrawal. She reports that she has no dysphagia, odynophagia, or bolus sensation when taking in PO intake. Patient reports that when she is out of the withdrawal period, she is able to eat and drink with no issues. She does have bilateral lower quadrant pain.     She has regular BM every day. Denies hematochezia, melena. Constipated for 2 days since being in the hospital.     Last drink was day prior to admission     Allergies:  No Known Allergies      Home Medications:    Hospital Medications:  artificial  tears Solution 1 Drop(s) Both EYES four times a day  ciprofloxacin  0.3% Ophthalmic Solution for Otic Use 4 Drop(s) Left Ear every 12 hours  dexAMETHasone 0.1% Ophthalmic Solution for OTIC Use 4 Drop(s) Left Ear two times a day  diazepam  Injectable 5 milliGRAM(s) IV Push every 4 hours PRN  famotidine    Tablet 20 milliGRAM(s) Oral daily  folic acid 1 milliGRAM(s) Oral daily  melatonin 3 milliGRAM(s) Oral at bedtime PRN  multivitamin 1 Tablet(s) Oral daily  nicotine -  14 mG/24Hr(s) Patch 1 Patch Transdermal daily  pantoprazole    Tablet 40 milliGRAM(s) Oral before breakfast  thiamine 100 milliGRAM(s) Oral daily      PMHX/PSHX:  No pertinent past medical history    History of alcohol use disorder    No significant past surgical history        Family history:  Family history of type II diabetes mellitus    No pertinent family history in first degree relatives    FH: type 2 diabetes (Father)    FH: hypertension (Mother)        Denies family history of colon cancer/polyps, stomach cancer/polyps, pancreatic cancer/masses, liver cancer/disease, ovarian cancer and endometrial cancer.    Social History:   Tob: Denies  EtOH: Denies  Illicit Drugs: Denies    ROS:     General:  No wt loss, fevers, chills, night sweats, fatigue  Eyes:  Good vision, no reported pain  ENT:  No sore throat, pain, runny nose, dysphagia  CV:  No pain, palpitations, hypo/hypertension  Pulm:  No dyspnea, cough, tachypnea, wheezing  GI:  see HPI  :  No pain, bleeding, incontinence, nocturia  Muscle:  No pain, weakness  Neuro:  No weakness, tingling, memory problems  Psych:  No fatigue, insomnia, mood problems, depression  Endocrine:  No polyuria, polydipsia, cold/heat intolerance  Heme:  No petechiae, ecchymosis, easy bruisability  Skin:  No rash, tattoos, scars, edema    PHYSICAL EXAM:     GENERAL:  No acute distress, patient appears tremulous   HEENT:  NCAT, no scleral icterus   CHEST:  no respiratory distress  HEART:  Regular rate and rhythm  ABDOMEN:  Soft, mildly-tender on bilateral lower quadrants, non-distended, no masses  EXTREMITIES: No edema  SKIN:  No rash/erythema/ecchymoses/petechiae/wounds/abscess/warm/dry  NEURO:  Alert and oriented x 3    Vital Signs:  Vital Signs Last 24 Hrs  T(C): 36.4 (27 Aug 2023 05:00), Max: 36.9 (26 Aug 2023 13:00)  T(F): 97.6 (27 Aug 2023 05:00), Max: 98.4 (26 Aug 2023 13:00)  HR: 64 (27 Aug 2023 05:00) (64 - 92)  BP: 123/79 (27 Aug 2023 05:00) (104/69 - 126/79)  BP(mean): --  RR: 18 (27 Aug 2023 05:00) (18 - 18)  SpO2: 97% (27 Aug 2023 05:00) (95% - 98%)    Parameters below as of 27 Aug 2023 05:00  Patient On (Oxygen Delivery Method): room air      Daily     Daily     LABS:                        10.2   4.80  )-----------( 174      ( 27 Aug 2023 07:44 )             32.2     Mean Cell Volume: 80.5 fl (08-27-23 @ 07:44)    08-27    139  |  102  |  8   ----------------------------<  89  4.0   |  24  |  0.46<L>    Ca    9.7      27 Aug 2023 07:43  Phos  3.9     08-27  Mg     1.9     08-27    TPro  7.0  /  Alb  4.1  /  TBili  0.4  /  DBili  x   /  AST  167<H>  /  ALT  85<H>  /  AlkPhos  128<H>  08-26    LIVER FUNCTIONS - ( 26 Aug 2023 06:46 )  Alb: 4.1 g/dL / Pro: 7.0 g/dL / ALK PHOS: 128 U/L / ALT: 85 U/L / AST: 167 U/L / GGT: x             Urinalysis Basic - ( 27 Aug 2023 07:43 )    Color: x / Appearance: x / SG: x / pH: x  Gluc: 89 mg/dL / Ketone: x  / Bili: x / Urobili: x   Blood: x / Protein: x / Nitrite: x   Leuk Esterase: x / RBC: x / WBC x   Sq Epi: x / Non Sq Epi: x / Bacteria: x                              10.2   4.80  )-----------( 174      ( 27 Aug 2023 07:44 )             32.2                         9.7    4.93  )-----------( 122      ( 26 Aug 2023 06:46 )             30.4                         10.0   4.76  )-----------( 72       ( 25 Aug 2023 09:18 )             31.5       Imaging:

## 2023-08-27 NOTE — CONSULT NOTE ADULT - ASSESSMENT
Impression:     #alcohol withdrawal   #abdominal pain   #emesis   Vomiting likely secondary to alcohol use disorder given that patient has emesis while not drinking alcohol and is able to tolerate PO intake when not actively in withdrawal. Abdominal pain also likely related to alcohol use. No melena or hematemesis. Low clinical suspicion for bleeding ulcer given stable Hgb and no reports of overt GIB.     Recommendations:  - c/w PO PPI daily for heartburn    - no role for endoscopy at this time given patient in active withdrawal on standing benzo   - social work for alcohol cessation and smoking cessation   - patient is requesting direct admission to rehab after hospitalization   - rest of care per primary team     No further recommendations from Gastroenterology at this point. Gastroenterology will sign off at this time. Please re-consult for any other further questions or if patient continues to have emesis once outside of withdrawal period.   All recommendations are tentative until note is attested by attending.     Amelia Jones, PGY-5  Gastroenterology/Hepatology Fellow  Available on [employer] Teams  [phone] ([facility] Short Range Pager)  [phone] ([facility] Long Range Pager)    After 5pm, please contact the on-call GI fellow. [phone] Impression:     #alcohol withdrawal   #abdominal pain   #emesis   Vomiting likely secondary to alcohol use disorder given that patient has emesis while not drinking alcohol and is able to tolerate PO intake when not actively in withdrawal. Abdominal pain also likely related to alcohol use. No melena or hematemesis. Low clinical suspicion for bleeding ulcer given stable Hgb and no reports of overt GIB.     Recommendations:  - c/w PO PPI daily for heartburn    - no role for endoscopy at this time given patient in active withdrawal on standing benzo   - social work for alcohol cessation and smoking cessation   - patient is requesting direct admission to rehab after hospitalization   - rest of care per primary team     All recommendations are tentative until note is attested by attending.     Amelia Jones, PGY-5  Gastroenterology/Hepatology Fellow  Available on [employer] Teams  [phone] ([facility] Short Range Pager)  [phone] ([facility] Long Range Pager)    After 5pm, please contact the on-call GI fellow. [phone]

## 2023-08-27 NOTE — PROVIDER CONTACT NOTE (MEDICATION) - ASSESSMENT
Patient is A&O x 4; last CIWA score is 5. Patient reports she is not able to drink fluids.
Pt refusing Nicotine Gum, Pt A&O4, vss

## 2023-08-28 ENCOUNTER — TRANSCRIPTION ENCOUNTER (OUTPATIENT)
Age: 43
End: 2023-08-28

## 2023-08-28 VITALS
HEART RATE: 76 BPM | SYSTOLIC BLOOD PRESSURE: 98 MMHG | TEMPERATURE: 98 F | RESPIRATION RATE: 18 BRPM | OXYGEN SATURATION: 97 % | DIASTOLIC BLOOD PRESSURE: 67 MMHG

## 2023-08-28 DIAGNOSIS — H66.90 OTITIS MEDIA, UNSPECIFIED, UNSPECIFIED EAR: ICD-10-CM

## 2023-08-28 LAB
ALBUMIN SERPL ELPH-MCNC: 4.2 G/DL — SIGNIFICANT CHANGE UP (ref 3.3–5)
ALP SERPL-CCNC: 112 U/L — SIGNIFICANT CHANGE UP (ref 40–120)
ALT FLD-CCNC: 203 U/L — HIGH (ref 10–45)
ANION GAP SERPL CALC-SCNC: 15 MMOL/L — SIGNIFICANT CHANGE UP (ref 5–17)
AST SERPL-CCNC: 297 U/L — HIGH (ref 10–40)
BILIRUB SERPL-MCNC: 0.4 MG/DL — SIGNIFICANT CHANGE UP (ref 0.2–1.2)
BUN SERPL-MCNC: 11 MG/DL — SIGNIFICANT CHANGE UP (ref 7–23)
CALCIUM SERPL-MCNC: 9.5 MG/DL — SIGNIFICANT CHANGE UP (ref 8.4–10.5)
CHLORIDE SERPL-SCNC: 101 MMOL/L — SIGNIFICANT CHANGE UP (ref 96–108)
CO2 SERPL-SCNC: 20 MMOL/L — LOW (ref 22–31)
CREAT SERPL-MCNC: 0.44 MG/DL — LOW (ref 0.5–1.3)
CULTURE RESULTS: SIGNIFICANT CHANGE UP
EGFR: 122 ML/MIN/1.73M2 — SIGNIFICANT CHANGE UP
GLUCOSE SERPL-MCNC: 77 MG/DL — SIGNIFICANT CHANGE UP (ref 70–99)
ORGANISM # SPEC MICROSCOPIC CNT: SIGNIFICANT CHANGE UP
ORGANISM # SPEC MICROSCOPIC CNT: SIGNIFICANT CHANGE UP
POTASSIUM SERPL-MCNC: 4.3 MMOL/L — SIGNIFICANT CHANGE UP (ref 3.5–5.3)
POTASSIUM SERPL-SCNC: 4.3 MMOL/L — SIGNIFICANT CHANGE UP (ref 3.5–5.3)
PROT SERPL-MCNC: 7.3 G/DL — SIGNIFICANT CHANGE UP (ref 6–8.3)
SODIUM SERPL-SCNC: 136 MMOL/L — SIGNIFICANT CHANGE UP (ref 135–145)
SPECIMEN SOURCE: SIGNIFICANT CHANGE UP

## 2023-08-28 PROCEDURE — 73620 X-RAY EXAM OF FOOT: CPT

## 2023-08-28 PROCEDURE — 36415 COLL VENOUS BLD VENIPUNCTURE: CPT

## 2023-08-28 PROCEDURE — 96374 THER/PROPH/DIAG INJ IV PUSH: CPT

## 2023-08-28 PROCEDURE — 84702 CHORIONIC GONADOTROPIN TEST: CPT

## 2023-08-28 PROCEDURE — 76705 ECHO EXAM OF ABDOMEN: CPT

## 2023-08-28 PROCEDURE — 97110 THERAPEUTIC EXERCISES: CPT

## 2023-08-28 PROCEDURE — 82330 ASSAY OF CALCIUM: CPT

## 2023-08-28 PROCEDURE — 85610 PROTHROMBIN TIME: CPT

## 2023-08-28 PROCEDURE — 85027 COMPLETE CBC AUTOMATED: CPT

## 2023-08-28 PROCEDURE — 84295 ASSAY OF SERUM SODIUM: CPT

## 2023-08-28 PROCEDURE — 82947 ASSAY GLUCOSE BLOOD QUANT: CPT

## 2023-08-28 PROCEDURE — 96361 HYDRATE IV INFUSION ADD-ON: CPT

## 2023-08-28 PROCEDURE — 80307 DRUG TEST PRSMV CHEM ANLYZR: CPT

## 2023-08-28 PROCEDURE — 87075 CULTR BACTERIA EXCEPT BLOOD: CPT

## 2023-08-28 PROCEDURE — 99285 EMERGENCY DEPT VISIT HI MDM: CPT | Mod: 25

## 2023-08-28 PROCEDURE — 84100 ASSAY OF PHOSPHORUS: CPT

## 2023-08-28 PROCEDURE — 85730 THROMBOPLASTIN TIME PARTIAL: CPT

## 2023-08-28 PROCEDURE — 82962 GLUCOSE BLOOD TEST: CPT

## 2023-08-28 PROCEDURE — 93005 ELECTROCARDIOGRAM TRACING: CPT

## 2023-08-28 PROCEDURE — 87186 SC STD MICRODIL/AGAR DIL: CPT

## 2023-08-28 PROCEDURE — 71045 X-RAY EXAM CHEST 1 VIEW: CPT

## 2023-08-28 PROCEDURE — 87070 CULTURE OTHR SPECIMN AEROBIC: CPT

## 2023-08-28 PROCEDURE — 80048 BASIC METABOLIC PNL TOTAL CA: CPT

## 2023-08-28 PROCEDURE — 82010 KETONE BODYS QUAN: CPT

## 2023-08-28 PROCEDURE — 85014 HEMATOCRIT: CPT

## 2023-08-28 PROCEDURE — 80053 COMPREHEN METABOLIC PANEL: CPT

## 2023-08-28 PROCEDURE — 99239 HOSP IP/OBS DSCHRG MGMT >30: CPT

## 2023-08-28 PROCEDURE — 84132 ASSAY OF SERUM POTASSIUM: CPT

## 2023-08-28 PROCEDURE — 83735 ASSAY OF MAGNESIUM: CPT

## 2023-08-28 PROCEDURE — 87205 SMEAR GRAM STAIN: CPT

## 2023-08-28 PROCEDURE — 82435 ASSAY OF BLOOD CHLORIDE: CPT

## 2023-08-28 PROCEDURE — 96376 TX/PRO/DX INJ SAME DRUG ADON: CPT

## 2023-08-28 PROCEDURE — 80076 HEPATIC FUNCTION PANEL: CPT

## 2023-08-28 PROCEDURE — 99222 1ST HOSP IP/OBS MODERATE 55: CPT

## 2023-08-28 PROCEDURE — 70481 CT ORBIT/EAR/FOSSA W/DYE: CPT

## 2023-08-28 PROCEDURE — 87077 CULTURE AEROBIC IDENTIFY: CPT

## 2023-08-28 PROCEDURE — 85025 COMPLETE CBC W/AUTO DIFF WBC: CPT

## 2023-08-28 PROCEDURE — 83605 ASSAY OF LACTIC ACID: CPT

## 2023-08-28 PROCEDURE — 97116 GAIT TRAINING THERAPY: CPT

## 2023-08-28 PROCEDURE — 85018 HEMOGLOBIN: CPT

## 2023-08-28 PROCEDURE — 70450 CT HEAD/BRAIN W/O DYE: CPT | Mod: MA

## 2023-08-28 PROCEDURE — 97162 PT EVAL MOD COMPLEX 30 MIN: CPT

## 2023-08-28 PROCEDURE — 82803 BLOOD GASES ANY COMBINATION: CPT

## 2023-08-28 RX ORDER — DIAZEPAM 5 MG
5 TABLET ORAL
Refills: 0 | Status: DISCONTINUED | OUTPATIENT
Start: 2023-08-28 | End: 2023-08-28

## 2023-08-28 RX ORDER — NICOTINE POLACRILEX 2 MG
1 GUM BUCCAL
Qty: 2 | Refills: 0
Start: 2023-08-28 | End: 2023-09-26

## 2023-08-28 RX ORDER — PANTOPRAZOLE SODIUM 20 MG/1
1 TABLET, DELAYED RELEASE ORAL
Qty: 30 | Refills: 0
Start: 2023-08-28 | End: 2023-09-26

## 2023-08-28 RX ORDER — CIPROFLOXACIN AND DEXAMETHASONE 3; 1 MG/ML; MG/ML
4 SUSPENSION/ DROPS AURICULAR (OTIC)
Qty: 1 | Refills: 0
Start: 2023-08-28 | End: 2023-09-03

## 2023-08-28 RX ORDER — FAMOTIDINE 10 MG/ML
1 INJECTION INTRAVENOUS
Qty: 30 | Refills: 0
Start: 2023-08-28 | End: 2023-09-26

## 2023-08-28 RX ADMIN — FAMOTIDINE 20 MILLIGRAM(S): 10 INJECTION INTRAVENOUS at 11:29

## 2023-08-28 RX ADMIN — Medication 5 MILLIGRAM(S): at 04:50

## 2023-08-28 RX ADMIN — Medication 100 MILLIGRAM(S): at 11:29

## 2023-08-28 RX ADMIN — Medication 4 DROP(S): at 05:32

## 2023-08-28 RX ADMIN — Medication 1 DROP(S): at 18:25

## 2023-08-28 RX ADMIN — Medication 1 TABLET(S): at 11:29

## 2023-08-28 RX ADMIN — Medication 1 PATCH: at 11:29

## 2023-08-28 RX ADMIN — Medication 5 MILLIGRAM(S): at 11:30

## 2023-08-28 RX ADMIN — PANTOPRAZOLE SODIUM 40 MILLIGRAM(S): 20 TABLET, DELAYED RELEASE ORAL at 05:31

## 2023-08-28 RX ADMIN — Medication 1 DROP(S): at 11:30

## 2023-08-28 RX ADMIN — Medication 1 PATCH: at 11:28

## 2023-08-28 RX ADMIN — Medication 1 DROP(S): at 05:36

## 2023-08-28 RX ADMIN — Medication 5 MILLIGRAM(S): at 15:50

## 2023-08-28 RX ADMIN — Medication 4 DROP(S): at 18:25

## 2023-08-28 RX ADMIN — Medication 1 PATCH: at 12:38

## 2023-08-28 RX ADMIN — Medication 1 MILLIGRAM(S): at 11:28

## 2023-08-28 NOTE — PROGRESS NOTE ADULT - PROBLEM SELECTOR PLAN 6
R cuboid fracture, possibly traumatic  - podiatry recs appreciated  - WBAT with cam boot
R cuboid fracture, possibly traumatic  - podiatry recs appreciated  - PT eval NWB R foot
DVT ppx: early ambulation
R cuboid fracture, possibly traumatic  - podiatry recs appreciated  - WBAT with cam boot

## 2023-08-28 NOTE — PROVIDER CONTACT NOTE (OTHER) - ACTION/TREATMENT ORDERED:
Provider notified and ENT will be consulted.
Juan Luis DO at bedside assessed patient and gives OK to administer valium 5mg now.
Provider came to the bedside to see patient.
Provider notified.
Give dose of valium. Reassess in 1 hour.
no

## 2023-08-28 NOTE — PROVIDER CONTACT NOTE (OTHER) - REASON
Pt c/o ear infection
[facility] 11
Patient wants to  leave Against Medical Advice
Pt feeling anxious but Valium is not due for another hour.
CIWA score 8

## 2023-08-28 NOTE — DISCHARGE NOTE NURSING/CASE MANAGEMENT/SOCIAL WORK - NSDCFUADDAPPT_GEN_ALL_CORE_FT
Podiatry Discharge Instructions:  Follow up: Please follow up with Dr. Waterhouse within 1 week of discharge from the hospital, please call [phone] for appointment and discuss that you recently were seen in the hospital.  Wound Care: Please leave your dressing clean dry intact until your follow up appointment   Weight bearing: Non-weight bearing on right lower extremity   Antibiotics: Please continue as instructed.

## 2023-08-28 NOTE — DISCHARGE NOTE NURSING/CASE MANAGEMENT/SOCIAL WORK - PATIENT PORTAL LINK FT
You can access the FollowMyHealth Patient Portal offered by [facility] by registering at the following website: [email or web address]. By joining [employer]’s FollowMyHealth portal, you will also be able to view your health information using other applications (apps) compatible with our system.

## 2023-08-28 NOTE — PROGRESS NOTE ADULT - PROBLEM SELECTOR PLAN 7
DVT ppx: early ambulation
DVT ppx: early ambulation    stable for dc  time spent on discharge: 45 min

## 2023-08-28 NOTE — PROGRESS NOTE ADULT - REASON FOR ADMISSION
etoh withdrawal

## 2023-08-28 NOTE — PROGRESS NOTE ADULT - SUBJECTIVE AND OBJECTIVE BOX
Patient is a 43y old  Female who presents with a chief complaint of etoh withdrawal (24 Aug 2023 13:44)       INTERVAL HPI/OVERNIGHT EVENTS:  Patient seen and evaluated at bedside.  Pt is resting comfortable in NAD. Denies N/V/F/C.  Pain rated at X/10    Allergies    No Known Allergies    Intolerances        Vital Signs Last 24 Hrs  T(C): 36.4 (25 Aug 2023 04:43), Max: 37.1 (24 Aug 2023 10:56)  T(F): 97.6 (25 Aug 2023 04:43), Max: 98.8 (24 Aug 2023 10:56)  HR: 70 (25 Aug 2023 04:43) (70 - 100)  BP: 123/78 (25 Aug 2023 04:43) (105/73 - 124/77)  BP(mean): --  RR: 18 (25 Aug 2023 04:43) (18 - 18)  SpO2: 99% (25 Aug 2023 04:43) (96% - 99%)    Parameters below as of 25 Aug 2023 04:43  Patient On (Oxygen Delivery Method): room air        LABS:                        11.0   5.20  )-----------( 64       ( 24 Aug 2023 07:07 )             32.8     08-24    142  |  108  |  <4<L>  ----------------------------<  99  3.7   |  21<L>  |  0.44<L>    Ca    9.2      24 Aug 2023 07:07    TPro  6.9  /  Alb  4.1  /  TBili  0.9  /  DBili  x   /  AST  183<H>  /  ALT  65<H>  /  AlkPhos  145<H>  08-24      Urinalysis Basic - ( 24 Aug 2023 07:07 )    Color: x / Appearance: x / SG: x / pH: x  Gluc: 99 mg/dL / Ketone: x  / Bili: x / Urobili: x   Blood: x / Protein: x / Nitrite: x   Leuk Esterase: x / RBC: x / WBC x   Sq Epi: x / Non Sq Epi: x / Bacteria: x      CAPILLARY BLOOD GLUCOSE          Lower Extremity Physical Exam:  Vascular: DP/PT 2/4, B/L, CFT <3 seconds B/L, Temperature gradient warm to cool , B/L.   Neuro: Epicritic sensation intact to the level of digits, B/L.  Musculoskeletal/Ortho: pain upon palpation of lateral aspect of right foot   Skin: no open lesions or signs of infection b/l, ecchymosis present on the dorsal lateral aspect of right foot.     RADIOLOGY & ADDITIONAL TESTS:  
Patient is a 43y old  Female who presents with a chief complaint of etoh withdrawal (27 Aug 2023 13:30)       INTERVAL HPI/OVERNIGHT EVENTS:  Patient seen and evaluated at bedside.  Pt is resting comfortable in NAD. Denies N/V/F/C.  Pain rated at X/10    Allergies    No Known Allergies    Intolerances        Vital Signs Last 24 Hrs  T(C): 36.9 (28 Aug 2023 05:08), Max: 36.9 (28 Aug 2023 05:08)  T(F): 98.4 (28 Aug 2023 05:08), Max: 98.4 (28 Aug 2023 05:08)  HR: 69 (28 Aug 2023 05:08) (69 - 73)  BP: 105/71 (28 Aug 2023 05:08) (104/71 - 109/73)  BP(mean): --  RR: 18 (28 Aug 2023 05:08) (18 - 18)  SpO2: 96% (28 Aug 2023 05:08) (96% - 96%)    Parameters below as of 28 Aug 2023 05:08  Patient On (Oxygen Delivery Method): room air        LABS:                        10.2   4.80  )-----------( 174      ( 27 Aug 2023 07:44 )             32.2     08-28    136  |  101  |  11  ----------------------------<  77  4.3   |  20<L>  |  0.44<L>    Ca    9.5      28 Aug 2023 07:05  Phos  3.9     08-27  Mg     1.9     08-27    TPro  7.3  /  Alb  4.2  /  TBili  0.4  /  DBili  x   /  AST  297<H>  /  ALT  203<H>  /  AlkPhos  112  08-28      Urinalysis Basic - ( 28 Aug 2023 07:05 )    Color: x / Appearance: x / SG: x / pH: x  Gluc: 77 mg/dL / Ketone: x  / Bili: x / Urobili: x   Blood: x / Protein: x / Nitrite: x   Leuk Esterase: x / RBC: x / WBC x   Sq Epi: x / Non Sq Epi: x / Bacteria: x      CAPILLARY BLOOD GLUCOSE          Lower Extremity Physical Exam:  Vascular: DP/PT 2/4, B/L, CFT <3 seconds B/L, Temperature gradient warm to cool , B/L.   Neuro: Epicritic sensation intact to the level of digits, B/L.  Musculoskeletal/Ortho: pain upon palpation of lateral aspect of right foot   Skin: no open lesions or signs of infection b/l, ecchymosis present on the dorsal lateral aspect of right foot.     RADIOLOGY & ADDITIONAL TESTS:  
nausea improving. still with some epigastric pain.   CIWA 5.    GENERAL: No fevers, no chills.  EYES: No blurry vision,  No photophobia  ENT: No sore throat.  No dysphagia  Cardiovascular: No chest pain, palpitations, orthopnea  Pulmonary: No cough, no wheezing. No shortness of breath  Gastrointestinal: no abdominal pain, no diarrhea, no constipation  Musculoskeletal: No weakness.  No myalgias.  Dermatology:  No rashes.  Neuro: No Headache.  No vertigo.  No dizziness.  Psych: No anxiety, no depression.  Denies suicidal thoughts.    MEDICATIONS  (STANDING):  artificial  tears Solution 1 Drop(s) Both EYES four times a day  ciprofloxacin  0.3% Ophthalmic Solution for Otic Use 4 Drop(s) Left Ear every 12 hours  dexAMETHasone 0.1% Ophthalmic Solution for OTIC Use 4 Drop(s) Left Ear two times a day  dextrose 5% + sodium chloride 0.9%. 2000 milliLiter(s) (500 mL/Hr) IV Continuous <Continuous>  folic acid 1 milliGRAM(s) Oral daily  multivitamin 1 Tablet(s) Oral daily  nicotine  Polacrilex Gum 2 milliGRAM(s) Oral every 3 hours  nicotine -  14 mG/24Hr(s) Patch 1 Patch Transdermal daily  pantoprazole    Tablet 40 milliGRAM(s) Oral before breakfast  sodium chloride 0.9%. 1000 milliLiter(s) (75 mL/Hr) IV Continuous <Continuous>  thiamine IVPB 500 milliGRAM(s) IV Intermittent three times a day    MEDICATIONS  (PRN):  diazepam  Injectable 5 milliGRAM(s) IV Push every 4 hours PRN CIWA score < 8 - Symptom-triggered 2 point increase in CIWA-Ar  diazepam  Injectable 10 milliGRAM(s) IV Push every 4 hours PRN Symptom-triggered when CIWA-Ar score 8 or Greater  melatonin 3 milliGRAM(s) Oral at bedtime PRN Insomnia    Vital Signs Last 24 Hrs  T(C): 36.7 (24 Aug 2023 12:27), Max: 37.1 (23 Aug 2023 20:00)  T(F): 98.1 (24 Aug 2023 12:27), Max: 98.8 (24 Aug 2023 10:56)  HR: 94 (24 Aug 2023 12:27) (83 - 100)  BP: 114/80 (24 Aug 2023 12:27) (104/69 - 122/83)  BP(mean): --  RR: 18 (24 Aug 2023 12:27) (18 - 19)  SpO2: 98% (24 Aug 2023 12:27) (96% - 99%)    Parameters below as of 24 Aug 2023 12:27  Patient On (Oxygen Delivery Method): room air    GENERAL: anxious  HEAD:  Atraumatic, Normocephalic  EYES: EOMI, PERRLA, conjunctiva and sclera clear  ENT: Pharynx not erythematous  PULMONARY: Clear to auscultation bilaterally; No wheeze  CARDIOVASCULAR: Regular rate and rhythm; No murmurs, rubs, or gallops  ABDOMEN: Soft, Nontender, Nondistended; Bowel sounds present  EXTREMITIES:  2+ Peripheral Pulses, No clubbing, cyanosis, or edema  MUSCULOSKELETAL: No calf tenderness  PSYCH: AAOx3, normal affect  SKIN: warm and dry, No rashes or lesions    .  LABS:                         11.0   5.20  )-----------( 64       ( 24 Aug 2023 07:07 )             32.8     08-24    142  |  108  |  <4<L>  ----------------------------<  99  3.7   |  21<L>  |  0.44<L>    Ca    9.2      24 Aug 2023 07:07  Phos  3.2     08-23  Mg     1.8     08-23    TPro  6.9  /  Alb  4.1  /  TBili  0.9  /  DBili  x   /  AST  183<H>  /  ALT  65<H>  /  AlkPhos  145<H>  08-24      Urinalysis Basic - ( 24 Aug 2023 07:07 )    Color: x / Appearance: x / SG: x / pH: x  Gluc: 99 mg/dL / Ketone: x  / Bili: x / Urobili: x   Blood: x / Protein: x / Nitrite: x   Leuk Esterase: x / RBC: x / WBC x   Sq Epi: x / Non Sq Epi: x / Bacteria: x            RADIOLOGY, EKG & ADDITIONAL TESTS: Reviewed.   
CC: left ear discharge    HPI: 44yo female with PMHx etoh use disorder, current smoker admitted for etoh withdrawal with R cuboid fracture. ENT called to evaluate for left ear discharge. 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 She gets recurrent ear infections associated with tonsillitis during which she gets drainage from left ear. Pt states she gets prescribed amoxicillin by PMD because ear drops cause a burning sensation in her ear and throat. She states this episode of clear/yellow ear drainage began 1 month ago, that was initially associated with ear pain, which has since resolved. She also c/o hearing loss during these episodes. Pt denies fever, chills, n/v, tinnitus, vertigo, HA, SOB, dysphagia, odynophagia, hemoptysis, hoarseness, unintentional weight loss.     PAST MEDICAL & SURGICAL HISTORY:  History of alcohol use disorder  1 pint of vodka/day      No significant past surgical history        Allergies    No Known Allergies    Intolerances      MEDICATIONS  (STANDING):  artificial  tears Solution 1 Drop(s) Both EYES four times a day  ciprofloxacin  0.3% Ophthalmic Solution for Otic Use 4 Drop(s) Left Ear every 12 hours  dexAMETHasone 0.1% Ophthalmic Solution for OTIC Use 4 Drop(s) Left Ear two times a day  dextrose 5% + sodium chloride 0.9%. 2000 milliLiter(s) (500 mL/Hr) IV Continuous <Continuous>  famotidine    Tablet 20 milliGRAM(s) Oral daily  folic acid 1 milliGRAM(s) Oral daily  lactated ringers. 1000 milliLiter(s) (100 mL/Hr) IV Continuous <Continuous>  multivitamin 1 Tablet(s) Oral daily  nicotine  Polacrilex Gum 2 milliGRAM(s) Oral every 3 hours  nicotine -  14 mG/24Hr(s) Patch 1 Patch Transdermal daily  pantoprazole    Tablet 40 milliGRAM(s) Oral before breakfast  potassium chloride    Tablet ER 40 milliEquivalent(s) Oral once  sodium chloride 0.9%. 1000 milliLiter(s) (75 mL/Hr) IV Continuous <Continuous>  thiamine 100 milliGRAM(s) Oral daily    MEDICATIONS  (PRN):  diazepam  Injectable 5 milliGRAM(s) IV Push every 4 hours PRN CIWA score < 8 - Symptom-triggered 2 point increase in CIWA-Ar  diazepam  Injectable 10 milliGRAM(s) IV Push every 4 hours PRN Symptom-triggered when CIWA-Ar score 8 or Greater  melatonin 3 milliGRAM(s) Oral at bedtime PRN Insomnia    social history: see consult     family history: see consult     ROS:   ENT: all negative except as noted in HPI   Pulm: denies SOB, cough, hemoptysis  Neuro: denies numbness/tingling, loss of sensation  Endo: denies heat/cold intolerance, excessive sweating      Vital Signs Last 24 Hrs  T(C): 36.6 (25 Aug 2023 16:46), Max: 37.1 (24 Aug 2023 21:23)  T(F): 97.9 (25 Aug 2023 16:46), Max: 98.7 (24 Aug 2023 21:23)  HR: 77 (25 Aug 2023 16:46) (70 - 100)  BP: 120/78 (25 Aug 2023 16:46) (110/73 - 124/77)  BP(mean): --  RR: 18 (25 Aug 2023 16:46) (18 - 18)  SpO2: 99% (25 Aug 2023 16:46) (96% - 99%)    Parameters below as of 25 Aug 2023 16:46  Patient On (Oxygen Delivery Method): room air                              10.0   4.76  )-----------( 72       ( 25 Aug 2023 09:18 )             31.5    08-25    141  |  104  |  4<L>  ----------------------------<  77  3.3<L>   |  21<L>  |  0.43<L>    Ca    9.0      25 Aug 2023 09:18    TPro  6.7  /  Alb  3.7  /  TBili  0.6  /  DBili  x   /  AST  187<H>  /  ALT  74<H>  /  AlkPhos  116  08-25         PHYSICAL EXAM:  Gen: NAD  Skin: No rashes, bruises, or lesions  Head: Normocephalic, Atraumatic  Face: no edema, erythema, or fluctuance. Parotid glands soft without mass  Eyes: no scleral injection  Ears: Right:  small amount of cerumen suctioned from right EAC, ear canal clear, TM intact without effusion or erythema. No evidence of any fluid drainage. No mastoid tenderness, erythema, or ear bulging            Left: ear canal with minimal amount of clear drainage, culture sent, + TM perforation. No purulence or erythema. No mastoid tenderness, erythema, or ear bulging  Nose: Nares bilaterally patent, no discharge  Mouth: No Stridor / Drooling / Trismus.  Mucosa moist, tongue/uvula midline, oropharynx clear  Neck: Flat, supple, no lymphadenopathy, trachea midline, no masses  Lymphatic: No lymphadenopathy  Resp: breathing easily, no stridor  CV: no peripheral edema/cyanosis  GI: nondistended   Peripheral vascular: no JVD or edema  Neuro: facial nerve intact, no facial droop    < from: CT Internal Auditory Canals w/ IV Cont (08.24.23 @ 14:55) >    IMPRESSION: Large left-sided tympanic membrane perforation along with   chronic left-sided otomastoiditis. No associated bony destruction or   erosive change, however, superimposed cholesteatoma formation cannot be   fully excluded. Consider further evaluation with a contrast enhanced MRI   study of the internal auditory canals with non-EPI DWI imaging.    < end of copied text >    Culture - Ear, Nose and Throat (ENT) (08.23.23 @ 17:55)    Gram Stain:   No polymorphonuclear cells seen  Gram Negative Rods  by cytocentrifuge   Specimen Source: Ear left ear   Culture Results:   Numerous Pseudomonas aeruginosa  Rare Staphylococcus epidermidis "Susceptibilities not performed"      
some nausea, anxious.  CIWA 5.    GENERAL: No fevers, no chills.  EYES: No blurry vision,  No photophobia  ENT: No sore throat.  No dysphagia  Cardiovascular: No chest pain, palpitations, orthopnea  Pulmonary: No cough, no wheezing. No shortness of breath  Gastrointestinal: |+nausea. abdominal pain, no diarrhea, no constipation  Musculoskeletal: No weakness.  No myalgias.  Dermatology:  No rashes.  Neuro: No Headache.  No vertigo.  No dizziness.  Psych: No anxiety, no depression.  Denies suicidal thoughts.    MEDICATIONS  (STANDING):  dextrose 5% + sodium chloride 0.9%. 2000 milliLiter(s) (500 mL/Hr) IV Continuous <Continuous>  folic acid 1 milliGRAM(s) Oral daily  multivitamin 1 Tablet(s) Oral daily  nicotine -  14 mG/24Hr(s) Patch 1 Patch Transdermal daily  sodium chloride 0.9%. 1000 milliLiter(s) (75 mL/Hr) IV Continuous <Continuous>  thiamine IVPB 500 milliGRAM(s) IV Intermittent three times a day    MEDICATIONS  (PRN):  diazepam  Injectable 5 milliGRAM(s) IV Push every 4 hours PRN CIWA score < 8 - Symptom-triggered 2 point increase in CIWA-Ar  diazepam  Injectable 10 milliGRAM(s) IV Push every 4 hours PRN Symptom-triggered when CIWA-Ar score 8 or Greater  melatonin 3 milliGRAM(s) Oral at bedtime PRN Insomnia    Vital Signs Last 24 Hrs  T(C): 36.9 (23 Aug 2023 12:15), Max: 36.9 (23 Aug 2023 00:30)  T(F): 98.5 (23 Aug 2023 12:15), Max: 98.5 (23 Aug 2023 12:15)  HR: 96 (23 Aug 2023 12:15) (84 - 115)  BP: 112/78 (23 Aug 2023 12:15) (112/72 - 130/87)  BP(mean): --  RR: 18 (23 Aug 2023 12:15) (18 - 20)  SpO2: 97% (23 Aug 2023 12:15) (95% - 98%)    Parameters below as of 23 Aug 2023 12:15  Patient On (Oxygen Delivery Method): room air    GENERAL: anxious  HEAD:  Atraumatic, Normocephalic  EYES: EOMI, PERRLA, conjunctiva and sclera clear  ENT: Pharynx not erythematous  PULMONARY: Clear to auscultation bilaterally; No wheeze  CARDIOVASCULAR: Regular rate and rhythm; No murmurs, rubs, or gallops  ABDOMEN: Soft, Nontender, Nondistended; Bowel sounds present  EXTREMITIES:  2+ Peripheral Pulses, No clubbing, cyanosis, or edema  MUSCULOSKELETAL: No calf tenderness  PSYCH: AAOx3, normal affect  SKIN: warm and dry, No rashes or lesions    .  LABS:                         10.3   4.00  )-----------( 54       ( 23 Aug 2023 06:42 )             32.1     08-23    137  |  102  |  <4<L>  ----------------------------<  99  3.5   |  20<L>  |  0.44<L>    Ca    9.3      23 Aug 2023 06:43  Phos  3.2     08-23  Mg     1.8     08-23    TPro  7.3  /  Alb  4.2  /  TBili  1.2  /  DBili  x   /  AST  173<H>  /  ALT  61<H>  /  AlkPhos  173<H>  08-23    PT/INR - ( 21 Aug 2023 19:18 )   PT: 11.9 sec;   INR: 1.08 ratio         PTT - ( 21 Aug 2023 19:18 )  PTT:35.9 sec  Urinalysis Basic - ( 23 Aug 2023 06:43 )    Color: x / Appearance: x / SG: x / pH: x  Gluc: 99 mg/dL / Ketone: x  / Bili: x / Urobili: x   Blood: x / Protein: x / Nitrite: x   Leuk Esterase: x / RBC: x / WBC x   Sq Epi: x / Non Sq Epi: x / Bacteria: x            RADIOLOGY, EKG & ADDITIONAL TESTS: Reviewed.   
still with some epigastric pain and nausea but tolerating small amounts of po- slowly improving     GENERAL: No fevers, no chills.  EYES: No blurry vision,  No photophobia  ENT: No sore throat.  No dysphagia  Cardiovascular: No chest pain, palpitations, orthopnea  Pulmonary: No cough, no wheezing. No shortness of breath  Gastrointestinal: some abdominal pain with po, no diarrhea, no constipation  Musculoskeletal: No weakness.  No myalgias.  Dermatology:  No rashes.  Neuro: No Headache.  No vertigo.  No dizziness.  Psych: No anxiety, no depression.  Denies suicidal thoughts.    MEDICATIONS  (STANDING):  artificial  tears Solution 1 Drop(s) Both EYES four times a day  ciprofloxacin  0.3% Ophthalmic Solution for Otic Use 4 Drop(s) Left Ear every 12 hours  dexAMETHasone 0.1% Ophthalmic Solution for OTIC Use 4 Drop(s) Left Ear two times a day  famotidine    Tablet 20 milliGRAM(s) Oral daily  folic acid 1 milliGRAM(s) Oral daily  multivitamin 1 Tablet(s) Oral daily  nicotine -  14 mG/24Hr(s) Patch 1 Patch Transdermal daily  pantoprazole    Tablet 40 milliGRAM(s) Oral before breakfast  thiamine 100 milliGRAM(s) Oral daily    MEDICATIONS  (PRN):  diazepam  Injectable 5 milliGRAM(s) IV Push every 4 hours PRN CIWA score < 8 - Symptom-triggered 2 point increase in CIWA-Ar  melatonin 3 milliGRAM(s) Oral at bedtime PRN Insomnia    Vital Signs Last 24 Hrs  T(C): 36.4 (27 Aug 2023 05:00), Max: 36.9 (26 Aug 2023 20:57)  T(F): 97.6 (27 Aug 2023 05:00), Max: 98.4 (26 Aug 2023 20:57)  HR: 64 (27 Aug 2023 05:00) (64 - 79)  BP: 123/79 (27 Aug 2023 05:00) (104/69 - 126/79)  BP(mean): --  RR: 18 (27 Aug 2023 05:00) (18 - 18)  SpO2: 97% (27 Aug 2023 05:00) (95% - 98%)    Parameters below as of 27 Aug 2023 05:00  Patient On (Oxygen Delivery Method): room air    GENERAL: anxious  HEAD:  Atraumatic, Normocephalic  EYES: EOMI, PERRLA, conjunctiva and sclera clear  ENT: Pharynx not erythematous  PULMONARY: Clear to auscultation bilaterally; No wheeze  CARDIOVASCULAR: Regular rate and rhythm; No murmurs, rubs, or gallops  ABDOMEN: Soft, Nontender, Nondistended; Bowel sounds present  EXTREMITIES:  2+ Peripheral Pulses, No clubbing, cyanosis, or edema  MUSCULOSKELETAL: No calf tenderness  PSYCH: AAOx3, normal affect  SKIN: warm and dry, No rashes or lesions    .  LABS:                         10.2   4.80  )-----------( 174      ( 27 Aug 2023 07:44 )             32.2     08-27    139  |  102  |  8   ----------------------------<  89  4.0   |  24  |  0.46<L>    Ca    9.7      27 Aug 2023 07:43  Phos  3.9     08-27  Mg     1.9     08-27    TPro  7.0  /  Alb  4.1  /  TBili  0.4  /  DBili  x   /  AST  167<H>  /  ALT  85<H>  /  AlkPhos  128<H>  08-26      Urinalysis Basic - ( 27 Aug 2023 07:43 )    Color: x / Appearance: x / SG: x / pH: x  Gluc: 89 mg/dL / Ketone: x  / Bili: x / Urobili: x   Blood: x / Protein: x / Nitrite: x   Leuk Esterase: x / RBC: x / WBC x   Sq Epi: x / Non Sq Epi: x / Bacteria: x            RADIOLOGY, EKG & ADDITIONAL TESTS: Reviewed. 
still with some epigastric pain and anxiety.   CIWA 5.    GENERAL: No fevers, no chills.  EYES: No blurry vision,  No photophobia  ENT: No sore throat.  No dysphagia  Cardiovascular: No chest pain, palpitations, orthopnea  Pulmonary: No cough, no wheezing. No shortness of breath  Gastrointestinal: no abdominal pain, no diarrhea, no constipation  Musculoskeletal: No weakness.  No myalgias.  Dermatology:  No rashes.  Neuro: No Headache.  No vertigo.  No dizziness.  Psych: No anxiety, no depression.  Denies suicidal thoughts.    MEDICATIONS  (STANDING):  artificial  tears Solution 1 Drop(s) Both EYES four times a day  ciprofloxacin  0.3% Ophthalmic Solution for Otic Use 4 Drop(s) Left Ear every 12 hours  dexAMETHasone 0.1% Ophthalmic Solution for OTIC Use 4 Drop(s) Left Ear two times a day  dextrose 5% + sodium chloride 0.9%. 2000 milliLiter(s) (500 mL/Hr) IV Continuous <Continuous>  folic acid 1 milliGRAM(s) Oral daily  multivitamin 1 Tablet(s) Oral daily  nicotine  Polacrilex Gum 2 milliGRAM(s) Oral every 3 hours  nicotine -  14 mG/24Hr(s) Patch 1 Patch Transdermal daily  pantoprazole    Tablet 40 milliGRAM(s) Oral before breakfast  sodium chloride 0.9%. 1000 milliLiter(s) (75 mL/Hr) IV Continuous <Continuous>  thiamine 100 milliGRAM(s) Oral daily    MEDICATIONS  (PRN):  diazepam  Injectable 10 milliGRAM(s) IV Push every 4 hours PRN Symptom-triggered when CIWA-Ar score 8 or Greater  diazepam  Injectable 5 milliGRAM(s) IV Push every 4 hours PRN CIWA score < 8 - Symptom-triggered 2 point increase in CIWA-Ar  melatonin 3 milliGRAM(s) Oral at bedtime PRN Insomnia    Vital Signs Last 24 Hrs  T(C): 37.1 (25 Aug 2023 11:35), Max: 37.1 (24 Aug 2023 21:23)  T(F): 98.7 (25 Aug 2023 11:35), Max: 98.7 (24 Aug 2023 21:23)  HR: 86 (25 Aug 2023 11:35) (70 - 100)  BP: 115/77 (25 Aug 2023 11:35) (105/73 - 124/77)  BP(mean): --  RR: 18 (25 Aug 2023 11:35) (18 - 18)  SpO2: 98% (25 Aug 2023 11:35) (96% - 99%)    Parameters below as of 25 Aug 2023 11:35  Patient On (Oxygen Delivery Method): room air    GENERAL: anxious  HEAD:  Atraumatic, Normocephalic  EYES: EOMI, PERRLA, conjunctiva and sclera clear  ENT: Pharynx not erythematous  PULMONARY: Clear to auscultation bilaterally; No wheeze  CARDIOVASCULAR: Regular rate and rhythm; No murmurs, rubs, or gallops  ABDOMEN: Soft, Nontender, Nondistended; Bowel sounds present  EXTREMITIES:  2+ Peripheral Pulses, No clubbing, cyanosis, or edema  MUSCULOSKELETAL: No calf tenderness  PSYCH: AAOx3, normal affect  SKIN: warm and dry, No rashes or lesions    .  LABS:                         10.0   4.76  )-----------( 72       ( 25 Aug 2023 09:18 )             31.5     08-25    141  |  104  |  4<L>  ----------------------------<  77  3.3<L>   |  21<L>  |  0.43<L>    Ca    9.0      25 Aug 2023 09:18    TPro  6.7  /  Alb  3.7  /  TBili  0.6  /  DBili  x   /  AST  187<H>  /  ALT  74<H>  /  AlkPhos  116  08-25      Urinalysis Basic - ( 25 Aug 2023 09:18 )    Color: x / Appearance: x / SG: x / pH: x  Gluc: 77 mg/dL / Ketone: x  / Bili: x / Urobili: x   Blood: x / Protein: x / Nitrite: x   Leuk Esterase: x / RBC: x / WBC x   Sq Epi: x / Non Sq Epi: x / Bacteria: x            RADIOLOGY, EKG & ADDITIONAL TESTS: Reviewed. 
[facility] Division of Hospital Medicine  Elizabeth GarciaDO   Available via [employer] Teams      Patient is a 43y old  Female who presents with a chief complaint of etoh withdrawal (28 Aug 2023 10:59)      SUBJECTIVE / OVERNIGHT EVENTS:  had anxiety overnight with tremors   Improved after valium   N/V resolved. tolerating po     MEDICATIONS  (STANDING):  artificial  tears Solution 1 Drop(s) Both EYES four times a day  ciprofloxacin  0.3% Ophthalmic Solution for Otic Use 4 Drop(s) Left Ear every 12 hours  dexAMETHasone 0.1% Ophthalmic Solution for OTIC Use 4 Drop(s) Left Ear two times a day  famotidine    Tablet 20 milliGRAM(s) Oral daily  folic acid 1 milliGRAM(s) Oral daily  multivitamin 1 Tablet(s) Oral daily  nicotine -  14 mG/24Hr(s) Patch 1 Patch Transdermal daily  pantoprazole    Tablet 40 milliGRAM(s) Oral before breakfast  thiamine 100 milliGRAM(s) Oral daily    MEDICATIONS  (PRN):  diazepam    Tablet 5 milliGRAM(s) Oral two times a day PRN anxiety  melatonin 3 milliGRAM(s) Oral at bedtime PRN Insomnia      CAPILLARY BLOOD GLUCOSE        I&O's Summary    27 Aug 2023 07:01  -  28 Aug 2023 07:00  --------------------------------------------------------  IN: 180 mL / OUT: 1 mL / NET: 179 mL        PHYSICAL EXAM:  Vital Signs Last 24 Hrs  T(C): 36.8 (28 Aug 2023 12:18), Max: 36.9 (28 Aug 2023 05:08)  T(F): 98.3 (28 Aug 2023 12:18), Max: 98.4 (28 Aug 2023 05:08)  HR: 76 (28 Aug 2023 12:18) (69 - 76)  BP: 98/67 (28 Aug 2023 12:18) (98/67 - 105/71)  BP(mean): --  RR: 18 (28 Aug 2023 12:18) (18 - 18)  SpO2: 97% (28 Aug 2023 12:18) (96% - 97%)    Parameters below as of 28 Aug 2023 12:18  Patient On (Oxygen Delivery Method): room air      CONSTITUTIONAL: NAD, well-developed, well-groomed  EYES: PERRL; conjunctiva and sclera clear  ENMT: Moist oral mucosa, normal dentition  RESPIRATORY: Normal respiratory effort; lungs are clear to auscultation bilaterally  CARDIOVASCULAR: Regular rate and rhythm, normal S1 and S2; No lower extremity edema  ABDOMEN: Nontender to palpation, normoactive bowel sounds, no rebound/guarding  MUSCULOSKELETAL: no clubbing or cyanosis of digits; no joint swelling or tenderness to palpation  PSYCH: A+O to person, place, and time; affect appropriate  NEUROLOGY: CN 2-12 are intact and symmetric; no gross sensory deficits   SKIN: No rashes; no palpable lesions    LABS:                        10.2   4.80  )-----------( 174      ( 27 Aug 2023 07:44 )             32.2     08-28    136  |  101  |  11  ----------------------------<  77  4.3   |  20<L>  |  0.44<L>    Ca    9.5      28 Aug 2023 07:05  Phos  3.9     08-27  Mg     1.9     08-27    TPro  7.3  /  Alb  4.2  /  TBili  0.4  /  DBili  x   /  AST  297<H>  /  ALT  203<H>  /  AlkPhos  112  08-28          Urinalysis Basic - ( 28 Aug 2023 07:05 )    Color: x / Appearance: x / SG: x / pH: x  Gluc: 77 mg/dL / Ketone: x  / Bili: x / Urobili: x   Blood: x / Protein: x / Nitrite: x   Leuk Esterase: x / RBC: x / WBC x   Sq Epi: x / Non Sq Epi: x / Bacteria: x          RADIOLOGY & ADDITIONAL TESTS:  Results Reviewed:   Imaging Personally Reviewed:  Electrocardiogram Personally Reviewed:    COORDINATION OF CARE:  Care Discussed with Consultants/Other Providers [Y/N]:  Prior or Outpatient Records Reviewed [Y/N]:  
still with some epigastric pain and nausea but tolerating small amounts of po.     GENERAL: No fevers, no chills.  EYES: No blurry vision,  No photophobia  ENT: No sore throat.  No dysphagia  Cardiovascular: No chest pain, palpitations, orthopnea  Pulmonary: No cough, no wheezing. No shortness of breath  Gastrointestinal: no abdominal pain, no diarrhea, no constipation  Musculoskeletal: No weakness.  No myalgias.  Dermatology:  No rashes.  Neuro: No Headache.  No vertigo.  No dizziness.  Psych: No anxiety, no depression.  Denies suicidal thoughts.    MEDICATIONS  (STANDING):  artificial  tears Solution 1 Drop(s) Both EYES four times a day  ciprofloxacin  0.3% Ophthalmic Solution for Otic Use 4 Drop(s) Left Ear every 12 hours  dexAMETHasone 0.1% Ophthalmic Solution for OTIC Use 4 Drop(s) Left Ear two times a day  famotidine    Tablet 20 milliGRAM(s) Oral daily  folic acid 1 milliGRAM(s) Oral daily  magnesium sulfate  IVPB 1 Gram(s) IV Intermittent once  multivitamin 1 Tablet(s) Oral daily  nicotine  Polacrilex Gum 2 milliGRAM(s) Oral every 3 hours  nicotine -  14 mG/24Hr(s) Patch 1 Patch Transdermal daily  pantoprazole    Tablet 40 milliGRAM(s) Oral before breakfast  thiamine 100 milliGRAM(s) Oral daily    MEDICATIONS  (PRN):  diazepam  Injectable 5 milliGRAM(s) IV Push every 4 hours PRN CIWA score < 8 - Symptom-triggered 2 point increase in CIWA-Ar  diazepam  Injectable 10 milliGRAM(s) IV Push every 4 hours PRN Symptom-triggered when CIWA-Ar score 8 or Greater  melatonin 3 milliGRAM(s) Oral at bedtime PRN Insomnia    Vital Signs Last 24 Hrs  T(C): 36.7 (26 Aug 2023 06:25), Max: 36.9 (25 Aug 2023 22:20)  T(F): 98.1 (26 Aug 2023 06:25), Max: 98.5 (25 Aug 2023 22:20)  HR: 73 (26 Aug 2023 06:25) (70 - 88)  BP: 131/76 (26 Aug 2023 06:25) (116/65 - 135/86)  BP(mean): --  RR: 18 (26 Aug 2023 06:25) (18 - 18)  SpO2: 97% (26 Aug 2023 06:25) (97% - 99%)    Parameters below as of 26 Aug 2023 06:25  Patient On (Oxygen Delivery Method): room air    GENERAL: anxious  HEAD:  Atraumatic, Normocephalic  EYES: EOMI, PERRLA, conjunctiva and sclera clear  ENT: Pharynx not erythematous  PULMONARY: Clear to auscultation bilaterally; No wheeze  CARDIOVASCULAR: Regular rate and rhythm; No murmurs, rubs, or gallops  ABDOMEN: Soft, Nontender, Nondistended; Bowel sounds present  EXTREMITIES:  2+ Peripheral Pulses, No clubbing, cyanosis, or edema  MUSCULOSKELETAL: No calf tenderness  PSYCH: AAOx3, normal affect  SKIN: warm and dry, No rashes or lesions    .  LABS:                         9.7    4.93  )-----------( 122      ( 26 Aug 2023 06:46 )             30.4     08-26    139  |  103  |  5<L>  ----------------------------<  93  3.8   |  23  |  0.42<L>    Ca    9.8      26 Aug 2023 06:46  Mg     1.5     08-26    TPro  7.0  /  Alb  4.1  /  TBili  0.4  /  DBili  x   /  AST  167<H>  /  ALT  85<H>  /  AlkPhos  128<H>  08-26      Urinalysis Basic - ( 26 Aug 2023 06:46 )    Color: x / Appearance: x / SG: x / pH: x  Gluc: 93 mg/dL / Ketone: x  / Bili: x / Urobili: x   Blood: x / Protein: x / Nitrite: x   Leuk Esterase: x / RBC: x / WBC x   Sq Epi: x / Non Sq Epi: x / Bacteria: x            RADIOLOGY, EKG & ADDITIONAL TESTS: Reviewed.

## 2023-08-28 NOTE — PROGRESS NOTE ADULT - PROBLEM SELECTOR PLAN 5
- ciprodex ear drops 4 drops to left ear bid  - CT IAC with contrast with large left TM perforation and chronic otomastoiditis   - f/u culture  - Follow up outpatient with Dr. Miner/Saige/John/Jordan [phone]
- ciprodex ear drops 4 drops to left ear bid  - CT IAC with contrast with large left TM perforation and chronic otomastoiditis   - f/u culture  - Follow up outpatient with Dr. Miner/Saige/John/Jordan [phone]
- ciprodex ear drops 4 drops to left ear bid x 14 days   - CT IAC with contrast with large left TM perforation and chronic otomastoiditis   - f/u culture  - Follow up outpatient with Dr. Miner/Saige/John/Jordan [phone]
R cuboid fracture, possibly traumatic  - podiatry recs appreciated  - PT eval NWB R foot
- ciprodex ear drops 4 drops to left ear bid  - CT IAC with contrast  - f/u culture  - Follow up outpatient with Dr. Miner/Saige/John/Jordan [phone]
- ciprodex ear drops 4 drops to left ear bid  - CT IAC with contrast with large left TM perforation and chronic otomastoiditis   - f/u culture  - Follow up outpatient with Dr. Miner/Saige/John/Jordan [phone]

## 2023-08-28 NOTE — PROVIDER CONTACT NOTE (OTHER) - ASSESSMENT
Pt A&Ox4, C/o anxiety, tremors, and HA. CIWA as documented.
Pt appears anxious, VSS.
Pt A&Ox4, had 1 episode of vomiting with tremors. CIWA score increase from 2 to 11.
Visible cloudy drainage in bilateral ear canals, L worse than R.
Patient is alert and oriented times four and is  hemodynamically stable. Patient wanted to  leave Against Medical Advice. Patient wanted to be given 10mg of Valium IV and if not given threatens to leave the hospital against medical advice.

## 2023-08-28 NOTE — PROGRESS NOTE ADULT - PROBLEM SELECTOR PROBLEM 6
Cuboid fracture
Prophylactic measure
Cuboid fracture
Cuboid fracture

## 2023-08-28 NOTE — PROGRESS NOTE ADULT - PROBLEM SELECTOR PLAN 1
- CIWA improved   - c/w high dose thiamine, multivitamin, folic acid  - Last drink 7 days ago. Suspect symptoms overnight more related to anxiety than withdrawal   - N/V resolved, can f/u with GI outpatient if persists for EGD

## 2023-08-28 NOTE — PROVIDER CONTACT NOTE (OTHER) - BACKGROUND
Patient diagnose with other specified disorder of metabolism.
43 y.o F admitted for etoh withdrawal.
Pt is not due for valium.
43 y.o F admitted for other ETOH withdrawal.
Pt states she has had "holes in her eardrums" since she was little. She has never seen ENT for it. She reports having periodic ear infections.

## 2023-08-28 NOTE — PROGRESS NOTE ADULT - ASSESSMENT
42yo female with PMHx etoh use disorder, current smoker admitted for etoh withdrawal with R cuboid fracture. ENT called to evaluate for left ear discharge. 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 She gets recurrent ear infections associated with tonsillitis during which she gets drainage from left ear. She states this episode of clear/yellow ear drainage began 1 month ago, that was initially associated with ear pain, which has since resolved. She also c/o hearing loss during these episodes. Pt denies fever, chills, n/v, tinnitus, vertigo, HA, SOB, dysphagia, odynophagia, hemoptysis, hoarseness, unintentional weight loss. Afebrile, WBC normal.       
43F presents with right foot cuboid fracture   - patient seen and evaluated  - afebrile   - no open lesions or signs of infection b/l, ecchymosis present on the dorsal lateral aspect of right foot.   - right foot xray: acute nondisplaced cuboid fracture   - Ordered cam boot to begin WBAT as patient fairly comfortable, questionable fracture per radiology  - Stable to d/c, follow up in office, will follow while admitted
43F with etoh use disorder, current smoker admitted for etoh withdrawal with R cuboid fracture. 
43F with etoh use disorder, current smoker admitted for etoh withdrawal with R cuboid fracture. 
43F presents with right foot cuboid fracture   - patient seen and evaluated  - afebrile   - no open lesions or signs of infection b/l, ecchymosis present on the dorsal lateral aspect of right foot.   - right foot xray: acute nondisplaced cuboid fracture   - Ordered cam boot to begin WBAT as patient fairly comfortable, questionable fracture per radiology  - Stable to d/c, follow up in office, will follow while admitted
43F with etoh use disorder, current smoker admitted for etoh withdrawal with R cuboid fracture. 

## 2023-08-28 NOTE — PROGRESS NOTE ADULT - PROBLEM SELECTOR PLAN 3
Elevated AST/ALT c/w etoh hepatitis  - LFTs improving   - maddreys score < 32, no steroids indicated
Elevated AST/ALT c/w etoh hepatitis  - LFTs improving   - maddreys score < 32, no steroids indicated
Elevated AST/ALT c/w etoh hepatitis  - LFTs stable
Elevated AST/ALT c/w etoh hepatitis  - LFTs improving   - maddreys score < 32, no steroids indicated

## 2023-08-28 NOTE — PROGRESS NOTE ADULT - PROBLEM SELECTOR PLAN 4
- 2' etoh use  - abdominal US-- fatty liver

## 2023-08-28 NOTE — DISCHARGE NOTE NURSING/CASE MANAGEMENT/SOCIAL WORK - NSDCPEFALRISK_GEN_ALL_CORE
For information on Fall & Injury Prevention, visit: [email or web address] OR  [email or web address] OR  https://www.cdc.gov/steadi/patient.html

## 2023-08-28 NOTE — PROGRESS NOTE ADULT - PROBLEM SELECTOR PLAN 2
Extensive smoking hx, 1/2 PPD. 5 minutes of smoking cessation counseling given previously   - c/w nicotine patch step 2 (14 mcg/day) x 6 weeks, followed by 7 mcg/day x 2 weeks
Extensive smoking hx, 1/2 PPD. 5 minutes of smoking cessation counseling given  - c/w nicotine patch step 2 (14 mcg/day) x 6 weeks, followed by 7 mcg/day x 2 weeks
- Educated patient on keeping ears as dry as possible, Avoid sticking anything into the ear canal (i.e. q-tips, hairpins to clean ears), Avoid swimming in polluted wells, To keep ears dry patient can use hair dryer to blow warm air back and forth over the ear canal.
Extensive smoking hx, 1/2 PPD. 5 minutes of smoking cessation counseling given  - c/w nicotine patch step 2 (14 mcg/day) x 6 weeks, followed by 7 mcg/day x 2 weeks

## 2023-08-28 NOTE — PROGRESS NOTE ADULT - PROBLEM SELECTOR PROBLEM 2
Ready to quit smoking
Perforated tympanic membrane
Ready to quit smoking

## 2023-08-28 NOTE — PROGRESS NOTE ADULT - PROBLEM SELECTOR PROBLEM 5
Perforated tympanic membrane
Cuboid fracture
Perforated tympanic membrane
Perforated tympanic membrane

## 2023-08-28 NOTE — PROVIDER CONTACT NOTE (OTHER) - SITUATION
[facility] 11
CIWA score 8
Patient wants to  leave Against Medical Advice. Patient wanted to be given 10mg of Valium IV and if not given threatens to leave the hospital against medical advice.
Pt c/o ear infection x 1 month with drainage.
Pt reports feeling more anxious. Pt is on CIWA protocol, scoring 5.

## 2023-08-28 NOTE — PROGRESS NOTE ADULT - NSPROGADDITIONALINFOA_GEN_ALL_CORE
matt Barger
disposition: symptom trigger ativan for etoh withdrawal  discussed with saundra Mcknight D.O.  Division of Hospital Medicine  Available on MS Teams
disposition: symptom trigger ativan for etoh withdrawal, ent final recs pending  discussed with acp    Kayleigh Mcknight D.O.  Division of Hospital Medicine  Available on MS Teams
disposition: symptom trigger ativan for etoh withdrawal  discussed with saundra Mcknight D.O.  Division of Hospital Medicine  Available on MS Teams
disposition: likely can dc 8/28 if no signs of withdrawal unless gi wants to do egd inpatient     Kayleigh Mcknight D.O.  Division of Hospital Medicine  Available on MS Teams
disposition: ancipitate dc in 24- 48 hours pending ciwa/ resolution of withdrawal    Kayleigh Mcknight D.O.  Division of Hospital Medicine  Available on MS Teams

## 2023-08-28 NOTE — PROGRESS NOTE ADULT - NUTRITIONAL ASSESSMENT
This patient has been assessed with a concern for Malnutrition and has been determined to have a diagnosis/diagnoses of Severe protein-calorie malnutrition and Underweight (BMI < 19).    This patient is being managed with:   Diet Regular-  Entered: Aug 22 2023  8:59AM  

## 2023-08-28 NOTE — PROVIDER CONTACT NOTE (OTHER) - RECOMMENDATIONS
Seek ENT consult.
Notify provider. Valium?
Contacted Provider.
Give valium an hour early?
Notify provider. Give dose of valium.

## 2023-09-12 NOTE — ED PROVIDER NOTE - PHYSICAL EXAMINATION
Lab: [id number] Lab: [id number] General appearance: NAD, conversant, afebrile    Eyes: anicteric sclerae, DENISE, EOMI   HENT: Atraumatic; oropharynx clear, MMM and no ulcerations, no pharyngeal erythema or exudate   Neck: Trachea midline; Full range of motion, supple   Pulm: CTA bl, normal respiratory effort and no intercostal retractions, normal work of breathing   CV: RRR, No murmurs, rubs, or gallops.    Abdomen: Soft, non-tender, non-distended; no guarding or rebound   Extremities: No peripheral edema or extremity lymphadenopathy. 5/5 strength in all four extremities.   Skin: R breast redness 3oclock position with center ttp, area of fluctuance around and erythema spreading towards nipple. Dry, normal temperature, turgor and texture; no rash, ulcers or subcutaneous nodules   Psych: Appropriate affect, cooperative; alert and oriented to person, place and time

## 2023-10-26 ENCOUNTER — INPATIENT (INPATIENT)
Facility: HOSPITAL | Age: 43
LOS: 3 days | Discharge: HOME | DRG: 683 | End: 2023-10-30
Attending: INTERNAL MEDICINE | Admitting: STUDENT IN AN ORGANIZED HEALTH CARE EDUCATION/TRAINING PROGRAM
Payer: MEDICAID

## 2023-10-26 VITALS
DIASTOLIC BLOOD PRESSURE: 66 MMHG | HEIGHT: 61 IN | WEIGHT: 100.09 LBS | OXYGEN SATURATION: 99 % | RESPIRATION RATE: 17 BRPM | HEART RATE: 110 BPM | SYSTOLIC BLOOD PRESSURE: 91 MMHG | TEMPERATURE: 98 F

## 2023-10-26 DIAGNOSIS — E87.1 HYPO-OSMOLALITY AND HYPONATREMIA: ICD-10-CM

## 2023-10-26 DIAGNOSIS — Z29.9 ENCOUNTER FOR PROPHYLACTIC MEASURES, UNSPECIFIED: ICD-10-CM

## 2023-10-26 DIAGNOSIS — N17.9 ACUTE KIDNEY FAILURE, UNSPECIFIED: ICD-10-CM

## 2023-10-26 DIAGNOSIS — E87.29 OTHER ACIDOSIS: ICD-10-CM

## 2023-10-26 DIAGNOSIS — R94.31 ABNORMAL ELECTROCARDIOGRAM [ECG] [EKG]: ICD-10-CM

## 2023-10-26 DIAGNOSIS — E87.6 HYPOKALEMIA: ICD-10-CM

## 2023-10-26 DIAGNOSIS — Z78.9 OTHER SPECIFIED HEALTH STATUS: ICD-10-CM

## 2023-10-26 DIAGNOSIS — R07.9 CHEST PAIN, UNSPECIFIED: ICD-10-CM

## 2023-10-26 DIAGNOSIS — E87.3 ALKALOSIS: ICD-10-CM

## 2023-10-26 LAB
ALBUMIN SERPL ELPH-MCNC: 5 G/DL — SIGNIFICANT CHANGE UP (ref 3.3–5)
ALBUMIN SERPL ELPH-MCNC: 5 G/DL — SIGNIFICANT CHANGE UP (ref 3.3–5)
ALBUMIN SERPL ELPH-MCNC: 6.3 G/DL — HIGH (ref 3.3–5)
ALBUMIN SERPL ELPH-MCNC: 6.3 G/DL — HIGH (ref 3.3–5)
ALP SERPL-CCNC: 108 U/L — SIGNIFICANT CHANGE UP (ref 40–120)
ALP SERPL-CCNC: 108 U/L — SIGNIFICANT CHANGE UP (ref 40–120)
ALP SERPL-CCNC: 91 U/L — SIGNIFICANT CHANGE UP (ref 40–120)
ALP SERPL-CCNC: 91 U/L — SIGNIFICANT CHANGE UP (ref 40–120)
ALT FLD-CCNC: 45 U/L — SIGNIFICANT CHANGE UP (ref 10–45)
ALT FLD-CCNC: 45 U/L — SIGNIFICANT CHANGE UP (ref 10–45)
ALT FLD-CCNC: 57 U/L — HIGH (ref 10–45)
ALT FLD-CCNC: 57 U/L — HIGH (ref 10–45)
ANION GAP SERPL CALC-SCNC: 23 MMOL/L — HIGH (ref 5–17)
ANION GAP SERPL CALC-SCNC: 23 MMOL/L — HIGH (ref 5–17)
ANION GAP SERPL CALC-SCNC: 27 MMOL/L — HIGH (ref 5–17)
ANION GAP SERPL CALC-SCNC: 27 MMOL/L — HIGH (ref 5–17)
ANISOCYTOSIS BLD QL: SLIGHT — SIGNIFICANT CHANGE UP
ANISOCYTOSIS BLD QL: SLIGHT — SIGNIFICANT CHANGE UP
APPEARANCE UR: ABNORMAL
APPEARANCE UR: ABNORMAL
AST SERPL-CCNC: 51 U/L — HIGH (ref 10–40)
AST SERPL-CCNC: 51 U/L — HIGH (ref 10–40)
AST SERPL-CCNC: 60 U/L — HIGH (ref 10–40)
AST SERPL-CCNC: 60 U/L — HIGH (ref 10–40)
BACTERIA # UR AUTO: NEGATIVE — SIGNIFICANT CHANGE UP
BACTERIA # UR AUTO: NEGATIVE — SIGNIFICANT CHANGE UP
BASE EXCESS BLDV CALC-SCNC: 14.3 MMOL/L — HIGH (ref -2–3)
BASE EXCESS BLDV CALC-SCNC: 14.3 MMOL/L — HIGH (ref -2–3)
BASOPHILS # BLD AUTO: 0 K/UL — SIGNIFICANT CHANGE UP (ref 0–0.2)
BASOPHILS # BLD AUTO: 0 K/UL — SIGNIFICANT CHANGE UP (ref 0–0.2)
BASOPHILS NFR BLD AUTO: 0 % — SIGNIFICANT CHANGE UP (ref 0–2)
BASOPHILS NFR BLD AUTO: 0 % — SIGNIFICANT CHANGE UP (ref 0–2)
BILIRUB SERPL-MCNC: 0.5 MG/DL — SIGNIFICANT CHANGE UP (ref 0.2–1.2)
BILIRUB SERPL-MCNC: 0.5 MG/DL — SIGNIFICANT CHANGE UP (ref 0.2–1.2)
BILIRUB SERPL-MCNC: 0.6 MG/DL — SIGNIFICANT CHANGE UP (ref 0.2–1.2)
BILIRUB SERPL-MCNC: 0.6 MG/DL — SIGNIFICANT CHANGE UP (ref 0.2–1.2)
BILIRUB UR-MCNC: ABNORMAL
BILIRUB UR-MCNC: ABNORMAL
BUN SERPL-MCNC: 48 MG/DL — HIGH (ref 7–23)
BUN SERPL-MCNC: 48 MG/DL — HIGH (ref 7–23)
BUN SERPL-MCNC: 52 MG/DL — HIGH (ref 7–23)
BUN SERPL-MCNC: 52 MG/DL — HIGH (ref 7–23)
CA-I SERPL-SCNC: 0.84 MMOL/L — LOW (ref 1.15–1.33)
CA-I SERPL-SCNC: 0.84 MMOL/L — LOW (ref 1.15–1.33)
CALCIUM SERPL-MCNC: 8.5 MG/DL — SIGNIFICANT CHANGE UP (ref 8.4–10.5)
CALCIUM SERPL-MCNC: 8.5 MG/DL — SIGNIFICANT CHANGE UP (ref 8.4–10.5)
CALCIUM SERPL-MCNC: 9.9 MG/DL — SIGNIFICANT CHANGE UP (ref 8.4–10.5)
CALCIUM SERPL-MCNC: 9.9 MG/DL — SIGNIFICANT CHANGE UP (ref 8.4–10.5)
CHLORIDE BLDV-SCNC: 76 MMOL/L — LOW (ref 96–108)
CHLORIDE BLDV-SCNC: 76 MMOL/L — LOW (ref 96–108)
CHLORIDE SERPL-SCNC: 73 MMOL/L — LOW (ref 96–108)
CHLORIDE SERPL-SCNC: 73 MMOL/L — LOW (ref 96–108)
CHLORIDE SERPL-SCNC: <70 MMOL/L — LOW (ref 96–108)
CHLORIDE SERPL-SCNC: <70 MMOL/L — LOW (ref 96–108)
CHLORIDE UR-SCNC: <20 MMOL/L — SIGNIFICANT CHANGE UP
CHLORIDE UR-SCNC: <20 MMOL/L — SIGNIFICANT CHANGE UP
CO2 BLDV-SCNC: 41 MMOL/L — HIGH (ref 22–26)
CO2 BLDV-SCNC: 41 MMOL/L — HIGH (ref 22–26)
CO2 SERPL-SCNC: 32 MMOL/L — HIGH (ref 22–31)
CO2 SERPL-SCNC: 32 MMOL/L — HIGH (ref 22–31)
CO2 SERPL-SCNC: 36 MMOL/L — HIGH (ref 22–31)
CO2 SERPL-SCNC: 36 MMOL/L — HIGH (ref 22–31)
COLOR SPEC: YELLOW — SIGNIFICANT CHANGE UP
COLOR SPEC: YELLOW — SIGNIFICANT CHANGE UP
CREAT ?TM UR-MCNC: 191 MG/DL — SIGNIFICANT CHANGE UP
CREAT ?TM UR-MCNC: 191 MG/DL — SIGNIFICANT CHANGE UP
CREAT SERPL-MCNC: 4.29 MG/DL — HIGH (ref 0.5–1.3)
CREAT SERPL-MCNC: 4.29 MG/DL — HIGH (ref 0.5–1.3)
CREAT SERPL-MCNC: 6.34 MG/DL — HIGH (ref 0.5–1.3)
CREAT SERPL-MCNC: 6.34 MG/DL — HIGH (ref 0.5–1.3)
DACRYOCYTES BLD QL SMEAR: SLIGHT — SIGNIFICANT CHANGE UP
DACRYOCYTES BLD QL SMEAR: SLIGHT — SIGNIFICANT CHANGE UP
DIFF PNL FLD: ABNORMAL
DIFF PNL FLD: ABNORMAL
EGFR: 12 ML/MIN/1.73M2 — LOW
EGFR: 12 ML/MIN/1.73M2 — LOW
EGFR: 8 ML/MIN/1.73M2 — LOW
EGFR: 8 ML/MIN/1.73M2 — LOW
ELLIPTOCYTES BLD QL SMEAR: SLIGHT — SIGNIFICANT CHANGE UP
ELLIPTOCYTES BLD QL SMEAR: SLIGHT — SIGNIFICANT CHANGE UP
EOSINOPHIL # BLD AUTO: 0 K/UL — SIGNIFICANT CHANGE UP (ref 0–0.5)
EOSINOPHIL # BLD AUTO: 0 K/UL — SIGNIFICANT CHANGE UP (ref 0–0.5)
EOSINOPHIL NFR BLD AUTO: 0 % — SIGNIFICANT CHANGE UP (ref 0–6)
EOSINOPHIL NFR BLD AUTO: 0 % — SIGNIFICANT CHANGE UP (ref 0–6)
EPI CELLS # UR: 17 /HPF — HIGH
EPI CELLS # UR: 17 /HPF — HIGH
FLUAV AG NPH QL: SIGNIFICANT CHANGE UP
FLUAV AG NPH QL: SIGNIFICANT CHANGE UP
FLUBV AG NPH QL: SIGNIFICANT CHANGE UP
FLUBV AG NPH QL: SIGNIFICANT CHANGE UP
FOLATE SERPL-MCNC: 17 NG/ML — SIGNIFICANT CHANGE UP
FOLATE SERPL-MCNC: 17 NG/ML — SIGNIFICANT CHANGE UP
GAS PNL BLDV: 123 MMOL/L — LOW (ref 136–145)
GAS PNL BLDV: 123 MMOL/L — LOW (ref 136–145)
GAS PNL BLDV: SIGNIFICANT CHANGE UP
GLUCOSE BLDV-MCNC: 138 MG/DL — HIGH (ref 70–99)
GLUCOSE BLDV-MCNC: 138 MG/DL — HIGH (ref 70–99)
GLUCOSE SERPL-MCNC: 121 MG/DL — HIGH (ref 70–99)
GLUCOSE SERPL-MCNC: 121 MG/DL — HIGH (ref 70–99)
GLUCOSE SERPL-MCNC: 137 MG/DL — HIGH (ref 70–99)
GLUCOSE SERPL-MCNC: 137 MG/DL — HIGH (ref 70–99)
GLUCOSE UR QL: ABNORMAL
GLUCOSE UR QL: ABNORMAL
GRAN CASTS # UR COMP ASSIST: ABNORMAL /LPF
GRAN CASTS # UR COMP ASSIST: ABNORMAL /LPF
HCG SERPL-ACNC: <2 MIU/ML — SIGNIFICANT CHANGE UP
HCG SERPL-ACNC: <2 MIU/ML — SIGNIFICANT CHANGE UP
HCO3 BLDV-SCNC: 40 MMOL/L — HIGH (ref 22–29)
HCO3 BLDV-SCNC: 40 MMOL/L — HIGH (ref 22–29)
HCT VFR BLD CALC: 36.7 % — SIGNIFICANT CHANGE UP (ref 34.5–45)
HCT VFR BLD CALC: 36.7 % — SIGNIFICANT CHANGE UP (ref 34.5–45)
HCT VFR BLDA CALC: 32 % — LOW (ref 34.5–46.5)
HCT VFR BLDA CALC: 32 % — LOW (ref 34.5–46.5)
HGB BLD CALC-MCNC: 10.7 G/DL — LOW (ref 11.7–16.1)
HGB BLD CALC-MCNC: 10.7 G/DL — LOW (ref 11.7–16.1)
HGB BLD-MCNC: 12.4 G/DL — SIGNIFICANT CHANGE UP (ref 11.5–15.5)
HGB BLD-MCNC: 12.4 G/DL — SIGNIFICANT CHANGE UP (ref 11.5–15.5)
HYALINE CASTS # UR AUTO: 296 /LPF — HIGH (ref 0–2)
HYALINE CASTS # UR AUTO: 296 /LPF — HIGH (ref 0–2)
HYPOCHROMIA BLD QL: SLIGHT — SIGNIFICANT CHANGE UP
HYPOCHROMIA BLD QL: SLIGHT — SIGNIFICANT CHANGE UP
KETONES UR-MCNC: ABNORMAL
KETONES UR-MCNC: ABNORMAL
LACTATE BLDV-MCNC: 1.3 MMOL/L — SIGNIFICANT CHANGE UP (ref 0.5–2)
LACTATE BLDV-MCNC: 1.3 MMOL/L — SIGNIFICANT CHANGE UP (ref 0.5–2)
LEUKOCYTE ESTERASE UR-ACNC: NEGATIVE — SIGNIFICANT CHANGE UP
LEUKOCYTE ESTERASE UR-ACNC: NEGATIVE — SIGNIFICANT CHANGE UP
LYMPHOCYTES # BLD AUTO: 1.59 K/UL — SIGNIFICANT CHANGE UP (ref 1–3.3)
LYMPHOCYTES # BLD AUTO: 1.59 K/UL — SIGNIFICANT CHANGE UP (ref 1–3.3)
LYMPHOCYTES # BLD AUTO: 16.7 % — SIGNIFICANT CHANGE UP (ref 13–44)
LYMPHOCYTES # BLD AUTO: 16.7 % — SIGNIFICANT CHANGE UP (ref 13–44)
MAGNESIUM SERPL-MCNC: 1.6 MG/DL — SIGNIFICANT CHANGE UP (ref 1.6–2.6)
MAGNESIUM SERPL-MCNC: 1.6 MG/DL — SIGNIFICANT CHANGE UP (ref 1.6–2.6)
MAGNESIUM SERPL-MCNC: 2.9 MG/DL — HIGH (ref 1.6–2.6)
MAGNESIUM SERPL-MCNC: 2.9 MG/DL — HIGH (ref 1.6–2.6)
MANUAL SMEAR VERIFICATION: SIGNIFICANT CHANGE UP
MANUAL SMEAR VERIFICATION: SIGNIFICANT CHANGE UP
MCHC RBC-ENTMCNC: 23.4 PG — LOW (ref 27–34)
MCHC RBC-ENTMCNC: 23.4 PG — LOW (ref 27–34)
MCHC RBC-ENTMCNC: 33.8 GM/DL — SIGNIFICANT CHANGE UP (ref 32–36)
MCHC RBC-ENTMCNC: 33.8 GM/DL — SIGNIFICANT CHANGE UP (ref 32–36)
MCV RBC AUTO: 69.1 FL — LOW (ref 80–100)
MCV RBC AUTO: 69.1 FL — LOW (ref 80–100)
MICROCYTES BLD QL: SLIGHT — SIGNIFICANT CHANGE UP
MICROCYTES BLD QL: SLIGHT — SIGNIFICANT CHANGE UP
MONOCYTES # BLD AUTO: 0.58 K/UL — SIGNIFICANT CHANGE UP (ref 0–0.9)
MONOCYTES # BLD AUTO: 0.58 K/UL — SIGNIFICANT CHANGE UP (ref 0–0.9)
MONOCYTES NFR BLD AUTO: 6.1 % — SIGNIFICANT CHANGE UP (ref 2–14)
MONOCYTES NFR BLD AUTO: 6.1 % — SIGNIFICANT CHANGE UP (ref 2–14)
NEUTROPHILS # BLD AUTO: 7.33 K/UL — SIGNIFICANT CHANGE UP (ref 1.8–7.4)
NEUTROPHILS # BLD AUTO: 7.33 K/UL — SIGNIFICANT CHANGE UP (ref 1.8–7.4)
NEUTROPHILS NFR BLD AUTO: 73.7 % — SIGNIFICANT CHANGE UP (ref 43–77)
NEUTROPHILS NFR BLD AUTO: 73.7 % — SIGNIFICANT CHANGE UP (ref 43–77)
NEUTS BAND # BLD: 3.5 % — SIGNIFICANT CHANGE UP (ref 0–8)
NEUTS BAND # BLD: 3.5 % — SIGNIFICANT CHANGE UP (ref 0–8)
NITRITE UR-MCNC: NEGATIVE — SIGNIFICANT CHANGE UP
NITRITE UR-MCNC: NEGATIVE — SIGNIFICANT CHANGE UP
OSMOLALITY SERPL: 283 MOSMOL/KG — SIGNIFICANT CHANGE UP (ref 275–300)
OSMOLALITY SERPL: 283 MOSMOL/KG — SIGNIFICANT CHANGE UP (ref 275–300)
OSMOLALITY UR: 376 MOS/KG — SIGNIFICANT CHANGE UP (ref 300–900)
OSMOLALITY UR: 376 MOS/KG — SIGNIFICANT CHANGE UP (ref 300–900)
OVALOCYTES BLD QL SMEAR: SLIGHT — SIGNIFICANT CHANGE UP
OVALOCYTES BLD QL SMEAR: SLIGHT — SIGNIFICANT CHANGE UP
PCO2 BLDV: 52 MMHG — HIGH (ref 39–42)
PCO2 BLDV: 52 MMHG — HIGH (ref 39–42)
PH BLDV: 7.49 — HIGH (ref 7.32–7.43)
PH BLDV: 7.49 — HIGH (ref 7.32–7.43)
PH UR: 5.5 — SIGNIFICANT CHANGE UP (ref 5–8)
PH UR: 5.5 — SIGNIFICANT CHANGE UP (ref 5–8)
PHOSPHATE SERPL-MCNC: 2.9 MG/DL — SIGNIFICANT CHANGE UP (ref 2.5–4.5)
PHOSPHATE SERPL-MCNC: 2.9 MG/DL — SIGNIFICANT CHANGE UP (ref 2.5–4.5)
PHOSPHATE SERPL-MCNC: 3.3 MG/DL — SIGNIFICANT CHANGE UP (ref 2.5–4.5)
PHOSPHATE SERPL-MCNC: 3.3 MG/DL — SIGNIFICANT CHANGE UP (ref 2.5–4.5)
PLAT MORPH BLD: NORMAL — SIGNIFICANT CHANGE UP
PLAT MORPH BLD: NORMAL — SIGNIFICANT CHANGE UP
PLATELET # BLD AUTO: 86 K/UL — LOW (ref 150–400)
PLATELET # BLD AUTO: 86 K/UL — LOW (ref 150–400)
PO2 BLDV: 32 MMHG — SIGNIFICANT CHANGE UP (ref 25–45)
PO2 BLDV: 32 MMHG — SIGNIFICANT CHANGE UP (ref 25–45)
POIKILOCYTOSIS BLD QL AUTO: SLIGHT — SIGNIFICANT CHANGE UP
POIKILOCYTOSIS BLD QL AUTO: SLIGHT — SIGNIFICANT CHANGE UP
POLYCHROMASIA BLD QL SMEAR: SLIGHT — SIGNIFICANT CHANGE UP
POLYCHROMASIA BLD QL SMEAR: SLIGHT — SIGNIFICANT CHANGE UP
POTASSIUM BLDV-SCNC: 1.7 MMOL/L — CRITICAL LOW (ref 3.5–5.1)
POTASSIUM BLDV-SCNC: 1.7 MMOL/L — CRITICAL LOW (ref 3.5–5.1)
POTASSIUM SERPL-MCNC: 2.3 MMOL/L — CRITICAL LOW (ref 3.5–5.3)
POTASSIUM SERPL-SCNC: 2.3 MMOL/L — CRITICAL LOW (ref 3.5–5.3)
POTASSIUM UR-SCNC: 43 MMOL/L — SIGNIFICANT CHANGE UP
POTASSIUM UR-SCNC: 43 MMOL/L — SIGNIFICANT CHANGE UP
PROT ?TM UR-MCNC: 204 MG/DL — HIGH (ref 0–12)
PROT ?TM UR-MCNC: 204 MG/DL — HIGH (ref 0–12)
PROT SERPL-MCNC: 8.3 G/DL — SIGNIFICANT CHANGE UP (ref 6–8.3)
PROT SERPL-MCNC: 8.3 G/DL — SIGNIFICANT CHANGE UP (ref 6–8.3)
PROT SERPL-MCNC: 9.9 G/DL — HIGH (ref 6–8.3)
PROT SERPL-MCNC: 9.9 G/DL — HIGH (ref 6–8.3)
PROT UR-MCNC: ABNORMAL
PROT UR-MCNC: ABNORMAL
PROT/CREAT UR-RTO: 1.1 RATIO — HIGH (ref 0–0.2)
PROT/CREAT UR-RTO: 1.1 RATIO — HIGH (ref 0–0.2)
RBC # BLD: 5.31 M/UL — HIGH (ref 3.8–5.2)
RBC # BLD: 5.31 M/UL — HIGH (ref 3.8–5.2)
RBC # FLD: 14.3 % — SIGNIFICANT CHANGE UP (ref 10.3–14.5)
RBC # FLD: 14.3 % — SIGNIFICANT CHANGE UP (ref 10.3–14.5)
RBC BLD AUTO: ABNORMAL
RBC BLD AUTO: ABNORMAL
RBC CASTS # UR COMP ASSIST: 15 /HPF — HIGH (ref 0–4)
RBC CASTS # UR COMP ASSIST: 15 /HPF — HIGH (ref 0–4)
RSV RNA NPH QL NAA+NON-PROBE: SIGNIFICANT CHANGE UP
RSV RNA NPH QL NAA+NON-PROBE: SIGNIFICANT CHANGE UP
SAO2 % BLDV: 57.5 % — LOW (ref 67–88)
SAO2 % BLDV: 57.5 % — LOW (ref 67–88)
SARS-COV-2 RNA SPEC QL NAA+PROBE: SIGNIFICANT CHANGE UP
SARS-COV-2 RNA SPEC QL NAA+PROBE: SIGNIFICANT CHANGE UP
SODIUM SERPL-SCNC: 127 MMOL/L — LOW (ref 135–145)
SODIUM SERPL-SCNC: 127 MMOL/L — LOW (ref 135–145)
SODIUM SERPL-SCNC: 128 MMOL/L — LOW (ref 135–145)
SODIUM SERPL-SCNC: 128 MMOL/L — LOW (ref 135–145)
SODIUM UR-SCNC: 25 MMOL/L — SIGNIFICANT CHANGE UP
SODIUM UR-SCNC: 25 MMOL/L — SIGNIFICANT CHANGE UP
SP GR SPEC: 1.02 — SIGNIFICANT CHANGE UP (ref 1.01–1.02)
SP GR SPEC: 1.02 — SIGNIFICANT CHANGE UP (ref 1.01–1.02)
TARGETS BLD QL SMEAR: SLIGHT — SIGNIFICANT CHANGE UP
TARGETS BLD QL SMEAR: SLIGHT — SIGNIFICANT CHANGE UP
TROPONIN T, HIGH SENSITIVITY RESULT: 19 NG/L — SIGNIFICANT CHANGE UP (ref 0–51)
TROPONIN T, HIGH SENSITIVITY RESULT: 19 NG/L — SIGNIFICANT CHANGE UP (ref 0–51)
UROBILINOGEN FLD QL: ABNORMAL
UROBILINOGEN FLD QL: ABNORMAL
WBC # BLD: 9.5 K/UL — SIGNIFICANT CHANGE UP (ref 3.8–10.5)
WBC # BLD: 9.5 K/UL — SIGNIFICANT CHANGE UP (ref 3.8–10.5)
WBC # FLD AUTO: 9.5 K/UL — SIGNIFICANT CHANGE UP (ref 3.8–10.5)
WBC # FLD AUTO: 9.5 K/UL — SIGNIFICANT CHANGE UP (ref 3.8–10.5)
WBC UR QL: 23 /HPF — HIGH (ref 0–5)
WBC UR QL: 23 /HPF — HIGH (ref 0–5)

## 2023-10-26 PROCEDURE — 71045 X-RAY EXAM CHEST 1 VIEW: CPT | Mod: 26

## 2023-10-26 PROCEDURE — 99223 1ST HOSP IP/OBS HIGH 75: CPT | Mod: GC

## 2023-10-26 PROCEDURE — 99285 EMERGENCY DEPT VISIT HI MDM: CPT

## 2023-10-26 RX ORDER — POTASSIUM CHLORIDE 20 MEQ
40 PACKET (EA) ORAL EVERY 4 HOURS
Refills: 0 | Status: DISCONTINUED | OUTPATIENT
Start: 2023-10-26 | End: 2023-10-26

## 2023-10-26 RX ORDER — POTASSIUM CHLORIDE 20 MEQ
40 PACKET (EA) ORAL ONCE
Refills: 0 | Status: DISCONTINUED | OUTPATIENT
Start: 2023-10-26 | End: 2023-10-26

## 2023-10-26 RX ORDER — SODIUM CHLORIDE 9 MG/ML
1000 INJECTION, SOLUTION INTRAVENOUS
Refills: 0 | Status: DISCONTINUED | OUTPATIENT
Start: 2023-10-26 | End: 2023-10-26

## 2023-10-26 RX ORDER — POTASSIUM CHLORIDE 20 MEQ
40 PACKET (EA) ORAL ONCE
Refills: 0 | Status: COMPLETED | OUTPATIENT
Start: 2023-10-26 | End: 2023-10-26

## 2023-10-26 RX ORDER — INFLUENZA VIRUS VACCINE 15; 15; 15; 15 UG/.5ML; UG/.5ML; UG/.5ML; UG/.5ML
0.5 SUSPENSION INTRAMUSCULAR ONCE
Refills: 0 | Status: DISCONTINUED | OUTPATIENT
Start: 2023-10-26 | End: 2023-10-30

## 2023-10-26 RX ORDER — NICOTINE POLACRILEX 2 MG
1 GUM BUCCAL DAILY
Refills: 0 | Status: DISCONTINUED | OUTPATIENT
Start: 2023-10-26 | End: 2023-10-30

## 2023-10-26 RX ORDER — POTASSIUM CHLORIDE 20 MEQ
10 PACKET (EA) ORAL
Refills: 0 | Status: DISCONTINUED | OUTPATIENT
Start: 2023-10-26 | End: 2023-10-26

## 2023-10-26 RX ORDER — ALBUTEROL 90 UG/1
2.5 AEROSOL, METERED ORAL ONCE
Refills: 0 | Status: COMPLETED | OUTPATIENT
Start: 2023-10-26 | End: 2023-10-26

## 2023-10-26 RX ORDER — SODIUM CHLORIDE 9 MG/ML
1000 INJECTION INTRAMUSCULAR; INTRAVENOUS; SUBCUTANEOUS
Refills: 0 | Status: DISCONTINUED | OUTPATIENT
Start: 2023-10-26 | End: 2023-10-27

## 2023-10-26 RX ORDER — HEPARIN SODIUM 5000 [USP'U]/ML
5000 INJECTION INTRAVENOUS; SUBCUTANEOUS EVERY 8 HOURS
Refills: 0 | Status: DISCONTINUED | OUTPATIENT
Start: 2023-10-26 | End: 2023-10-26

## 2023-10-26 RX ORDER — THIAMINE MONONITRATE (VIT B1) 100 MG
100 TABLET ORAL DAILY
Refills: 0 | Status: DISCONTINUED | OUTPATIENT
Start: 2023-10-26 | End: 2023-10-30

## 2023-10-26 RX ORDER — FAMOTIDINE 10 MG/ML
20 INJECTION INTRAVENOUS ONCE
Refills: 0 | Status: COMPLETED | OUTPATIENT
Start: 2023-10-26 | End: 2023-10-26

## 2023-10-26 RX ORDER — MAGNESIUM SULFATE 500 MG/ML
2 VIAL (ML) INJECTION ONCE
Refills: 0 | Status: COMPLETED | OUTPATIENT
Start: 2023-10-26 | End: 2023-10-26

## 2023-10-26 RX ORDER — FOLIC ACID 0.8 MG
1 TABLET ORAL DAILY
Refills: 0 | Status: DISCONTINUED | OUTPATIENT
Start: 2023-10-26 | End: 2023-10-30

## 2023-10-26 RX ORDER — SODIUM CHLORIDE 9 MG/ML
1000 INJECTION INTRAMUSCULAR; INTRAVENOUS; SUBCUTANEOUS ONCE
Refills: 0 | Status: COMPLETED | OUTPATIENT
Start: 2023-10-26 | End: 2023-10-26

## 2023-10-26 RX ORDER — ACETAMINOPHEN 500 MG
675 TABLET ORAL ONCE
Refills: 0 | Status: COMPLETED | OUTPATIENT
Start: 2023-10-26 | End: 2023-10-26

## 2023-10-26 RX ORDER — SODIUM CHLORIDE 9 MG/ML
1000 INJECTION INTRAMUSCULAR; INTRAVENOUS; SUBCUTANEOUS
Refills: 0 | Status: DISCONTINUED | OUTPATIENT
Start: 2023-10-26 | End: 2023-10-29

## 2023-10-26 RX ORDER — FAMOTIDINE 10 MG/ML
20 INJECTION INTRAVENOUS ONCE
Refills: 0 | Status: DISCONTINUED | OUTPATIENT
Start: 2023-10-26 | End: 2023-10-26

## 2023-10-26 RX ORDER — THIAMINE MONONITRATE (VIT B1) 100 MG
100 TABLET ORAL ONCE
Refills: 0 | Status: COMPLETED | OUTPATIENT
Start: 2023-10-26 | End: 2023-10-26

## 2023-10-26 RX ORDER — DIAZEPAM 5 MG
2 TABLET ORAL ONCE
Refills: 0 | Status: DISCONTINUED | OUTPATIENT
Start: 2023-10-26 | End: 2023-10-26

## 2023-10-26 RX ADMIN — Medication 2 MILLIGRAM(S): at 15:46

## 2023-10-26 RX ADMIN — Medication 270 MILLIGRAM(S): at 15:10

## 2023-10-26 RX ADMIN — Medication 25 GRAM(S): at 15:45

## 2023-10-26 RX ADMIN — SODIUM CHLORIDE 100 MILLILITER(S): 9 INJECTION INTRAMUSCULAR; INTRAVENOUS; SUBCUTANEOUS at 21:56

## 2023-10-26 RX ADMIN — Medication 100 MILLIGRAM(S): at 15:45

## 2023-10-26 RX ADMIN — SODIUM CHLORIDE 1000 MILLILITER(S): 9 INJECTION INTRAMUSCULAR; INTRAVENOUS; SUBCUTANEOUS at 15:10

## 2023-10-26 RX ADMIN — FAMOTIDINE 20 MILLIGRAM(S): 10 INJECTION INTRAVENOUS at 19:55

## 2023-10-26 RX ADMIN — Medication 40 MILLIEQUIVALENT(S): at 22:24

## 2023-10-26 RX ADMIN — Medication 30 MILLILITER(S): at 23:21

## 2023-10-26 RX ADMIN — Medication 100 MILLIEQUIVALENT(S): at 20:13

## 2023-10-26 RX ADMIN — Medication 40 MILLIEQUIVALENT(S): at 20:10

## 2023-10-26 RX ADMIN — Medication 40 MILLIEQUIVALENT(S): at 21:28

## 2023-10-26 RX ADMIN — Medication 40 MILLIEQUIVALENT(S): at 18:52

## 2023-10-26 RX ADMIN — ALBUTEROL 2.5 MILLIGRAM(S): 90 AEROSOL, METERED ORAL at 15:10

## 2023-10-26 RX ADMIN — Medication 100 MILLIEQUIVALENT(S): at 18:53

## 2023-10-26 NOTE — H&P ADULT - PROBLEM SELECTOR PLAN 4
Last drink  Usually drinks   - c/w symptom triggered CIWA  - c/w electrolyte correction  on EKG   Likely i/s/o electrolyte derangements (hypokalemia)   - avoid antiemetics that prolong QTC  - c/w telemetry monitoring   - c/w electrolyte repletions

## 2023-10-26 NOTE — ED PROVIDER NOTE - CLINICAL SUMMARY MEDICAL DECISION MAKING FREE TEXT BOX
nelso43-year-old female no significant past medical history who presents with 4 days of chest pain followed by persistent nausea vomiting nonbilious nonbloody unable to tolerate p.o. patient denies any drug use no recent travel patient room endorses tactile temperature and shaking chills positive nonproductive cough no abdominal pain no diarrhea no family history of DVT PE or ACS no sick contacts patient PERC negative LMP current likely severe dehydration will rule out possible electrolyte abnormalities patient's EKG nonischemic however QTc close to 600 will avoid medications like Zithromax and Zofran chest x-ray to evaluate for infiltrate we will swab for flu and COVID will reevaluate after labs and imaging nelso43-year-old female no significant past medical history who presents with 4 days of chest pain followed by persistent nausea vomiting nonbilious nonbloody unable to tolerate p.o. patient denies any drug use no recent travel patient room endorses tactile temperature and shaking chills positive nonproductive cough no abdominal pain no diarrhea no family history of DVT PE or ACS no sick contacts patient PERC negative LMP current likely severe dehydration will rule out possible electrolyte abnormalities patient's EKG nonischemic however QTc close to 600 will avoid medications like Zithromax and Zofran chest x-ray to evaluate for infiltrate  pt w bl scattered wheeze no ho rad we will swab for flu and COVID will reevaluate after labs and imaging nelso43-year-old female no significant past medical history who presents with 4 days of chest pain followed by persistent nausea vomiting nonbilious nonbloody unable to tolerate p.o. patient denies any drug use no recent travel patient room endorses tactile temperature and shaking chills positive nonproductive cough no abdominal pain no diarrhea no family history of DVT PE or ACS no sick contacts patient PERC negative LMP current likely severe dehydration will rule out possible electrolyte abnormalities patient's EKG nonischemic however QTc close to 600 will avoid medications like Zithromax and Zofran chest x-ray to evaluate for infiltrate  pt w bl scattered wheeze no ho rad we will swab for flu and COVID will reevaluate after labs and imaging further dw pt chonic etoh use stopped  5 days ago - may be mild withdrawal

## 2023-10-26 NOTE — H&P ADULT - PROBLEM SELECTOR PLAN 3
AG 27 Likely from contraction, has been vomiting for the last 3 days along with poor PO intake   pH 7.49, HCO3 36   - c/w IVF resuscitation

## 2023-10-26 NOTE — ED ADULT NURSE NOTE - CAS EDN DISCHARGE INTERVENTIONS
"    Subjective:     Encounter Date:02/28/2023      Patient ID: Flaco Yan is a 60 y.o. male.    Chief Complaint:  History of Present Illness 60-year-old white male with history of coronary disease hypertension hyperlipidemia and sleep apnea presents to office for follow-up.  Patient is currently stable without any symptoms of chest pain or shortness of breath at rest or exertion.  No complaint of any PND orthopnea.  No palpitation dizziness syncope he has some mild swelling of the feet but is taking meds regular.  He does not use a CPAP machine.  He does not smoke    The following portions of the patient's history were reviewed and updated as appropriate: allergies, current medications, past family history, past medical history, past social history, past surgical history and problem list.  Past Medical History:   Diagnosis Date   • Coronary artery disease    • GERD (gastroesophageal reflux disease)    • Hyperlipidemia    • Hypertension    • Obesity      Past Surgical History:   Procedure Laterality Date   • CARDIAC CATHETERIZATION     • CARDIAC SURGERY     • CORONARY ARTERY BYPASS GRAFT     • CORONARY STENT PLACEMENT     • FINGER SURGERY     • TOTAL KNEE ARTHROPLASTY Left 6/19/2020    Procedure: LEFT TOTAL KNEE;  Surgeon: Fernie Peoples II, MD;  Location: [facility];  Service: Orthopedics;  Laterality: Left;     /83   Pulse 86   Ht 177.8 cm (70\")   Wt (!) 141 kg (311 lb)   SpO2 97%   BMI 44.62 kg/m²   Family History   Problem Relation Age of Onset   • Stroke Mother    • Heart disease Mother    • Heart disease Father    • Heart disease Sister        Current Outpatient Medications:   •  amLODIPine (NORVASC) 10 MG tablet, Take 1 tablet by mouth Daily., Disp: , Rfl:   •  aspirin 325 MG tablet, Take 1 tablet by mouth Every Night. 6/12 @1400 Call out to Cecy at Dr Peoples office as to stopping ASA. Message left for her to call with order., Disp: , Rfl:   •  esomeprazole (nexIUM) 20 MG " capsule, Take 1 capsule by mouth Every Morning Before Breakfast., Disp: , Rfl:   •  metoprolol tartrate (LOPRESSOR) 50 MG tablet, TAKE ONE TABLET BY MOUTH TWICE A DAY, Disp: 180 tablet, Rfl: 0  •  rosuvastatin (CRESTOR) 5 MG tablet, Take 1 tablet by mouth Every Night for 90 days., Disp: 90 tablet, Rfl: 2  No Known Allergies  Social History     Socioeconomic History   • Marital status: Single   Tobacco Use   • Smoking status: Former     Types: Cigarettes     Quit date: 1999     Years since quittin.2   • Smokeless tobacco: Never   Vaping Use   • Vaping Use: Never used   Substance and Sexual Activity   • Alcohol use: No   • Drug use: No   • Sexual activity: Defer     Review of Systems   Constitutional: Positive for malaise/fatigue.   Cardiovascular: Positive for leg swelling. Negative for chest pain, dyspnea on exertion and palpitations.   Respiratory: Negative for cough and shortness of breath.    Gastrointestinal: Negative for abdominal pain, nausea and vomiting.   Neurological: Negative for dizziness, focal weakness, headaches, light-headedness and numbness.   All other systems reviewed and are negative.             Objective:     Constitutional:       Appearance: Well-developed.   Eyes:      General: No scleral icterus.     Conjunctiva/sclera: Conjunctivae normal.   HENT:      Head: Normocephalic and atraumatic.   Neck:      Vascular: No carotid bruit or JVD.   Pulmonary:      Effort: Pulmonary effort is normal.      Breath sounds: Normal breath sounds. No wheezing. No rales.   Cardiovascular:      Normal rate. Regular rhythm.   Pulses:     Intact distal pulses.   Abdominal:      General: Bowel sounds are normal.      Palpations: Abdomen is soft.   Musculoskeletal:      Cervical back: Normal range of motion and neck supple. Skin:     General: Skin is warm and dry.      Findings: No rash.   Neurological:      Mental Status: Alert.       Procedures    Lab Review:         MDM  1.  Coronary disease  Patient  had coronary artery bypass surgery x3 vessels with a LIMA to LAD and saphenous graft to the marginal branch and RCA and is currently stable on medications with normal V function  2.  Hypertension  Patient blood pressure currently stable on amlodipine and metoprolol  3.  Hyperlipidemia  Patient is on Crestor and the lipid levels are well within normal limits  4.  Sleep apnea  Patient has sleep apnea and does not use a CPAP machine      Patient's previous medical records, labs, and EKG were reviewed and discussed with the patient at today's visit.    IV intact

## 2023-10-26 NOTE — H&P ADULT - TIME BILLING
Need to interview and examine patient, discuss care and nature of alcohol withdrawal and addiction at length by bedside, provide counseling and reassurance, coordinate care, place orders, document, personally review imaging, ekg and review prior medical records

## 2023-10-26 NOTE — CONSULT NOTE ADULT - PROBLEM SELECTOR RECOMMENDATION 2
Hypochloremic metabolic alkalosis 2/2 vomiting.    Plan:  -Nausea control per primary team  -NS as above, UCl <20 indicating chloride-responsiveness   -Replete K detailed below

## 2023-10-26 NOTE — CONSULT NOTE ADULT - PROBLEM SELECTOR RECOMMENDATION 4
Likely due to poor PO intake and vomiting.    Plan:  -With serum K 2.3, total body K deficit at least 300-400meq. Given 80meq PO in ED and three 10meq K riders. Would give another 80meq PO tonight if tolerated  -Repleting K will also help with hyponatremia and alkalosis  -Ensure adequate Mag and phos levels as well  -Renal panel q4h    Ayaan Bonilla, DO  Nephrology Fellow  Feel free to contact me directly on TEAMS with any questions.  (After 5pm or on weekends, please call the on-call fellow).

## 2023-10-26 NOTE — H&P ADULT - NSHPLABSRESULTS_GEN_ALL_CORE
Personally reviewed labs, imaging, ekg                           12.4   9.50  )-----------( 86       ( 26 Oct 2023 15:34 )             36.7       10-26    127<L>  |  <70<L>  |  52<H>  ----------------------------<  137<H>  2.3<LL>   |  36<H>  |  6.34<H>    Ca    9.9      26 Oct 2023 15:34  Mg     1.6     10-26    TPro  9.9<H>  /  Alb  6.3<H>  /  TBili  0.6  /  DBili  x   /  AST  60<H>  /  ALT  57<H>  /  AlkPhos  108  10-26              Urinalysis Basic - ( 26 Oct 2023 15:34 )    Color: x / Appearance: x / SG: x / pH: x  Gluc: 137 mg/dL / Ketone: x  / Bili: x / Urobili: x   Blood: x / Protein: x / Nitrite: x   Leuk Esterase: x / RBC: x / WBC x   Sq Epi: x / Non Sq Epi: x / Bacteria: x            Lactate Trend            CAPILLARY BLOOD GLUCOSE      Personal interpretation EKG: Personally reviewed labs, imaging, ekg                           12.4   9.50  )-----------( 86       ( 26 Oct 2023 15:34 )             36.7       10-26    127<L>  |  <70<L>  |  52<H>  ----------------------------<  137<H>  2.3<LL>   |  36<H>  |  6.34<H>    Ca    9.9      26 Oct 2023 15:34  Mg     1.6     10-26    TPro  9.9<H>  /  Alb  6.3<H>  /  TBili  0.6  /  DBili  x   /  AST  60<H>  /  ALT  57<H>  /  AlkPhos  108  10-26              Urinalysis Basic - ( 26 Oct 2023 15:34 )    Color: x / Appearance: x / SG: x / pH: x  Gluc: 137 mg/dL / Ketone: x  / Bili: x / Urobili: x   Blood: x / Protein: x / Nitrite: x   Leuk Esterase: x / RBC: x / WBC x   Sq Epi: x / Non Sq Epi: x / Bacteria: x            Lactate Trend            CAPILLARY BLOOD GLUCOSE      Personal interpretation EKG: Sinus,

## 2023-10-26 NOTE — H&P ADULT - HISTORY OF PRESENT ILLNESS
43-year-old female no significant past medical history who presents with 4 days of chest pain followed by persistent nausea vomiting nonbilious nonbloody unable to tolerate p.o. 43-year-old female with alcohol misuse presents with nausea/vomiting and poor PO intake. Patient recently hospitalized in Aug for alcohol withdrawal, went to rehab and quit for 35 days. Reports that when she went home, she started drinking again because she was bored and living alone. Reports that she stopped drinking alcohol cold turkey about 5 days ago (usually drinks 1 pint of vodka daily) because she realized it was worsening her health. Two days later, she experienced nausea, vomiting, chills (although no documented fevers). Felt like she was having the flu. Reports that vomiting was non-bloody and mostly looked like brown food content. Has been having associated chest tightness, worse when she lays down in bed right after vomiting. No association with exertion. Also reported extreme weakness and brain fog, along with tremors. Denies seizures at home. Denied diarrhea/constipation, dysuria/hematuria. Pt also smokes 1/2 ppd cigarettes but no other concurrent substance use.     In ED, patient was hemodynamically stable and afebrile. Did have multiple lab abnormalities including hypokalemia, metabolic alkalosis, anion gap.

## 2023-10-26 NOTE — H&P ADULT - PROBLEM SELECTOR PLAN 1
94 SCr 6.34 --> 4.29 after 1L IVF (baseline 0.44 8/2023)   Improving with fluids, likely prerenal i/s/o poor PO intake   - bladder scan   - monitor Cr  - avoid nephrotoxic agents SCr 6.34 --> 4.29 after 1L IVF (baseline 0.44 8/2023)   Improving with fluids, FeNa 0.7% - likely prerenal i/s/o poor PO intake   - continue with NaCl IVF   - monitor Cr  - avoid nephrotoxic agents

## 2023-10-26 NOTE — ED ADULT NURSE NOTE - OBJECTIVE STATEMENT
1637 pt 43yf aox4 ambulatory self care c/o cp states syncopal episode 6 days ago w/ inj to rt side of her skull, noted whenever she drinks alcohol feels sweaty and chilly, inc anxiety recently w/ cp noted here for eval/vss able to verbalize concerns 1641 pt 43yf c/o cp w/ hx alcohol abuse/vss able to verbalize concerns, pending eval

## 2023-10-26 NOTE — H&P ADULT - NSHPSOCIALHISTORY_GEN_ALL_CORE
Patient reports that she used to work as a  but lost her job in May, causing her to drink more. Lives alone at home but has a sister who she is in close communication with

## 2023-10-26 NOTE — ED PROVIDER NOTE - CARE PLAN
1 Principal Discharge DX:	Acute chest pain  Secondary Diagnosis:	Nausea & vomiting  Secondary Diagnosis:	QT prolongation

## 2023-10-26 NOTE — H&P ADULT - ASSESSMENT
43-year-old female no significant past medical history who presents with 4 days of chest pain followed by persistent nausea vomiting nonbilious nonbloody unable to tolerate p.o. 43-year-old female no significant past medical history who presents with 4 days of chest pain followed by persistent nausea vomiting nonbilious nonbloody unable to tolerate p.o. presents with electrolyte derangements.  43-year-old female no significant past medical history who presents with 4 days of chest pain followed by persistent nausea vomiting unable to tolerate p.o. presents with new HANNAH and electrolyte derangements.  43-year-old female no significant past medical history who presents with 4 days of chest pain followed by persistent nausea and vomiting i/s/o acute alcohol withdrawal presents with new HANNAH and electrolyte derangements 43-year-old female no significant past medical history who presents with persistent nausea and vomiting i/s/o acute alcohol withdrawal presents with new HANNAH and electrolyte derangements

## 2023-10-26 NOTE — CONSULT NOTE ADULT - PROBLEM SELECTOR RECOMMENDATION 9
Likely prerenal iso poor PO intake, vomiting, and hypotension. Urine studies with Uosm 376, Frandy 25, UCl<20. but no evidence of retention. No kidney issues in the past. Already improving s/p 1L NS bolus in ED.    Plan:  -Change LR to NS @ 100cc/hr x 24 hours.  -Maintain strict Is+Os.  -Consider echo to r/o alcoholic cardiomyopathy given pt reporting persistent hypotension, although in this case more likely hypovolemia  -Renal panel q4h  -Avoid nephrotoxic agents

## 2023-10-26 NOTE — ED ADULT NURSE NOTE - NSFALLHARMRISKINTERV_ED_ALL_ED

## 2023-10-26 NOTE — PATIENT PROFILE ADULT - FALL HARM RISK - HARM RISK INTERVENTIONS

## 2023-10-26 NOTE — CONSULT NOTE ADULT - ASSESSMENT
This is a 43-year-old F with hx of alcohol abuse c/b multiple admissions for alcohol withdrawal (although never had seizures or ICU admission), who presented to the ED with 4 days of chest pain followed by persistent nausea, vomiting (nonbilious nonbloody), and inability to tolerate p.o. Nephrology service consulted for HANNAH. Upon presentation, patient was hypotensive with BP 91/66 and tachycardic , but afebrile. She reports she is hypotensive and tachycardic at baseline, however. Labs revealed serum Na 127, K 2.3, Cl<70, bicarb 36, Cr 6.35, BUN 52, mildly elevated LFTs, and serum Osm 283. PVR bladder scan showed 12cc urine, urine studies with Uosm 376, Frandy 25, UCl<20.

## 2023-10-26 NOTE — CONSULT NOTE ADULT - PROBLEM SELECTOR RECOMMENDATION 3
Serum osm 283, likely hypovolemic over beer potomania given urine studies.    Plan:  -NS as above  -Encourage PO solute intake  -Renal panel q4h

## 2023-10-26 NOTE — PATIENT PROFILE ADULT - VISION (WITH CORRECTIVE LENSES IF THE PATIENT USUALLY WEARS THEM):
Wears glasses for distance driving, watching TV/Normal vision: sees adequately in most situations; can see medication labels, newsprint

## 2023-10-26 NOTE — H&P ADULT - PROBLEM SELECTOR PLAN 2
Likely from contraction, has been vomiting for the last 3 days along with poor PO intake   pH 7.49, HCO3 36   - c/w IVF resuscitation Last drink 5 days ago   Usually drinks 1 pint of vodka per day   - c/w symptom triggered CIWA  - folic acid, thiamine, multivitamin   - SBIRT     #Smoking cessation   - nicotine patch Last drink 5 days ago, likely N/V all in the setting of acute withdrawal - sxs now improved   Usually drinks 1 pint of vodka per day   - c/w symptom triggered CIWA  - folic acid, thiamine, multivitamin   - SBIRT     #Smoking cessation   - nicotine patch

## 2023-10-26 NOTE — H&P ADULT - ATTENDING COMMENTS
I have reviewed the labs, imaging and ekg. EKG with NSR HR 93 QTc 594 markedly prolonged, peaked QRS, CXR w/o focal consolidations. Labs with K 2.3, Plt 86, Crt 4.29    43F no significant past medical history aside ETOH dependence w/ recent stay at rehab p/w nausea and vomiting i/s/o, thrombocytopenia, acute alcohol withdrawal after stopping drinking 4 days ago also has severe hypokalemia contributing to QTc prolongation and HANNAH likely pre-renal in nature. Appreciate nephrology recommendations.  -Cont. CIWA for now  -Advance diet as tolerated  -Replete electrolytes aggressively, Trend BMP and Mg Q6hrs until improvement  -IVF  -F/u nephrology recommendations  -GI ppx  -Limit QT prolonging meds  -Telemetry  -DVT PPx, SCDs

## 2023-10-26 NOTE — ED PROVIDER NOTE - OBJECTIVE STATEMENT
43-year-old female 43-year-old femalePast medical history alcohol abuse with history of admission for withdrawal last in August of this year presents complaining of chest pain, nausea vomiting inability to tolerate p.o. and fevers.  States last EtOH use was 5 days ago. States that in this time she has also had a cough.   Denies recent travel, calf pain, history of DVT.

## 2023-10-26 NOTE — H&P ADULT - PROBLEM SELECTOR PLAN 6
DVT PPx: heparin subq  Diet: Regular  Dispo: Medically active    Code Status: Full Code Plts 80s likely i/s/o marrow suppression/alcohol related   - f/u daily CBC

## 2023-10-26 NOTE — H&P ADULT - PROBLEM SELECTOR PLAN 5
DVT PPx: Lovenox 40  Diet: Regular  Dispo: Medically active    Code Status: Full Code AG 27, possibly i/s/o alcohol use   Lactate 1.3   Denies concurrent substance use, denies salicylates AG 27, possibly i/s/o alcohol use vs starvation ketoacidosis   Lactate 1.3   Denies concurrent substance use, denies salicylates

## 2023-10-26 NOTE — ED ADULT TRIAGE NOTE - CHIEF COMPLAINT QUOTE
since Monday and vomiting   pt hasnot yessi able to eat or drink for 3 day because of the vomiting and has chest pain also chest pain first

## 2023-10-26 NOTE — H&P ADULT - NSHPPHYSICALEXAM_GEN_ALL_CORE
LOS:     VITALS:   T(C): 36.8 (10-26-23 @ 20:34), Max: 36.8 (10-26-23 @ 13:32)  HR: 80 (10-26-23 @ 20:34) (80 - 110)  BP: 150/89 (10-26-23 @ 20:34) (91/66 - 150/89)  RR: 18 (10-26-23 @ 20:34) (16 - 18)  SpO2: 99% (10-26-23 @ 20:34) (98% - 100%)    GENERAL: NAD, lying in bed comfortably  EYES: EOMI, PERRLA  CHEST/LUNG: Clear to auscultation bilaterally;   HEART: Regular rate and rhythm; No murmurs, rubs, or gallops  ABDOMEN: BSx4; Soft, nontender, nondistended  EXTREMITIES:  2+ Peripheral Pulses, brisk capillary refill. No clubbing, cyanosis, or edema  NERVOUS SYSTEM:  A&Ox3, no focal deficits, no tremor

## 2023-10-26 NOTE — ED PROVIDER NOTE - PROGRESS NOTE DETAILS
Patient is low risk for pulmonalry embolism.  The Perc score makes it highly unlikely for the diagnosis of PE, no other diagnostic test is required. pt ho etoh abuse with qtc prolong will give empoic mag Tutu Perez DO (PGY-1) Cr of 6.7 given fluid Tutu Peerz, DO (PGY-1) Cr of 6.7 given fluid nephro consulted. Unclear if urinary retention patient states she has been urinating

## 2023-10-27 DIAGNOSIS — D69.6 THROMBOCYTOPENIA, UNSPECIFIED: ICD-10-CM

## 2023-10-27 DIAGNOSIS — R13.10 DYSPHAGIA, UNSPECIFIED: ICD-10-CM

## 2023-10-27 LAB
ALBUMIN SERPL ELPH-MCNC: 4.7 G/DL — SIGNIFICANT CHANGE UP (ref 3.3–5)
ALBUMIN SERPL ELPH-MCNC: 4.7 G/DL — SIGNIFICANT CHANGE UP (ref 3.3–5)
ALP SERPL-CCNC: 94 U/L — SIGNIFICANT CHANGE UP (ref 40–120)
ALP SERPL-CCNC: 94 U/L — SIGNIFICANT CHANGE UP (ref 40–120)
ALT FLD-CCNC: 46 U/L — HIGH (ref 10–45)
ALT FLD-CCNC: 46 U/L — HIGH (ref 10–45)
ANION GAP SERPL CALC-SCNC: 14 MMOL/L — SIGNIFICANT CHANGE UP (ref 5–17)
ANION GAP SERPL CALC-SCNC: 17 MMOL/L — SIGNIFICANT CHANGE UP (ref 5–17)
ANION GAP SERPL CALC-SCNC: 17 MMOL/L — SIGNIFICANT CHANGE UP (ref 5–17)
AST SERPL-CCNC: 75 U/L — HIGH (ref 10–40)
AST SERPL-CCNC: 75 U/L — HIGH (ref 10–40)
BASE EXCESS BLDV CALC-SCNC: 5.5 MMOL/L — HIGH (ref -2–3)
BASE EXCESS BLDV CALC-SCNC: 5.5 MMOL/L — HIGH (ref -2–3)
BILIRUB SERPL-MCNC: 0.5 MG/DL — SIGNIFICANT CHANGE UP (ref 0.2–1.2)
BILIRUB SERPL-MCNC: 0.5 MG/DL — SIGNIFICANT CHANGE UP (ref 0.2–1.2)
BUN SERPL-MCNC: 32 MG/DL — HIGH (ref 7–23)
BUN SERPL-MCNC: 32 MG/DL — HIGH (ref 7–23)
BUN SERPL-MCNC: 38 MG/DL — HIGH (ref 7–23)
BUN SERPL-MCNC: 38 MG/DL — HIGH (ref 7–23)
BUN SERPL-MCNC: 40 MG/DL — HIGH (ref 7–23)
BUN SERPL-MCNC: 40 MG/DL — HIGH (ref 7–23)
CALCIUM SERPL-MCNC: 7.8 MG/DL — LOW (ref 8.4–10.5)
CALCIUM SERPL-MCNC: 7.8 MG/DL — LOW (ref 8.4–10.5)
CALCIUM SERPL-MCNC: 8.2 MG/DL — LOW (ref 8.4–10.5)
CALCIUM SERPL-MCNC: 8.2 MG/DL — LOW (ref 8.4–10.5)
CALCIUM SERPL-MCNC: 8.3 MG/DL — LOW (ref 8.4–10.5)
CALCIUM SERPL-MCNC: 8.3 MG/DL — LOW (ref 8.4–10.5)
CHLORIDE SERPL-SCNC: 84 MMOL/L — LOW (ref 96–108)
CHLORIDE SERPL-SCNC: 84 MMOL/L — LOW (ref 96–108)
CHLORIDE SERPL-SCNC: 88 MMOL/L — LOW (ref 96–108)
CHLORIDE SERPL-SCNC: 88 MMOL/L — LOW (ref 96–108)
CHLORIDE SERPL-SCNC: 94 MMOL/L — LOW (ref 96–108)
CHLORIDE SERPL-SCNC: 94 MMOL/L — LOW (ref 96–108)
CO2 BLDV-SCNC: 33 MMOL/L — HIGH (ref 22–26)
CO2 BLDV-SCNC: 33 MMOL/L — HIGH (ref 22–26)
CO2 SERPL-SCNC: 27 MMOL/L — SIGNIFICANT CHANGE UP (ref 22–31)
CO2 SERPL-SCNC: 32 MMOL/L — HIGH (ref 22–31)
CO2 SERPL-SCNC: 32 MMOL/L — HIGH (ref 22–31)
CREAT SERPL-MCNC: 1.12 MG/DL — SIGNIFICANT CHANGE UP (ref 0.5–1.3)
CREAT SERPL-MCNC: 1.12 MG/DL — SIGNIFICANT CHANGE UP (ref 0.5–1.3)
CREAT SERPL-MCNC: 1.96 MG/DL — HIGH (ref 0.5–1.3)
CREAT SERPL-MCNC: 1.96 MG/DL — HIGH (ref 0.5–1.3)
CREAT SERPL-MCNC: 2.71 MG/DL — HIGH (ref 0.5–1.3)
CREAT SERPL-MCNC: 2.71 MG/DL — HIGH (ref 0.5–1.3)
EGFR: 22 ML/MIN/1.73M2 — LOW
EGFR: 22 ML/MIN/1.73M2 — LOW
EGFR: 32 ML/MIN/1.73M2 — LOW
EGFR: 32 ML/MIN/1.73M2 — LOW
EGFR: 62 ML/MIN/1.73M2 — SIGNIFICANT CHANGE UP
EGFR: 62 ML/MIN/1.73M2 — SIGNIFICANT CHANGE UP
GAS PNL BLDV: SIGNIFICANT CHANGE UP
GAS PNL BLDV: SIGNIFICANT CHANGE UP
GLUCOSE SERPL-MCNC: 100 MG/DL — HIGH (ref 70–99)
GLUCOSE SERPL-MCNC: 100 MG/DL — HIGH (ref 70–99)
GLUCOSE SERPL-MCNC: 103 MG/DL — HIGH (ref 70–99)
GLUCOSE SERPL-MCNC: 103 MG/DL — HIGH (ref 70–99)
GLUCOSE SERPL-MCNC: 123 MG/DL — HIGH (ref 70–99)
GLUCOSE SERPL-MCNC: 123 MG/DL — HIGH (ref 70–99)
HCO3 BLDV-SCNC: 32 MMOL/L — HIGH (ref 22–29)
HCO3 BLDV-SCNC: 32 MMOL/L — HIGH (ref 22–29)
HCT VFR BLD CALC: 30.3 % — LOW (ref 34.5–45)
HCT VFR BLD CALC: 30.3 % — LOW (ref 34.5–45)
HGB BLD-MCNC: 10.1 G/DL — LOW (ref 11.5–15.5)
HGB BLD-MCNC: 10.1 G/DL — LOW (ref 11.5–15.5)
LACTATE BLDV-MCNC: 1.7 MMOL/L — SIGNIFICANT CHANGE UP (ref 0.5–2)
LACTATE BLDV-MCNC: 1.7 MMOL/L — SIGNIFICANT CHANGE UP (ref 0.5–2)
LIDOCAIN IGE QN: 91 U/L — HIGH (ref 7–60)
LIDOCAIN IGE QN: 91 U/L — HIGH (ref 7–60)
MAGNESIUM SERPL-MCNC: 2.2 MG/DL — SIGNIFICANT CHANGE UP (ref 1.6–2.6)
MAGNESIUM SERPL-MCNC: 2.2 MG/DL — SIGNIFICANT CHANGE UP (ref 1.6–2.6)
MAGNESIUM SERPL-MCNC: 2.6 MG/DL — SIGNIFICANT CHANGE UP (ref 1.6–2.6)
MCHC RBC-ENTMCNC: 24 PG — LOW (ref 27–34)
MCHC RBC-ENTMCNC: 24 PG — LOW (ref 27–34)
MCHC RBC-ENTMCNC: 33.3 GM/DL — SIGNIFICANT CHANGE UP (ref 32–36)
MCHC RBC-ENTMCNC: 33.3 GM/DL — SIGNIFICANT CHANGE UP (ref 32–36)
MCV RBC AUTO: 72 FL — LOW (ref 80–100)
MCV RBC AUTO: 72 FL — LOW (ref 80–100)
NRBC # BLD: 0 /100 WBCS — SIGNIFICANT CHANGE UP (ref 0–0)
NRBC # BLD: 0 /100 WBCS — SIGNIFICANT CHANGE UP (ref 0–0)
PCO2 BLDV: 51 MMHG — HIGH (ref 39–42)
PCO2 BLDV: 51 MMHG — HIGH (ref 39–42)
PH BLDV: 7.4 — SIGNIFICANT CHANGE UP (ref 7.32–7.43)
PH BLDV: 7.4 — SIGNIFICANT CHANGE UP (ref 7.32–7.43)
PHOSPHATE SERPL-MCNC: 0.9 MG/DL — CRITICAL LOW (ref 2.5–4.5)
PHOSPHATE SERPL-MCNC: 0.9 MG/DL — CRITICAL LOW (ref 2.5–4.5)
PHOSPHATE SERPL-MCNC: 2.1 MG/DL — LOW (ref 2.5–4.5)
PHOSPHATE SERPL-MCNC: 2.1 MG/DL — LOW (ref 2.5–4.5)
PHOSPHATE SERPL-MCNC: 2.2 MG/DL — LOW (ref 2.5–4.5)
PHOSPHATE SERPL-MCNC: 2.2 MG/DL — LOW (ref 2.5–4.5)
PLATELET # BLD AUTO: 74 K/UL — LOW (ref 150–400)
PLATELET # BLD AUTO: 74 K/UL — LOW (ref 150–400)
PO2 BLDV: 32 MMHG — SIGNIFICANT CHANGE UP (ref 25–45)
PO2 BLDV: 32 MMHG — SIGNIFICANT CHANGE UP (ref 25–45)
POTASSIUM SERPL-MCNC: 3.5 MMOL/L — SIGNIFICANT CHANGE UP (ref 3.5–5.3)
POTASSIUM SERPL-MCNC: 3.5 MMOL/L — SIGNIFICANT CHANGE UP (ref 3.5–5.3)
POTASSIUM SERPL-MCNC: 3.7 MMOL/L — SIGNIFICANT CHANGE UP (ref 3.5–5.3)
POTASSIUM SERPL-MCNC: 3.7 MMOL/L — SIGNIFICANT CHANGE UP (ref 3.5–5.3)
POTASSIUM SERPL-MCNC: 4 MMOL/L — SIGNIFICANT CHANGE UP (ref 3.5–5.3)
POTASSIUM SERPL-MCNC: 4 MMOL/L — SIGNIFICANT CHANGE UP (ref 3.5–5.3)
POTASSIUM SERPL-SCNC: 3.5 MMOL/L — SIGNIFICANT CHANGE UP (ref 3.5–5.3)
POTASSIUM SERPL-SCNC: 3.5 MMOL/L — SIGNIFICANT CHANGE UP (ref 3.5–5.3)
POTASSIUM SERPL-SCNC: 3.7 MMOL/L — SIGNIFICANT CHANGE UP (ref 3.5–5.3)
POTASSIUM SERPL-SCNC: 3.7 MMOL/L — SIGNIFICANT CHANGE UP (ref 3.5–5.3)
POTASSIUM SERPL-SCNC: 4 MMOL/L — SIGNIFICANT CHANGE UP (ref 3.5–5.3)
POTASSIUM SERPL-SCNC: 4 MMOL/L — SIGNIFICANT CHANGE UP (ref 3.5–5.3)
PROT SERPL-MCNC: 7.9 G/DL — SIGNIFICANT CHANGE UP (ref 6–8.3)
PROT SERPL-MCNC: 7.9 G/DL — SIGNIFICANT CHANGE UP (ref 6–8.3)
RBC # BLD: 4.21 M/UL — SIGNIFICANT CHANGE UP (ref 3.8–5.2)
RBC # BLD: 4.21 M/UL — SIGNIFICANT CHANGE UP (ref 3.8–5.2)
RBC # FLD: 14.6 % — HIGH (ref 10.3–14.5)
RBC # FLD: 14.6 % — HIGH (ref 10.3–14.5)
SAO2 % BLDV: 54.1 % — LOW (ref 67–88)
SAO2 % BLDV: 54.1 % — LOW (ref 67–88)
SODIUM SERPL-SCNC: 130 MMOL/L — LOW (ref 135–145)
SODIUM SERPL-SCNC: 130 MMOL/L — LOW (ref 135–145)
SODIUM SERPL-SCNC: 132 MMOL/L — LOW (ref 135–145)
SODIUM SERPL-SCNC: 132 MMOL/L — LOW (ref 135–145)
SODIUM SERPL-SCNC: 135 MMOL/L — SIGNIFICANT CHANGE UP (ref 135–145)
SODIUM SERPL-SCNC: 135 MMOL/L — SIGNIFICANT CHANGE UP (ref 135–145)
UUN UR-MCNC: 454 MG/DL — SIGNIFICANT CHANGE UP
UUN UR-MCNC: 454 MG/DL — SIGNIFICANT CHANGE UP
WBC # BLD: 5.82 K/UL — SIGNIFICANT CHANGE UP (ref 3.8–10.5)
WBC # BLD: 5.82 K/UL — SIGNIFICANT CHANGE UP (ref 3.8–10.5)
WBC # FLD AUTO: 5.82 K/UL — SIGNIFICANT CHANGE UP (ref 3.8–10.5)
WBC # FLD AUTO: 5.82 K/UL — SIGNIFICANT CHANGE UP (ref 3.8–10.5)

## 2023-10-27 PROCEDURE — 99222 1ST HOSP IP/OBS MODERATE 55: CPT | Mod: GC

## 2023-10-27 PROCEDURE — 76700 US EXAM ABDOM COMPLETE: CPT | Mod: 26

## 2023-10-27 PROCEDURE — 99222 1ST HOSP IP/OBS MODERATE 55: CPT

## 2023-10-27 PROCEDURE — 93306 TTE W/DOPPLER COMPLETE: CPT | Mod: 26

## 2023-10-27 PROCEDURE — 93356 MYOCRD STRAIN IMG SPCKL TRCK: CPT | Mod: 26

## 2023-10-27 PROCEDURE — 99232 SBSQ HOSP IP/OBS MODERATE 35: CPT | Mod: GC

## 2023-10-27 RX ORDER — SUCRALFATE 1 G
1 TABLET ORAL
Refills: 0 | Status: DISCONTINUED | OUTPATIENT
Start: 2023-10-27 | End: 2023-10-30

## 2023-10-27 RX ORDER — PANTOPRAZOLE SODIUM 20 MG/1
40 TABLET, DELAYED RELEASE ORAL EVERY 12 HOURS
Refills: 0 | Status: DISCONTINUED | OUTPATIENT
Start: 2023-10-27 | End: 2023-10-30

## 2023-10-27 RX ORDER — POTASSIUM PHOSPHATE, MONOBASIC POTASSIUM PHOSPHATE, DIBASIC 236; 224 MG/ML; MG/ML
15 INJECTION, SOLUTION INTRAVENOUS ONCE
Refills: 0 | Status: COMPLETED | OUTPATIENT
Start: 2023-10-27 | End: 2023-10-27

## 2023-10-27 RX ORDER — PANTOPRAZOLE SODIUM 20 MG/1
40 TABLET, DELAYED RELEASE ORAL DAILY
Refills: 0 | Status: DISCONTINUED | OUTPATIENT
Start: 2023-10-27 | End: 2023-10-27

## 2023-10-27 RX ORDER — DIPHENHYDRAMINE HYDROCHLORIDE AND LIDOCAINE HYDROCHLORIDE AND ALUMINUM HYDROXIDE AND MAGNESIUM HYDRO
15 KIT DAILY
Refills: 0 | Status: DISCONTINUED | OUTPATIENT
Start: 2023-10-27 | End: 2023-10-30

## 2023-10-27 RX ORDER — LANOLIN ALCOHOL/MO/W.PET/CERES
6 CREAM (GRAM) TOPICAL AT BEDTIME
Refills: 0 | Status: DISCONTINUED | OUTPATIENT
Start: 2023-10-27 | End: 2023-10-30

## 2023-10-27 RX ADMIN — Medication 1 PATCH: at 19:57

## 2023-10-27 RX ADMIN — Medication 1 GRAM(S): at 18:15

## 2023-10-27 RX ADMIN — Medication 6 MILLIGRAM(S): at 21:46

## 2023-10-27 RX ADMIN — Medication 1 MILLIGRAM(S): at 13:06

## 2023-10-27 RX ADMIN — PANTOPRAZOLE SODIUM 40 MILLIGRAM(S): 20 TABLET, DELAYED RELEASE ORAL at 18:15

## 2023-10-27 RX ADMIN — POTASSIUM PHOSPHATE, MONOBASIC POTASSIUM PHOSPHATE, DIBASIC 62.5 MILLIMOLE(S): 236; 224 INJECTION, SOLUTION INTRAVENOUS at 03:12

## 2023-10-27 RX ADMIN — PANTOPRAZOLE SODIUM 40 MILLIGRAM(S): 20 TABLET, DELAYED RELEASE ORAL at 08:11

## 2023-10-27 RX ADMIN — POTASSIUM PHOSPHATE, MONOBASIC POTASSIUM PHOSPHATE, DIBASIC 62.5 MILLIMOLE(S): 236; 224 INJECTION, SOLUTION INTRAVENOUS at 08:12

## 2023-10-27 RX ADMIN — Medication 1 GRAM(S): at 23:50

## 2023-10-27 RX ADMIN — Medication 1 TABLET(S): at 13:06

## 2023-10-27 RX ADMIN — SODIUM CHLORIDE 100 MILLILITER(S): 9 INJECTION INTRAMUSCULAR; INTRAVENOUS; SUBCUTANEOUS at 08:12

## 2023-10-27 RX ADMIN — Medication 1 PATCH: at 13:06

## 2023-10-27 RX ADMIN — Medication 100 MILLIGRAM(S): at 13:06

## 2023-10-27 NOTE — DIETITIAN INITIAL EVALUATION ADULT - ORAL INTAKE PTA/DIET HISTORY
Pt reports poor appetite/PO intake at baseline when she is drinking, eating 1 meal/day. In the 5 days PTA, unable to tolerate any PO intake. Was not following any therapeutic diet at baseline. Doesn't eat pork per preference.  Confirms NKFA. Endorses possible lactose intolerance, can't tolerate milk as of lately (rxn: upset stomach), but can tolerate cheese, yogurt, etc.

## 2023-10-27 NOTE — DIETITIAN INITIAL EVALUATION ADULT - PROBLEM SELECTOR PLAN 3
Likely from contraction, has been vomiting for the last 3 days along with poor PO intake   pH 7.49, HCO3 36   - c/w IVF resuscitation

## 2023-10-27 NOTE — DIETITIAN INITIAL EVALUATION ADULT - PERSON TAUGHT/METHOD
Discussed importance of adequate protein-energy consumption to meet estimated nutrient needs. Encouraged intake of small, frequent meals and oral nutrition supplements as tolerated. Encouraged intake of protein-rich foods first at mealtimes to help preserve lean muscle mass. Reviewed food choices that are high in protein. Pt noted with good comprehension and made aware RD remains available for further questions/concerns./verbal instruction/patient instructed

## 2023-10-27 NOTE — DIETITIAN INITIAL EVALUATION ADULT - NSFNSGIIOFT_GEN_A_CORE
Reports vomiting yesterday in ED. Denies constipation, diarrhea. Reports last BM 10/26. Pt not currently ordered for bowel regimen.

## 2023-10-27 NOTE — PROGRESS NOTE ADULT - ASSESSMENT
43-year-old female no significant past medical history who presents with persistent nausea and vomiting i/s/o acute alcohol withdrawal presents with new HANNAH and electrolyte derangements

## 2023-10-27 NOTE — DIETITIAN INITIAL EVALUATION ADULT - PHYSCIAL ASSESSMENT
Visual nutrition-focused physical examination conducted as pt lethargic at time of visit, wishing to rest. Visual findings noted.

## 2023-10-27 NOTE — PROGRESS NOTE ADULT - PROBLEM SELECTOR PLAN 6
Plts 80s likely i/s/o marrow suppression/alcohol related   - f/u daily CBC AG 27, possibly i/s/o alcohol use vs starvation ketoacidosis   - Lactate 1.3   - Denies concurrent substance use, denies salicylates  - AG resolved 10/27

## 2023-10-27 NOTE — CONSULT NOTE ADULT - SUBJECTIVE AND OBJECTIVE BOX
[facility] DIVISION OF KIDNEY DISEASES AND HYPERTENSION -- [phone]  -- INITIAL CONSULT NOTE  --------------------------------------------------------------------------------  HPI: This is a 43-year-old F with hx of alcohol abuse c/b multiple admissions for alcohol withdrawal (although never had seizures or ICU admission), who presented to the ED with 4 days of chest pain followed by persistent nausea, vomiting (nonbilious nonbloody), and inability to tolerate p.o. Nephrology service consulted for HANNAH. She reports drinking one pint of Vodka daily along with one gallon of water "to wash things out." She stated she has not eaten actual food for the past five days. She has been so nauseous that she vomits the water she drinks. Upon presentation, patient was hypotensive with BP 91/66 and tachycardic , but afebrile. She reports she is hypotensive and tachycardic at baseline, however. Labs revealed serum Na 127, K 2.3, Cl<70, bicarb 36, Cr 6.35, BUN 52, mildly elevated LFTs, and serum Osm 283. PVR bladder scan showed 12cc urine, urine studies with Uosm 376 and Frandy 25. In ED, given 1L NS and KCl supps.      PAST HISTORY  --------------------------------------------------------------------------------  PAST MEDICAL & SURGICAL HISTORY:  History of alcohol use disorder  1 pint of vodka/day      No significant past surgical history        FAMILY HISTORY:  FH: type 2 diabetes (Father)    FH: hypertension (Mother)      PAST SOCIAL HISTORY:    ALLERGIES & MEDICATIONS  --------------------------------------------------------------------------------  Allergies    No Known Allergies    Intolerances      Standing Inpatient Medications  lactated ringers. 1000 milliLiter(s) IV Continuous <Continuous>  potassium chloride  10 mEq/100 mL IVPB 10 milliEquivalent(s) IV Intermittent every 1 hour    PRN Inpatient Medications  LORazepam   Injectable 2 milliGRAM(s) IV Push every 2 hours PRN  LORazepam   Injectable 2 milliGRAM(s) IV Push every 1 hour PRN      REVIEW OF SYSTEMS  --------------------------------------------------------------------------------  Constitutional: No fevers/chills  HEENT: No HA, sore throat   Respiratory: No dyspnea, cough  Cardiovascular: +chest pain  Gastrointestinal: No abdominal pain, diarrhea, nausea, vomiting  Genitourinary: No dysuria, hematuria, urgency  Extremities: No edema  Skin: No rashes  Heme: No easy bruising or bleeding    All other systems were reviewed and are negative, except as noted.    VITALS  --------------------------------------------------------------------------------  T(C): 36.8 (10-26-23 @ 20:34), Max: 36.8 (10-26-23 @ 13:32)  HR: 80 (10-26-23 @ 20:34) (80 - 110)  BP: 150/89 (10-26-23 @ 20:34) (91/66 - 150/89)  RR: 18 (10-26-23 @ 20:34) (16 - 18)  SpO2: 99% (10-26-23 @ 20:34) (98% - 100%)  Wt(kg): --  Height (cm): 154.9 (10-26-23 @ 13:32)  Weight (kg): 45.4 (10-26-23 @ 13:32)  BMI (kg/m2): 18.9 (10-26-23 @ 13:32)  BSA (m2): 1.41 (10-26-23 @ 13:32)    PHYSICAL EXAM:  General: no acute distress  Neuro: no focal deficits  HEENT: anicteric, no JVD  Pulmonary: lungs CTA B/L  Cardiovascular/Chest: +S1S2, tachycardic  GI/Abdomen: soft, non-distended, non-tender, +bowel sounds  Extremities: no LE pitting edema  Skin: Warm and dry    LABS/STUDIES  --------------------------------------------------------------------------------              12.4   9.50  >-----------<  86       [10-26-23 @ 15:34]              36.7     128  |  73  |  48  ----------------------------<  121      [10-26-23 @ 20:03]  2.3   |  32  |  4.29        Ca     8.5     [10-26-23 @ 20:03]      Mg     2.9     [10-26-23 @ 20:03]      Phos  2.9     [10-26-23 @ 20:03]    TPro  8.3  /  Alb  5.0  /  TBili  0.5  /  DBili  x   /  AST  51  /  ALT  45  /  AlkPhos  91  [10-26-23 @ 20:03]        Serum Osmolality 283      [10-26-23 @ 20:03]    Creatinine Trend:  SCr 4.29 [10-26 @ 20:03]  SCr 6.34 [10-26 @ 15:34]    Urinalysis - [10-26-23 @ 20:32]      Color Yellow / Appearance Slightly Turbid / SG 1.019 / pH 5.5      Gluc 100 mg/dL / Ketone Small  / Bili Small / Urobili 3 mg/dL       Blood Moderate / Protein 300 mg/dL / Leuk Est Negative / Nitrite Negative      RBC  / WBC  / Hyaline  / Gran  / Sq Epi  / Non Sq Epi  / Bacteria       HBsAg Nonreact      [08-01-22 @ 07:45]  HCV 0.09, Nonreact      [08-01-22 @ 07:45]  HIV Nonreact      [07-30-22 @ 19:23]  
Chief Complaint:  Patient is a 43y old  Female who presents with a chief complaint of Chest pain    Per chart, 43-year-old female no significant past medical history who presents with persistent nausea and vomiting i/s/o acute alcohol withdrawal presents with new HANNAH and electrolyte derangements   (27 Oct 2023 09:09)      HPI:ARABELLA BHANDARI is a 43y Female with AUD with recurrent hospitalizations for alcohol withdrawal, s/p rehab with alcohol cessation for 35 days and subsequent resumption of 1 pint of vodka daily and stopped abruptly about 5 days prior to admission and now presents to the ED with a couple of days of severe nausea and NBNB vomiting and chills and chest tightness so she went to the ED, admitted for HANNAH/electrolyte derangements including hyponatremia/hypokalemia with metabolic acidosis. GI consulted for odynophagia.     Patient states that after repeated episodes of vomiting she started to have a very sore throat with pain with po intake. Denies sensation of food/liquids getting stuck or difficulty swallowing. No abdominal, bloody red/black stools, no diarrhea or constipation. Denies heavy use of NSAIDs. Smokes cigarettes. Denies fhx of GI cancer or other GI disease. No prior EGD/colonoscopy.     She has been HDS and afebrile. Hgb wnl on admission with decrease from 12.4 to 10 and decrease in white count from 9.5 to 5.8, PLT 74 from 86 after fluid. K 2 on admission with BUn 52/Cr 6 on admission. Cr now  1.12 and BUN 32. US revealed hepatic steatosis and GB sludge.         PMHX/PSHX:    History of alcohol use disorder      Allergies:  No Known Allergies      Home Medications: reviewed  Hospital Medications:  folic acid 1 milliGRAM(s) Oral daily  influenza   Vaccine 0.5 milliLiter(s) IntraMuscular once  LORazepam   Injectable 2 milliGRAM(s) IV Push every 2 hours PRN  LORazepam   Injectable 2 milliGRAM(s) IV Push every 1 hour PRN  melatonin 6 milliGRAM(s) Oral at bedtime  multivitamin 1 Tablet(s) Oral daily  nicotine -  14 mG/24Hr(s) Patch 1 Patch Transdermal daily  pantoprazole  Injectable 40 milliGRAM(s) IV Push daily  sodium chloride 0.9%. 1000 milliLiter(s) IV Continuous <Continuous>  sucralfate suspension 1 Gram(s) Oral four times a day  thiamine 100 milliGRAM(s) Oral daily      Social History:   Tob: active  EtOH: active  Illicit Drugs: Denies    Family history:  Family history of type II diabetes mellitus    FH: type 2 diabetes (Father)    FH: hypertension (Mother)      Denies family history of colon cancer/polyps, stomach cancer/polyps, pancreatic cancer/masses, liver cancer/disease, ovarian cancer and endometrial cancer.    ROS:   Complete and normal except as mentioned above.    PHYSICAL EXAM:   Vital Signs:  Vital Signs Last 24 Hrs  T(C): 36.7 (27 Oct 2023 11:18), Max: 36.9 (27 Oct 2023 00:47)  T(F): 98.1 (27 Oct 2023 11:18), Max: 98.5 (27 Oct 2023 00:47)  HR: 73 (27 Oct 2023 11:18) (61 - 86)  BP: 107/67 (27 Oct 2023 11:18) (103/70 - 150/89)  BP(mean): --  RR: 18 (27 Oct 2023 11:18) (16 - 18)  SpO2: 98% (27 Oct 2023 11:18) (98% - 100%)    Parameters below as of 27 Oct 2023 11:18  Patient On (Oxygen Delivery Method): room air      Daily     Daily     GENERAL: no acute distress  NEURO: aox3  HEENT: anicteric sclera, no conjunctival pallor appreciated  CHEST: no respiratory distress, no accessory muscle use  CARDIAC: regular rate  ABDOMEN: soft, non-tender, non-distended, no rebound or guarding  EXTREMITIES: warm, well perfused   SKIN: no lesions noted    LABS:                          10.1   5.82  )-----------( 74       ( 27 Oct 2023 06:28 )             30.3     10-27    135  |  94<L>  |  32<H>  ----------------------------<  103<H>  4.0   |  27  |  1.12    Ca    7.8<L>      27 Oct 2023 12:33  Phos  2.2     10-27  Mg     2.2     10-27    TPro  7.9  /  Alb  4.7  /  TBili  0.5  /  DBili  x   /  AST  75<H>  /  ALT  46<H>  /  AlkPhos  94  10-27    LIVER FUNCTIONS - ( 27 Oct 2023 06:28 )  Alb: 4.7 g/dL / Pro: 7.9 g/dL / ALK PHOS: 94 U/L / ALT: 46 U/L / AST: 75 U/L / GGT: x               Diagnostic Studies:   XR CHEST AP OR PA 1V   ORDERED BY: GIN TEJADA     PROCEDURE DATE:  10/26/2023          INTERPRETATION:  CLINICAL HISTORY: cough.    COMPARISON: Chest radiograph dated 8/21/2023.    TECHNIQUE: Portable frontal chest radiograph. Adequate positioning.    FINDINGS:    Support devices: None.    Cardiac/mediastinum/hilum: Cardiomediastinal silhouette  within normal   limits.    Lung parenchyma/Pleura: No focal parenchymal opacities, pleural   effusions, or pneumothorax. Normal pulmonary vasculature    Skeleton/soft tissues: No acute osseous abnormalities.      IMPRESSION:    No radiographic evidence of acute cardiopulmonary disease.    --- End of Report ---

## 2023-10-27 NOTE — CONSULT NOTE ADULT - ATTENDING COMMENTS
Agree with above assessment and plan. Patient seen, examined and discussed with the fellow.
I have seen this patient with the fellow and agree with their assessment and plan. I was physically present for significant portions of the evaluation and management (E/M) service provided.  I agree with the above history, physical, and plan which I have reviewed and edited where appropriate.    HANNAH likely pre renal improving    Evin Allen MD  Office   Contact me directly via [employer] Teams     (After 5 pm or on weekends please page the on-call fellow/attending, can check [email or web address] for schedule. Login is gus greenwood, schedule under [facility] medicine, psych, derm)

## 2023-10-27 NOTE — DIETITIAN INITIAL EVALUATION ADULT - OTHER INFO
Reports -105 lbs. Denies recent known wt loss. With wt gain since last admission in 08/2023.   Dosing wt: 100 lbs (10-26)  Wt history per chart: 109 lbs (10-27, RD obtained bedscale wt though likely overestimating true weight in setting of several blankets on bed), 95 lbs (08-21), 89.9 lbs (01-26). RD to continue to monitor weight trends as able/available.     Per chart, pt currently ordered for protonix, folic acid, a multivitamin, and thiamine in-house.

## 2023-10-27 NOTE — DIETITIAN INITIAL EVALUATION ADULT - NSFNSNUTRHOMESUPPLEMENTFT_GEN_A_CORE
Reporys use of folic acid, a multivitamin, and thiamine PTA, though states she ran out about 1 month ago and thus hasn't been taking them since. Reports her neighbor gave her Ensure shakes to try but was unable to tolerate due to V/ and pain in chest PTA.

## 2023-10-27 NOTE — PROGRESS NOTE ADULT - ATTENDING COMMENTS
Patient seen and examined the patient  Labs and vitals are reviewed     42 Y/O/F with no significant PMHx except  ETOH dependence had rehab and last drink 5 days ago admitted with nausea and vomiting/ Pre renal HANNAH/ Hyponatremia and metabolic alkalosis and acidosis   HANNAH and electrolyte abnormalities resolved   some midsternal chest pain after drinking liquid   PPI and Carafate   GI note appreciated  diet  CIWA   if needed consider  consultation   f/w CBC and BMP in AM  SW consulted

## 2023-10-27 NOTE — DIETITIAN INITIAL EVALUATION ADULT - PROBLEM SELECTOR PLAN 5
AG 27, possibly i/s/o alcohol use vs starvation ketoacidosis   Lactate 1.3   Denies concurrent substance use, denies salicylates

## 2023-10-27 NOTE — PROGRESS NOTE ADULT - PROBLEM SELECTOR PLAN 4
on EKG   Likely i/s/o electrolyte derangements (hypokalemia)   - avoid antiemetics that prolong QTC  - c/w telemetry monitoring   - c/w electrolyte repletions Last drink 5 days ago, likely N/V all in the setting of acute withdrawal - sxs now improved   Usually drinks 1 pint of vodka per day   - c/w symptom triggered CIWA  - folic acid, thiamine, multivitamin   - SBIRT; may consult psych given suspected depression      #Smoking cessation   - nicotine patch

## 2023-10-27 NOTE — DIETITIAN INITIAL EVALUATION ADULT - ENERGY INTAKE
Pt reports poor PO intake this AM, having pain in chest when eating. Is amenable to try oral nutrition supplements in-house though unsure if she will be able to tolerate much of it at this time. Pt has menu, reviewed meal ordering procedures to help ensure pt receives preferred food items.

## 2023-10-27 NOTE — PROGRESS NOTE ADULT - SUBJECTIVE AND OBJECTIVE BOX
*******************************  Yobani Mancia MD (PGY-1)  Internal Medicine  Contact via [employer] TEAMS  *******************************    PROGRESS NOTE:     Patient is a 43y old  Female who presents with a chief complaint of HANNAH/electrolyte disturbance (26 Oct 2023 19:48)      INTERVAL EVENTS: No acute overnight events.     SUBJECTIVE: Patient seen and examined at bedside. This morning, the patient is comfortable and doing well. No acute complaints. Denies fevers, chills, N/V/D, chest pain, SOB, abdominal pain.    MEDICATIONS  (STANDING):  folic acid 1 milliGRAM(s) Oral daily  influenza   Vaccine 0.5 milliLiter(s) IntraMuscular once  melatonin 6 milliGRAM(s) Oral at bedtime  multivitamin 1 Tablet(s) Oral daily  nicotine -  14 mG/24Hr(s) Patch 1 Patch Transdermal daily  pantoprazole  Injectable 40 milliGRAM(s) IV Push daily  sodium chloride 0.9%. 1000 milliLiter(s) (100 mL/Hr) IV Continuous <Continuous>  thiamine 100 milliGRAM(s) Oral daily    MEDICATIONS  (PRN):  LORazepam   Injectable 2 milliGRAM(s) IV Push every 2 hours PRN CIWA-Ar score increase by 2 points and a total score of 7 or less  LORazepam   Injectable 2 milliGRAM(s) IV Push every 1 hour PRN CIWA-Ar score 8 or greater      CAPILLARY BLOOD GLUCOSE        I&O's Summary      PHYSICAL EXAM:  Vital Signs Last 24 Hrs  T(C): 36.8 (27 Oct 2023 04:25), Max: 36.9 (27 Oct 2023 00:47)  T(F): 98.3 (27 Oct 2023 04:25), Max: 98.5 (27 Oct 2023 00:47)  HR: 68 (27 Oct 2023 04:25) (68 - 110)  BP: 103/70 (27 Oct 2023 04:25) (91/66 - 150/89)  BP(mean): --  RR: 18 (27 Oct 2023 04:25) (16 - 18)  SpO2: 100% (27 Oct 2023 04:25) (98% - 100%)    Parameters below as of 27 Oct 2023 04:25  Patient On (Oxygen Delivery Method): room air        GENERAL: NAD, lying in bed comfortably  HEAD: Atraumatic, normocephalic  EYES: EOMI, PERRLA  ENT: Moist mucous membranes  NECK: Supple, no JVD  HEART: S1, S2, Regular rate and rhythm, no murmurs, rubs, or gallops  LUNGS: Unlabored respirations, clear to auscultation bilaterally, no crackles, wheezing, or rhonchi  ABDOMEN: Soft, nontender, nondistended, +BS  EXTREMITIES: No clubbing, cyanosis, or edema  NERVOUS SYSTEM:  A&Ox3, no focal deficits   SKIN: No rashes or lesions    LABS:                        10.1   5.82  )-----------( 74       ( 27 Oct 2023 06:28 )             30.3     10-27    132<L>  |  88<L>  |  38<H>  ----------------------------<  100<H>  3.5   |  27  |  1.96<H>    Ca    8.2<L>      27 Oct 2023 06:28  Phos  2.1     10-27  Mg     2.6     10-27    TPro  7.9  /  Alb  4.7  /  TBili  0.5  /  DBili  x   /  AST  75<H>  /  ALT  46<H>  /  AlkPhos  94  10-27          Urinalysis Basic - ( 27 Oct 2023 06:28 )    Color: x / Appearance: x / SG: x / pH: x  Gluc: 100 mg/dL / Ketone: x  / Bili: x / Urobili: x   Blood: x / Protein: x / Nitrite: x   Leuk Esterase: x / RBC: x / WBC x   Sq Epi: x / Non Sq Epi: x / Bacteria: x          RADIOLOGY & ADDITIONAL TESTS:  Results Reviewed:   Imaging Personally Reviewed:  Electrocardiogram Personally Reviewed:  Tele: *******************************  Yobani Mancia MD (PGY-1)  Internal Medicine  Contact via [employer] TEAMS  *******************************    PROGRESS NOTE:     Patient is a 43y old  Female who presents with a chief complaint of HANNAH/electrolyte disturbance (26 Oct 2023 19:48)      INTERVAL EVENTS: No acute overnight events.     SUBJECTIVE: Patient seen and examined at bedside. Reports retrosternal and epigastric pain. Unable to tolerate PO. Denies increased anxiety, headache, visual tactile or auditory hallucinations. Denies fevers, chills, N/V/D, SOB, abdominal pain.    MEDICATIONS  (STANDING):  folic acid 1 milliGRAM(s) Oral daily  influenza   Vaccine 0.5 milliLiter(s) IntraMuscular once  melatonin 6 milliGRAM(s) Oral at bedtime  multivitamin 1 Tablet(s) Oral daily  nicotine -  14 mG/24Hr(s) Patch 1 Patch Transdermal daily  pantoprazole  Injectable 40 milliGRAM(s) IV Push daily  sodium chloride 0.9%. 1000 milliLiter(s) (100 mL/Hr) IV Continuous <Continuous>  thiamine 100 milliGRAM(s) Oral daily    MEDICATIONS  (PRN):  LORazepam   Injectable 2 milliGRAM(s) IV Push every 2 hours PRN CIWA-Ar score increase by 2 points and a total score of 7 or less  LORazepam   Injectable 2 milliGRAM(s) IV Push every 1 hour PRN CIWA-Ar score 8 or greater      CAPILLARY BLOOD GLUCOSE        I&O's Summary      PHYSICAL EXAM:  Vital Signs Last 24 Hrs  T(C): 36.8 (27 Oct 2023 04:25), Max: 36.9 (27 Oct 2023 00:47)  T(F): 98.3 (27 Oct 2023 04:25), Max: 98.5 (27 Oct 2023 00:47)  HR: 68 (27 Oct 2023 04:25) (68 - 110)  BP: 103/70 (27 Oct 2023 04:25) (91/66 - 150/89)  BP(mean): --  RR: 18 (27 Oct 2023 04:25) (16 - 18)  SpO2: 100% (27 Oct 2023 04:25) (98% - 100%)    Parameters below as of 27 Oct 2023 04:25  Patient On (Oxygen Delivery Method): room air        GENERAL: NAD, lying in bed comfortably  HEAD: Atraumatic, normocephalic  EYES: EOMI, PERRLA  ENT: Moist mucous membranes  HEART: S1, S2, Regular rate and rhythm, no murmurs, rubs, or gallops  LUNGS: Unlabored respirations, clear to auscultation bilaterally, no crackles, wheezing, or rhonchi  ABDOMEN: Soft, nontender, mild tenderness to palpation in LLQ and RLQ  EXTREMITIES: No edema  NERVOUS SYSTEM:  A&Ox3, no focal deficits, no tremor        LABS:                        10.1   5.82  )-----------( 74       ( 27 Oct 2023 06:28 )             30.3     10-27    132<L>  |  88<L>  |  38<H>  ----------------------------<  100<H>  3.5   |  27  |  1.96<H>    Ca    8.2<L>      27 Oct 2023 06:28  Phos  2.1     10-27  Mg     2.6     10-27    TPro  7.9  /  Alb  4.7  /  TBili  0.5  /  DBili  x   /  AST  75<H>  /  ALT  46<H>  /  AlkPhos  94  10-27          Urinalysis Basic - ( 27 Oct 2023 06:28 )    Color: x / Appearance: x / SG: x / pH: x  Gluc: 100 mg/dL / Ketone: x  / Bili: x / Urobili: x   Blood: x / Protein: x / Nitrite: x   Leuk Esterase: x / RBC: x / WBC x   Sq Epi: x / Non Sq Epi: x / Bacteria: x          RADIOLOGY & ADDITIONAL TESTS:  Results Reviewed:   Imaging Personally Reviewed:  Electrocardiogram Personally Reviewed:  Tele:

## 2023-10-27 NOTE — DIETITIAN INITIAL EVALUATION ADULT - REASON FOR ADMISSION
Chest pain    Per chart, 43-year-old female no significant past medical history who presents with persistent nausea and vomiting i/s/o acute alcohol withdrawal presents with new HANNAH and electrolyte derangements

## 2023-10-27 NOTE — DIETITIAN INITIAL EVALUATION ADULT - PERTINENT LABORATORY DATA
10-27    132<L>  |  88<L>  |  38<H>  ----------------------------<  100<H>  3.5   |  27  |  1.96<H>    Ca    8.2<L>      27 Oct 2023 06:28  Phos  2.1     10-27  Mg     2.6     10-27    TPro  7.9  /  Alb  4.7  /  TBili  0.5  /  DBili  x   /  AST  75<H>  /  ALT  46<H>  /  AlkPhos  94  10-27

## 2023-10-27 NOTE — DIETITIAN INITIAL EVALUATION ADULT - PROBLEM SELECTOR PLAN 1
SCr 6.34 --> 4.29 after 1L IVF (baseline 0.44 8/2023)   Improving with fluids, FeNa 0.7% - likely prerenal i/s/o poor PO intake   - continue with NaCl IVF   - monitor Cr  - avoid nephrotoxic agents

## 2023-10-27 NOTE — DIETITIAN INITIAL EVALUATION ADULT - PROBLEM SELECTOR PLAN 2
Last drink 5 days ago, likely N/V all in the setting of acute withdrawal - sxs now improved   Usually drinks 1 pint of vodka per day   - c/w symptom triggered CIWA  - folic acid, thiamine, multivitamin   - SBIRT     #Smoking cessation   - nicotine patch

## 2023-10-27 NOTE — PROGRESS NOTE ADULT - PROBLEM SELECTOR PLAN 2
Last drink 5 days ago, likely N/V all in the setting of acute withdrawal - sxs now improved   Usually drinks 1 pint of vodka per day   - c/w symptom triggered CIWA  - folic acid, thiamine, multivitamin   - SBIRT     #Smoking cessation   - nicotine patch Pt w/ epigastric/retrosternal pain following prolonged retching. Unable to tolerate PO. No hemoptysis. Suspect esophageal irritation 2/2 vomiting.   - GI consulted; f/u recs  - Start magic mouthwash  - Start carafate x7-10 days  - Start pantoprazole 40mg IV  days then po BID 6-8 weeks   - Per GI no indication for egd at this time; if sx persist in absence of recurrent vomiting may consider egd at that time

## 2023-10-27 NOTE — DIETITIAN INITIAL EVALUATION ADULT - OTHER CALCULATIONS
Fluid needs deferred to team. Energy and protein needs based on dosing wt: 100 lbs/45.4 kg (10-26, stated)

## 2023-10-27 NOTE — SBIRT NOTE ADULT - NSSBIRTALCPASSREFTXDET_GEN_A_CORE
LMSW met with patient who scored a 40 on the SBIRT screener. Patient risk is harmful use. LMSW discussed dangers/risks of her use. Specifically, combined with depression. Patient expressed understanding. LMSW offered referral, however, patient verbalized she is working with her sister to return to a facility that works best for her. Patient declined referral at this time.

## 2023-10-27 NOTE — DIETITIAN INITIAL EVALUATION ADULT - PERTINENT MEDS FT
MEDICATIONS  (STANDING):  folic acid 1 milliGRAM(s) Oral daily  influenza   Vaccine 0.5 milliLiter(s) IntraMuscular once  melatonin 6 milliGRAM(s) Oral at bedtime  multivitamin 1 Tablet(s) Oral daily  nicotine -  14 mG/24Hr(s) Patch 1 Patch Transdermal daily  pantoprazole  Injectable 40 milliGRAM(s) IV Push daily  sodium chloride 0.9%. 1000 milliLiter(s) (100 mL/Hr) IV Continuous <Continuous>  thiamine 100 milliGRAM(s) Oral daily    MEDICATIONS  (PRN):  LORazepam   Injectable 2 milliGRAM(s) IV Push every 2 hours PRN CIWA-Ar score increase by 2 points and a total score of 7 or less  LORazepam   Injectable 2 milliGRAM(s) IV Push every 1 hour PRN CIWA-Ar score 8 or greater

## 2023-10-27 NOTE — PROGRESS NOTE ADULT - PROBLEM SELECTOR PLAN 3
Likely from contraction, has been vomiting for the last 3 days along with poor PO intake   pH 7.49, HCO3 36   - c/w IVF resuscitation  on EKG   Likely i/s/o electrolyte derangements (hypokalemia)   - avoid antiemetics that prolong QTC  - c/w telemetry monitoring   - c/w electrolyte repletions

## 2023-10-27 NOTE — PROGRESS NOTE ADULT - PROBLEM SELECTOR PLAN 7
DVT PPx: SCDs  Diet: Regular  Dispo: Medically active    Code Status: Full Code Plts 80s likely i/s/o marrow suppression/alcohol related   - f/u daily CBC

## 2023-10-27 NOTE — DIETITIAN INITIAL EVALUATION ADULT - ADD RECOMMEND
1) Continue Regular diet.   2) Continue multivitamin, folic acid, and thiamine supplements, if not medically contraindicated.  3) Recommend Ensure Plus High Protein 1x/day to trial to help optimize protein-energy intake.   4) Monitor PO intake, GI tolerance, skin integrity, labs, weight, and bowel movement regularity.   5) Honor food preferences as feasible. Assist with meals PRN and encourage PO intake.  6) RD remains available upon request and will follow-up per protocol.  7) malnutrition/BMI <19 alert placed in chart

## 2023-10-27 NOTE — PROGRESS NOTE ADULT - PROBLEM SELECTOR PLAN 1
SCr 6.34 --> 4.29 after 1L IVF (baseline 0.44 8/2023)   Improving with fluids, FeNa 0.7% - likely prerenal i/s/o poor PO intake   - continue with NaCl IVF   - monitor Cr  - avoid nephrotoxic agents On presentation pt found to have SCr 6.34 --> 4.29 after 1L IVF (baseline 0.44 8/2023)   Improving with fluids, FeNa 0.7% - likely prerenal i/s/o poor PO intake   - continue with NaCl IVF   - monitor Cr  - avoid nephrotoxic agents  - Creat 1.12 today 10/27 after IVF

## 2023-10-27 NOTE — DIETITIAN INITIAL EVALUATION ADULT - PROBLEM SELECTOR PLAN 4
on EKG   Likely i/s/o electrolyte derangements (hypokalemia)   - avoid antiemetics that prolong QTC  - c/w telemetry monitoring   - c/w electrolyte repletions

## 2023-10-27 NOTE — CONSULT NOTE ADULT - ASSESSMENT
# Odynophagia  # AUD  Patient's sx started after repeated episodes of NBNB vomiting which has likely resulted in erosive esophagitis. No overt bleeding and no dysphagia. At this time endoscopic evaluation is not indicated, but she may benefit from medications to facilitate healing. Had in depth discussion with patient and informed her it will take time for the pain to completely resolve, but would medication, may begin to experience some improvement over the next several days.     Recommendations:  - No indication for endoscopic evaluation at this time  - PPI 40 IV BID while inpatient  - Transition to PPI 40 PO BID x 6-8 weeks  - Magic mouth wash  - Carafate, can continue for 7-10 days  - Follow up with GI outpatient to re-assess, if sx persist despite adequate therapy in absence of recurrent vomiting, may consider endoscopic evaluation at that time  - Antiemetics as needed  - Alcohol cessation; consider / services   - Please provide patient with Gastroenterology Clinic for outpatient follow up; [phone] ([facility] at [address] [place] [address]) or [phone] ([facility] at [address] [place] [address]).    Preliminary note until signed by Attending.    Thank you for involving us in this patient's care. Please reach out if any further questions or concerns. Signing off.    Елена Thacker MD  Gastroenterology/Hepatology Fellow, PGY-7    NON-URGENT CONSULTS:  Please email [email or web address]  AT NIGHT AND ON WEEKENDS:  Contact on-call GI fellow via answering service ([phone]) from 5pm-8am and on weekends/holidays  MONDAY-FRIDAY 8AM-5PM:  Pager: [phone] ([facility])  Pager: [phone] ([facility])   # Odynophagia after vomiting, poss. due to alcohol withdrawal   # AUD  Patient's sx started after repeated episodes of NBNB vomiting which has likely resulted in erosive esophagitis. No overt bleeding and no dysphagia. At this time endoscopic evaluation is not indicated, but she may benefit from medications to facilitate healing. Had in depth discussion with patient and informed her it will take time for the pain to completely resolve, but would medication, may begin to experience some improvement over the next several days.     Recommendations:  - No indication for endoscopic evaluation at this time  - PPI 40 IV BID for 2-3 days, then PO bid for 6-8 weeks empirically    - Magic mouth wash  - Carafate, can continue for 7-10 days  - Follow up with GI outpatient to re-assess, if sx persist despite adequate therapy in absence of recurrent vomiting, may consider endoscopic evaluation at that time  - Antiemetics as needed  - Alcohol cessation; consider / services   - Please provide patient with Gastroenterology Clinic for outpatient follow up; [phone] ([facility] at [address] [place] [address]) or [phone] ([facility] at [address] [place] [address]).    Preliminary note until signed by Attending.    Thank you for involving us in this patient's care. Please reach out if any further questions or concerns. Signing off.    Елена Thacker MD  Gastroenterology/Hepatology Fellow, PGY-7    NON-URGENT CONSULTS:  Please email [email or web address]  [email or web address]  AT NIGHT AND ON WEEKENDS:  Contact on-call GI fellow via answering service ([phone]) from 5pm-8am and on weekends/holidays  MONDAY-FRIDAY 8AM-5PM:  Pager: [phone] ([facility])  Pager: [phone] ([facility])

## 2023-10-28 ENCOUNTER — TRANSCRIPTION ENCOUNTER (OUTPATIENT)
Age: 43
End: 2023-10-28

## 2023-10-28 LAB
ALBUMIN SERPL ELPH-MCNC: 4.2 G/DL — SIGNIFICANT CHANGE UP (ref 3.3–5)
ALBUMIN SERPL ELPH-MCNC: 4.2 G/DL — SIGNIFICANT CHANGE UP (ref 3.3–5)
ALP SERPL-CCNC: 87 U/L — SIGNIFICANT CHANGE UP (ref 40–120)
ALP SERPL-CCNC: 87 U/L — SIGNIFICANT CHANGE UP (ref 40–120)
ALT FLD-CCNC: 57 U/L — HIGH (ref 10–45)
ALT FLD-CCNC: 57 U/L — HIGH (ref 10–45)
ANION GAP SERPL CALC-SCNC: 12 MMOL/L — SIGNIFICANT CHANGE UP (ref 5–17)
ANION GAP SERPL CALC-SCNC: 12 MMOL/L — SIGNIFICANT CHANGE UP (ref 5–17)
AST SERPL-CCNC: 103 U/L — HIGH (ref 10–40)
AST SERPL-CCNC: 103 U/L — HIGH (ref 10–40)
BASOPHILS # BLD AUTO: 0.03 K/UL — SIGNIFICANT CHANGE UP (ref 0–0.2)
BASOPHILS # BLD AUTO: 0.03 K/UL — SIGNIFICANT CHANGE UP (ref 0–0.2)
BASOPHILS NFR BLD AUTO: 0.5 % — SIGNIFICANT CHANGE UP (ref 0–2)
BASOPHILS NFR BLD AUTO: 0.5 % — SIGNIFICANT CHANGE UP (ref 0–2)
BILIRUB SERPL-MCNC: 0.4 MG/DL — SIGNIFICANT CHANGE UP (ref 0.2–1.2)
BILIRUB SERPL-MCNC: 0.4 MG/DL — SIGNIFICANT CHANGE UP (ref 0.2–1.2)
BUN SERPL-MCNC: 19 MG/DL — SIGNIFICANT CHANGE UP (ref 7–23)
BUN SERPL-MCNC: 19 MG/DL — SIGNIFICANT CHANGE UP (ref 7–23)
CALCIUM SERPL-MCNC: 8.4 MG/DL — SIGNIFICANT CHANGE UP (ref 8.4–10.5)
CALCIUM SERPL-MCNC: 8.4 MG/DL — SIGNIFICANT CHANGE UP (ref 8.4–10.5)
CHLORIDE SERPL-SCNC: 97 MMOL/L — SIGNIFICANT CHANGE UP (ref 96–108)
CHLORIDE SERPL-SCNC: 97 MMOL/L — SIGNIFICANT CHANGE UP (ref 96–108)
CO2 SERPL-SCNC: 27 MMOL/L — SIGNIFICANT CHANGE UP (ref 22–31)
CO2 SERPL-SCNC: 27 MMOL/L — SIGNIFICANT CHANGE UP (ref 22–31)
CREAT SERPL-MCNC: 0.63 MG/DL — SIGNIFICANT CHANGE UP (ref 0.5–1.3)
CREAT SERPL-MCNC: 0.63 MG/DL — SIGNIFICANT CHANGE UP (ref 0.5–1.3)
EGFR: 112 ML/MIN/1.73M2 — SIGNIFICANT CHANGE UP
EGFR: 112 ML/MIN/1.73M2 — SIGNIFICANT CHANGE UP
EOSINOPHIL # BLD AUTO: 0.06 K/UL — SIGNIFICANT CHANGE UP (ref 0–0.5)
EOSINOPHIL # BLD AUTO: 0.06 K/UL — SIGNIFICANT CHANGE UP (ref 0–0.5)
EOSINOPHIL NFR BLD AUTO: 1.1 % — SIGNIFICANT CHANGE UP (ref 0–6)
EOSINOPHIL NFR BLD AUTO: 1.1 % — SIGNIFICANT CHANGE UP (ref 0–6)
GLUCOSE SERPL-MCNC: 121 MG/DL — HIGH (ref 70–99)
GLUCOSE SERPL-MCNC: 121 MG/DL — HIGH (ref 70–99)
HCT VFR BLD CALC: 29.3 % — LOW (ref 34.5–45)
HCT VFR BLD CALC: 29.3 % — LOW (ref 34.5–45)
HGB BLD-MCNC: 9.6 G/DL — LOW (ref 11.5–15.5)
HGB BLD-MCNC: 9.6 G/DL — LOW (ref 11.5–15.5)
IMM GRANULOCYTES NFR BLD AUTO: 0.2 % — SIGNIFICANT CHANGE UP (ref 0–0.9)
IMM GRANULOCYTES NFR BLD AUTO: 0.2 % — SIGNIFICANT CHANGE UP (ref 0–0.9)
LYMPHOCYTES # BLD AUTO: 2.13 K/UL — SIGNIFICANT CHANGE UP (ref 1–3.3)
LYMPHOCYTES # BLD AUTO: 2.13 K/UL — SIGNIFICANT CHANGE UP (ref 1–3.3)
LYMPHOCYTES # BLD AUTO: 38.2 % — SIGNIFICANT CHANGE UP (ref 13–44)
LYMPHOCYTES # BLD AUTO: 38.2 % — SIGNIFICANT CHANGE UP (ref 13–44)
MAGNESIUM SERPL-MCNC: 1.7 MG/DL — SIGNIFICANT CHANGE UP (ref 1.6–2.6)
MAGNESIUM SERPL-MCNC: 1.7 MG/DL — SIGNIFICANT CHANGE UP (ref 1.6–2.6)
MCHC RBC-ENTMCNC: 23.8 PG — LOW (ref 27–34)
MCHC RBC-ENTMCNC: 23.8 PG — LOW (ref 27–34)
MCHC RBC-ENTMCNC: 32.8 GM/DL — SIGNIFICANT CHANGE UP (ref 32–36)
MCHC RBC-ENTMCNC: 32.8 GM/DL — SIGNIFICANT CHANGE UP (ref 32–36)
MCV RBC AUTO: 72.5 FL — LOW (ref 80–100)
MCV RBC AUTO: 72.5 FL — LOW (ref 80–100)
MONOCYTES # BLD AUTO: 0.59 K/UL — SIGNIFICANT CHANGE UP (ref 0–0.9)
MONOCYTES # BLD AUTO: 0.59 K/UL — SIGNIFICANT CHANGE UP (ref 0–0.9)
MONOCYTES NFR BLD AUTO: 10.6 % — SIGNIFICANT CHANGE UP (ref 2–14)
MONOCYTES NFR BLD AUTO: 10.6 % — SIGNIFICANT CHANGE UP (ref 2–14)
MRSA PCR RESULT.: SIGNIFICANT CHANGE UP
MRSA PCR RESULT.: SIGNIFICANT CHANGE UP
NEUTROPHILS # BLD AUTO: 2.76 K/UL — SIGNIFICANT CHANGE UP (ref 1.8–7.4)
NEUTROPHILS # BLD AUTO: 2.76 K/UL — SIGNIFICANT CHANGE UP (ref 1.8–7.4)
NEUTROPHILS NFR BLD AUTO: 49.4 % — SIGNIFICANT CHANGE UP (ref 43–77)
NEUTROPHILS NFR BLD AUTO: 49.4 % — SIGNIFICANT CHANGE UP (ref 43–77)
NRBC # BLD: 0 /100 WBCS — SIGNIFICANT CHANGE UP (ref 0–0)
NRBC # BLD: 0 /100 WBCS — SIGNIFICANT CHANGE UP (ref 0–0)
PHOSPHATE SERPL-MCNC: 1.1 MG/DL — LOW (ref 2.5–4.5)
PHOSPHATE SERPL-MCNC: 1.1 MG/DL — LOW (ref 2.5–4.5)
PLATELET # BLD AUTO: 91 K/UL — LOW (ref 150–400)
PLATELET # BLD AUTO: 91 K/UL — LOW (ref 150–400)
POTASSIUM SERPL-MCNC: 3.3 MMOL/L — LOW (ref 3.5–5.3)
POTASSIUM SERPL-MCNC: 3.3 MMOL/L — LOW (ref 3.5–5.3)
POTASSIUM SERPL-SCNC: 3.3 MMOL/L — LOW (ref 3.5–5.3)
POTASSIUM SERPL-SCNC: 3.3 MMOL/L — LOW (ref 3.5–5.3)
PROT SERPL-MCNC: 7 G/DL — SIGNIFICANT CHANGE UP (ref 6–8.3)
PROT SERPL-MCNC: 7 G/DL — SIGNIFICANT CHANGE UP (ref 6–8.3)
RBC # BLD: 4.04 M/UL — SIGNIFICANT CHANGE UP (ref 3.8–5.2)
RBC # BLD: 4.04 M/UL — SIGNIFICANT CHANGE UP (ref 3.8–5.2)
RBC # FLD: 14.7 % — HIGH (ref 10.3–14.5)
RBC # FLD: 14.7 % — HIGH (ref 10.3–14.5)
S AUREUS DNA NOSE QL NAA+PROBE: SIGNIFICANT CHANGE UP
S AUREUS DNA NOSE QL NAA+PROBE: SIGNIFICANT CHANGE UP
SODIUM SERPL-SCNC: 136 MMOL/L — SIGNIFICANT CHANGE UP (ref 135–145)
SODIUM SERPL-SCNC: 136 MMOL/L — SIGNIFICANT CHANGE UP (ref 135–145)
WBC # BLD: 5.58 K/UL — SIGNIFICANT CHANGE UP (ref 3.8–10.5)
WBC # BLD: 5.58 K/UL — SIGNIFICANT CHANGE UP (ref 3.8–10.5)
WBC # FLD AUTO: 5.58 K/UL — SIGNIFICANT CHANGE UP (ref 3.8–10.5)
WBC # FLD AUTO: 5.58 K/UL — SIGNIFICANT CHANGE UP (ref 3.8–10.5)

## 2023-10-28 PROCEDURE — 99232 SBSQ HOSP IP/OBS MODERATE 35: CPT | Mod: GC

## 2023-10-28 RX ORDER — SUCRALFATE 1 G
10 TABLET ORAL
Qty: 0 | Refills: 0 | DISCHARGE
Start: 2023-10-28

## 2023-10-28 RX ORDER — FOLIC ACID 0.8 MG
1 TABLET ORAL
Qty: 0 | Refills: 0 | DISCHARGE
Start: 2023-10-28

## 2023-10-28 RX ORDER — BENZOCAINE AND MENTHOL 5; 1 G/100ML; G/100ML
1 LIQUID ORAL ONCE
Refills: 0 | Status: COMPLETED | OUTPATIENT
Start: 2023-10-28 | End: 2023-10-28

## 2023-10-28 RX ORDER — SODIUM CHLORIDE 9 MG/ML
1000 INJECTION INTRAMUSCULAR; INTRAVENOUS; SUBCUTANEOUS
Refills: 0 | Status: DISCONTINUED | OUTPATIENT
Start: 2023-10-28 | End: 2023-10-29

## 2023-10-28 RX ORDER — CHLORHEXIDINE GLUCONATE 213 G/1000ML
1 SOLUTION TOPICAL DAILY
Refills: 0 | Status: DISCONTINUED | OUTPATIENT
Start: 2023-10-28 | End: 2023-10-30

## 2023-10-28 RX ORDER — NICOTINE POLACRILEX 2 MG
0 GUM BUCCAL
Qty: 0 | Refills: 0 | DISCHARGE
Start: 2023-10-28

## 2023-10-28 RX ORDER — POTASSIUM PHOSPHATE, MONOBASIC POTASSIUM PHOSPHATE, DIBASIC 236; 224 MG/ML; MG/ML
30 INJECTION, SOLUTION INTRAVENOUS ONCE
Refills: 0 | Status: COMPLETED | OUTPATIENT
Start: 2023-10-28 | End: 2023-10-28

## 2023-10-28 RX ORDER — THIAMINE MONONITRATE (VIT B1) 100 MG
1 TABLET ORAL
Qty: 0 | Refills: 0 | DISCHARGE
Start: 2023-10-28

## 2023-10-28 RX ADMIN — POTASSIUM PHOSPHATE, MONOBASIC POTASSIUM PHOSPHATE, DIBASIC 83.33 MILLIMOLE(S): 236; 224 INJECTION, SOLUTION INTRAVENOUS at 08:46

## 2023-10-28 RX ADMIN — BENZOCAINE AND MENTHOL 1 LOZENGE: 5; 1 LIQUID ORAL at 22:29

## 2023-10-28 RX ADMIN — CHLORHEXIDINE GLUCONATE 1 APPLICATION(S): 213 SOLUTION TOPICAL at 12:06

## 2023-10-28 RX ADMIN — Medication 1 MILLIGRAM(S): at 12:05

## 2023-10-28 RX ADMIN — PANTOPRAZOLE SODIUM 40 MILLIGRAM(S): 20 TABLET, DELAYED RELEASE ORAL at 05:02

## 2023-10-28 RX ADMIN — BENZOCAINE AND MENTHOL 1 LOZENGE: 5; 1 LIQUID ORAL at 05:02

## 2023-10-28 RX ADMIN — SODIUM CHLORIDE 100 MILLILITER(S): 9 INJECTION INTRAMUSCULAR; INTRAVENOUS; SUBCUTANEOUS at 22:29

## 2023-10-28 RX ADMIN — Medication 1 GRAM(S): at 05:02

## 2023-10-28 RX ADMIN — Medication 1 GRAM(S): at 22:32

## 2023-10-28 RX ADMIN — Medication 1 GRAM(S): at 12:05

## 2023-10-28 RX ADMIN — Medication 1 GRAM(S): at 17:05

## 2023-10-28 RX ADMIN — DIPHENHYDRAMINE HYDROCHLORIDE AND LIDOCAINE HYDROCHLORIDE AND ALUMINUM HYDROXIDE AND MAGNESIUM HYDRO 15 MILLILITER(S): KIT at 12:04

## 2023-10-28 RX ADMIN — Medication 1 PATCH: at 05:40

## 2023-10-28 RX ADMIN — PANTOPRAZOLE SODIUM 40 MILLIGRAM(S): 20 TABLET, DELAYED RELEASE ORAL at 17:05

## 2023-10-28 RX ADMIN — Medication 1 PATCH: at 12:05

## 2023-10-28 RX ADMIN — Medication 1 TABLET(S): at 12:04

## 2023-10-28 RX ADMIN — Medication 6 MILLIGRAM(S): at 21:16

## 2023-10-28 RX ADMIN — Medication 100 MILLIGRAM(S): at 12:04

## 2023-10-28 RX ADMIN — Medication 1 PATCH: at 21:03

## 2023-10-28 NOTE — PROGRESS NOTE ADULT - PROBLEM SELECTOR PLAN 4
Last drink 5 days ago, likely N/V all in the setting of acute withdrawal - sxs now improved   Usually drinks 1 pint of vodka per day   - c/w symptom triggered CIWA  - folic acid, thiamine, multivitamin   - SBIRT; may consult psych given suspected depression      #Smoking cessation   - nicotine patch Last drink 5 days ago, likely N/V all in the setting of acute withdrawal - sxs now improved   Usually drinks 1 pint of vodka per day   - c/w symptom triggered CIWA  - folic acid, thiamine, multivitamin   - SBIRT; suspect depression contributing. Pt reports family member setting up psych outpt.       #Smoking cessation   - nicotine patch

## 2023-10-28 NOTE — DISCHARGE NOTE PROVIDER - NSDCCPTREATMENT_GEN_ALL_CORE_FT
PRINCIPAL PROCEDURE  Procedure: Complete abdominal ultrasound  Findings and Treatment: IMPRESSION:  Hepatic steatosis.  Gallbladder sludge, without evidence of cholelithiasis or cholecystitis.

## 2023-10-28 NOTE — PROGRESS NOTE ADULT - PROBLEM SELECTOR PLAN 6
AG 27, possibly i/s/o alcohol use vs starvation ketoacidosis   - Lactate 1.3   - Denies concurrent substance use, denies salicylates  - AG resolved 10/27

## 2023-10-28 NOTE — DISCHARGE NOTE PROVIDER - NSFOLLOWUPCLINICS_GEN_ALL_ED_FT
Gastroenterology at [facility]  Gastroenterology  [address] [place] [address]  Phone: [phone]  Fax:     [email or web address]  Psychiatry  [address] [place] [id number]  Phone: [phone]  Fax:      Gastroenterology at [facility]  Gastroenterology  [address] [place] [address]  Phone: [phone]  Fax:   Follow Up Time: 1 week    [facility]  Psychiatry  [address] [place] [address]  Phone: [phone]  Fax:   Follow Up Time: 1 week

## 2023-10-28 NOTE — DISCHARGE NOTE PROVIDER - NSDCCPCAREPLAN_GEN_ALL_CORE_FT
PRINCIPAL DISCHARGE DIAGNOSIS  Diagnosis: HANNAH (acute kidney injury)  Assessment and Plan of Treatment: You came to the hospital with nasea and vomiting. You were found to have an acute kidney injury. This is a sudden drop in your kidney function. This mosty likely occured due to decreased food and water intake as well as vomiting. You were treated with intravenous fluids and the problem resolved.   Please follow up with your primary care provider withn the next 5-7 days.   Please return to the emergency department if you develope decreased urine output, altered mental status, loss of conciousness, palpitations, chest pain, shortness of breath.      SECONDARY DISCHARGE DIAGNOSES  Diagnosis: Alcohol use  Assessment and Plan of Treatment: You reported a history of significant alcohol use. You were counceled on the importance of abstenance from alcohol use and expressed a desire to stop drinking. You were treated with vitamens and monitored for signs of alcohol withdrawal.   Please follow up with psychiatry within the next 5-7 days to obtain additional resources for alcohol sessation and for evaluation for depression.   Please return to the emergency departement if you experince seizures, hallucinations, intractable vomiting, thoughs of self harm, suicidal ideations, chest pain, shortness of breath.    Diagnosis: Odynophagia  Assessment and Plan of Treatment: You reported odynophagia after prolonged vomiting. This is pain with swallowing. You also endoresed decreased ability to tolerate food and drink due to pain. You were seen by the gastroenterology team who reccomended pantoprazole, magic mouth wash, an Carafate. You still reported inablity to tolerate food and ....... Please continue to take pantoprazole 40mg twice per day for the next 6 weeks and carafate 4 times per day for the next xxxx days,   Please follow up with gastro enterology within the next 5-7 days.   Please return to the emergency department if you develope worsening of your chest/abdmoinal pain, iniability to tolerate food or drink, high fever, bloody vomit.     PRINCIPAL DISCHARGE DIAGNOSIS  Diagnosis: HANNAH (acute kidney injury)  Assessment and Plan of Treatment: You came to the hospital with nasea and vomiting. You were found to have an acute kidney injury. This is a sudden drop in your kidney function. This mosty likely occured due to decreased food and water intake as well as vomiting. You were treated with intravenous fluids and the problem resolved.   Please follow up with your primary care provider withn the next 5-7 days.   Please return to the emergency department if you develope decreased urine output, altered mental status, loss of conciousness, palpitations, chest pain, shortness of breath.      SECONDARY DISCHARGE DIAGNOSES  Diagnosis: Alcohol use  Assessment and Plan of Treatment: You reported a history of significant alcohol use. You were counceled on the importance of abstenance from alcohol use and expressed a desire to stop drinking. You were treated with vitamens and monitored for signs of alcohol withdrawal.   Please follow up with psychiatry within the next 5-7 days to obtain additional resources for alcohol sessation and for evaluation for depression.   Please return to the emergency departement if you experince seizures, hallucinations, intractable vomiting, thoughs of self harm, suicidal ideations, chest pain, shortness of breath.    Diagnosis: Odynophagia  Assessment and Plan of Treatment: You reported odynophagia after prolonged vomiting. This is pain with swallowing. You also endoresed decreased ability to tolerate food and drink due to pain. You were seen by the gastroenterology team who reccomended pantoprazole, magic mouth wash, an Carafate. You still reported inablity to tolerate food and this resolved during your stay. Please continue to take pantoprazole 40mg twice per day and carafate 4 times per day until you follow up with your outside doctors.  Please follow up with gastroenterology within the next 5-7 days.   Please return to the emergency department if you develope worsening of your chest/abdmoinal pain, iniability to tolerate food or drink, high fever, bloody vomit.     PRINCIPAL DISCHARGE DIAGNOSIS  Diagnosis: HANNAH (acute kidney injury)  Assessment and Plan of Treatment: You came to the hospital with nasea and vomiting. You were found to have an acute kidney injury. This is a sudden drop in your kidney function. This mosty likely occured due to decreased food and water intake as well as vomiting. You were treated with intravenous fluids and the problem resolved.   Please follow up with your primary care provider withn the next 5-7 days.   Please return to the emergency department if you develope decreased urine output, altered mental status, loss of conciousness, palpitations, chest pain, shortness of breath.      SECONDARY DISCHARGE DIAGNOSES  Diagnosis: Alcohol use  Assessment and Plan of Treatment: You reported a history of significant alcohol use. You were counceled on the importance of abstenance from alcohol use and expressed a desire to stop drinking. You were treated with vitamens and monitored for signs of alcohol withdrawal.   Please follow up with psychiatry within the next 5-7 days to obtain additional resources for alcohol sessation and for evaluation for depression. You need a repeat CMP at this time as well.  Please return to the emergency departement if you experince seizures, hallucinations, intractable vomiting, thoughs of self harm, suicidal ideations, chest pain, shortness of breath.    Diagnosis: Odynophagia  Assessment and Plan of Treatment: You reported odynophagia after prolonged vomiting. This is pain with swallowing. You also endoresed decreased ability to tolerate food and drink due to pain. You were seen by the gastroenterology team who reccomended pantoprazole, magic mouth wash, an Carafate. You still reported inablity to tolerate food and this resolved during your stay. Please continue to take pantoprazole 40mg twice per day and carafate 4 times per day until you follow up with your outside doctors.  Please follow up with gastroenterology within the next 5-7 days.   Please return to the emergency department if you develope worsening of your chest/abdmoinal pain, iniability to tolerate food or drink, high fever, bloody vomit.

## 2023-10-28 NOTE — PROGRESS NOTE ADULT - PROBLEM SELECTOR PLAN 2
Pt w/ epigastric/retrosternal pain following prolonged retching. Unable to tolerate PO. No hemoptysis. Suspect esophageal irritation 2/2 vomiting.   - GI consulted; f/u recs  - Start magic mouthwash  - Start carafate x7-10 days  - Start pantoprazole 40mg IV  days then po BID 6-8 weeks   - Per GI no indication for egd at this time; if sx persist in absence of recurrent vomiting may consider egd at that time

## 2023-10-28 NOTE — PROGRESS NOTE ADULT - PROBLEM SELECTOR PLAN 1
On presentation pt found to have SCr 6.34 --> 4.29 after 1L IVF (baseline 0.44 8/2023)   Improving with fluids, FeNa 0.7% - likely prerenal i/s/o poor PO intake   - continue with NaCl IVF   - monitor Cr  - avoid nephrotoxic agents  - Creat 1.12 today 10/27 after IVF On presentation pt found to have SCr 6.34 --> 4.29 after 1L IVF (baseline 0.44 8/2023)   Improving with fluids, FeNa 0.7% - likely prerenal i/s/o poor PO intake   - continue with NaCl IVF   - monitor Cr  - avoid nephrotoxic agents  - Creat 0.63 today 10/28 after IVF

## 2023-10-28 NOTE — PROGRESS NOTE ADULT - ASSESSMENT
INCOMPLETE # Odynophagia after vomiting, poss. due to alcohol withdrawal   # AUD  Patient's sx started after repeated episodes of NBNB vomiting which has likely resulted in erosive esophagitis. No overt bleeding and no dysphagia. At this time endoscopic evaluation is not indicated, but she may benefit from medications to facilitate healing. Had in depth discussion with patient and informed her it will take time for the pain to completely resolve, but would medication, may begin to experience some improvement over the next several days.   *symptoms stable 10/28. Able to swallow as per discussion 10/28 AM, but odynophagia making it difficult.    Recommendations:  - No indication for endoscopic evaluation at this time  - PPI 40 IV BID for 2-3 days while admitted, then PO bid for 6-8 weeks   - Magic mouth wash  - Carafate, can continue for 7-10 days  - Follow up with GI outpatient to re-assess, if sx persist despite adequate therapy in absence of recurrent vomiting, may consider endoscopic evaluation at that time  - Antiemetics as needed  - Alcohol cessation; consider / services   - Please provide patient with Gastroenterology Clinic for outpatient follow up; [phone] ([facility] at [address] [place] [address]) or [phone] ([facility] at [address] [place] [address]).  -GI to signoff      All recommendations preliminary until note signed by service attending.    Thank you for involving us in the care of this patient. Please contact should any concern or questions arise.    Shaquille Cowart MD   Gastroenterology/Hepatology Fellow PGY-5  Available on Microsoft Teams 7am - 5pm  [email or web address]    NON-URGENT CONSULTS:  Please email:  [email or web address]   OR  [email or web address]    After 5pm, please contact the on-call GI fellow. [phone]    AT NIGHT AND ON WEEKENDS:  Contact on-call GI fellow via answering service ([phone]) from 5pm-8am and on weekends/holidays     # Odynophagia after vomiting, poss. due to alcohol withdrawal   # AUD  Patient's sx started after repeated episodes of NBNB vomiting which has likely resulted in erosive esophagitis. No overt bleeding and no dysphagia. At this time endoscopic evaluation is not indicated, but she may benefit from medications to facilitate healing. Had in depth discussion with patient and informed her it will take time for the pain to completely resolve, but would medication, may begin to experience some improvement over the next several days.   *symptoms stable 10/28. Able to swallow as per discussion 10/28 AM, but odynophagia making it difficult.    Recommendations:  - No indication for endoscopic evaluation at this time  - PPI 40 IV BID for 2-3 days while admitted, then PO bid for 6-8 weeks   - Magic mouth wash  - Carafate, can continue for 7-10 days  - Antiemetics as needed  - Alcohol cessation; consider / services         All recommendations preliminary until note signed by service attending.    Thank you for involving us in the care of this patient. Please contact should any concern or questions arise.    Shaquille Cowart MD   Gastroenterology/Hepatology Fellow PGY-5  Available on [employer] Teams 7am - 5pm  [email or web address]    NON-URGENT CONSULTS:  Please email:  [email or web address]   OR  [email or web address]    After 5pm, please contact the on-call GI fellow. [phone]    AT NIGHT AND ON WEEKENDS:  Contact on-call GI fellow via answering service ([phone]) from 5pm-8am and on weekends/holidays

## 2023-10-28 NOTE — DISCHARGE NOTE PROVIDER - NSDCFUADDAPPT_GEN_ALL_CORE_FT
APPTS ARE READY TO BE MADE: [ ] YES    Best Family or Patient Contact (if needed):    Additional Information about above appointments (if needed):    1: PCP in 5-7 days   2: Psych in 5-7 days  3: GI in 5-7 days    Other comments or requests:    APPTS ARE READY TO BE MADE: [X] YES    Best Family or Patient Contact (if needed):    Additional Information about above appointments (if needed):    1: PCP in 5-7 days   2: Psych in 5-7 days  3: GI in 5-7 days    Other comments or requests:    APPTS ARE READY TO BE MADE: [X] YES    Best Family or Patient Contact (if needed):    Additional Information about above appointments (if needed):    1: PCP in 5-7 days   2: Psych in 5-7 days  3: GI in 5-7 days    Other comments or requests:   Outreached on (11/7, 11/8 and 11/9) which have been unsuccessful. Will await call back from patient/caregiver to coordinate follow up care.

## 2023-10-28 NOTE — DISCHARGE NOTE PROVIDER - CARE PROVIDER_API CALL
Malcom Lang [facility]  Family Medicine  [address] [place] [address]  Phone: [phone]  Fax: [phone]  Follow Up Time:    Malcom Lang [facility]  Family Medicine  [address] [place] [address]  Phone: [phone]  Fax: [phone]  Follow Up Time: 1 week

## 2023-10-28 NOTE — PROGRESS NOTE ADULT - NUTRITIONAL ASSESSMENT
This patient has been assessed with a concern for Malnutrition and has been determined to have a diagnosis/diagnoses of Severe protein-calorie malnutrition and Underweight (BMI < 19).    This patient is being managed with:   Diet Regular-  Entered: Oct 26 2023 11:22PM

## 2023-10-28 NOTE — PROGRESS NOTE ADULT - ATTENDING COMMENTS
42 Y/O/F with no significant PMHx except  ETOH dependence had rehab and last drink 5 days ago admitted with nausea and vomiting/ Pre renal HANNAH/ Hyponatremia and metabolic alkalosis and acidosis   HANNAH and electrolyte abnormalities resolved   some midsternal chest pain after drinking liquid   PPI and Carafate   GI note appreciated-->PPI IV BID 2-3 days, then PO BID 6-8 weeks, 7-10 days of carafate, no intervention for now, if seen outpatient and appropriate but no improvement  then they will do  endoscopic evaluation.   Diet  CIWA   SBIRT consulted. LMSW offered referral, however, patient verbalized she is working with her sister to return to a facility that works best for her. Patient declined referral at this time.  SW consulted.  Possible discharge tomorrow.

## 2023-10-28 NOTE — DISCHARGE NOTE PROVIDER - NSFOLLOWUPCLINICSTOKEN_GEN_ALL_ED_FT
[id number]: || ||00\01||False;[id number]: || ||00\01||False; 338391:1 week|| ||00\01||False;408959:1 week|| ||00\01||False;

## 2023-10-28 NOTE — PROGRESS NOTE ADULT - PROBLEM SELECTOR PLAN 5
Likely from contraction, has been vomiting for the last 3 days along with poor PO intake   pH 7.49, HCO3 36   - c/w IVF   - pH improved to 7.40 bicarb 27 10/27

## 2023-10-28 NOTE — PROGRESS NOTE ADULT - ATTENDING COMMENTS
Agree w above. Odynophagia most likely reflux esophagitis. Tolerating diet. Continue PPI tx, Carafate. GI will follow. Agree w above. Odynophagia most likely reflux esophagitis. Still w difficulty swallowing.  Continue PPI tx, Carafate. If continues w odynophagia over the next 24 hrs, plan for EGD Monday. GI will follow.

## 2023-10-28 NOTE — DISCHARGE NOTE PROVIDER - DETAILS OF MALNUTRITION DIAGNOSIS/DIAGNOSES
This patient has been assessed with a concern for Malnutrition and was treated during this hospitalization for the following Nutrition diagnosis/diagnoses:     -  10/27/2023: Severe protein-calorie malnutrition   -  10/27/2023: Underweight (BMI < 19)

## 2023-10-28 NOTE — PROGRESS NOTE ADULT - SUBJECTIVE AND OBJECTIVE BOX
*******************************  Yobani Mancia MD (PGY-1)  Internal Medicine  Contact via [employer] TEAMS  *******************************    PROGRESS NOTE:     Patient is a 43y old  Female who presents with a chief complaint of HANNAH/electrolyte disturbance (27 Oct 2023 14:01)      INTERVAL EVENTS: No acute overnight events.     SUBJECTIVE: Patient seen and examined at bedside. This morning, the patient is comfortable and doing well. No acute complaints. Denies fevers, chills, N/V/D, chest pain, SOB, abdominal pain.    MEDICATIONS  (STANDING):  FIRST- Mouthwash  BLM 15 milliLiter(s) Swish and Spit daily  folic acid 1 milliGRAM(s) Oral daily  influenza   Vaccine 0.5 milliLiter(s) IntraMuscular once  melatonin 6 milliGRAM(s) Oral at bedtime  multivitamin 1 Tablet(s) Oral daily  nicotine -  14 mG/24Hr(s) Patch 1 Patch Transdermal daily  pantoprazole  Injectable 40 milliGRAM(s) IV Push every 12 hours  sodium chloride 0.9%. 1000 milliLiter(s) (100 mL/Hr) IV Continuous <Continuous>  sucralfate suspension 1 Gram(s) Oral four times a day  thiamine 100 milliGRAM(s) Oral daily    MEDICATIONS  (PRN):  LORazepam   Injectable 2 milliGRAM(s) IV Push every 2 hours PRN CIWA-Ar score increase by 2 points and a total score of 7 or less  LORazepam   Injectable 2 milliGRAM(s) IV Push every 1 hour PRN CIWA-Ar score 8 or greater      CAPILLARY BLOOD GLUCOSE        I&O's Summary    27 Oct 2023 07:01  -  28 Oct 2023 06:41  --------------------------------------------------------  IN: 1500 mL / OUT: 0 mL / NET: 1500 mL        PHYSICAL EXAM:  Vital Signs Last 24 Hrs  T(C): 36.8 (28 Oct 2023 04:20), Max: 37.3 (27 Oct 2023 20:43)  T(F): 98.3 (28 Oct 2023 04:20), Max: 99.2 (27 Oct 2023 20:43)  HR: 74 (28 Oct 2023 04:20) (61 - 92)  BP: 134/87 (28 Oct 2023 04:20) (101/68 - 134/87)  BP(mean): --  RR: 18 (28 Oct 2023 04:20) (18 - 18)  SpO2: 100% (28 Oct 2023 04:20) (98% - 100%)    Parameters below as of 28 Oct 2023 04:20  Patient On (Oxygen Delivery Method): room air        GENERAL: NAD, lying in bed comfortably  HEAD: Atraumatic, normocephalic  EYES: EOMI, PERRLA  ENT: Moist mucous membranes  NECK: Supple, no JVD  HEART: S1, S2, Regular rate and rhythm, no murmurs, rubs, or gallops  LUNGS: Unlabored respirations, clear to auscultation bilaterally, no crackles, wheezing, or rhonchi  ABDOMEN: Soft, nontender, nondistended, +BS  EXTREMITIES: No clubbing, cyanosis, or edema  NERVOUS SYSTEM:  A&Ox3, no focal deficits   SKIN: No rashes or lesions    LABS:                        10.1   5.82  )-----------( 74       ( 27 Oct 2023 06:28 )             30.3     10-27    135  |  94<L>  |  32<H>  ----------------------------<  103<H>  4.0   |  27  |  1.12    Ca    7.8<L>      27 Oct 2023 12:33  Phos  2.2     10-27  Mg     2.2     10-27    TPro  7.9  /  Alb  4.7  /  TBili  0.5  /  DBili  x   /  AST  75<H>  /  ALT  46<H>  /  AlkPhos  94  10-27          Urinalysis Basic - ( 27 Oct 2023 12:33 )    Color: x / Appearance: x / SG: x / pH: x  Gluc: 103 mg/dL / Ketone: x  / Bili: x / Urobili: x   Blood: x / Protein: x / Nitrite: x   Leuk Esterase: x / RBC: x / WBC x   Sq Epi: x / Non Sq Epi: x / Bacteria: x          RADIOLOGY & ADDITIONAL TESTS:  Results Reviewed:   Imaging Personally Reviewed:  Electrocardiogram Personally Reviewed:  Tele: *******************************  Yobani Mancia MD (PGY-1)  Internal Medicine  Contact via [employer] TEAMS  *******************************    PROGRESS NOTE:     Patient is a 43y old  Female who presents with a chief complaint of HANNAH/electrolyte disturbance (27 Oct 2023 14:01)      INTERVAL EVENTS: No acute overnight events.     SUBJECTIVE: Patient seen and examined at bedside. Reports continued epigastric/retrosternal pain. Decrease PO. Denies fevers, chills, N/V/D, SOB, abdominal pain.    MEDICATIONS  (STANDING):  FIRST- Mouthwash  BLM 15 milliLiter(s) Swish and Spit daily  folic acid 1 milliGRAM(s) Oral daily  influenza   Vaccine 0.5 milliLiter(s) IntraMuscular once  melatonin 6 milliGRAM(s) Oral at bedtime  multivitamin 1 Tablet(s) Oral daily  nicotine -  14 mG/24Hr(s) Patch 1 Patch Transdermal daily  pantoprazole  Injectable 40 milliGRAM(s) IV Push every 12 hours  sodium chloride 0.9%. 1000 milliLiter(s) (100 mL/Hr) IV Continuous <Continuous>  sucralfate suspension 1 Gram(s) Oral four times a day  thiamine 100 milliGRAM(s) Oral daily    MEDICATIONS  (PRN):  LORazepam   Injectable 2 milliGRAM(s) IV Push every 2 hours PRN CIWA-Ar score increase by 2 points and a total score of 7 or less  LORazepam   Injectable 2 milliGRAM(s) IV Push every 1 hour PRN CIWA-Ar score 8 or greater      CAPILLARY BLOOD GLUCOSE        I&O's Summary    27 Oct 2023 07:01  -  28 Oct 2023 06:41  --------------------------------------------------------  IN: 1500 mL / OUT: 0 mL / NET: 1500 mL        PHYSICAL EXAM:  Vital Signs Last 24 Hrs  T(C): 36.8 (28 Oct 2023 04:20), Max: 37.3 (27 Oct 2023 20:43)  T(F): 98.3 (28 Oct 2023 04:20), Max: 99.2 (27 Oct 2023 20:43)  HR: 74 (28 Oct 2023 04:20) (61 - 92)  BP: 134/87 (28 Oct 2023 04:20) (101/68 - 134/87)  BP(mean): --  RR: 18 (28 Oct 2023 04:20) (18 - 18)  SpO2: 100% (28 Oct 2023 04:20) (98% - 100%)    Parameters below as of 28 Oct 2023 04:20  Patient On (Oxygen Delivery Method): room air        GENERAL: NAD, lying in bed comfortably  HEAD: Atraumatic, normocephalic  EYES: EOMI, PERRLA  ENT: Moist mucous membranes  HEART: S1, S2, Regular rate and rhythm, no murmurs, rubs, or gallops  LUNGS: Unlabored respirations, clear to auscultation bilaterally, no crackles, wheezing, or rhonchi  ABDOMEN: Soft, nontender, mild tenderness to palpation in LLQ and RLQ  EXTREMITIES: No edema  NERVOUS SYSTEM:  A&Ox3, no focal deficits, no tremor      LABS:                        10.1   5.82  )-----------( 74       ( 27 Oct 2023 06:28 )             30.3     10-27    135  |  94<L>  |  32<H>  ----------------------------<  103<H>  4.0   |  27  |  1.12    Ca    7.8<L>      27 Oct 2023 12:33  Phos  2.2     10-27  Mg     2.2     10-27    TPro  7.9  /  Alb  4.7  /  TBili  0.5  /  DBili  x   /  AST  75<H>  /  ALT  46<H>  /  AlkPhos  94  10-27          Urinalysis Basic - ( 27 Oct 2023 12:33 )    Color: x / Appearance: x / SG: x / pH: x  Gluc: 103 mg/dL / Ketone: x  / Bili: x / Urobili: x   Blood: x / Protein: x / Nitrite: x   Leuk Esterase: x / RBC: x / WBC x   Sq Epi: x / Non Sq Epi: x / Bacteria: x          RADIOLOGY & ADDITIONAL TESTS:  Results Reviewed:   Imaging Personally Reviewed:  Electrocardiogram Personally Reviewed:  Tele:

## 2023-10-28 NOTE — DISCHARGE NOTE PROVIDER - NSDCMRMEDTOKEN_GEN_ALL_CORE_FT
folic acid 1 mg oral tablet: 1 tab(s) orally once a day  Multiple Vitamins oral tablet: 1 tab(s) orally once a day  nicotine 14 mg/24 hr transdermal film, extended release: transdermal  sucralfate 1 g/10 mL oral suspension: 10 milliliter(s) orally 4 times a day  thiamine 100 mg oral tablet: 1 tab(s) orally once a day   folic acid 1 mg oral tablet: 1 tab(s) orally once a day  Multiple Vitamins oral tablet: 1 tab(s) orally once a day  pantoprazole 40 mg oral delayed release tablet: 1 tab(s) orally 2 times a day  sucralfate 1 g/10 mL oral suspension: 10 milliliter(s) orally 4 times a day  thiamine 100 mg oral tablet: 1 tab(s) orally once a day

## 2023-10-28 NOTE — PROGRESS NOTE ADULT - SUBJECTIVE AND OBJECTIVE BOX
Interval Events:   INCOMPLETE    ROS:   12 point review of systems performed and negative except otherwise noted in HPI.    Hospital Medications:  chlorhexidine 2% Cloths 1 Application(s) Topical daily  FIRST- Mouthwash  BLM 15 milliLiter(s) Swish and Spit daily  folic acid 1 milliGRAM(s) Oral daily  influenza   Vaccine 0.5 milliLiter(s) IntraMuscular once  LORazepam   Injectable 2 milliGRAM(s) IV Push every 2 hours PRN  LORazepam   Injectable 2 milliGRAM(s) IV Push every 1 hour PRN  melatonin 6 milliGRAM(s) Oral at bedtime  multivitamin 1 Tablet(s) Oral daily  nicotine -  14 mG/24Hr(s) Patch 1 Patch Transdermal daily  pantoprazole  Injectable 40 milliGRAM(s) IV Push every 12 hours  sodium chloride 0.9%. 1000 milliLiter(s) IV Continuous <Continuous>  sucralfate suspension 1 Gram(s) Oral four times a day  thiamine 100 milliGRAM(s) Oral daily      PHYSICAL EXAM:   Vital Signs:  Vital Signs Last 24 Hrs  T(C): 36.7 (28 Oct 2023 08:30), Max: 37.3 (27 Oct 2023 20:43)  T(F): 98 (28 Oct 2023 08:30), Max: 99.2 (27 Oct 2023 20:43)  HR: 72 (28 Oct 2023 08:30) (72 - 92)  BP: 118/80 (28 Oct 2023 08:30) (101/68 - 134/87)  BP(mean): --  RR: 18 (28 Oct 2023 08:30) (18 - 18)  SpO2: 100% (28 Oct 2023 08:30) (98% - 100%)    Parameters below as of 28 Oct 2023 08:30  Patient On (Oxygen Delivery Method): room air      Daily     Daily     GENERAL: no acute distress  NEURO: alert  HEENT: NCAT, no conjunctival pallor appreciated  CHEST: no respiratory distress, no accessory muscle use  CARDIAC: regular rate, +S1/S2  ABDOMEN: soft, nontender, no rebound or guarding  EXTREMITIES: warm, well perfused  SKIN: no lesions noted    LABS: reviewed                        9.6    5.58  )-----------( 91       ( 28 Oct 2023 07:03 )             29.3     10-28    136  |  97  |  19  ----------------------------<  121<H>  3.3<L>   |  27  |  0.63    Ca    8.4      28 Oct 2023 06:57  Phos  1.1     10-28  Mg     1.7     10-28    TPro  7.0  /  Alb  4.2  /  TBili  0.4  /  DBili  x   /  AST  103<H>  /  ALT  57<H>  /  AlkPhos  87  10-28    LIVER FUNCTIONS - ( 28 Oct 2023 06:57 )  Alb: 4.2 g/dL / Pro: 7.0 g/dL / ALK PHOS: 87 U/L / ALT: 57 U/L / AST: 103 U/L / GGT: x             Interval Diagnostic Studies: see sunrise for full report   Interval Events:   -patient continues to have same level of odynophagia today despite supportive measures  -she reports having to make postural adjustments and pressing on her chest to help with comfort of swallowing  -she CAN eat, but its difficult  -extended discussion concluding in patient endorsing desire to stay in hospital today with goal of home tomorrow. Patient understands that swallowing will take time to normalize. She is in agreement to follow up with GI outpatient and possible EGD evaluation at that time    ROS:   12 point review of systems performed and negative except otherwise noted in HPI.    Hospital Medications:  chlorhexidine 2% Cloths 1 Application(s) Topical daily  FIRST- Mouthwash  BLM 15 milliLiter(s) Swish and Spit daily  folic acid 1 milliGRAM(s) Oral daily  influenza   Vaccine 0.5 milliLiter(s) IntraMuscular once  LORazepam   Injectable 2 milliGRAM(s) IV Push every 2 hours PRN  LORazepam   Injectable 2 milliGRAM(s) IV Push every 1 hour PRN  melatonin 6 milliGRAM(s) Oral at bedtime  multivitamin 1 Tablet(s) Oral daily  nicotine -  14 mG/24Hr(s) Patch 1 Patch Transdermal daily  pantoprazole  Injectable 40 milliGRAM(s) IV Push every 12 hours  sodium chloride 0.9%. 1000 milliLiter(s) IV Continuous <Continuous>  sucralfate suspension 1 Gram(s) Oral four times a day  thiamine 100 milliGRAM(s) Oral daily      PHYSICAL EXAM:   Vital Signs:  Vital Signs Last 24 Hrs  T(C): 36.7 (28 Oct 2023 08:30), Max: 37.3 (27 Oct 2023 20:43)  T(F): 98 (28 Oct 2023 08:30), Max: 99.2 (27 Oct 2023 20:43)  HR: 72 (28 Oct 2023 08:30) (72 - 92)  BP: 118/80 (28 Oct 2023 08:30) (101/68 - 134/87)  BP(mean): --  RR: 18 (28 Oct 2023 08:30) (18 - 18)  SpO2: 100% (28 Oct 2023 08:30) (98% - 100%)    Parameters below as of 28 Oct 2023 08:30  Patient On (Oxygen Delivery Method): room air      Daily     Daily     GENERAL: no acute distress  NEURO: aox3  HEENT: anicteric sclera, no conjunctival pallor appreciated  CHEST: no respiratory distress, no accessory muscle use  CARDIAC: regular rate  ABDOMEN: soft, non-tender, non-distended, no rebound or guarding  EXTREMITIES: no LE edema      LABS: reviewed                        9.6    5.58  )-----------( 91       ( 28 Oct 2023 07:03 )             29.3     10-28    136  |  97  |  19  ----------------------------<  121<H>  3.3<L>   |  27  |  0.63    Ca    8.4      28 Oct 2023 06:57  Phos  1.1     10-28  Mg     1.7     10-28    TPro  7.0  /  Alb  4.2  /  TBili  0.4  /  DBili  x   /  AST  103<H>  /  ALT  57<H>  /  AlkPhos  87  10-28    LIVER FUNCTIONS - ( 28 Oct 2023 06:57 )  Alb: 4.2 g/dL / Pro: 7.0 g/dL / ALK PHOS: 87 U/L / ALT: 57 U/L / AST: 103 U/L / GGT: x             Interval Diagnostic Studies:

## 2023-10-28 NOTE — DISCHARGE NOTE PROVIDER - HOSPITAL COURSE
Discharge Summary     Discharge diagnoses:   HANNAH    Hospital Course:   For full details, please see H&P, progress notes, consult notes and ancillary notes. Briefly, HPI:  43-year-old female with alcohol misuse presents with nausea/vomiting and poor PO intake. Patient recently hospitalized in Aug for alcohol withdrawal, went to rehab and quit for 35 days. Reports that when she went home, she started drinking again because she was bored and living alone. Reports that she stopped drinking alcohol cold turkey about 5 days ago (usually drinks 1 pint of vodka daily) because she realized it was worsening her health. Two days later, she experienced nausea, vomiting, chills (although no documented fevers). Felt like she was having the flu. Reports that vomiting was non-bloody and mostly looked like brown food content. Has been having associated chest tightness, worse when she lays down in bed right after vomiting. No association with exertion. Also reported extreme weakness and brain fog, along with tremors. Denies seizures at home. Denied diarrhea/constipation, dysuria/hematuria. Pt also smokes 1/2 ppd cigarettes but no other concurrent substance use.     In ED, patient was hemodynamically stable and afebrile. Did have multiple lab abnormalities including hypokalemia, metabolic alkalosis, anion gap.  (26 Oct 2023 19:48)  .     The patient's hospital course will be summarized in a problem based format.    # HANNAH  On presentation pt found to have SCr 6.34 --> 4.29 after 1L IVF (baseline 0.44 8/2023). FeNA 0.7% - likely prerenal i/s/o poor PO intake. Pt received IVF while inpt w/ normalization of SCR to 0.63 day after admission.      # Odynophagia  Pt w/ epigastric/retrosternal pain following prolonged retching. Unable to tolerate PO. No hemoptysis. Suspect esophageal irritation 2/2 vomiting. GI was consulted. Pt treated w/ magic mouthwash, Carafate (7-10 day course), pantoprazole 40mg IV BID (2-3 days w/ transition to PO x6-8 weeks). Pt w/ persistent odynophagia, GI plan for.... Transitioned to PO pantoprazole; will be dced on pantoprazole 40mg po BID and carafate xxx days. Underwent abdominal US to evaluate for hepatic and biliary pathology. Showed hepatic steatosis and no biliary pathology.     #Alcohol use  Last drink 5 days prior to admission, likely N/V all in the setting of acute withdrawal - sxs now improved. Usually drinks 1 pint of vodka per day. Started on symptom triggered CIWA. Remained asymptomatic during admision w/ CIWAs of 0. Started on folic acid, thiamin and multivitamin. Pt seen by SBIRT and conceled on importances of abstinence from etoh use as well as adverse outcomes that may occur due to etoh ebuse; suspect depression contributing to etoh use. Pt stating family member setting up psych evals outpt and wished to pursue them instead of seeing psych while inpt. Pt to f/u w/ psych on dc.     #Metabolic alkalosis  Likely from contraction, had been vomiting for the last 3 days along with poor PO intake. pH 7.49, HCO3 36. Normalized w/ IVF.      #Metabolic acidosis   AG 27, possibly i/s/o alcohol use vs starvation ketoacidosis. Resolved w/ IVF.     #Thrombocytopenia   Plts 80s on admission. Likely i/s/o marrow suppression/alcohol related. Pt w/o overt signs of bleeding during admission and H&H stable. Pt to follow w/ pcp on dc to monitor plt levels.     On day of discharge, patient is clinically stable with no new exam findings or acute symptoms compared to prior. The patient was seen by the attending physician on the date of discharge and deemed stable and acceptable for discharge.     Discharge follow up action items:   1) f/u w/ pcp in 5-7 days  2) f/u w/ psych n 5-7 days  3) f/u w/ GI in 5-7 days Discharge Summary     Discharge diagnoses:   HANNAH    Hospital Course:   For full details, please see H&P, progress notes, consult notes and ancillary notes. Briefly, HPI:  43-year-old female with alcohol misuse presents with nausea/vomiting and poor PO intake. Patient recently hospitalized in Aug for alcohol withdrawal, went to rehab and quit for 35 days. Reports that when she went home, she started drinking again because she was bored and living alone. Reports that she stopped drinking alcohol cold turkey about 5 days ago (usually drinks 1 pint of vodka daily) because she realized it was worsening her health. Two days later, she experienced nausea, vomiting, chills (although no documented fevers). Felt like she was having the flu. Reports that vomiting was non-bloody and mostly looked like brown food content. Has been having associated chest tightness, worse when she lays down in bed right after vomiting. No association with exertion. Also reported extreme weakness and brain fog, along with tremors. Denies seizures at home. Denied diarrhea/constipation, dysuria/hematuria. Pt also smokes 1/2 ppd cigarettes but no other concurrent substance use.     In ED, patient was hemodynamically stable and afebrile. Did have multiple lab abnormalities including hypokalemia, metabolic alkalosis, anion gap.  (26 Oct 2023 19:48)    The patient's hospital course will be summarized in a problem based format.    # HANNAH  On presentation pt found to have SCr 6.34 --> 4.29 after 1L IVF (baseline 0.44 8/2023). FeNA 0.7% - likely prerenal i/s/o poor PO intake. Pt received IVF while inpt w/ normalization of SCR to 0.63 day after admission.      # Odynophagia  Pt w/ epigastric/retrosternal pain following prolonged retching. Unable to tolerate PO. No hemoptysis. Suspect esophageal irritation 2/2 vomiting. GI was consulted. Pt treated w/ magic mouthwash, Carafate (7-10 day course), pantoprazole 40mg IV BID (2-3 days w/ transition to PO x6-8 weeks). Pt's odynophagia resolved so no GI intervention. Transitioned to PO pantoprazole; will be dced on pantoprazole 40mg po BID and carafate. Underwent abdominal US to evaluate for hepatic and biliary pathology. Showed hepatic steatosis and no biliary pathology.     #Alcohol use  Last drink 5 days prior to admission, likely N/V all in the setting of acute withdrawal - sxs now improved. Usually drinks 1 pint of vodka per day. Started on symptom triggered CIWA. Remained asymptomatic during admision w/ CIWAs of 0. Started on folic acid, thiamin and multivitamin. Pt seen by SBIRT and counseled on importances of abstinence from etoh use as well as adverse outcomes that may occur due to etoh ebuse; suspect depression contributing to etoh use. Pt stating family member setting up psych evals outpt and wished to pursue them instead of seeing psych while inpt. Pt to f/u w/ psych on dc.     #Metabolic alkalosis  Likely from contraction, had been vomiting for the last 3 days along with poor PO intake. pH 7.49, HCO3 36. Normalized w/ IVF.      #Metabolic acidosis   AG 27, possibly i/s/o alcohol use vs starvation ketoacidosis. Resolved w/ IVF.     #Thrombocytopenia   Plts 80s on admission. Likely i/s/o marrow suppression/alcohol related. Pt w/o overt signs of bleeding during admission and H&H stable. Pt to follow w/ pcp on dc to monitor plt levels.     On day of discharge, patient is clinically stable with no new exam findings or acute symptoms compared to prior. The patient was seen by the attending physician on the date of discharge and deemed stable and acceptable for discharge.     Discharge follow up action items:   1) f/u w/ pcp in 5-7 days  2) f/u w/ psych n 5-7 days  3) f/u w/ GI in 5-7 days Discharge Summary     Discharge diagnoses:   HANNAH    Hospital Course:   For full details, please see H&P, progress notes, consult notes and ancillary notes. Briefly, HPI:  43-year-old female with alcohol misuse presents with nausea/vomiting and poor PO intake. Patient recently hospitalized in Aug for alcohol withdrawal, went to rehab and quit for 35 days. Reports that when she went home, she started drinking again because she was bored and living alone. Reports that she stopped drinking alcohol cold turkey about 5 days ago (usually drinks 1 pint of vodka daily) because she realized it was worsening her health. Two days later, she experienced nausea, vomiting, chills (although no documented fevers). Felt like she was having the flu. Reports that vomiting was non-bloody and mostly looked like brown food content. Has been having associated chest tightness, worse when she lays down in bed right after vomiting. No association with exertion. Also reported extreme weakness and brain fog, along with tremors. Denies seizures at home. Denied diarrhea/constipation, dysuria/hematuria. Pt also smokes 1/2 ppd cigarettes but no other concurrent substance use.     In ED, patient was hemodynamically stable and afebrile. Did have multiple lab abnormalities including hypokalemia, metabolic alkalosis, anion gap.  (26 Oct 2023 19:48)    The patient's hospital course will be summarized in a problem based format.    # HANNAH  On presentation pt found to have SCr 6.34 --> 4.29 after 1L IVF (baseline 0.44 8/2023). FeNA 0.7% - likely prerenal i/s/o poor PO intake. Pt received IVF while inpt w/ normalization of SCR to 0.63 day after admission.      # Odynophagia  Pt w/ epigastric/retrosternal pain following prolonged retching. Unable to tolerate PO. No hemoptysis. Suspect esophageal irritation 2/2 vomiting. GI was consulted. Pt treated w/ magic mouthwash, Carafate (7-10 day course), pantoprazole 40mg IV BID (2-3 days w/ transition to PO x6-8 weeks). Pt's odynophagia resolved so no GI intervention. Transitioned to PO pantoprazole; will be dced on pantoprazole 40mg po BID and carafate. Underwent abdominal US to evaluate for hepatic and biliary pathology. Showed hepatic steatosis and no biliary pathology.     #Alcohol use  Last drink 5 days prior to admission, likely N/V all in the setting of acute withdrawal - sxs now improved. Usually drinks 1 pint of vodka per day. Started on symptom triggered CIWA. Remained asymptomatic during admision w/ CIWAs of 0. Started on folic acid, thiamin and multivitamin. Pt seen by SBIRT and counseled on importances of abstinence from etoh use as well as adverse outcomes that may occur due to etoh ebuse; suspect depression contributing to etoh use. Pt stating family member setting up psych evals outpt and wished to pursue them instead of seeing psych while inpt. Pt to f/u w/ psych on dc.     #Metabolic alkalosis  Likely from contraction, had been vomiting for the last 3 days along with poor PO intake. pH 7.49, HCO3 36. Normalized w/ IVF.      #Metabolic acidosis   AG 27, possibly i/s/o alcohol use vs starvation ketoacidosis. Resolved w/ IVF.     #Thrombocytopenia   Plts 80s on admission. Likely i/s/o marrow suppression/alcohol related. Pt w/o overt signs of bleeding during admission and H&H stable. Pt to follow w/ pcp on dc to monitor plt levels.     On day of discharge, patient is clinically stable with no new exam findings or acute symptoms compared to prior. The patient was seen by the attending physician on the date of discharge and deemed stable and acceptable for discharge.     Discharge follow up action items:   1) f/u w/ pcp in 5-7 days  2) f/u w/ psych n 5-7 days  3) f/u w/ GI in 5-7 days. Needs repeat CMP

## 2023-10-29 LAB
ALBUMIN SERPL ELPH-MCNC: 3.8 G/DL — SIGNIFICANT CHANGE UP (ref 3.3–5)
ALBUMIN SERPL ELPH-MCNC: 3.8 G/DL — SIGNIFICANT CHANGE UP (ref 3.3–5)
ALP SERPL-CCNC: 100 U/L — SIGNIFICANT CHANGE UP (ref 40–120)
ALP SERPL-CCNC: 100 U/L — SIGNIFICANT CHANGE UP (ref 40–120)
ALT FLD-CCNC: 97 U/L — HIGH (ref 10–45)
ALT FLD-CCNC: 97 U/L — HIGH (ref 10–45)
ANION GAP SERPL CALC-SCNC: 12 MMOL/L — SIGNIFICANT CHANGE UP (ref 5–17)
ANION GAP SERPL CALC-SCNC: 12 MMOL/L — SIGNIFICANT CHANGE UP (ref 5–17)
ANION GAP SERPL CALC-SCNC: 13 MMOL/L — SIGNIFICANT CHANGE UP (ref 5–17)
ANION GAP SERPL CALC-SCNC: 13 MMOL/L — SIGNIFICANT CHANGE UP (ref 5–17)
AST SERPL-CCNC: 133 U/L — HIGH (ref 10–40)
AST SERPL-CCNC: 133 U/L — HIGH (ref 10–40)
BASOPHILS # BLD AUTO: 0.04 K/UL — SIGNIFICANT CHANGE UP (ref 0–0.2)
BASOPHILS # BLD AUTO: 0.04 K/UL — SIGNIFICANT CHANGE UP (ref 0–0.2)
BASOPHILS NFR BLD AUTO: 0.7 % — SIGNIFICANT CHANGE UP (ref 0–2)
BASOPHILS NFR BLD AUTO: 0.7 % — SIGNIFICANT CHANGE UP (ref 0–2)
BILIRUB SERPL-MCNC: 0.2 MG/DL — SIGNIFICANT CHANGE UP (ref 0.2–1.2)
BILIRUB SERPL-MCNC: 0.2 MG/DL — SIGNIFICANT CHANGE UP (ref 0.2–1.2)
BUN SERPL-MCNC: 16 MG/DL — SIGNIFICANT CHANGE UP (ref 7–23)
BUN SERPL-MCNC: 16 MG/DL — SIGNIFICANT CHANGE UP (ref 7–23)
BUN SERPL-MCNC: 19 MG/DL — SIGNIFICANT CHANGE UP (ref 7–23)
BUN SERPL-MCNC: 19 MG/DL — SIGNIFICANT CHANGE UP (ref 7–23)
CALCIUM SERPL-MCNC: 8.7 MG/DL — SIGNIFICANT CHANGE UP (ref 8.4–10.5)
CALCIUM SERPL-MCNC: 8.7 MG/DL — SIGNIFICANT CHANGE UP (ref 8.4–10.5)
CALCIUM SERPL-MCNC: 9.2 MG/DL — SIGNIFICANT CHANGE UP (ref 8.4–10.5)
CALCIUM SERPL-MCNC: 9.2 MG/DL — SIGNIFICANT CHANGE UP (ref 8.4–10.5)
CHLORIDE SERPL-SCNC: 102 MMOL/L — SIGNIFICANT CHANGE UP (ref 96–108)
CO2 SERPL-SCNC: 22 MMOL/L — SIGNIFICANT CHANGE UP (ref 22–31)
CO2 SERPL-SCNC: 22 MMOL/L — SIGNIFICANT CHANGE UP (ref 22–31)
CO2 SERPL-SCNC: 23 MMOL/L — SIGNIFICANT CHANGE UP (ref 22–31)
CO2 SERPL-SCNC: 23 MMOL/L — SIGNIFICANT CHANGE UP (ref 22–31)
CREAT SERPL-MCNC: 0.62 MG/DL — SIGNIFICANT CHANGE UP (ref 0.5–1.3)
CREAT SERPL-MCNC: 0.62 MG/DL — SIGNIFICANT CHANGE UP (ref 0.5–1.3)
CREAT SERPL-MCNC: 0.67 MG/DL — SIGNIFICANT CHANGE UP (ref 0.5–1.3)
CREAT SERPL-MCNC: 0.67 MG/DL — SIGNIFICANT CHANGE UP (ref 0.5–1.3)
EGFR: 110 ML/MIN/1.73M2 — SIGNIFICANT CHANGE UP
EGFR: 110 ML/MIN/1.73M2 — SIGNIFICANT CHANGE UP
EGFR: 113 ML/MIN/1.73M2 — SIGNIFICANT CHANGE UP
EGFR: 113 ML/MIN/1.73M2 — SIGNIFICANT CHANGE UP
EOSINOPHIL # BLD AUTO: 0.09 K/UL — SIGNIFICANT CHANGE UP (ref 0–0.5)
EOSINOPHIL # BLD AUTO: 0.09 K/UL — SIGNIFICANT CHANGE UP (ref 0–0.5)
EOSINOPHIL NFR BLD AUTO: 1.7 % — SIGNIFICANT CHANGE UP (ref 0–6)
EOSINOPHIL NFR BLD AUTO: 1.7 % — SIGNIFICANT CHANGE UP (ref 0–6)
GLUCOSE SERPL-MCNC: 103 MG/DL — HIGH (ref 70–99)
GLUCOSE SERPL-MCNC: 103 MG/DL — HIGH (ref 70–99)
GLUCOSE SERPL-MCNC: 112 MG/DL — HIGH (ref 70–99)
GLUCOSE SERPL-MCNC: 112 MG/DL — HIGH (ref 70–99)
HCT VFR BLD CALC: 32 % — LOW (ref 34.5–45)
HCT VFR BLD CALC: 32 % — LOW (ref 34.5–45)
HGB BLD-MCNC: 10.4 G/DL — LOW (ref 11.5–15.5)
HGB BLD-MCNC: 10.4 G/DL — LOW (ref 11.5–15.5)
IMM GRANULOCYTES NFR BLD AUTO: 0.6 % — SIGNIFICANT CHANGE UP (ref 0–0.9)
IMM GRANULOCYTES NFR BLD AUTO: 0.6 % — SIGNIFICANT CHANGE UP (ref 0–0.9)
LYMPHOCYTES # BLD AUTO: 2.28 K/UL — SIGNIFICANT CHANGE UP (ref 1–3.3)
LYMPHOCYTES # BLD AUTO: 2.28 K/UL — SIGNIFICANT CHANGE UP (ref 1–3.3)
LYMPHOCYTES # BLD AUTO: 42.1 % — SIGNIFICANT CHANGE UP (ref 13–44)
LYMPHOCYTES # BLD AUTO: 42.1 % — SIGNIFICANT CHANGE UP (ref 13–44)
MAGNESIUM SERPL-MCNC: 1.4 MG/DL — LOW (ref 1.6–2.6)
MAGNESIUM SERPL-MCNC: 1.4 MG/DL — LOW (ref 1.6–2.6)
MAGNESIUM SERPL-MCNC: 2 MG/DL — SIGNIFICANT CHANGE UP (ref 1.6–2.6)
MAGNESIUM SERPL-MCNC: 2 MG/DL — SIGNIFICANT CHANGE UP (ref 1.6–2.6)
MCHC RBC-ENTMCNC: 24 PG — LOW (ref 27–34)
MCHC RBC-ENTMCNC: 24 PG — LOW (ref 27–34)
MCHC RBC-ENTMCNC: 32.5 GM/DL — SIGNIFICANT CHANGE UP (ref 32–36)
MCHC RBC-ENTMCNC: 32.5 GM/DL — SIGNIFICANT CHANGE UP (ref 32–36)
MCV RBC AUTO: 73.7 FL — LOW (ref 80–100)
MCV RBC AUTO: 73.7 FL — LOW (ref 80–100)
MONOCYTES # BLD AUTO: 0.54 K/UL — SIGNIFICANT CHANGE UP (ref 0–0.9)
MONOCYTES # BLD AUTO: 0.54 K/UL — SIGNIFICANT CHANGE UP (ref 0–0.9)
MONOCYTES NFR BLD AUTO: 10 % — SIGNIFICANT CHANGE UP (ref 2–14)
MONOCYTES NFR BLD AUTO: 10 % — SIGNIFICANT CHANGE UP (ref 2–14)
NEUTROPHILS # BLD AUTO: 2.43 K/UL — SIGNIFICANT CHANGE UP (ref 1.8–7.4)
NEUTROPHILS # BLD AUTO: 2.43 K/UL — SIGNIFICANT CHANGE UP (ref 1.8–7.4)
NEUTROPHILS NFR BLD AUTO: 44.9 % — SIGNIFICANT CHANGE UP (ref 43–77)
NEUTROPHILS NFR BLD AUTO: 44.9 % — SIGNIFICANT CHANGE UP (ref 43–77)
NRBC # BLD: 0 /100 WBCS — SIGNIFICANT CHANGE UP (ref 0–0)
NRBC # BLD: 0 /100 WBCS — SIGNIFICANT CHANGE UP (ref 0–0)
PHOSPHATE SERPL-MCNC: 3.1 MG/DL — SIGNIFICANT CHANGE UP (ref 2.5–4.5)
PHOSPHATE SERPL-MCNC: 3.1 MG/DL — SIGNIFICANT CHANGE UP (ref 2.5–4.5)
PHOSPHATE SERPL-MCNC: 3.5 MG/DL — SIGNIFICANT CHANGE UP (ref 2.5–4.5)
PHOSPHATE SERPL-MCNC: 3.5 MG/DL — SIGNIFICANT CHANGE UP (ref 2.5–4.5)
PLATELET # BLD AUTO: 150 K/UL — SIGNIFICANT CHANGE UP (ref 150–400)
PLATELET # BLD AUTO: 150 K/UL — SIGNIFICANT CHANGE UP (ref 150–400)
POTASSIUM SERPL-MCNC: 4.3 MMOL/L — SIGNIFICANT CHANGE UP (ref 3.5–5.3)
POTASSIUM SERPL-SCNC: 4.3 MMOL/L — SIGNIFICANT CHANGE UP (ref 3.5–5.3)
PROT SERPL-MCNC: 6.6 G/DL — SIGNIFICANT CHANGE UP (ref 6–8.3)
PROT SERPL-MCNC: 6.6 G/DL — SIGNIFICANT CHANGE UP (ref 6–8.3)
RBC # BLD: 4.34 M/UL — SIGNIFICANT CHANGE UP (ref 3.8–5.2)
RBC # BLD: 4.34 M/UL — SIGNIFICANT CHANGE UP (ref 3.8–5.2)
RBC # FLD: 14.9 % — HIGH (ref 10.3–14.5)
RBC # FLD: 14.9 % — HIGH (ref 10.3–14.5)
SODIUM SERPL-SCNC: 137 MMOL/L — SIGNIFICANT CHANGE UP (ref 135–145)
WBC # BLD: 5.41 K/UL — SIGNIFICANT CHANGE UP (ref 3.8–10.5)
WBC # BLD: 5.41 K/UL — SIGNIFICANT CHANGE UP (ref 3.8–10.5)
WBC # FLD AUTO: 5.41 K/UL — SIGNIFICANT CHANGE UP (ref 3.8–10.5)
WBC # FLD AUTO: 5.41 K/UL — SIGNIFICANT CHANGE UP (ref 3.8–10.5)

## 2023-10-29 PROCEDURE — 99232 SBSQ HOSP IP/OBS MODERATE 35: CPT | Mod: GC

## 2023-10-29 RX ORDER — SODIUM CHLORIDE 9 MG/ML
1000 INJECTION, SOLUTION INTRAVENOUS
Refills: 0 | Status: DISCONTINUED | OUTPATIENT
Start: 2023-10-29 | End: 2023-10-30

## 2023-10-29 RX ORDER — MAGNESIUM SULFATE 500 MG/ML
2 VIAL (ML) INJECTION
Refills: 0 | Status: COMPLETED | OUTPATIENT
Start: 2023-10-29 | End: 2023-10-29

## 2023-10-29 RX ORDER — FAMOTIDINE 10 MG/ML
20 INJECTION INTRAVENOUS ONCE
Refills: 0 | Status: COMPLETED | OUTPATIENT
Start: 2023-10-29 | End: 2023-10-29

## 2023-10-29 RX ADMIN — Medication 1 GRAM(S): at 23:03

## 2023-10-29 RX ADMIN — DIPHENHYDRAMINE HYDROCHLORIDE AND LIDOCAINE HYDROCHLORIDE AND ALUMINUM HYDROXIDE AND MAGNESIUM HYDRO 15 MILLILITER(S): KIT at 11:08

## 2023-10-29 RX ADMIN — PANTOPRAZOLE SODIUM 40 MILLIGRAM(S): 20 TABLET, DELAYED RELEASE ORAL at 17:27

## 2023-10-29 RX ADMIN — SODIUM CHLORIDE 100 MILLILITER(S): 9 INJECTION INTRAMUSCULAR; INTRAVENOUS; SUBCUTANEOUS at 12:40

## 2023-10-29 RX ADMIN — Medication 1 MILLIGRAM(S): at 11:07

## 2023-10-29 RX ADMIN — Medication 6 MILLIGRAM(S): at 21:08

## 2023-10-29 RX ADMIN — Medication 1 PATCH: at 11:07

## 2023-10-29 RX ADMIN — PANTOPRAZOLE SODIUM 40 MILLIGRAM(S): 20 TABLET, DELAYED RELEASE ORAL at 05:30

## 2023-10-29 RX ADMIN — Medication 1 GRAM(S): at 11:06

## 2023-10-29 RX ADMIN — Medication 25 GRAM(S): at 09:04

## 2023-10-29 RX ADMIN — CHLORHEXIDINE GLUCONATE 1 APPLICATION(S): 213 SOLUTION TOPICAL at 11:08

## 2023-10-29 RX ADMIN — Medication 1 GRAM(S): at 05:30

## 2023-10-29 RX ADMIN — FAMOTIDINE 20 MILLIGRAM(S): 10 INJECTION INTRAVENOUS at 23:02

## 2023-10-29 RX ADMIN — SODIUM CHLORIDE 100 MILLILITER(S): 9 INJECTION, SOLUTION INTRAVENOUS at 15:26

## 2023-10-29 RX ADMIN — Medication 30 MILLILITER(S): at 19:36

## 2023-10-29 RX ADMIN — Medication 1 GRAM(S): at 17:28

## 2023-10-29 RX ADMIN — Medication 1 PATCH: at 22:49

## 2023-10-29 RX ADMIN — Medication 25 GRAM(S): at 11:14

## 2023-10-29 RX ADMIN — Medication 1 PATCH: at 09:09

## 2023-10-29 RX ADMIN — Medication 1 TABLET(S): at 11:07

## 2023-10-29 RX ADMIN — Medication 100 MILLIGRAM(S): at 11:07

## 2023-10-29 RX ADMIN — Medication 675 MILLIGRAM(S): at 04:03

## 2023-10-29 NOTE — PROGRESS NOTE ADULT - PROBLEM SELECTOR PLAN 7
Plts 80s likely i/s/o marrow suppression/alcohol related   - f/u daily CBC DVT PPx: SCDs  Diet: Regular  Dispo: Medically active    Code Status: Full Code

## 2023-10-29 NOTE — PROGRESS NOTE ADULT - NUTRITIONAL ASSESSMENT
This patient has been assessed with a concern for Malnutrition and has been determined to have a diagnosis/diagnoses of Severe protein-calorie malnutrition and Underweight (BMI < 19).    This patient is being managed with:   Diet Regular-  Entered: Oct 28 2023  5:45PM

## 2023-10-29 NOTE — PROGRESS NOTE ADULT - PROBLEM SELECTOR PLAN 8
DVT PPx: SCDs  Diet: Regular  Dispo: Medically active    Code Status: Full Code

## 2023-10-29 NOTE — PROGRESS NOTE ADULT - PROBLEM SELECTOR PLAN 2
Pt w/ epigastric/retrosternal pain following prolonged retching. Unable to tolerate PO. No hemoptysis. Suspect esophageal irritation 2/2 vomiting.   - GI consulted; f/u recs  - Start magic mouthwash  - Start carafate x7-10 days  - Start pantoprazole 40mg IV  days then po BID 6-8 weeks   - Per GI no indication for egd at this time; if sx persist in absence of recurrent vomiting may consider egd at that time Pt w/ epigastric/retrosternal pain following prolonged retching. Unable to tolerate PO. No hemoptysis. Suspect esophageal irritation 2/2 vomiting.   - GI consulted; f/u recs  - Start magic mouthwash  - Start carafate x7-10 days  - Start pantoprazole 40mg IV BID 2-3 days then po BID 6-8 weeks   - Per GI no indication for egd at this time; if sx persist in absence of recurrent vomiting may consider egd at that time

## 2023-10-29 NOTE — PROVIDER CONTACT NOTE (OTHER) - SITUATION
Patient states that she is having periods of diarrhea, cramping, feeling pins and needles over her body. As well she is having moderate sweating and small tremors. CIWA score done which showed a 5
Patient received from ED on 0.9 normal saline fluids, rather than LR fluids ordered

## 2023-10-29 NOTE — PROVIDER CONTACT NOTE (OTHER) - BACKGROUND
44 y/o f admitted for n/v, chest pain and decrease P.o intake. With a pmhx of HANNAH, hyponatremia, alcohol use and metabolic acidosis.
Patient admitted for chest jose martin and Hypokalemia found on admission

## 2023-10-29 NOTE — PROGRESS NOTE ADULT - SUBJECTIVE AND OBJECTIVE BOX
***************************************************************  Alf Castro, PGY2  Internal Medicine   pager:  [facility]: [phone] [facility]: [phone]  ***************************************************************    ARABELLA BHANDARI  43y  MRN: [id number]    Patient is a 43y old  Female who presents with a chief complaint of HANNAH/electrolyte disturbance (28 Oct 2023 20:21)      Interval/Overnight Events: no events ON.     Subjective: Pt seen and examined at bedside. Denies fever, CP, SOB, abn pain, N/V, dysuria. Tolerating diet.      MEDICATIONS  (STANDING):  chlorhexidine 2% Cloths 1 Application(s) Topical daily  FIRST- Mouthwash  BLM 15 milliLiter(s) Swish and Spit daily  folic acid 1 milliGRAM(s) Oral daily  influenza   Vaccine 0.5 milliLiter(s) IntraMuscular once  magnesium sulfate  IVPB 2 Gram(s) IV Intermittent every 2 hours  melatonin 6 milliGRAM(s) Oral at bedtime  multivitamin 1 Tablet(s) Oral daily  nicotine -  14 mG/24Hr(s) Patch 1 Patch Transdermal daily  pantoprazole  Injectable 40 milliGRAM(s) IV Push every 12 hours  sodium chloride 0.9%. 1000 milliLiter(s) (100 mL/Hr) IV Continuous <Continuous>  sodium chloride 0.9%. 1000 milliLiter(s) (100 mL/Hr) IV Continuous <Continuous>  sucralfate suspension 1 Gram(s) Oral four times a day  thiamine 100 milliGRAM(s) Oral daily    MEDICATIONS  (PRN):  LORazepam   Injectable 2 milliGRAM(s) IV Push every 2 hours PRN CIWA-Ar score increase by 2 points and a total score of 7 or less  LORazepam   Injectable 2 milliGRAM(s) IV Push every 1 hour PRN CIWA-Ar score 8 or greater      Objective:    Vitals: Vital Signs Last 24 Hrs  T(C): 36.8 (10-29-23 @ 04:06), Max: 36.9 (10-28-23 @ 16:23)  T(F): 98.2 (10-29-23 @ 04:06), Max: 98.5 (10-28-23 @ 16:23)  HR: 92 (10-29-23 @ 04:06) (66 - 95)  BP: 112/79 (10-29-23 @ 04:06) (105/75 - 112/79)  BP(mean): --  RR: 18 (10-29-23 @ 04:06) (18 - 18)  SpO2: 100% (10-29-23 @ 04:06) (99% - 100%)                I&O's Summary    28 Oct 2023 07:01  -  29 Oct 2023 07:00  --------------------------------------------------------  IN: 2380 mL / OUT: 0 mL / NET: 2380 mL    PHYSICAL EXAM:  GENERAL: NAD  HEAD:  Atraumatic, Normocephalic  EYES: EOMI, conjunctiva and sclera clear  CHEST/LUNG: Clear to auscultation bilaterally; No rales, rhonchi, wheezing, or rubs  HEART: Regular rate and rhythm; No murmurs, rubs, or gallops  ABDOMEN: Soft, nontender, mild tenderness to palpation in LLQ and RLQ  SKIN: No rashes or lesions  NERVOUS SYSTEM:  Alert & Oriented X3, no focal deficits    LABS:                        10.4   5.41  )-----------( 150      ( 29 Oct 2023 07:04 )             32.0                         9.6    5.58  )-----------( 91       ( 28 Oct 2023 07:03 )             29.3                         10.1   5.82  )-----------( 74       ( 27 Oct 2023 06:28 )             30.3     10-29    137  |  102  |  19  ----------------------------<  112<H>  4.3   |  22  |  0.62  10-28    136  |  97  |  19  ----------------------------<  121<H>  3.3<L>   |  27  |  0.63  10-27    135  |  94<L>  |  32<H>  ----------------------------<  103<H>  4.0   |  27  |  1.12    Ca    8.7      29 Oct 2023 07:04  Ca    8.4      28 Oct 2023 06:57  Ca    7.8<L>      27 Oct 2023 12:33  Phos  3.1     10-29  Mg     1.4     10-29    TPro  6.6  /  Alb  3.8  /  TBili  0.2  /  DBili  x   /  AST  133<H>  /  ALT  97<H>  /  AlkPhos  100  10-29  TPro  7.0  /  Alb  4.2  /  TBili  0.4  /  DBili  x   /  AST  103<H>  /  ALT  57<H>  /  AlkPhos  87  10-28  TPro  7.9  /  Alb  4.7  /  TBili  0.5  /  DBili  x   /  AST  75<H>  /  ALT  46<H>  /  AlkPhos  94  10-27    CAPILLARY BLOOD GLUCOSE    Urinalysis Basic - ( 29 Oct 2023 07:04 )    Color: x / Appearance: x / SG: x / pH: x  Gluc: 112 mg/dL / Ketone: x  / Bili: x / Urobili: x   Blood: x / Protein: x / Nitrite: x   Leuk Esterase: x / RBC: x / WBC x   Sq Epi: x / Non Sq Epi: x / Bacteria: x          RADIOLOGY & ADDITIONAL TESTS:    Imaging Personally Reviewed:  [x ] YES  [ ] NO    Consultants involved in case:   Consultant(s) Notes Reviewed:  [ x] YES  [ ] NO:   Care Discussed with Consultants/Other Providers [x ] YES  [ ] NO         ***************************************************************  Alf Castro, PGY2  Internal Medicine   pager:  [facility]: [phone] [facility]: [phone]  ***************************************************************    ARABELLA BHANDARI  43y  MRN: [id number]    Patient is a 43y old  Female who presents with a chief complaint of HANNAH/electrolyte disturbance (28 Oct 2023 20:21)      Interval/Overnight Events: no events ON.   - now tolerating PO breakfast and lunch without n/v/c/d, pain  - defer EGD at this time -- see chart note  - pt c/o peripheral neuropathy -- f/u BMP, replaced IVF NS with LR  - will DC CIWA for now given out of window (per pt, last drink > 6 days ago)    Subjective: Pt seen and examined at bedside. Denies fever, CP, SOB, abn pain, N/V, dysuria. Tolerating diet.      MEDICATIONS  (STANDING):  chlorhexidine 2% Cloths 1 Application(s) Topical daily  FIRST- Mouthwash  BLM 15 milliLiter(s) Swish and Spit daily  folic acid 1 milliGRAM(s) Oral daily  influenza   Vaccine 0.5 milliLiter(s) IntraMuscular once  magnesium sulfate  IVPB 2 Gram(s) IV Intermittent every 2 hours  melatonin 6 milliGRAM(s) Oral at bedtime  multivitamin 1 Tablet(s) Oral daily  nicotine -  14 mG/24Hr(s) Patch 1 Patch Transdermal daily  pantoprazole  Injectable 40 milliGRAM(s) IV Push every 12 hours  sodium chloride 0.9%. 1000 milliLiter(s) (100 mL/Hr) IV Continuous <Continuous>  sodium chloride 0.9%. 1000 milliLiter(s) (100 mL/Hr) IV Continuous <Continuous>  sucralfate suspension 1 Gram(s) Oral four times a day  thiamine 100 milliGRAM(s) Oral daily    MEDICATIONS  (PRN):  LORazepam   Injectable 2 milliGRAM(s) IV Push every 2 hours PRN CIWA-Ar score increase by 2 points and a total score of 7 or less  LORazepam   Injectable 2 milliGRAM(s) IV Push every 1 hour PRN CIWA-Ar score 8 or greater      Objective:    Vitals: Vital Signs Last 24 Hrs  T(C): 36.8 (10-29-23 @ 04:06), Max: 36.9 (10-28-23 @ 16:23)  T(F): 98.2 (10-29-23 @ 04:06), Max: 98.5 (10-28-23 @ 16:23)  HR: 92 (10-29-23 @ 04:06) (66 - 95)  BP: 112/79 (10-29-23 @ 04:06) (105/75 - 112/79)  BP(mean): --  RR: 18 (10-29-23 @ 04:06) (18 - 18)  SpO2: 100% (10-29-23 @ 04:06) (99% - 100%)                I&O's Summary    28 Oct 2023 07:01  -  29 Oct 2023 07:00  --------------------------------------------------------  IN: 2380 mL / OUT: 0 mL / NET: 2380 mL    PHYSICAL EXAM:  GENERAL: NAD  HEAD:  Atraumatic, Normocephalic  EYES: EOMI, conjunctiva and sclera clear  CHEST/LUNG: Clear to auscultation bilaterally; No rales, rhonchi, wheezing, or rubs  HEART: Regular rate and rhythm; No murmurs, rubs, or gallops  ABDOMEN: Soft, nontender, mild tenderness to palpation in LLQ and RLQ  SKIN: No rashes or lesions  NERVOUS SYSTEM:  Alert & Oriented X3, no focal deficits    LABS:                        10.4   5.41  )-----------( 150      ( 29 Oct 2023 07:04 )             32.0                         9.6    5.58  )-----------( 91       ( 28 Oct 2023 07:03 )             29.3                         10.1   5.82  )-----------( 74       ( 27 Oct 2023 06:28 )             30.3     10-29    137  |  102  |  19  ----------------------------<  112<H>  4.3   |  22  |  0.62  10-28    136  |  97  |  19  ----------------------------<  121<H>  3.3<L>   |  27  |  0.63  10-27    135  |  94<L>  |  32<H>  ----------------------------<  103<H>  4.0   |  27  |  1.12    Ca    8.7      29 Oct 2023 07:04  Ca    8.4      28 Oct 2023 06:57  Ca    7.8<L>      27 Oct 2023 12:33  Phos  3.1     10-29  Mg     1.4     10-29    TPro  6.6  /  Alb  3.8  /  TBili  0.2  /  DBili  x   /  AST  133<H>  /  ALT  97<H>  /  AlkPhos  100  10-29  TPro  7.0  /  Alb  4.2  /  TBili  0.4  /  DBili  x   /  AST  103<H>  /  ALT  57<H>  /  AlkPhos  87  10-28  TPro  7.9  /  Alb  4.7  /  TBili  0.5  /  DBili  x   /  AST  75<H>  /  ALT  46<H>  /  AlkPhos  94  10-27    CAPILLARY BLOOD GLUCOSE    Urinalysis Basic - ( 29 Oct 2023 07:04 )    Color: x / Appearance: x / SG: x / pH: x  Gluc: 112 mg/dL / Ketone: x  / Bili: x / Urobili: x   Blood: x / Protein: x / Nitrite: x   Leuk Esterase: x / RBC: x / WBC x   Sq Epi: x / Non Sq Epi: x / Bacteria: x          RADIOLOGY & ADDITIONAL TESTS:    Imaging Personally Reviewed:  [x ] YES  [ ] NO    Consultants involved in case:   Consultant(s) Notes Reviewed:  [ x] YES  [ ] NO:   Care Discussed with Consultants/Other Providers [x ] YES  [ ] NO

## 2023-10-29 NOTE — PROGRESS NOTE ADULT - PROBLEM SELECTOR PLAN 6
AG 27, possibly i/s/o alcohol use vs starvation ketoacidosis   - Lactate 1.3   - Denies concurrent substance use, denies salicylates  - AG resolved 10/27 Plts 80s likely i/s/o marrow suppression/alcohol related   - f/u daily CBC

## 2023-10-29 NOTE — PROGRESS NOTE ADULT - PROBLEM SELECTOR PLAN 3
on EKG   Likely i/s/o electrolyte derangements (hypokalemia)   - avoid antiemetics that prolong QTC  - c/w telemetry monitoring   - c/w electrolyte repletions Partial Purse String (Intermediate) Text: Given the location of the defect and the characteristics of the surrounding skin an intermediate purse string closure was deemed most appropriate.  Undermining was performed circumferentially around the surgical defect.  A purse string suture was then placed and tightened. Wound tension of the circular defect prevented complete closure of the wound.

## 2023-10-29 NOTE — PROGRESS NOTE ADULT - PROBLEM SELECTOR PLAN 5
Likely from contraction, has been vomiting for the last 3 days along with poor PO intake   pH 7.49, HCO3 36   - c/w IVF   - pH improved to 7.40 bicarb 27 10/27 Likely from contraction, has been vomiting for the last 3 days along with poor PO intake   pH 7.49, HCO3 36 AG 27, possibly i/s/o alcohol use vs starvation ketoacidosis  - c/w IVF   - pH improved to 7.40 bicarb 27 10/27  - Lactate 1.3   - Denies concurrent substance use, denies salicylates  - AG resolved 10/27

## 2023-10-29 NOTE — PROGRESS NOTE ADULT - ATTENDING COMMENTS
42 Y/O/F with no significant PMHx except  ETOH dependence had rehab and last drink 5 days ago admitted with nausea and vomiting/ Pre renal HANNAH/ Hyponatremia and metabolic alkalosis and acidosis   HANNAH and electrolyte abnormalities resolved.        PPI and Carafate   GI note appreciated-->PPI IV BID 2-3 days, then PO BID 6-8 weeks, 7-10 days of carafate, no intervention for now, if seen outpatient and appropriate but no improvement  then they will do  endoscopic evaluation.   Patient states she would like to defer if possible, today with improved ability to have PO diet. Will continue to monitor. Will defer EGD to outpatient if patient is able to continue to tolerate diet. Discussed with GI fellow.   ARACELI PLASENCIA consulted. LMSW offered referral, however, patient verbalized she is working with her sister to return to a facility that works best for her. Patient declined referral at this time.  SW consulted.  DC planning, prepare for discharge tomorrow.

## 2023-10-29 NOTE — PROGRESS NOTE ADULT - PROBLEM SELECTOR PLAN 1
On presentation pt found to have SCr 6.34 --> 4.29 after 1L IVF (baseline 0.44 8/2023)   Improving with fluids, FeNa 0.7% - likely prerenal i/s/o poor PO intake   - continue with NaCl IVF   - monitor Cr  - avoid nephrotoxic agents  - Creat 0.63 today 10/28 after IVF On presentation pt found to have SCr 6.34 --> 4.29 after 1L IVF (baseline 0.44 8/2023)   Improving with fluids, FeNa 0.7% - likely prerenal i/s/o poor PO intake   - continue with NaCl IVF --> LR given prior hypokalemia  - monitor Cr  - avoid nephrotoxic agents  - Creat 0.63 today 10/28 after IVF

## 2023-10-29 NOTE — PROVIDER CONTACT NOTE (OTHER) - REASON
Patient states that she is having diarrhea and CIWA score increased to 5
Patient received from ED on normal saline fluids, rather than LR fluids ordered

## 2023-10-29 NOTE — PROVIDER CONTACT NOTE (OTHER) - ACTION/TREATMENT ORDERED:
Bmp ordered, no other orders at this time. Plan  of care ongoing.
Provider Aware, Order switched   Order was switched to Normal saline as per nephrologist who came on floor to speak with patient and CHRIST andrews.

## 2023-10-30 ENCOUNTER — TRANSCRIPTION ENCOUNTER (OUTPATIENT)
Age: 43
End: 2023-10-30

## 2023-10-30 VITALS
HEART RATE: 65 BPM | TEMPERATURE: 98 F | SYSTOLIC BLOOD PRESSURE: 110 MMHG | OXYGEN SATURATION: 100 % | RESPIRATION RATE: 18 BRPM | DIASTOLIC BLOOD PRESSURE: 72 MMHG

## 2023-10-30 LAB
ALBUMIN SERPL ELPH-MCNC: 4 G/DL — SIGNIFICANT CHANGE UP (ref 3.3–5)
ALBUMIN SERPL ELPH-MCNC: 4 G/DL — SIGNIFICANT CHANGE UP (ref 3.3–5)
ALP SERPL-CCNC: 83 U/L — SIGNIFICANT CHANGE UP (ref 40–120)
ALP SERPL-CCNC: 83 U/L — SIGNIFICANT CHANGE UP (ref 40–120)
ALT FLD-CCNC: 186 U/L — HIGH (ref 10–45)
ALT FLD-CCNC: 186 U/L — HIGH (ref 10–45)
ANION GAP SERPL CALC-SCNC: 10 MMOL/L — SIGNIFICANT CHANGE UP (ref 5–17)
ANION GAP SERPL CALC-SCNC: 10 MMOL/L — SIGNIFICANT CHANGE UP (ref 5–17)
AST SERPL-CCNC: 216 U/L — HIGH (ref 10–40)
AST SERPL-CCNC: 216 U/L — HIGH (ref 10–40)
BASOPHILS # BLD AUTO: 0.04 K/UL — SIGNIFICANT CHANGE UP (ref 0–0.2)
BASOPHILS # BLD AUTO: 0.04 K/UL — SIGNIFICANT CHANGE UP (ref 0–0.2)
BASOPHILS NFR BLD AUTO: 0.8 % — SIGNIFICANT CHANGE UP (ref 0–2)
BASOPHILS NFR BLD AUTO: 0.8 % — SIGNIFICANT CHANGE UP (ref 0–2)
BILIRUB SERPL-MCNC: 0.2 MG/DL — SIGNIFICANT CHANGE UP (ref 0.2–1.2)
BILIRUB SERPL-MCNC: 0.2 MG/DL — SIGNIFICANT CHANGE UP (ref 0.2–1.2)
BUN SERPL-MCNC: 13 MG/DL — SIGNIFICANT CHANGE UP (ref 7–23)
BUN SERPL-MCNC: 13 MG/DL — SIGNIFICANT CHANGE UP (ref 7–23)
CALCIUM SERPL-MCNC: 9.3 MG/DL — SIGNIFICANT CHANGE UP (ref 8.4–10.5)
CALCIUM SERPL-MCNC: 9.3 MG/DL — SIGNIFICANT CHANGE UP (ref 8.4–10.5)
CHLORIDE SERPL-SCNC: 101 MMOL/L — SIGNIFICANT CHANGE UP (ref 96–108)
CHLORIDE SERPL-SCNC: 101 MMOL/L — SIGNIFICANT CHANGE UP (ref 96–108)
CO2 SERPL-SCNC: 26 MMOL/L — SIGNIFICANT CHANGE UP (ref 22–31)
CO2 SERPL-SCNC: 26 MMOL/L — SIGNIFICANT CHANGE UP (ref 22–31)
CREAT SERPL-MCNC: 0.64 MG/DL — SIGNIFICANT CHANGE UP (ref 0.5–1.3)
CREAT SERPL-MCNC: 0.64 MG/DL — SIGNIFICANT CHANGE UP (ref 0.5–1.3)
EGFR: 112 ML/MIN/1.73M2 — SIGNIFICANT CHANGE UP
EGFR: 112 ML/MIN/1.73M2 — SIGNIFICANT CHANGE UP
EOSINOPHIL # BLD AUTO: 0.1 K/UL — SIGNIFICANT CHANGE UP (ref 0–0.5)
EOSINOPHIL # BLD AUTO: 0.1 K/UL — SIGNIFICANT CHANGE UP (ref 0–0.5)
EOSINOPHIL NFR BLD AUTO: 2.1 % — SIGNIFICANT CHANGE UP (ref 0–6)
EOSINOPHIL NFR BLD AUTO: 2.1 % — SIGNIFICANT CHANGE UP (ref 0–6)
GLUCOSE SERPL-MCNC: 95 MG/DL — SIGNIFICANT CHANGE UP (ref 70–99)
GLUCOSE SERPL-MCNC: 95 MG/DL — SIGNIFICANT CHANGE UP (ref 70–99)
HCT VFR BLD CALC: 31.4 % — LOW (ref 34.5–45)
HCT VFR BLD CALC: 31.4 % — LOW (ref 34.5–45)
HGB BLD-MCNC: 10 G/DL — LOW (ref 11.5–15.5)
HGB BLD-MCNC: 10 G/DL — LOW (ref 11.5–15.5)
IMM GRANULOCYTES NFR BLD AUTO: 0.6 % — SIGNIFICANT CHANGE UP (ref 0–0.9)
IMM GRANULOCYTES NFR BLD AUTO: 0.6 % — SIGNIFICANT CHANGE UP (ref 0–0.9)
LYMPHOCYTES # BLD AUTO: 2.1 K/UL — SIGNIFICANT CHANGE UP (ref 1–3.3)
LYMPHOCYTES # BLD AUTO: 2.1 K/UL — SIGNIFICANT CHANGE UP (ref 1–3.3)
LYMPHOCYTES # BLD AUTO: 43.3 % — SIGNIFICANT CHANGE UP (ref 13–44)
LYMPHOCYTES # BLD AUTO: 43.3 % — SIGNIFICANT CHANGE UP (ref 13–44)
MAGNESIUM SERPL-MCNC: 1.9 MG/DL — SIGNIFICANT CHANGE UP (ref 1.6–2.6)
MAGNESIUM SERPL-MCNC: 1.9 MG/DL — SIGNIFICANT CHANGE UP (ref 1.6–2.6)
MCHC RBC-ENTMCNC: 23.5 PG — LOW (ref 27–34)
MCHC RBC-ENTMCNC: 23.5 PG — LOW (ref 27–34)
MCHC RBC-ENTMCNC: 31.8 GM/DL — LOW (ref 32–36)
MCHC RBC-ENTMCNC: 31.8 GM/DL — LOW (ref 32–36)
MCV RBC AUTO: 73.9 FL — LOW (ref 80–100)
MCV RBC AUTO: 73.9 FL — LOW (ref 80–100)
MONOCYTES # BLD AUTO: 0.53 K/UL — SIGNIFICANT CHANGE UP (ref 0–0.9)
MONOCYTES # BLD AUTO: 0.53 K/UL — SIGNIFICANT CHANGE UP (ref 0–0.9)
MONOCYTES NFR BLD AUTO: 10.9 % — SIGNIFICANT CHANGE UP (ref 2–14)
MONOCYTES NFR BLD AUTO: 10.9 % — SIGNIFICANT CHANGE UP (ref 2–14)
NEUTROPHILS # BLD AUTO: 2.05 K/UL — SIGNIFICANT CHANGE UP (ref 1.8–7.4)
NEUTROPHILS # BLD AUTO: 2.05 K/UL — SIGNIFICANT CHANGE UP (ref 1.8–7.4)
NEUTROPHILS NFR BLD AUTO: 42.3 % — LOW (ref 43–77)
NEUTROPHILS NFR BLD AUTO: 42.3 % — LOW (ref 43–77)
NRBC # BLD: 0 /100 WBCS — SIGNIFICANT CHANGE UP (ref 0–0)
NRBC # BLD: 0 /100 WBCS — SIGNIFICANT CHANGE UP (ref 0–0)
PHOSPHATE SERPL-MCNC: 2.7 MG/DL — SIGNIFICANT CHANGE UP (ref 2.5–4.5)
PHOSPHATE SERPL-MCNC: 2.7 MG/DL — SIGNIFICANT CHANGE UP (ref 2.5–4.5)
PLATELET # BLD AUTO: 221 K/UL — SIGNIFICANT CHANGE UP (ref 150–400)
PLATELET # BLD AUTO: 221 K/UL — SIGNIFICANT CHANGE UP (ref 150–400)
POTASSIUM SERPL-MCNC: 4 MMOL/L — SIGNIFICANT CHANGE UP (ref 3.5–5.3)
POTASSIUM SERPL-MCNC: 4 MMOL/L — SIGNIFICANT CHANGE UP (ref 3.5–5.3)
POTASSIUM SERPL-SCNC: 4 MMOL/L — SIGNIFICANT CHANGE UP (ref 3.5–5.3)
POTASSIUM SERPL-SCNC: 4 MMOL/L — SIGNIFICANT CHANGE UP (ref 3.5–5.3)
PROT SERPL-MCNC: 6.8 G/DL — SIGNIFICANT CHANGE UP (ref 6–8.3)
PROT SERPL-MCNC: 6.8 G/DL — SIGNIFICANT CHANGE UP (ref 6–8.3)
RBC # BLD: 4.25 M/UL — SIGNIFICANT CHANGE UP (ref 3.8–5.2)
RBC # BLD: 4.25 M/UL — SIGNIFICANT CHANGE UP (ref 3.8–5.2)
RBC # FLD: 15.4 % — HIGH (ref 10.3–14.5)
RBC # FLD: 15.4 % — HIGH (ref 10.3–14.5)
SODIUM SERPL-SCNC: 137 MMOL/L — SIGNIFICANT CHANGE UP (ref 135–145)
SODIUM SERPL-SCNC: 137 MMOL/L — SIGNIFICANT CHANGE UP (ref 135–145)
WBC # BLD: 4.85 K/UL — SIGNIFICANT CHANGE UP (ref 3.8–10.5)
WBC # BLD: 4.85 K/UL — SIGNIFICANT CHANGE UP (ref 3.8–10.5)
WBC # FLD AUTO: 4.85 K/UL — SIGNIFICANT CHANGE UP (ref 3.8–10.5)
WBC # FLD AUTO: 4.85 K/UL — SIGNIFICANT CHANGE UP (ref 3.8–10.5)

## 2023-10-30 PROCEDURE — 87641 MR-STAPH DNA AMP PROBE: CPT

## 2023-10-30 PROCEDURE — 83605 ASSAY OF LACTIC ACID: CPT

## 2023-10-30 PROCEDURE — 82570 ASSAY OF URINE CREATININE: CPT

## 2023-10-30 PROCEDURE — 83690 ASSAY OF LIPASE: CPT

## 2023-10-30 PROCEDURE — 84484 ASSAY OF TROPONIN QUANT: CPT

## 2023-10-30 PROCEDURE — 83935 ASSAY OF URINE OSMOLALITY: CPT

## 2023-10-30 PROCEDURE — 87637 SARSCOV2&INF A&B&RSV AMP PRB: CPT

## 2023-10-30 PROCEDURE — 84132 ASSAY OF SERUM POTASSIUM: CPT

## 2023-10-30 PROCEDURE — 96375 TX/PRO/DX INJ NEW DRUG ADDON: CPT

## 2023-10-30 PROCEDURE — 96374 THER/PROPH/DIAG INJ IV PUSH: CPT

## 2023-10-30 PROCEDURE — 71045 X-RAY EXAM CHEST 1 VIEW: CPT

## 2023-10-30 PROCEDURE — 84300 ASSAY OF URINE SODIUM: CPT

## 2023-10-30 PROCEDURE — 82435 ASSAY OF BLOOD CHLORIDE: CPT

## 2023-10-30 PROCEDURE — 76700 US EXAM ABDOM COMPLETE: CPT

## 2023-10-30 PROCEDURE — 87640 STAPH A DNA AMP PROBE: CPT

## 2023-10-30 PROCEDURE — 85014 HEMATOCRIT: CPT

## 2023-10-30 PROCEDURE — 84156 ASSAY OF PROTEIN URINE: CPT

## 2023-10-30 PROCEDURE — 93306 TTE W/DOPPLER COMPLETE: CPT

## 2023-10-30 PROCEDURE — 85018 HEMOGLOBIN: CPT

## 2023-10-30 PROCEDURE — 82947 ASSAY GLUCOSE BLOOD QUANT: CPT

## 2023-10-30 PROCEDURE — 82330 ASSAY OF CALCIUM: CPT

## 2023-10-30 PROCEDURE — 99232 SBSQ HOSP IP/OBS MODERATE 35: CPT | Mod: GC

## 2023-10-30 PROCEDURE — 99239 HOSP IP/OBS DSCHRG MGMT >30: CPT | Mod: GC

## 2023-10-30 PROCEDURE — 94640 AIRWAY INHALATION TREATMENT: CPT

## 2023-10-30 PROCEDURE — 83930 ASSAY OF BLOOD OSMOLALITY: CPT

## 2023-10-30 PROCEDURE — 82803 BLOOD GASES ANY COMBINATION: CPT

## 2023-10-30 PROCEDURE — 85027 COMPLETE CBC AUTOMATED: CPT

## 2023-10-30 PROCEDURE — 84100 ASSAY OF PHOSPHORUS: CPT

## 2023-10-30 PROCEDURE — 81001 URINALYSIS AUTO W/SCOPE: CPT

## 2023-10-30 PROCEDURE — 80307 DRUG TEST PRSMV CHEM ANLYZR: CPT

## 2023-10-30 PROCEDURE — 82436 ASSAY OF URINE CHLORIDE: CPT

## 2023-10-30 PROCEDURE — 84702 CHORIONIC GONADOTROPIN TEST: CPT

## 2023-10-30 PROCEDURE — 83735 ASSAY OF MAGNESIUM: CPT

## 2023-10-30 PROCEDURE — 84540 ASSAY OF URINE/UREA-N: CPT

## 2023-10-30 PROCEDURE — 82746 ASSAY OF FOLIC ACID SERUM: CPT

## 2023-10-30 PROCEDURE — G0480: CPT

## 2023-10-30 PROCEDURE — 85025 COMPLETE CBC W/AUTO DIFF WBC: CPT

## 2023-10-30 PROCEDURE — 80048 BASIC METABOLIC PNL TOTAL CA: CPT

## 2023-10-30 PROCEDURE — 93356 MYOCRD STRAIN IMG SPCKL TRCK: CPT

## 2023-10-30 PROCEDURE — 84133 ASSAY OF URINE POTASSIUM: CPT

## 2023-10-30 PROCEDURE — 80053 COMPREHEN METABOLIC PANEL: CPT

## 2023-10-30 PROCEDURE — 84295 ASSAY OF SERUM SODIUM: CPT

## 2023-10-30 PROCEDURE — 99285 EMERGENCY DEPT VISIT HI MDM: CPT

## 2023-10-30 RX ORDER — PANTOPRAZOLE SODIUM 20 MG/1
1 TABLET, DELAYED RELEASE ORAL
Qty: 60 | Refills: 0
Start: 2023-10-30 | End: 2023-11-28

## 2023-10-30 RX ORDER — THIAMINE MONONITRATE (VIT B1) 100 MG
1 TABLET ORAL
Qty: 30 | Refills: 0
Start: 2023-10-30 | End: 2023-11-28

## 2023-10-30 RX ORDER — SUCRALFATE 1 G
10 TABLET ORAL
Qty: 1200 | Refills: 0
Start: 2023-10-30 | End: 2023-11-28

## 2023-10-30 RX ORDER — FOLIC ACID 0.8 MG
1 TABLET ORAL
Qty: 30 | Refills: 0
Start: 2023-10-30 | End: 2023-11-28

## 2023-10-30 RX ADMIN — Medication 1 GRAM(S): at 05:32

## 2023-10-30 RX ADMIN — CHLORHEXIDINE GLUCONATE 1 APPLICATION(S): 213 SOLUTION TOPICAL at 11:36

## 2023-10-30 RX ADMIN — Medication 100 MILLIGRAM(S): at 11:36

## 2023-10-30 RX ADMIN — Medication 1 GRAM(S): at 11:33

## 2023-10-30 RX ADMIN — Medication 1 PATCH: at 11:32

## 2023-10-30 RX ADMIN — Medication 1 MILLIGRAM(S): at 11:32

## 2023-10-30 RX ADMIN — PANTOPRAZOLE SODIUM 40 MILLIGRAM(S): 20 TABLET, DELAYED RELEASE ORAL at 05:33

## 2023-10-30 RX ADMIN — Medication 1 TABLET(S): at 11:31

## 2023-10-30 RX ADMIN — Medication 1 PATCH: at 09:00

## 2023-10-30 RX ADMIN — DIPHENHYDRAMINE HYDROCHLORIDE AND LIDOCAINE HYDROCHLORIDE AND ALUMINUM HYDROXIDE AND MAGNESIUM HYDRO 15 MILLILITER(S): KIT at 11:33

## 2023-10-30 RX ADMIN — Medication 1 PATCH: at 12:17

## 2023-10-30 NOTE — PROGRESS NOTE ADULT - ASSESSMENT
This is a 43-year-old F with hx of alcohol abuse c/b multiple admissions for alcohol withdrawal (although never had seizures or ICU admission), who presented to the ED with 4 days of chest pain followed by persistent nausea, vomiting (nonbilious nonbloody), and inability to tolerate p.o. Nephrology service consulted for HANNAH. Upon presentation, patient was hypotensive with BP 91/66 and tachycardic , but afebrile. She reports she is hypotensive and tachycardic at baseline, however. Labs revealed serum Na 127, K 2.3, Cl<70, bicarb 36, Cr 6.35, BUN 52, mildly elevated LFTs, and serum Osm 283. PVR bladder scan showed 12cc urine, urine studies with Uosm 376, Farndy 25, UCl<20.

## 2023-10-30 NOTE — PROGRESS NOTE ADULT - PROBLEM SELECTOR PLAN 4
Likely due to poor PO intake and vomiting.  Resolved    Will sign off, please re-call as needed.      *THIS NOTE IS NOT FINALIZED UNTIL SIGNED BY THE ATTENDING*  Wolfgang Majano  Nephrology Fellow  Feel free to contact me on TEAMS  After 4 pm please contact the on-call Fellow.

## 2023-10-30 NOTE — DISCHARGE NOTE NURSING/CASE MANAGEMENT/SOCIAL WORK - NSDCFUADDAPPT_GEN_ALL_CORE_FT
APPTS ARE READY TO BE MADE: [X] YES    Best Family or Patient Contact (if needed):    Additional Information about above appointments (if needed):    1: PCP in 5-7 days   2: Psych in 5-7 days  3: GI in 5-7 days    Other comments or requests:

## 2023-10-30 NOTE — PROGRESS NOTE ADULT - ATTENDING COMMENTS
Patient seen and examined   Labs and vitals are reviewed   No events  improved swallowing   no abdominal pain    A/P- 42 Y/O/F with no significant PMHx except  ETOH dependence had rehab and last drink 5 days ago admitted with nausea and vomiting/ Pre renal HANNAH/ Hyponatremia and metabolic alkalosis and acidosis.   HANNAH and electrolyte abnormalities resolved.    Odynophagia- likely esophagitis   symptoms improved after Carafate and PPI  HD stable   Abnormal LFts likely due to ethanol and haptic steatosis- RUQ sono hepatic steotasis no abdominal pain   DC planning if okay with GI DC planning and out patient follow up.

## 2023-10-30 NOTE — PROGRESS NOTE ADULT - TIME BILLING
Discharge time  Reviewing, and interpreting labs and discharge reconciliation   Counselling and educating patient
Time-based billing (NON-critical care).     The necessity of the time spent during the encounter on this date of service was due to:     - Ordering, reviewing, and interpreting labs, testing, and imaging.  - Independently obtaining a review of systems and performing a physical exam  - Reviewing prior hospitalization and where necessary, outpatient records.  - Counselling and educating patient and family regarding interpretation of aforementioned items and plan of care.
Time-based billing (NON-critical care).     The necessity of the time spent during the encounter on this date of service was due to:     - Ordering, reviewing, and interpreting labs, testing, and imaging.  - Independently obtaining a review of systems and performing a physical exam  - Reviewing prior hospitalization and where necessary, outpatient records.  - Counselling and educating patient  regarding interpretation of aforementioned items and plan of care.

## 2023-10-30 NOTE — PROGRESS NOTE ADULT - PROBLEM SELECTOR PLAN 2
Pt w/ epigastric/retrosternal pain following prolonged retching. Unable to tolerate PO. No hemoptysis. Suspect esophageal irritation 2/2 vomiting.   - GI consulted; f/u recs  - Start magic mouthwash  - Start carafate x7-10 days  - Start pantoprazole 40mg IV BID 2-3 days then po BID 6-8 weeks   - Per GI no indication for egd at this time; if sx persist in absence of recurrent vomiting may consider egd at that time Pt w/ epigastric/retrosternal pain following prolonged retching. Unable to tolerate PO. No hemoptysis. Suspect esophageal irritation 2/2 vomiting.   - GI consulted; f/u recs  - Start magic mouthwash  - Start carafate x7-10 days  - Start pantoprazole 40mg IV BID 2-3 days then po BID 6-8 weeks   - Per GI no indication for egd at this time; symptoms resolved.  - DC on PPI and carafate

## 2023-10-30 NOTE — PROGRESS NOTE ADULT - PROBLEM SELECTOR PLAN 4
Last drink 5 days ago, likely N/V all in the setting of acute withdrawal - sxs now improved   Usually drinks 1 pint of vodka per day   - c/w symptom triggered CIWA  - folic acid, thiamine, multivitamin   - SBIRT; suspect depression contributing. Pt reports family member setting up psych outpt.       #Smoking cessation   - nicotine patch Last drink 5 days ago, likely N/V all in the setting of acute withdrawal - sxs now improved   Usually drinks 1 pint of vodka per day   - c/w symptom triggered CIWA  - folic acid, thiamine, multivitamin   - SBIRT; suspect depression contributing. Pt reports family member setting up psych outpt.

## 2023-10-30 NOTE — PROGRESS NOTE ADULT - PROBLEM SELECTOR PLAN 6
Plts 80s likely i/s/o marrow suppression/alcohol related   - f/u daily CBC Plts 80s likely i/s/o marrow suppression/alcohol related   likely related to ethanol

## 2023-10-30 NOTE — PROGRESS NOTE ADULT - SUBJECTIVE AND OBJECTIVE BOX
[facility] DIVISION OF KIDNEY DISEASES AND HYPERTENSION   FOLLOW UP NOTE    --------------------------------------------------------------------------------  Chief Complaint:    24 hour events/subjective: No acute events.      PAST HISTORY  --------------------------------------------------------------------------------  No significant changes to PMH, PSH, FHx, SHx, unless otherwise noted    ALLERGIES & MEDICATIONS  --------------------------------------------------------------------------------  Allergies    No Known Allergies    Intolerances      Standing Inpatient Medications  chlorhexidine 2% Cloths 1 Application(s) Topical daily  FIRST- Mouthwash  BLM 15 milliLiter(s) Swish and Spit daily  folic acid 1 milliGRAM(s) Oral daily  influenza   Vaccine 0.5 milliLiter(s) IntraMuscular once  lactated ringers. 1000 milliLiter(s) IV Continuous <Continuous>  melatonin 6 milliGRAM(s) Oral at bedtime  multivitamin 1 Tablet(s) Oral daily  nicotine -  14 mG/24Hr(s) Patch 1 Patch Transdermal daily  pantoprazole  Injectable 40 milliGRAM(s) IV Push every 12 hours  sucralfate suspension 1 Gram(s) Oral four times a day  thiamine 100 milliGRAM(s) Oral daily    PRN Inpatient Medications  aluminum hydroxide/magnesium hydroxide/simethicone Suspension 30 milliLiter(s) Oral every 6 hours PRN        VITALS/PHYSICAL EXAM  --------------------------------------------------------------------------------  T(C): 36.8 (10-30-23 @ 11:20), Max: 37.2 (10-29-23 @ 20:42)  HR: 65 (10-30-23 @ 11:20) (65 - 81)  BP: 110/72 (10-30-23 @ 11:20) (110/72 - 118/77)  RR: 18 (10-30-23 @ 11:20) (18 - 18)  SpO2: 100% (10-30-23 @ 11:20) (99% - 100%)  Wt(kg): --        10-29-23 @ 07:01  -  10-30-23 @ 07:00  --------------------------------------------------------  IN: 1750 mL / OUT: 0 mL / NET: 1750 mL    10-30-23 @ 07:01  -  10-30-23 @ 15:53  --------------------------------------------------------  IN: 340 mL / OUT: 0 mL / NET: 340 mL        Physical Exam:  General: no acute distress  Neuro: no focal deficits  HEENT: anicteric, no JVD  Pulmonary: lungs CTA B/L  Cardiovascular/Chest: +S1S2  GI/Abdomen: soft, non-distended  Extremities: no LE pitting edema  Skin: Warm and dry      LABS/STUDIES  --------------------------------------------------------------------------------              10.0   4.85  >-----------<  221      [10-30-23 @ 07:13]              31.4     137  |  101  |  13  ----------------------------<  95      [10-30-23 @ 07:12]  4.0   |  26  |  0.64        Ca     9.3     [10-30-23 @ 07:12]      Mg     1.9     [10-30-23 @ 07:12]      Phos  2.7     [10-30-23 @ 07:12]    TPro  6.8  /  Alb  4.0  /  TBili  0.2  /  DBili  x   /  AST  216  /  ALT  186  /  AlkPhos  83  [10-30-23 @ 07:12]          Creatinine Trend:  SCr 0.64 [10-30 @ 07:12]  SCr 0.67 [10-29 @ 16:06]  SCr 0.62 [10-29 @ 07:04]  SCr 0.63 [10-28 @ 06:57]  SCr 1.12 [10-27 @ 12:33]    Urinalysis - [10-30-23 @ 07:12]      Color  / Appearance  / SG  / pH       Gluc 95 / Ketone   / Bili  / Urobili        Blood  / Protein  / Leuk Est  / Nitrite       RBC  / WBC  / Hyaline  / Gran  / Sq Epi  / Non Sq Epi  / Bacteria     Urine Creatinine 191      [10-26-23 @ 20:32]  Urine Protein 204      [10-26-23 @ 20:32]  Urine Sodium 25      [10-26-23 @ 20:32]  Urine Urea Nitrogen 454      [10-26-23 @ 20:32]  Urine Potassium 43      [10-26-23 @ 20:32]  Urine Chloride <20      [10-26-23 @ 20:32]  Urine Osmolality 376      [10-26-23 @ 20:32]        Tacrolimus  Cyclosporine  Sirolimus  Mycophenolate  BK PCR  CMV PCR  Parvo PCR  EBV PCR

## 2023-10-30 NOTE — DISCHARGE NOTE NURSING/CASE MANAGEMENT/SOCIAL WORK - PATIENT PORTAL LINK FT
You can access the FollowMyHealth Patient Portal offered by [facility] by registering at the following website: [email or web address]. By joining [employer]’s FollowMyHealth portal, you will also be able to view your health information using other applications (apps) compatible with our system.

## 2023-10-30 NOTE — PROGRESS NOTE ADULT - REASON FOR ADMISSION
HANNAH/electrolyte disturbance

## 2023-10-30 NOTE — PROGRESS NOTE ADULT - PROBLEM SELECTOR PLAN 7
DVT PPx: SCDs  Diet: Regular  Dispo: Medically active    Code Status: Full Code DVT PPx: SCDs  Diet: Regular  Dispo: Home    Code Status: Full Code

## 2023-10-30 NOTE — PROGRESS NOTE ADULT - ASSESSMENT
43-year-old female no significant past medical history who presents with persistent nausea and vomiting i/s/o acute alcohol withdrawal presents with new HANNAH and electrolyte derangements 43-year-old female no significant past medical history who presents with persistent nausea and vomiting i/s/o acute alcohol withdrawal presents with new HANNAH and odynophagia that have both resolved.

## 2023-10-30 NOTE — PROGRESS NOTE ADULT - ATTENDING COMMENTS
This is a 43-year-old F with hx of alcohol abuse c/b multiple admissions for alcohol withdrawal (although never had seizures or ICU admission), who presented to the ED with 4 days of chest pain followed by persistent nausea, vomiting (nonbilious nonbloody), and inability to tolerate p.o. Nephrology service consulted for HANNAH. Upon presentation, patient was hypotensive with BP 91/66 and tachycardic , but afebrile. She reports she is hypotensive and tachycardic at baseline, however. Labs revealed serum Na 127, K 2.3, Cl<70, bicarb 36, Cr 6.35, BUN 52, mildly elevated LFTs, and serum Osm 283. PVR bladder scan showed 12cc urine, urine studies with Uosm 376, Frandy 25, UCl<20.   Today feeling improved.  Non oliguric, no uremic symptoms  1.  ARF--pre>hepatorenal failure given response to supplementation.  IV--> PO intake.  No additional ongoing diarrhea, N&V  2.  Hyponatremia--goal >133.  Needs normal salt and solute diet  3.  Alkalosis--improved with volume supplementation and repair of K.  NaCl IV not required.  Can check VBG    [id number] with med team  Zuhair Juares MD  contact me on Teams

## 2023-10-30 NOTE — DISCHARGE NOTE NURSING/CASE MANAGEMENT/SOCIAL WORK - NSDCPEWEB_GEN_ALL_CORE
[facility] for Tobacco Control website --- [email or web address] website --- [email or web address]

## 2023-10-30 NOTE — PROGRESS NOTE ADULT - PROBLEM SELECTOR PROBLEM 1
HANNAH (acute kidney injury)

## 2023-10-30 NOTE — DISCHARGE NOTE NURSING/CASE MANAGEMENT/SOCIAL WORK - NSDCPEFALRISK_GEN_ALL_CORE
For information on Fall & Injury Prevention, visit: [email or web address] OR  [email or web address] OR  https://www.cdc.gov/steadi/patient.html

## 2023-10-30 NOTE — PROGRESS NOTE ADULT - PROBLEM SELECTOR PLAN 1
Likely prerenal iso poor PO intake, vomiting, and hypotension. Urine studies with Uosm 376, Frandy 25, UCl<20. but no evidence of retention. No kidney issues in the past. S/p IVF.    Resolved now.

## 2023-10-30 NOTE — PROGRESS NOTE ADULT - PROBLEM SELECTOR PLAN 1
On presentation pt found to have SCr 6.34 --> 4.29 after 1L IVF (baseline 0.44 8/2023)   Improving with fluids, FeNa 0.7% - likely prerenal i/s/o poor PO intake   - continue with NaCl IVF --> LR given prior hypokalemia  - monitor Cr  - avoid nephrotoxic agents  - Creat 0.63 today 10/28 after IVF RESOLVED

## 2023-10-30 NOTE — DISCHARGE NOTE NURSING/CASE MANAGEMENT/SOCIAL WORK - NSDCPEEMAIL_GEN_ALL_CORE
[facility] for Tobacco Control email [email or web address]

## 2023-10-30 NOTE — PROGRESS NOTE ADULT - SUBJECTIVE AND OBJECTIVE BOX
***************************************************************  Anuj Erazo, PGY2  Internal Medicine   TEAMS Preferred  ***************************************************************    ARABELLA BHANDARI  43y  MRN: [id number]  10-26-23 (4d)    Patient is a 43y old  Female who presents with a chief complaint of HANNAH/electrolyte disturbance (29 Oct 2023 09:31)      SUBJECTIVE / OVERNIGHT EVENTS:   No acute overnight events. Pt seen and examined at bedside. Denies fevers, chills, CP, SOB, Abdominal pain, N/V, Constipation, Diarrhea. Last BM     12 Point ROS negative with the exception of the above    MEDICATIONS  (STANDING):  chlorhexidine 2% Cloths 1 Application(s) Topical daily  FIRST- Mouthwash  BLM 15 milliLiter(s) Swish and Spit daily  folic acid 1 milliGRAM(s) Oral daily  influenza   Vaccine 0.5 milliLiter(s) IntraMuscular once  lactated ringers. 1000 milliLiter(s) (100 mL/Hr) IV Continuous <Continuous>  melatonin 6 milliGRAM(s) Oral at bedtime  multivitamin 1 Tablet(s) Oral daily  nicotine -  14 mG/24Hr(s) Patch 1 Patch Transdermal daily  pantoprazole  Injectable 40 milliGRAM(s) IV Push every 12 hours  sucralfate suspension 1 Gram(s) Oral four times a day  thiamine 100 milliGRAM(s) Oral daily    MEDICATIONS  (PRN):  aluminum hydroxide/magnesium hydroxide/simethicone Suspension 30 milliLiter(s) Oral every 6 hours PRN Dyspepsia      OBJECTIVE:  Vital Signs Last 24 Hrs  T(C): 37.1 (30 Oct 2023 01:10), Max: 37.2 (29 Oct 2023 20:42)  T(F): 98.7 (30 Oct 2023 01:10), Max: 99 (29 Oct 2023 20:42)  HR: 66 (30 Oct 2023 01:10) (65 - 96)  BP: 118/77 (30 Oct 2023 01:10) (103/68 - 118/77)  BP(mean): --  RR: 18 (30 Oct 2023 01:10) (17 - 18)  SpO2: 99% (30 Oct 2023 01:10) (98% - 100%)    Parameters below as of 30 Oct 2023 01:10  Patient On (Oxygen Delivery Method): room air        I&O's Summary    29 Oct 2023 07:01  -  30 Oct 2023 07:00  --------------------------------------------------------  IN: 1750 mL / OUT: 0 mL / NET: 1750 mL        PHYSICAL EXAM:  GENERAL: Laying comfortably, NAD  HEENT: NCAT, PERRLA, EOMI, no scleral icterus, no LAD  NECK: No JVD, supple  LUNG: CTABL; No wheezes, crackles, or rhonchi  HEART: RRR; normal S1/S2; No murmurs, rubs, or gallops  ABDOMEN: +BS, soft, nontender, nondistended, no HSM; No rebound, guarding, or rigidity  EXTREMITIES:  No LE edema b/l, 2+ Peripheral Pulses, No clubbing or cyanosis  NEUROLOGY: AOx3, non-focal, strength 5/5 in all extremities, sensation intact  PSYCH: calm and cooperative  SKIN: No rashes or lesions    LABS:                        10.4   5.41  )-----------( 150      ( 29 Oct 2023 07:04 )             32.0     Auto Eosinophil # 0.09  / Auto Eosinophil % 1.7   / Auto Neutrophil # 2.43  / Auto Neutrophil % 44.9  / BANDS % x        10-29    137  |  102  |  16  ----------------------------<  103<H>  4.3   |  23  |  0.67  10-29    137  |  102  |  19  ----------------------------<  112<H>  4.3   |  22  |  0.62  10-28    136  |  97  |  19  ----------------------------<  121<H>  3.3<L>   |  27  |  0.63    Ca    9.2      29 Oct 2023 16:06  Ca    8.7      29 Oct 2023 07:04  Ca    8.4      28 Oct 2023 06:57  Phos  3.5     10-29  Mg     2.0     10-29    TPro  6.6  /  Alb  3.8  /  TBili  0.2  /  DBili  x   /  AST  133<H>  /  ALT  97<H>  /  AlkPhos  100  10-29  TPro  7.0  /  Alb  4.2  /  TBili  0.4  /  DBili  x   /  AST  103<H>  /  ALT  57<H>  /  AlkPhos  87  10-28          Urinalysis Basic - ( 29 Oct 2023 16:06 )    Color: x / Appearance: x / SG: x / pH: x  Gluc: 103 mg/dL / Ketone: x  / Bili: x / Urobili: x   Blood: x / Protein: x / Nitrite: x   Leuk Esterase: x / RBC: x / WBC x   Sq Epi: x / Non Sq Epi: x / Bacteria: x          CAPILLARY BLOOD GLUCOSE            RADIOLOGY & ADDITIONAL TESTS:     ARABELLA BHANDARI  43y  MRN: [id number]  10-26-23 (4d)    Patient is a 43y old  Female who presents with a chief complaint of HANNAH/electrolyte disturbance (29 Oct 2023 09:31)      Pt feeling well this morning, no complaints. Tolerating. PO. Able to go home with uber.    12 Point ROS negative with the exception of the above    MEDICATIONS  (STANDING):  chlorhexidine 2% Cloths 1 Application(s) Topical daily  FIRST- Mouthwash  BLM 15 milliLiter(s) Swish and Spit daily  folic acid 1 milliGRAM(s) Oral daily  influenza   Vaccine 0.5 milliLiter(s) IntraMuscular once  lactated ringers. 1000 milliLiter(s) (100 mL/Hr) IV Continuous <Continuous>  melatonin 6 milliGRAM(s) Oral at bedtime  multivitamin 1 Tablet(s) Oral daily  nicotine -  14 mG/24Hr(s) Patch 1 Patch Transdermal daily  pantoprazole  Injectable 40 milliGRAM(s) IV Push every 12 hours  sucralfate suspension 1 Gram(s) Oral four times a day  thiamine 100 milliGRAM(s) Oral daily    MEDICATIONS  (PRN):  aluminum hydroxide/magnesium hydroxide/simethicone Suspension 30 milliLiter(s) Oral every 6 hours PRN Dyspepsia      OBJECTIVE:  Vital Signs Last 24 Hrs  T(C): 37.1 (30 Oct 2023 01:10), Max: 37.2 (29 Oct 2023 20:42)  T(F): 98.7 (30 Oct 2023 01:10), Max: 99 (29 Oct 2023 20:42)  HR: 66 (30 Oct 2023 01:10) (65 - 96)  BP: 118/77 (30 Oct 2023 01:10) (103/68 - 118/77)  BP(mean): --  RR: 18 (30 Oct 2023 01:10) (17 - 18)  SpO2: 99% (30 Oct 2023 01:10) (98% - 100%)    Parameters below as of 30 Oct 2023 01:10  Patient On (Oxygen Delivery Method): room air        I&O's Summary    29 Oct 2023 07:01  -  30 Oct 2023 07:00  --------------------------------------------------------  IN: 1750 mL / OUT: 0 mL / NET: 1750 mL        PHYSICAL EXAM:  GENERAL: Laying comfortably, NAD  LUNG: Breathing comfortably on RA  HEART: RRR; normal S1/S2  ABDOMEN: +BS, soft, nontender  Psych: Calm    LABS:                        10.4   5.41  )-----------( 150      ( 29 Oct 2023 07:04 )             32.0     Auto Eosinophil # 0.09  / Auto Eosinophil % 1.7   / Auto Neutrophil # 2.43  / Auto Neutrophil % 44.9  / BANDS % x        10-29    137  |  102  |  16  ----------------------------<  103<H>  4.3   |  23  |  0.67  10-29    137  |  102  |  19  ----------------------------<  112<H>  4.3   |  22  |  0.62  10-28    136  |  97  |  19  ----------------------------<  121<H>  3.3<L>   |  27  |  0.63    Ca    9.2      29 Oct 2023 16:06  Ca    8.7      29 Oct 2023 07:04  Ca    8.4      28 Oct 2023 06:57  Phos  3.5     10-29  Mg     2.0     10-29    TPro  6.6  /  Alb  3.8  /  TBili  0.2  /  DBili  x   /  AST  133<H>  /  ALT  97<H>  /  AlkPhos  100  10-29  TPro  7.0  /  Alb  4.2  /  TBili  0.4  /  DBili  x   /  AST  103<H>  /  ALT  57<H>  /  AlkPhos  87  10-28          Urinalysis Basic - ( 29 Oct 2023 16:06 )    Color: x / Appearance: x / SG: x / pH: x  Gluc: 103 mg/dL / Ketone: x  / Bili: x / Urobili: x   Blood: x / Protein: x / Nitrite: x   Leuk Esterase: x / RBC: x / WBC x   Sq Epi: x / Non Sq Epi: x / Bacteria: x       ARABELLA BHANDARI  43y  MRN: [id number]  10-26-23 (4d)    Patient is a 43y old  Female who presents with a chief complaint of HANNAH/electrolyte disturbance (29 Oct 2023 09:31)      Pt feeling well this morning, no complaints. Tolerating. PO. Able to go home with uber.    12 Point ROS negative with the exception of the above.    MEDICATIONS  (STANDING):  chlorhexidine 2% Cloths 1 Application(s) Topical daily  FIRST- Mouthwash  BLM 15 milliLiter(s) Swish and Spit daily  folic acid 1 milliGRAM(s) Oral daily  influenza   Vaccine 0.5 milliLiter(s) IntraMuscular once  lactated ringers. 1000 milliLiter(s) (100 mL/Hr) IV Continuous <Continuous>  melatonin 6 milliGRAM(s) Oral at bedtime  multivitamin 1 Tablet(s) Oral daily  nicotine -  14 mG/24Hr(s) Patch 1 Patch Transdermal daily  pantoprazole  Injectable 40 milliGRAM(s) IV Push every 12 hours  sucralfate suspension 1 Gram(s) Oral four times a day  thiamine 100 milliGRAM(s) Oral daily    MEDICATIONS  (PRN):  aluminum hydroxide/magnesium hydroxide/simethicone Suspension 30 milliLiter(s) Oral every 6 hours PRN Dyspepsia      OBJECTIVE:  Vital Signs Last 24 Hrs  T(C): 37.1 (30 Oct 2023 01:10), Max: 37.2 (29 Oct 2023 20:42)  T(F): 98.7 (30 Oct 2023 01:10), Max: 99 (29 Oct 2023 20:42)  HR: 66 (30 Oct 2023 01:10) (65 - 96)  BP: 118/77 (30 Oct 2023 01:10) (103/68 - 118/77)  BP(mean): --  RR: 18 (30 Oct 2023 01:10) (17 - 18)  SpO2: 99% (30 Oct 2023 01:10) (98% - 100%)    Parameters below as of 30 Oct 2023 01:10  Patient On (Oxygen Delivery Method): room air        I&O's Summary    29 Oct 2023 07:01  -  30 Oct 2023 07:00  --------------------------------------------------------  IN: 1750 mL / OUT: 0 mL / NET: 1750 mL        PHYSICAL EXAM:  GENERAL: Laying comfortably, NAD  LUNG: Breathing comfortably on RA  HEART: RRR; normal S1/S2  ABDOMEN: +BS, soft, nontender  Psych: Calm    LABS:                        10.4   5.41  )-----------( 150      ( 29 Oct 2023 07:04 )             32.0     Auto Eosinophil # 0.09  / Auto Eosinophil % 1.7   / Auto Neutrophil # 2.43  / Auto Neutrophil % 44.9  / BANDS % x        10-29    137  |  102  |  16  ----------------------------<  103<H>  4.3   |  23  |  0.67  10-29    137  |  102  |  19  ----------------------------<  112<H>  4.3   |  22  |  0.62  10-28    136  |  97  |  19  ----------------------------<  121<H>  3.3<L>   |  27  |  0.63    Ca    9.2      29 Oct 2023 16:06  Ca    8.7      29 Oct 2023 07:04  Ca    8.4      28 Oct 2023 06:57  Phos  3.5     10-29  Mg     2.0     10-29    TPro  6.6  /  Alb  3.8  /  TBili  0.2  /  DBili  x   /  AST  133<H>  /  ALT  97<H>  /  AlkPhos  100  10-29  TPro  7.0  /  Alb  4.2  /  TBili  0.4  /  DBili  x   /  AST  103<H>  /  ALT  57<H>  /  AlkPhos  87  10-28          Urinalysis Basic - ( 29 Oct 2023 16:06 )    Color: x / Appearance: x / SG: x / pH: x  Gluc: 103 mg/dL / Ketone: x  / Bili: x / Urobili: x   Blood: x / Protein: x / Nitrite: x   Leuk Esterase: x / RBC: x / WBC x   Sq Epi: x / Non Sq Epi: x / Bacteria: x

## 2023-11-02 LAB
ALPRAZOLAM RESULT, UR: NEGATIVE — SIGNIFICANT CHANGE UP
ALPRAZOLAM RESULT, UR: NEGATIVE — SIGNIFICANT CHANGE UP
AMPHET UR-MCNC: NEGATIVE NG/ML — SIGNIFICANT CHANGE UP
AMPHET UR-MCNC: NEGATIVE NG/ML — SIGNIFICANT CHANGE UP
BARBITURATES UR QL SCN: NEGATIVE NG/ML — SIGNIFICANT CHANGE UP
BARBITURATES UR QL SCN: NEGATIVE NG/ML — SIGNIFICANT CHANGE UP
BARBITURATES UR-MCNC: NEGATIVE NG/ML — SIGNIFICANT CHANGE UP
BARBITURATES UR-MCNC: NEGATIVE NG/ML — SIGNIFICANT CHANGE UP
BENZODIAZ UR QL SCN: POSITIVE NG/ML
BENZODIAZ UR QL SCN: POSITIVE NG/ML
BENZODIAZ UR-MCNC: SIGNIFICANT CHANGE UP NG/ML
BENZODIAZ UR-MCNC: SIGNIFICANT CHANGE UP NG/ML
BENZODIAZEPINES RESULT, UR: POSITIVE NG/ML
BENZODIAZEPINES RESULT, UR: POSITIVE NG/ML
CLONAZEPAM RESULT, UR: NEGATIVE — SIGNIFICANT CHANGE UP
CLONAZEPAM RESULT, UR: NEGATIVE — SIGNIFICANT CHANGE UP
COCAINE METAB.OTHER UR-MCNC: NEGATIVE NG/ML — SIGNIFICANT CHANGE UP
COCAINE METAB.OTHER UR-MCNC: NEGATIVE NG/ML — SIGNIFICANT CHANGE UP
CREATININE, URINE THERAPEUTIC: 145.1 MG/DL — SIGNIFICANT CHANGE UP (ref 20–300)
CREATININE, URINE THERAPEUTIC: 145.1 MG/DL — SIGNIFICANT CHANGE UP (ref 20–300)
FENTANYL RESULT, UR: NEGATIVE NG/ML — SIGNIFICANT CHANGE UP
FENTANYL RESULT, UR: NEGATIVE NG/ML — SIGNIFICANT CHANGE UP
FENTANYL UR QL SCN: NEGATIVE NG/ML — SIGNIFICANT CHANGE UP
FENTANYL UR QL SCN: NEGATIVE NG/ML — SIGNIFICANT CHANGE UP
FLURAZEPAM RESULT, UR: NEGATIVE — SIGNIFICANT CHANGE UP
FLURAZEPAM RESULT, UR: NEGATIVE — SIGNIFICANT CHANGE UP
LORAZEPAM RESULT, UR: NEGATIVE — SIGNIFICANT CHANGE UP
LORAZEPAM RESULT, UR: NEGATIVE — SIGNIFICANT CHANGE UP
LORAZEPAM UR CFM-MCNC: NEGATIVE — SIGNIFICANT CHANGE UP
LORAZEPAM UR CFM-MCNC: NEGATIVE — SIGNIFICANT CHANGE UP
Lab: SIGNIFICANT CHANGE UP
Lab: SIGNIFICANT CHANGE UP
METHADONE UR-MCNC: NEGATIVE NG/ML — SIGNIFICANT CHANGE UP
METHADONE UR-MCNC: NEGATIVE NG/ML — SIGNIFICANT CHANGE UP
MIDAZOLAM RESULT, UR: NEGATIVE — SIGNIFICANT CHANGE UP
MIDAZOLAM RESULT, UR: NEGATIVE — SIGNIFICANT CHANGE UP
NORDIAZEPAM CONFIRMATION, MS RESULT, UR: 448 NG/ML — SIGNIFICANT CHANGE UP
NORDIAZEPAM CONFIRMATION, MS RESULT, UR: 448 NG/ML — SIGNIFICANT CHANGE UP
NORDIAZEPAM RESULT, UR: POSITIVE
NORDIAZEPAM RESULT, UR: POSITIVE
OPIATES UR-MCNC: NEGATIVE NG/ML — SIGNIFICANT CHANGE UP
OPIATES UR-MCNC: NEGATIVE NG/ML — SIGNIFICANT CHANGE UP
OXAZEPAM RESULT, UR: POSITIVE
OXAZEPAM RESULT, UR: POSITIVE
OXAZEPAM UR CFM-MCNC: 152 NG/ML — SIGNIFICANT CHANGE UP
OXAZEPAM UR CFM-MCNC: 152 NG/ML — SIGNIFICANT CHANGE UP
OXAZEPAM UR QL SCN: POSITIVE
OXAZEPAM UR QL SCN: POSITIVE
OXYCODONE UR QL SCN: NEGATIVE NG/ML — SIGNIFICANT CHANGE UP
OXYCODONE UR QL SCN: NEGATIVE NG/ML — SIGNIFICANT CHANGE UP
PCP UR-MCNC: NEGATIVE NG/ML — SIGNIFICANT CHANGE UP
PCP UR-MCNC: NEGATIVE NG/ML — SIGNIFICANT CHANGE UP
PH, URINE RESULT: 8.6 — SIGNIFICANT CHANGE UP (ref 4.5–8.9)
PH, URINE RESULT: 8.6 — SIGNIFICANT CHANGE UP (ref 4.5–8.9)
TEMAZEPAM RESULT, UR: NEGATIVE — SIGNIFICANT CHANGE UP
TEMAZEPAM RESULT, UR: NEGATIVE — SIGNIFICANT CHANGE UP
THC UR QL: NEGATIVE NG/ML — SIGNIFICANT CHANGE UP
THC UR QL: NEGATIVE NG/ML — SIGNIFICANT CHANGE UP
TRIAZOLAM RESULT, UR: NEGATIVE — SIGNIFICANT CHANGE UP
TRIAZOLAM RESULT, UR: NEGATIVE — SIGNIFICANT CHANGE UP

## 2023-11-08 NOTE — CHART NOTE - NSCHARTNOTEFT_GEN_A_CORE
Pt tolerating regular breakfast and lunch without issue, improved from yesterday. She still reports discomfort on swallowing with some mild epigastric "cramping" after meals, but states greatly improved compared to yesterday. Denies any n/v/c/d hematemesis, hematochezia, melena, normal BM this AM. Discussed w pt the need for EGD; pt states she would like to avoid if possible now that her odynophagia is improving and no longer with phagophobia.    Will defer inpatient EGD at this time and continue to monitor patient, discussed with GI fellow. Plan for discharge on PO PPI if continuing to tolerate meals and labs wnl.
11/7: Mailbox full [phone], no other numbers.
No other number listed besides [phone]  (2) attempt(s) were made to reach patient, which have been unsuccessful. Unable to leave voicemail on 11/7 and 11/8
see full note from last evening    This is a 43-year-old F with hx of alcohol abuse c/b multiple admissions for alcohol withdrawal (although never had seizures or ICU admission), who presented to the ED with 4 days of chest pain followed by persistent nausea, vomiting (nonbilious nonbloody), and inability to tolerate p.o. Nephrology service consulted for HANNAH. Upon presentation, patient was hypotensive with BP 91/66 and tachycardic , but afebrile. She reports she is hypotensive and tachycardic at baseline, however. Labs revealed serum Na 127, K 2.3, Cl<70, bicarb 36, Cr 6.35, BUN 52, mildly elevated LFTs, and serum Osm 283. PVR bladder scan showed 12cc urine, urine studies with Uosm 376, Frandy 25, UCl<20.       HANNAH (acute kidney injury).   Likely prerenal iso poor PO intake, vomiting, and hypotension. Urine studies with Uosm 376, Frandy 25, UCl<20. but no evidence of retention. No kidney issues in the past. Already improving with fluids , crt down from 6 to 1.7 range  Monitor for post ATN diuresis  Monitor and replete K if needed  Dose all meds as GFR improves    Evin Allen MD  Office   Contact me directly via [employer] Teams     (After 5 pm or on weekends please page the on-call fellow/attending, can check [email or web address] for schedule. Login is [facility], schedule under [facility] medicine, psych, derm)    For weekend coverage, call  Dr Damian Gonzalez( fellow) and Dr Delon Grullon( attending)

## 2023-11-14 ENCOUNTER — INPATIENT (INPATIENT)
Facility: HOSPITAL | Age: 43
LOS: 6 days | Discharge: HOME | DRG: 897 | End: 2023-11-21
Attending: STUDENT IN AN ORGANIZED HEALTH CARE EDUCATION/TRAINING PROGRAM | Admitting: HOSPITALIST
Payer: MEDICAID

## 2023-11-14 VITALS
OXYGEN SATURATION: 97 % | DIASTOLIC BLOOD PRESSURE: 65 MMHG | HEART RATE: 153 BPM | HEIGHT: 61 IN | SYSTOLIC BLOOD PRESSURE: 89 MMHG | RESPIRATION RATE: 25 BRPM | TEMPERATURE: 99 F

## 2023-11-14 DIAGNOSIS — Z29.9 ENCOUNTER FOR PROPHYLACTIC MEASURES, UNSPECIFIED: ICD-10-CM

## 2023-11-14 DIAGNOSIS — E87.3 ALKALOSIS: ICD-10-CM

## 2023-11-14 DIAGNOSIS — R07.2 PRECORDIAL PAIN: ICD-10-CM

## 2023-11-14 DIAGNOSIS — D72.829 ELEVATED WHITE BLOOD CELL COUNT, UNSPECIFIED: ICD-10-CM

## 2023-11-14 DIAGNOSIS — N17.9 ACUTE KIDNEY FAILURE, UNSPECIFIED: ICD-10-CM

## 2023-11-14 DIAGNOSIS — F10.939 ALCOHOL USE, UNSPECIFIED WITH WITHDRAWAL, UNSPECIFIED: ICD-10-CM

## 2023-11-14 DIAGNOSIS — R11.2 NAUSEA WITH VOMITING, UNSPECIFIED: ICD-10-CM

## 2023-11-14 DIAGNOSIS — F10.20 ALCOHOL DEPENDENCE, UNCOMPLICATED: ICD-10-CM

## 2023-11-14 DIAGNOSIS — K92.2 GASTROINTESTINAL HEMORRHAGE, UNSPECIFIED: ICD-10-CM

## 2023-11-14 DIAGNOSIS — T73.0XXA STARVATION, INITIAL ENCOUNTER: ICD-10-CM

## 2023-11-14 LAB
ALBUMIN SERPL ELPH-MCNC: 4.5 G/DL — SIGNIFICANT CHANGE UP (ref 3.3–5)
ALBUMIN SERPL ELPH-MCNC: 4.5 G/DL — SIGNIFICANT CHANGE UP (ref 3.3–5)
ALBUMIN SERPL ELPH-MCNC: 5.5 G/DL — HIGH (ref 3.3–5)
ALBUMIN SERPL ELPH-MCNC: 5.5 G/DL — HIGH (ref 3.3–5)
ALBUMIN SERPL ELPH-MCNC: 6.4 G/DL — HIGH (ref 3.3–5)
ALBUMIN SERPL ELPH-MCNC: 6.4 G/DL — HIGH (ref 3.3–5)
ALP SERPL-CCNC: 122 U/L — HIGH (ref 40–120)
ALP SERPL-CCNC: 122 U/L — HIGH (ref 40–120)
ALP SERPL-CCNC: 90 U/L — SIGNIFICANT CHANGE UP (ref 40–120)
ALP SERPL-CCNC: 90 U/L — SIGNIFICANT CHANGE UP (ref 40–120)
ALP SERPL-CCNC: 95 U/L — SIGNIFICANT CHANGE UP (ref 40–120)
ALP SERPL-CCNC: 95 U/L — SIGNIFICANT CHANGE UP (ref 40–120)
ALT FLD-CCNC: 51 U/L — HIGH (ref 10–45)
ALT FLD-CCNC: 51 U/L — HIGH (ref 10–45)
ALT FLD-CCNC: 72 U/L — HIGH (ref 10–45)
ALT FLD-CCNC: 72 U/L — HIGH (ref 10–45)
ALT FLD-CCNC: 81 U/L — HIGH (ref 10–45)
ALT FLD-CCNC: 81 U/L — HIGH (ref 10–45)
ANION GAP SERPL CALC-SCNC: 19 MMOL/L — HIGH (ref 5–17)
ANION GAP SERPL CALC-SCNC: 19 MMOL/L — HIGH (ref 5–17)
ANION GAP SERPL CALC-SCNC: 20 MMOL/L — HIGH (ref 5–17)
ANION GAP SERPL CALC-SCNC: 20 MMOL/L — HIGH (ref 5–17)
ANION GAP SERPL CALC-SCNC: 30 MMOL/L — HIGH (ref 5–17)
ANION GAP SERPL CALC-SCNC: 30 MMOL/L — HIGH (ref 5–17)
ANISOCYTOSIS BLD QL: SLIGHT — SIGNIFICANT CHANGE UP
ANISOCYTOSIS BLD QL: SLIGHT — SIGNIFICANT CHANGE UP
APPEARANCE UR: ABNORMAL
APPEARANCE UR: ABNORMAL
APTT BLD: 32 SEC — SIGNIFICANT CHANGE UP (ref 24.5–35.6)
APTT BLD: 32 SEC — SIGNIFICANT CHANGE UP (ref 24.5–35.6)
AST SERPL-CCNC: 111 U/L — HIGH (ref 10–40)
AST SERPL-CCNC: 111 U/L — HIGH (ref 10–40)
AST SERPL-CCNC: 44 U/L — HIGH (ref 10–40)
AST SERPL-CCNC: 44 U/L — HIGH (ref 10–40)
AST SERPL-CCNC: 93 U/L — HIGH (ref 10–40)
AST SERPL-CCNC: 93 U/L — HIGH (ref 10–40)
B-OH-BUTYR SERPL-SCNC: 2.2 MMOL/L — HIGH
B-OH-BUTYR SERPL-SCNC: 2.2 MMOL/L — HIGH
BACTERIA # UR AUTO: ABNORMAL /HPF
BACTERIA # UR AUTO: ABNORMAL /HPF
BASE EXCESS BLDV CALC-SCNC: 10.6 MMOL/L — HIGH (ref -2–3)
BASE EXCESS BLDV CALC-SCNC: 10.6 MMOL/L — HIGH (ref -2–3)
BASE EXCESS BLDV CALC-SCNC: 16.2 MMOL/L — HIGH (ref -2–3)
BASE EXCESS BLDV CALC-SCNC: 16.2 MMOL/L — HIGH (ref -2–3)
BASOPHILS # BLD AUTO: 0 K/UL — SIGNIFICANT CHANGE UP (ref 0–0.2)
BASOPHILS # BLD AUTO: 0 K/UL — SIGNIFICANT CHANGE UP (ref 0–0.2)
BASOPHILS NFR BLD AUTO: 0 % — SIGNIFICANT CHANGE UP (ref 0–2)
BASOPHILS NFR BLD AUTO: 0 % — SIGNIFICANT CHANGE UP (ref 0–2)
BILIRUB SERPL-MCNC: 0.7 MG/DL — SIGNIFICANT CHANGE UP (ref 0.2–1.2)
BILIRUB SERPL-MCNC: 0.7 MG/DL — SIGNIFICANT CHANGE UP (ref 0.2–1.2)
BILIRUB SERPL-MCNC: 0.8 MG/DL — SIGNIFICANT CHANGE UP (ref 0.2–1.2)
BILIRUB SERPL-MCNC: 0.8 MG/DL — SIGNIFICANT CHANGE UP (ref 0.2–1.2)
BILIRUB SERPL-MCNC: 0.9 MG/DL — SIGNIFICANT CHANGE UP (ref 0.2–1.2)
BILIRUB SERPL-MCNC: 0.9 MG/DL — SIGNIFICANT CHANGE UP (ref 0.2–1.2)
BILIRUB UR-MCNC: NEGATIVE — SIGNIFICANT CHANGE UP
BILIRUB UR-MCNC: NEGATIVE — SIGNIFICANT CHANGE UP
BUN SERPL-MCNC: 26 MG/DL — HIGH (ref 7–23)
BUN SERPL-MCNC: 26 MG/DL — HIGH (ref 7–23)
BUN SERPL-MCNC: 31 MG/DL — HIGH (ref 7–23)
BUN SERPL-MCNC: 31 MG/DL — HIGH (ref 7–23)
BUN SERPL-MCNC: 32 MG/DL — HIGH (ref 7–23)
BUN SERPL-MCNC: 32 MG/DL — HIGH (ref 7–23)
CA-I SERPL-SCNC: 1.08 MMOL/L — LOW (ref 1.15–1.33)
CA-I SERPL-SCNC: 1.08 MMOL/L — LOW (ref 1.15–1.33)
CA-I SERPL-SCNC: 1.15 MMOL/L — SIGNIFICANT CHANGE UP (ref 1.15–1.33)
CA-I SERPL-SCNC: 1.15 MMOL/L — SIGNIFICANT CHANGE UP (ref 1.15–1.33)
CALCIUM SERPL-MCNC: 10.7 MG/DL — HIGH (ref 8.4–10.5)
CALCIUM SERPL-MCNC: 10.7 MG/DL — HIGH (ref 8.4–10.5)
CALCIUM SERPL-MCNC: 12.1 MG/DL — HIGH (ref 8.4–10.5)
CALCIUM SERPL-MCNC: 12.1 MG/DL — HIGH (ref 8.4–10.5)
CALCIUM SERPL-MCNC: 9.7 MG/DL — SIGNIFICANT CHANGE UP (ref 8.4–10.5)
CALCIUM SERPL-MCNC: 9.7 MG/DL — SIGNIFICANT CHANGE UP (ref 8.4–10.5)
CAST: >63 /LPF — HIGH (ref 0–4)
CAST: >63 /LPF — HIGH (ref 0–4)
CHLORIDE BLDV-SCNC: 78 MMOL/L — LOW (ref 96–108)
CHLORIDE BLDV-SCNC: 78 MMOL/L — LOW (ref 96–108)
CHLORIDE BLDV-SCNC: 91 MMOL/L — LOW (ref 96–108)
CHLORIDE BLDV-SCNC: 91 MMOL/L — LOW (ref 96–108)
CHLORIDE SERPL-SCNC: 70 MMOL/L — LOW (ref 96–108)
CHLORIDE SERPL-SCNC: 70 MMOL/L — LOW (ref 96–108)
CHLORIDE SERPL-SCNC: 77 MMOL/L — LOW (ref 96–108)
CHLORIDE SERPL-SCNC: 77 MMOL/L — LOW (ref 96–108)
CHLORIDE SERPL-SCNC: 86 MMOL/L — LOW (ref 96–108)
CHLORIDE SERPL-SCNC: 86 MMOL/L — LOW (ref 96–108)
CO2 BLDV-SCNC: 38 MMOL/L — HIGH (ref 22–26)
CO2 BLDV-SCNC: 38 MMOL/L — HIGH (ref 22–26)
CO2 BLDV-SCNC: 40 MMOL/L — HIGH (ref 22–26)
CO2 BLDV-SCNC: 40 MMOL/L — HIGH (ref 22–26)
CO2 SERPL-SCNC: 30 MMOL/L — SIGNIFICANT CHANGE UP (ref 22–31)
CO2 SERPL-SCNC: 31 MMOL/L — SIGNIFICANT CHANGE UP (ref 22–31)
CO2 SERPL-SCNC: 31 MMOL/L — SIGNIFICANT CHANGE UP (ref 22–31)
COLOR SPEC: YELLOW — SIGNIFICANT CHANGE UP
COLOR SPEC: YELLOW — SIGNIFICANT CHANGE UP
CREAT ?TM UR-MCNC: 174 MG/DL — SIGNIFICANT CHANGE UP
CREAT ?TM UR-MCNC: 174 MG/DL — SIGNIFICANT CHANGE UP
CREAT SERPL-MCNC: 2.56 MG/DL — HIGH (ref 0.5–1.3)
CREAT SERPL-MCNC: 2.56 MG/DL — HIGH (ref 0.5–1.3)
CREAT SERPL-MCNC: 3.48 MG/DL — HIGH (ref 0.5–1.3)
CREAT SERPL-MCNC: 3.48 MG/DL — HIGH (ref 0.5–1.3)
CREAT SERPL-MCNC: 4.1 MG/DL — HIGH (ref 0.5–1.3)
CREAT SERPL-MCNC: 4.1 MG/DL — HIGH (ref 0.5–1.3)
CRP SERPL-MCNC: 13 MG/L — HIGH (ref 0–4)
CRP SERPL-MCNC: 13 MG/L — HIGH (ref 0–4)
DIFF PNL FLD: ABNORMAL
DIFF PNL FLD: ABNORMAL
EGFR: 13 ML/MIN/1.73M2 — LOW
EGFR: 13 ML/MIN/1.73M2 — LOW
EGFR: 16 ML/MIN/1.73M2 — LOW
EGFR: 16 ML/MIN/1.73M2 — LOW
EGFR: 23 ML/MIN/1.73M2 — LOW
EGFR: 23 ML/MIN/1.73M2 — LOW
ELLIPTOCYTES BLD QL SMEAR: SLIGHT — SIGNIFICANT CHANGE UP
ELLIPTOCYTES BLD QL SMEAR: SLIGHT — SIGNIFICANT CHANGE UP
EOSINOPHIL # BLD AUTO: 0 K/UL — SIGNIFICANT CHANGE UP (ref 0–0.5)
EOSINOPHIL # BLD AUTO: 0 K/UL — SIGNIFICANT CHANGE UP (ref 0–0.5)
EOSINOPHIL NFR BLD AUTO: 0 % — SIGNIFICANT CHANGE UP (ref 0–6)
EOSINOPHIL NFR BLD AUTO: 0 % — SIGNIFICANT CHANGE UP (ref 0–6)
ETHANOL SERPL-MCNC: 27 MG/DL — HIGH (ref 0–10)
ETHANOL SERPL-MCNC: 27 MG/DL — HIGH (ref 0–10)
GAS PNL BLDV: 128 MMOL/L — LOW (ref 136–145)
GAS PNL BLDV: 128 MMOL/L — LOW (ref 136–145)
GAS PNL BLDV: 134 MMOL/L — LOW (ref 136–145)
GAS PNL BLDV: 134 MMOL/L — LOW (ref 136–145)
GAS PNL BLDV: SIGNIFICANT CHANGE UP
GLUCOSE BLDV-MCNC: 111 MG/DL — HIGH (ref 70–99)
GLUCOSE BLDV-MCNC: 111 MG/DL — HIGH (ref 70–99)
GLUCOSE BLDV-MCNC: 189 MG/DL — HIGH (ref 70–99)
GLUCOSE BLDV-MCNC: 189 MG/DL — HIGH (ref 70–99)
GLUCOSE SERPL-MCNC: 111 MG/DL — HIGH (ref 70–99)
GLUCOSE SERPL-MCNC: 111 MG/DL — HIGH (ref 70–99)
GLUCOSE SERPL-MCNC: 121 MG/DL — HIGH (ref 70–99)
GLUCOSE SERPL-MCNC: 121 MG/DL — HIGH (ref 70–99)
GLUCOSE SERPL-MCNC: 174 MG/DL — HIGH (ref 70–99)
GLUCOSE SERPL-MCNC: 174 MG/DL — HIGH (ref 70–99)
GLUCOSE UR QL: 100 MG/DL
GLUCOSE UR QL: 100 MG/DL
HCG SERPL-ACNC: <2 MIU/ML — SIGNIFICANT CHANGE UP
HCG SERPL-ACNC: <2 MIU/ML — SIGNIFICANT CHANGE UP
HCO3 BLDV-SCNC: 36 MMOL/L — HIGH (ref 22–29)
HCO3 BLDV-SCNC: 36 MMOL/L — HIGH (ref 22–29)
HCO3 BLDV-SCNC: 39 MMOL/L — HIGH (ref 22–29)
HCO3 BLDV-SCNC: 39 MMOL/L — HIGH (ref 22–29)
HCT VFR BLD CALC: 35.2 % — SIGNIFICANT CHANGE UP (ref 34.5–45)
HCT VFR BLD CALC: 35.2 % — SIGNIFICANT CHANGE UP (ref 34.5–45)
HCT VFR BLDA CALC: 31 % — LOW (ref 34.5–46.5)
HCT VFR BLDA CALC: 31 % — LOW (ref 34.5–46.5)
HCT VFR BLDA CALC: 38 % — SIGNIFICANT CHANGE UP (ref 34.5–46.5)
HCT VFR BLDA CALC: 38 % — SIGNIFICANT CHANGE UP (ref 34.5–46.5)
HGB BLD CALC-MCNC: 10.2 G/DL — LOW (ref 11.7–16.1)
HGB BLD CALC-MCNC: 10.2 G/DL — LOW (ref 11.7–16.1)
HGB BLD CALC-MCNC: 12.5 G/DL — SIGNIFICANT CHANGE UP (ref 11.7–16.1)
HGB BLD CALC-MCNC: 12.5 G/DL — SIGNIFICANT CHANGE UP (ref 11.7–16.1)
HGB BLD-MCNC: 11.9 G/DL — SIGNIFICANT CHANGE UP (ref 11.5–15.5)
HGB BLD-MCNC: 11.9 G/DL — SIGNIFICANT CHANGE UP (ref 11.5–15.5)
HYPOCHROMIA BLD QL: SLIGHT — SIGNIFICANT CHANGE UP
HYPOCHROMIA BLD QL: SLIGHT — SIGNIFICANT CHANGE UP
INR BLD: 1.02 RATIO — SIGNIFICANT CHANGE UP (ref 0.85–1.18)
INR BLD: 1.02 RATIO — SIGNIFICANT CHANGE UP (ref 0.85–1.18)
KETONES UR-MCNC: 40 MG/DL
KETONES UR-MCNC: 40 MG/DL
LACTATE BLDV-MCNC: 1.6 MMOL/L — SIGNIFICANT CHANGE UP (ref 0.5–2)
LACTATE BLDV-MCNC: 1.6 MMOL/L — SIGNIFICANT CHANGE UP (ref 0.5–2)
LACTATE BLDV-MCNC: 6.4 MMOL/L — CRITICAL HIGH (ref 0.5–2)
LACTATE BLDV-MCNC: 6.4 MMOL/L — CRITICAL HIGH (ref 0.5–2)
LDH SERPL L TO P-CCNC: 132 U/L — SIGNIFICANT CHANGE UP (ref 50–242)
LDH SERPL L TO P-CCNC: 132 U/L — SIGNIFICANT CHANGE UP (ref 50–242)
LEUKOCYTE ESTERASE UR-ACNC: NEGATIVE — SIGNIFICANT CHANGE UP
LEUKOCYTE ESTERASE UR-ACNC: NEGATIVE — SIGNIFICANT CHANGE UP
LIDOCAIN IGE QN: 79 U/L — HIGH (ref 7–60)
LIDOCAIN IGE QN: 79 U/L — HIGH (ref 7–60)
LYMPHOCYTES # BLD AUTO: 1.45 K/UL — SIGNIFICANT CHANGE UP (ref 1–3.3)
LYMPHOCYTES # BLD AUTO: 1.45 K/UL — SIGNIFICANT CHANGE UP (ref 1–3.3)
LYMPHOCYTES # BLD AUTO: 11.5 % — LOW (ref 13–44)
LYMPHOCYTES # BLD AUTO: 11.5 % — LOW (ref 13–44)
MACROCYTES BLD QL: SLIGHT — SIGNIFICANT CHANGE UP
MACROCYTES BLD QL: SLIGHT — SIGNIFICANT CHANGE UP
MAGNESIUM SERPL-MCNC: 1.8 MG/DL — SIGNIFICANT CHANGE UP (ref 1.6–2.6)
MAGNESIUM SERPL-MCNC: 1.8 MG/DL — SIGNIFICANT CHANGE UP (ref 1.6–2.6)
MANUAL SMEAR VERIFICATION: SIGNIFICANT CHANGE UP
MANUAL SMEAR VERIFICATION: SIGNIFICANT CHANGE UP
MCHC RBC-ENTMCNC: 23.9 PG — LOW (ref 27–34)
MCHC RBC-ENTMCNC: 23.9 PG — LOW (ref 27–34)
MCHC RBC-ENTMCNC: 33.8 GM/DL — SIGNIFICANT CHANGE UP (ref 32–36)
MCHC RBC-ENTMCNC: 33.8 GM/DL — SIGNIFICANT CHANGE UP (ref 32–36)
MCV RBC AUTO: 70.7 FL — LOW (ref 80–100)
MCV RBC AUTO: 70.7 FL — LOW (ref 80–100)
MICROCYTES BLD QL: SLIGHT — SIGNIFICANT CHANGE UP
MICROCYTES BLD QL: SLIGHT — SIGNIFICANT CHANGE UP
MONOCYTES # BLD AUTO: 0 K/UL — SIGNIFICANT CHANGE UP (ref 0–0.9)
MONOCYTES # BLD AUTO: 0 K/UL — SIGNIFICANT CHANGE UP (ref 0–0.9)
MONOCYTES NFR BLD AUTO: 0 % — LOW (ref 2–14)
MONOCYTES NFR BLD AUTO: 0 % — LOW (ref 2–14)
NEUTROPHILS # BLD AUTO: 11.18 K/UL — HIGH (ref 1.8–7.4)
NEUTROPHILS # BLD AUTO: 11.18 K/UL — HIGH (ref 1.8–7.4)
NEUTROPHILS NFR BLD AUTO: 85 % — HIGH (ref 43–77)
NEUTROPHILS NFR BLD AUTO: 85 % — HIGH (ref 43–77)
NEUTS BAND # BLD: 3.5 % — SIGNIFICANT CHANGE UP (ref 0–8)
NEUTS BAND # BLD: 3.5 % — SIGNIFICANT CHANGE UP (ref 0–8)
NITRITE UR-MCNC: NEGATIVE — SIGNIFICANT CHANGE UP
NITRITE UR-MCNC: NEGATIVE — SIGNIFICANT CHANGE UP
OSMOLALITY UR: 394 MOS/KG — SIGNIFICANT CHANGE UP (ref 300–900)
OSMOLALITY UR: 394 MOS/KG — SIGNIFICANT CHANGE UP (ref 300–900)
PAPPENHEIMER BOD BLD QL SMEAR: PRESENT — SIGNIFICANT CHANGE UP
PAPPENHEIMER BOD BLD QL SMEAR: PRESENT — SIGNIFICANT CHANGE UP
PCO2 BLDV: 40 MMHG — SIGNIFICANT CHANGE UP (ref 39–42)
PCO2 BLDV: 40 MMHG — SIGNIFICANT CHANGE UP (ref 39–42)
PCO2 BLDV: 52 MMHG — HIGH (ref 39–42)
PCO2 BLDV: 52 MMHG — HIGH (ref 39–42)
PH BLDV: 7.45 — HIGH (ref 7.32–7.43)
PH BLDV: 7.45 — HIGH (ref 7.32–7.43)
PH BLDV: 7.6 — CRITICAL HIGH (ref 7.32–7.43)
PH BLDV: 7.6 — CRITICAL HIGH (ref 7.32–7.43)
PH UR: 6.5 — SIGNIFICANT CHANGE UP (ref 5–8)
PH UR: 6.5 — SIGNIFICANT CHANGE UP (ref 5–8)
PHOSPHATE SERPL-MCNC: 1.3 MG/DL — LOW (ref 2.5–4.5)
PHOSPHATE SERPL-MCNC: 1.3 MG/DL — LOW (ref 2.5–4.5)
PLAT MORPH BLD: ABNORMAL
PLAT MORPH BLD: ABNORMAL
PLATELET # BLD AUTO: 223 K/UL — SIGNIFICANT CHANGE UP (ref 150–400)
PLATELET # BLD AUTO: 223 K/UL — SIGNIFICANT CHANGE UP (ref 150–400)
PO2 BLDV: 27 MMHG — SIGNIFICANT CHANGE UP (ref 25–45)
PO2 BLDV: 27 MMHG — SIGNIFICANT CHANGE UP (ref 25–45)
PO2 BLDV: 61 MMHG — HIGH (ref 25–45)
PO2 BLDV: 61 MMHG — HIGH (ref 25–45)
POIKILOCYTOSIS BLD QL AUTO: SLIGHT — SIGNIFICANT CHANGE UP
POIKILOCYTOSIS BLD QL AUTO: SLIGHT — SIGNIFICANT CHANGE UP
POLYCHROMASIA BLD QL SMEAR: SLIGHT — SIGNIFICANT CHANGE UP
POLYCHROMASIA BLD QL SMEAR: SLIGHT — SIGNIFICANT CHANGE UP
POTASSIUM BLDV-SCNC: 3 MMOL/L — LOW (ref 3.5–5.1)
POTASSIUM BLDV-SCNC: 3 MMOL/L — LOW (ref 3.5–5.1)
POTASSIUM BLDV-SCNC: 5.4 MMOL/L — HIGH (ref 3.5–5.1)
POTASSIUM BLDV-SCNC: 5.4 MMOL/L — HIGH (ref 3.5–5.1)
POTASSIUM SERPL-MCNC: 3.1 MMOL/L — LOW (ref 3.5–5.3)
POTASSIUM SERPL-MCNC: 3.1 MMOL/L — LOW (ref 3.5–5.3)
POTASSIUM SERPL-MCNC: 5.1 MMOL/L — SIGNIFICANT CHANGE UP (ref 3.5–5.3)
POTASSIUM SERPL-MCNC: 5.1 MMOL/L — SIGNIFICANT CHANGE UP (ref 3.5–5.3)
POTASSIUM SERPL-MCNC: SIGNIFICANT CHANGE UP MMOL/L (ref 3.5–5.3)
POTASSIUM SERPL-MCNC: SIGNIFICANT CHANGE UP MMOL/L (ref 3.5–5.3)
POTASSIUM SERPL-SCNC: 3.1 MMOL/L — LOW (ref 3.5–5.3)
POTASSIUM SERPL-SCNC: 3.1 MMOL/L — LOW (ref 3.5–5.3)
POTASSIUM SERPL-SCNC: 5.1 MMOL/L — SIGNIFICANT CHANGE UP (ref 3.5–5.3)
POTASSIUM SERPL-SCNC: 5.1 MMOL/L — SIGNIFICANT CHANGE UP (ref 3.5–5.3)
POTASSIUM SERPL-SCNC: SIGNIFICANT CHANGE UP MMOL/L (ref 3.5–5.3)
POTASSIUM SERPL-SCNC: SIGNIFICANT CHANGE UP MMOL/L (ref 3.5–5.3)
POTASSIUM UR-SCNC: 76 MMOL/L — SIGNIFICANT CHANGE UP
POTASSIUM UR-SCNC: 76 MMOL/L — SIGNIFICANT CHANGE UP
PROT ?TM UR-MCNC: 356 MG/DL — HIGH (ref 0–12)
PROT ?TM UR-MCNC: 356 MG/DL — HIGH (ref 0–12)
PROT SERPL-MCNC: 10.3 G/DL — HIGH (ref 6–8.3)
PROT SERPL-MCNC: 10.3 G/DL — HIGH (ref 6–8.3)
PROT SERPL-MCNC: 7.4 G/DL — SIGNIFICANT CHANGE UP (ref 6–8.3)
PROT SERPL-MCNC: 7.4 G/DL — SIGNIFICANT CHANGE UP (ref 6–8.3)
PROT SERPL-MCNC: 9 G/DL — HIGH (ref 6–8.3)
PROT SERPL-MCNC: 9 G/DL — HIGH (ref 6–8.3)
PROT UR-MCNC: 300 MG/DL
PROT UR-MCNC: 300 MG/DL
PROT/CREAT UR-RTO: 2 RATIO — HIGH (ref 0–0.2)
PROT/CREAT UR-RTO: 2 RATIO — HIGH (ref 0–0.2)
PROTHROM AB SERPL-ACNC: 10.7 SEC — SIGNIFICANT CHANGE UP (ref 9.5–13)
PROTHROM AB SERPL-ACNC: 10.7 SEC — SIGNIFICANT CHANGE UP (ref 9.5–13)
RBC # BLD: 4.98 M/UL — SIGNIFICANT CHANGE UP (ref 3.8–5.2)
RBC # BLD: 4.98 M/UL — SIGNIFICANT CHANGE UP (ref 3.8–5.2)
RBC # FLD: 18.6 % — HIGH (ref 10.3–14.5)
RBC # FLD: 18.6 % — HIGH (ref 10.3–14.5)
RBC BLD AUTO: ABNORMAL
RBC BLD AUTO: ABNORMAL
RBC CASTS # UR COMP ASSIST: 3 /HPF — SIGNIFICANT CHANGE UP (ref 0–4)
RBC CASTS # UR COMP ASSIST: 3 /HPF — SIGNIFICANT CHANGE UP (ref 0–4)
REVIEW: SIGNIFICANT CHANGE UP
REVIEW: SIGNIFICANT CHANGE UP
SAO2 % BLDV: 39.4 % — LOW (ref 67–88)
SAO2 % BLDV: 39.4 % — LOW (ref 67–88)
SAO2 % BLDV: 87.8 % — SIGNIFICANT CHANGE UP (ref 67–88)
SAO2 % BLDV: 87.8 % — SIGNIFICANT CHANGE UP (ref 67–88)
SCHISTOCYTES BLD QL AUTO: SLIGHT — SIGNIFICANT CHANGE UP
SCHISTOCYTES BLD QL AUTO: SLIGHT — SIGNIFICANT CHANGE UP
SODIUM SERPL-SCNC: 128 MMOL/L — LOW (ref 135–145)
SODIUM SERPL-SCNC: 128 MMOL/L — LOW (ref 135–145)
SODIUM SERPL-SCNC: 130 MMOL/L — LOW (ref 135–145)
SODIUM SERPL-SCNC: 130 MMOL/L — LOW (ref 135–145)
SODIUM SERPL-SCNC: 135 MMOL/L — SIGNIFICANT CHANGE UP (ref 135–145)
SODIUM SERPL-SCNC: 135 MMOL/L — SIGNIFICANT CHANGE UP (ref 135–145)
SODIUM UR-SCNC: 61 MMOL/L — SIGNIFICANT CHANGE UP
SODIUM UR-SCNC: 61 MMOL/L — SIGNIFICANT CHANGE UP
SP GR SPEC: 1.02 — SIGNIFICANT CHANGE UP (ref 1–1.03)
SP GR SPEC: 1.02 — SIGNIFICANT CHANGE UP (ref 1–1.03)
SQUAMOUS # UR AUTO: >36 /HPF — HIGH (ref 0–5)
SQUAMOUS # UR AUTO: >36 /HPF — HIGH (ref 0–5)
STOMATOCYTES BLD QL SMEAR: SLIGHT — SIGNIFICANT CHANGE UP
STOMATOCYTES BLD QL SMEAR: SLIGHT — SIGNIFICANT CHANGE UP
T3 SERPL-MCNC: 173 NG/DL — SIGNIFICANT CHANGE UP (ref 80–200)
T3 SERPL-MCNC: 173 NG/DL — SIGNIFICANT CHANGE UP (ref 80–200)
T4 AB SER-ACNC: 11.3 UG/DL — SIGNIFICANT CHANGE UP (ref 4.6–12)
T4 AB SER-ACNC: 11.3 UG/DL — SIGNIFICANT CHANGE UP (ref 4.6–12)
T4 FREE SERPL-MCNC: 1.5 NG/DL — SIGNIFICANT CHANGE UP (ref 0.9–1.8)
T4 FREE SERPL-MCNC: 1.5 NG/DL — SIGNIFICANT CHANGE UP (ref 0.9–1.8)
TARGETS BLD QL SMEAR: SLIGHT — SIGNIFICANT CHANGE UP
TARGETS BLD QL SMEAR: SLIGHT — SIGNIFICANT CHANGE UP
TRI-PHOS CRY UR QL COMP ASSIST: PRESENT
TRI-PHOS CRY UR QL COMP ASSIST: PRESENT
TSH SERPL-MCNC: 2.99 UIU/ML — SIGNIFICANT CHANGE UP (ref 0.27–4.2)
TSH SERPL-MCNC: 2.99 UIU/ML — SIGNIFICANT CHANGE UP (ref 0.27–4.2)
UROBILINOGEN FLD QL: 1 MG/DL — SIGNIFICANT CHANGE UP (ref 0.2–1)
UROBILINOGEN FLD QL: 1 MG/DL — SIGNIFICANT CHANGE UP (ref 0.2–1)
WBC # BLD: 12.63 K/UL — HIGH (ref 3.8–10.5)
WBC # BLD: 12.63 K/UL — HIGH (ref 3.8–10.5)
WBC # FLD AUTO: 12.63 K/UL — HIGH (ref 3.8–10.5)
WBC # FLD AUTO: 12.63 K/UL — HIGH (ref 3.8–10.5)
WBC UR QL: 7 /HPF — HIGH (ref 0–5)
WBC UR QL: 7 /HPF — HIGH (ref 0–5)
YEAST-LIKE CELLS: PRESENT
YEAST-LIKE CELLS: PRESENT

## 2023-11-14 PROCEDURE — 71045 X-RAY EXAM CHEST 1 VIEW: CPT | Mod: 26

## 2023-11-14 PROCEDURE — 99285 EMERGENCY DEPT VISIT HI MDM: CPT

## 2023-11-14 PROCEDURE — 99223 1ST HOSP IP/OBS HIGH 75: CPT | Mod: GC

## 2023-11-14 PROCEDURE — 74176 CT ABD & PELVIS W/O CONTRAST: CPT | Mod: 26

## 2023-11-14 RX ORDER — DIAZEPAM 5 MG
5 TABLET ORAL ONCE
Refills: 0 | Status: DISCONTINUED | OUTPATIENT
Start: 2023-11-14 | End: 2023-11-14

## 2023-11-14 RX ORDER — SODIUM CHLORIDE 9 MG/ML
1000 INJECTION, SOLUTION INTRAVENOUS
Refills: 0 | Status: COMPLETED | OUTPATIENT
Start: 2023-11-14 | End: 2023-11-15

## 2023-11-14 RX ORDER — PANTOPRAZOLE SODIUM 20 MG/1
40 TABLET, DELAYED RELEASE ORAL
Refills: 0 | Status: DISCONTINUED | OUTPATIENT
Start: 2023-11-14 | End: 2023-11-21

## 2023-11-14 RX ORDER — SODIUM,POTASSIUM PHOSPHATES 278-250MG
1 POWDER IN PACKET (EA) ORAL ONCE
Refills: 0 | Status: COMPLETED | OUTPATIENT
Start: 2023-11-14 | End: 2023-11-14

## 2023-11-14 RX ORDER — SODIUM CHLORIDE 9 MG/ML
1000 INJECTION INTRAMUSCULAR; INTRAVENOUS; SUBCUTANEOUS ONCE
Refills: 0 | Status: COMPLETED | OUTPATIENT
Start: 2023-11-14 | End: 2023-11-14

## 2023-11-14 RX ORDER — THIAMINE MONONITRATE (VIT B1) 100 MG
500 TABLET ORAL DAILY
Refills: 0 | Status: COMPLETED | OUTPATIENT
Start: 2023-11-14 | End: 2023-11-17

## 2023-11-14 RX ORDER — SODIUM CHLORIDE 9 MG/ML
1000 INJECTION, SOLUTION INTRAVENOUS
Refills: 0 | Status: DISCONTINUED | OUTPATIENT
Start: 2023-11-14 | End: 2023-11-14

## 2023-11-14 RX ORDER — POTASSIUM CHLORIDE 20 MEQ
10 PACKET (EA) ORAL
Refills: 0 | Status: COMPLETED | OUTPATIENT
Start: 2023-11-14 | End: 2023-11-14

## 2023-11-14 RX ORDER — NICOTINE POLACRILEX 2 MG
1 GUM BUCCAL DAILY
Refills: 0 | Status: DISCONTINUED | OUTPATIENT
Start: 2023-11-14 | End: 2023-11-21

## 2023-11-14 RX ORDER — CALCIUM GLUCONATE 100 MG/ML
1 VIAL (ML) INTRAVENOUS ONCE
Refills: 0 | Status: COMPLETED | OUTPATIENT
Start: 2023-11-14 | End: 2023-11-14

## 2023-11-14 RX ORDER — FOLIC ACID 0.8 MG
1 TABLET ORAL DAILY
Refills: 0 | Status: DISCONTINUED | OUTPATIENT
Start: 2023-11-14 | End: 2023-11-21

## 2023-11-14 RX ORDER — SUCRALFATE 1 G
1 TABLET ORAL
Refills: 0 | Status: DISCONTINUED | OUTPATIENT
Start: 2023-11-14 | End: 2023-11-21

## 2023-11-14 RX ORDER — FAMOTIDINE 10 MG/ML
20 INJECTION INTRAVENOUS ONCE
Refills: 0 | Status: COMPLETED | OUTPATIENT
Start: 2023-11-14 | End: 2023-11-14

## 2023-11-14 RX ADMIN — Medication 50 MILLIGRAM(S): at 17:42

## 2023-11-14 RX ADMIN — Medication 1 PATCH: at 22:21

## 2023-11-14 RX ADMIN — Medication 100 MILLIEQUIVALENT(S): at 23:51

## 2023-11-14 RX ADMIN — Medication 100 MILLIEQUIVALENT(S): at 21:37

## 2023-11-14 RX ADMIN — SODIUM CHLORIDE 1000 MILLILITER(S): 9 INJECTION, SOLUTION INTRAVENOUS at 17:43

## 2023-11-14 RX ADMIN — Medication 100 GRAM(S): at 15:48

## 2023-11-14 RX ADMIN — SODIUM CHLORIDE 1000 MILLILITER(S): 9 INJECTION INTRAMUSCULAR; INTRAVENOUS; SUBCUTANEOUS at 16:15

## 2023-11-14 RX ADMIN — Medication 5 MILLIGRAM(S): at 15:05

## 2023-11-14 RX ADMIN — FAMOTIDINE 20 MILLIGRAM(S): 10 INJECTION INTRAVENOUS at 15:05

## 2023-11-14 RX ADMIN — Medication 100 MILLIEQUIVALENT(S): at 22:55

## 2023-11-14 RX ADMIN — Medication 1 PACKET(S): at 16:19

## 2023-11-14 RX ADMIN — SODIUM CHLORIDE 1000 MILLILITER(S): 9 INJECTION INTRAMUSCULAR; INTRAVENOUS; SUBCUTANEOUS at 15:04

## 2023-11-14 NOTE — H&P ADULT - TIME BILLING
- Ordering, reviewing, and interpreting labs, testing, and imaging  - Independently obtaining a review of systems and performing a physical exam  - Counselling and educating patient and family regarding interpretation of aforementioned items and plan of care

## 2023-11-14 NOTE — ED PROVIDER NOTE - OBJECTIVE STATEMENT
Attending Cristine Argueta: 42 yo femal eh/o etoh abuse with prior episodes of withdrawl presenting with abdominal pain, chest pain, and vomiting. pt states symptoms started on saturday with multiple episodes of nbnb vomiting. has not had a bowel movement recently. no prior surgeries. last drank prior to arrival. no headaches or blurry vision. no falls. no recent travel. reports having difficulty following  PO. also reports some tingling to her hands. -

## 2023-11-14 NOTE — H&P ADULT - NSHPREVIEWOFSYSTEMS_GEN_ALL_CORE
REVIEW OF SYSTEMS:    CONSTITUTIONAL: +weakness; +chills  EYES/ENT: No visual changes;  No vertigo or throat pain   NECK: No pain or stiffness  RESPIRATORY: + dry cough  CARDIOVASCULAR: +chest pain; +palpitations when vomiting  GASTROINTESTINAL: +generalized abdominal discomfort; +nausea/vomiting  GENITOURINARY: No dysuria, frequency or hematuria  NEUROLOGICAL: No numbness or weakness  SKIN: No itching, rashes

## 2023-11-14 NOTE — H&P ADULT - NSHPLABSRESULTS_GEN_ALL_CORE
LABS:  11-14 @ 15:19 Creatine 111 U/L [25 - 170]                          11.9   12.63 )-----------( 223      ( 14 Nov 2023 15:19 )             35.2     11-14    135  |  86<L>  |  26<H>  ----------------------------<  111<H>  3.1<L>   |  30  |  2.56<H>    Ca    9.7      14 Nov 2023 19:16  Phos  1.3     11-14  Mg     1.8     11-14    TPro  7.4  /  Alb  4.5  /  TBili  0.7  /  DBili  x   /  AST  44<H>  /  ALT  51<H>  /  AlkPhos  90  11-14    PT/INR - ( 14 Nov 2023 15:19 )   PT: 10.7 sec;   INR: 1.02 ratio         PTT - ( 14 Nov 2023 15:19 )  PTT:32.0 sec    ECG: sinus tachycardia (), QTc 467

## 2023-11-14 NOTE — ED ADULT NURSE NOTE - OBJECTIVE STATEMENT
43yF PMH etoh abuse with prior episodes of withdrawal, presents to the ED c/o abdominal pain, chest pain, and vomiting. Per pt, sx began saturday with multiple episodes of vomiting. Pt states she has not had a bowel movement recently and hasn't been passing gas. Last drink was this afternoon at 12pm. Pt states she took 3 shots of vodka so that she could stop trembling enough to get to the ED. States she tried to quit drinking "cold turkey" over the weekend. Endorsing decreased PO intake stating that she hasn't eaten in 3 days. Gross neuro intact, no difficulty speaking in complete sentences, pulses x 4, moving all extremities, abdomen soft nontender, skin intact. Endorsing mild cramping and tingling in hands that began yesterday. States that the last time this happened, her electrolytes were all abnormal. Pt denies fevers/chills, headache, sob, cough, dysuria, hematuria, bloody stools, back pain.

## 2023-11-14 NOTE — H&P ADULT - PROBLEM SELECTOR PLAN 5
Patient developed retrosternal chest pain and generalized abdominal discomfort after persistent vomiting  - likely erosive esophagitis, however given reports of coffee ground emesis concern for possible UGIB  - c/w IV PPI BID  - c/w sucralfate as above

## 2023-11-14 NOTE — H&P ADULT - NSHPSOCIALHISTORY_GEN_ALL_CORE
Lives at home alone. Has been out of work since 3/2023. Prior to that, used to work as a dietitian and nutritionist, and in real estate.

## 2023-11-14 NOTE — H&P ADULT - SOCIAL HISTORY: ALCOHOL USE
Reports 1 pint of vodka daily since 2017 after quitting her job. Prior to that, was drinking socially since the age of 14. Was in inpatient rehab for alcohol use disorder for 35 days in 8/2023, but reports went back to drinking the day of discharge

## 2023-11-14 NOTE — H&P ADULT - ATTENDING COMMENTS
43y F pmh alcohol use disorder who presents for intractable nausea and vomiting, found to have HANNAH, mild alcohol withdrawal, and c/f possible UGI admitted for further eval     #ETOH Abuse w/ withdrawal, #N/V  - Etoh level: 27  - Last drink prior to arrival   - Pt exhibiting mild sx -- tremors, heightened anxiety, and irritability  - Symptom triggered CIWA    - C/w Thiamine, FA, MVI   - SBIRT/SW consult    #UGIB  -Pt intractable N/V over past 3d, vomitus recently evolved to “coffee ground” per pt   - Possible Debi Hernandez vs erosive esophagitis   - PPI, sucralfate   - F/u CT A/P   - GI consult for possible scope     #HANNAH  - Likely pre-renal iso poor intake, N/V   - Renal function Improved s/p IVF, expect continued improvement w/ fluid resuscitation   - If renal function not significantly improved in next 24hr, consider nephro consult       #Bacteruria  - UA grossly+ but put w/o sx   - Likely asymptomatic bacteriuria, monitor off abx for now   - F/u Ucx       Rest per resident

## 2023-11-14 NOTE — ED PROVIDER NOTE - PHYSICAL EXAMINATION
Attending Cristine Argueta: Gen: NAD, heent: atrauamtic, eomi, perrla, dry mucous memb ranes, no tongue fasiculations, uvula midline, neck; nttp, no nuchal rigidity, chest: nttp, no crepitus, cv: tachycardic, no murmurs, lungs: ctab, abd: soft, mild ttp epigastric area no guarding, ext: wwp, neg homans, skin: no rash, neuro: awake and alert, following commands, speech clear, sensation and strength intact, no focal deficits , mild tremor

## 2023-11-14 NOTE — H&P ADULT - PROBLEM SELECTOR PLAN 9
DVT PPx: SCDs, encourage ambulation (Improve Score 0)  Diet: CLD, advance as tolerated  Code: FULL  Dispo: pending improvement

## 2023-11-14 NOTE — ED CLERICAL - DIVISION
[facility]... Quality 265: Biopsy Follow-Up: Biopsy results reviewed, communicated, tracked, and documented Detail Level: Detailed Quality 226: Preventive Care And Screening: Tobacco Use: Screening And Cessation Intervention: Patient screened for tobacco use and is an ex/non-smoker Quality 111:Pneumonia Vaccination Status For Older Adults: Pneumococcal Vaccination Previously Received Quality 110: Preventive Care And Screening: Influenza Immunization: Influenza Immunization previously received during influenza season Quality 130: Documentation Of Current Medications In The Medical Record: Current Medications Documented Quality 431: Preventive Care And Screening: Unhealthy Alcohol Use - Screening: Patient not identified as an unhealthy alcohol user when screened for unhealthy alcohol use using a systematic screening method

## 2023-11-14 NOTE — ED PROVIDER NOTE - CLINICAL SUMMARY MEDICAL DECISION MAKING FREE TEXT BOX
Attending Cristine Argueta: 42 yo female h/o etoh abuse presenting with vomiting and abdominal pain. upon arrival pt tachcyardic with stable BP. ekg appers to be sinus tachycardia with nonspecific st changes. pt afebrile upon arrival. on exam abd soft with mild ttp epigastric area. pt passing gas making obstruction less likely. no RLQ ttp to suggest appendicitis. pt with multiple episodes of vomiting. no black or bloody stool and cnunctive pink. pt last darnk etoh prior to arrival. will place on ciwa, give IVF. blood work checked showing evidence of HANNAH. pt making urine. suspect a component of dehydration. on cbc pt with evidence of sschistocystes, will send hemolysis labs, no thrombocytopenia making TTP less likely. pt ps making urine. will send urine lytes and continue to hydrate. avoid nephroxic medications. reviewed old charts and pt with similar presenation in the past. pt does report some intermittent tingling to fingers. nonfocal neurologic exam. less likley intracranial hemorrhage or intracranial mass. pt will need admissiion, monitor for withdrawl, hydration.

## 2023-11-14 NOTE — ED PROVIDER NOTE - CARE PLAN
1 Principal Discharge DX:	Alcohol withdrawal   Principal Discharge DX:	Alcohol withdrawal  Secondary Diagnosis:	HANNAH (acute kidney injury)

## 2023-11-14 NOTE — H&P ADULT - PROBLEM SELECTOR PLAN 7
Patient with metabolic alkalosis, initial pH 7.6 but improved with IVF  - likely contraction alkalosis from vomiting and dec po intake  - continue with maintenance IVF, expect continued improvement

## 2023-11-14 NOTE — H&P ADULT - HISTORY OF PRESENT ILLNESS
43 year old F with alcohol use disorder resulting in multiple recent admissions who presents for 3 days of abdominal pain, nausea, and NBNB vomiting.     ED Course:  Initial VS: , iproved to -100 and SBP 130s after IVF. Patient received a total of 3L IVF. 43 year old F with alcohol use disorder resulting in multiple recent admissions who presents for 3 days of abdominal pain, nausea, and NBNB vomiting. The patient reports that since discharge on 10/30, she has been drinking 1 pint of vodka daily. Since Saturday 11/11, she has been experiencing intractable nausea, vomiting, and inability to tolerate any po intake. She reports vomiting approx once every hour, initially was yellow/green but since 11/13 PM has progressed to "coffee ground" emesis. She reports continued dark brown, grainy without gross hematemesis with associated central chest pain. She also reports generalized abdominal pain without . She also endorses symptoms of withdrawal including tremors, heightened anxiety, and irritability. She has had multiple admissions for alcohol withdrawal in the past but has never experienced DTs or seizures, or required ICU level of care for withdrawal. Also complaining of numbness and tingling of her bilateral fingers, which improved upon the time of my assessment. Last drink prior to arrival to ED, approximately 2 PM on 11/14. She denies any melena, hematochezia, dysuria, or shortness of breath.    ED Course:  VS HR 150s, BP 89/65, T 37.1 C, SpO2 97% on room air. VS improved to -110s and -30/80-90s after IVF. Patient received a total of 3L IVF, 20 IV pepcid, 1g Ca, 50 mg po librium, and 5 mg IV valium. Labs notable for WBC 12.6, Na 135, K 3.1, AG 19, Cr 2.56, AST 44, ALT 51, BHB 2.2. Lactate initially elevated to 6.4, normalized after IVF.

## 2023-11-14 NOTE — H&P ADULT - PROBLEM SELECTOR PLAN 8
Patient afebrile, no evidence of infection  - UA with bacteria, but pt denies any symptoms  - monitor off abx Patient afebrile, no evidence of infection, likely reactive  - UA with bacteria, but pt denies any symptoms  - monitor off abx, follow up prelim infectious workup

## 2023-11-14 NOTE — ED PROVIDER NOTE - PROGRESS NOTE DETAILS
Attending Cristine Argueta: pt found to have sig elevcated lactate. afebrile. pt is tachycardic but has been vomiting. receiving IVF. no clear sign of infectoin at this time Attending Cristine Argueta: heart rate improved with IVF. qtc greater than 500. will continue IVF and monitor electrolytes received s/o pt w/ enrique, cr of 4.4 plan for imaging and reassessment QTc improved to 467 after IVF and calcium, pt tba for etoh withdrawal, agreeable to admission,,  accepting. - Jason Rowell, PGY-3

## 2023-11-14 NOTE — H&P ADULT - NSHPPHYSICALEXAM_GEN_ALL_CORE
VITALS:   T(C): 36.9 (11-14-23 @ 18:01), Max: 37.2 (11-14-23 @ 15:00)  HR: 108 (11-14-23 @ 18:01) (108 - 153)  BP: 139/93 (11-14-23 @ 18:01) (89/65 - 139/93)  RR: 19 (11-14-23 @ 18:01) (19 - 25)  SpO2: 100% (11-14-23 @ 18:01) (97% - 100%)    GENERAL: NAD, lying in bed comfortably  HEAD:  Atraumatic, normocephalic  EYES: EOMI, PERRLA, conjunctiva and sclera clear  ENT: Dry mucous membranes  NECK: Supple, no JVD  HEART: Tachycardic, no murmurs, rubs, or gallops  LUNGS: Unlabored respirations.  Clear to auscultation bilaterally, no crackles, wheezing, or rhonchi  ABDOMEN: Generalized mild tenderness  EXTREMITIES: 2+ peripheral pulses bilaterally. No clubbing, cyanosis, or edema  NERVOUS SYSTEM:  A&Ox3, no focal deficits   SKIN: No rashes or lesions

## 2023-11-14 NOTE — ED PROVIDER NOTE - ATTENDING CONTRIBUTION TO CARE
Attending MD Cristine Argueta:  I personally have seen and examined this patient.  Resident note reviewed and agree on plan of care and except where noted.  See HPI, PE, and MDM for details.

## 2023-11-14 NOTE — H&P ADULT - PROBLEM SELECTOR PLAN 1
Patient presenting with severe nausea and vomiting x 3 days  - initially NBNB, however now with potential hematemesis  - unclear etiology, no hx of EGD, though given severe alcohol use disorder, likely suggestive of erosive esophagitis  - continue with sucralfate and PPI as below  - initial QTc prolonged, however improved after electrolyte correction and IVF  - CT A/P pending

## 2023-11-14 NOTE — H&P ADULT - PROBLEM SELECTOR PLAN 6
Patient found to have high AG metabolic acidosis  - HCO3 elevated to ,   - BHB elevated to 2.2, suggestive of possible starvation ketosis resulting in HAGMA  - pH compensated given likely concomitant metabolic alkalosis; likely contraction alkalosis as below

## 2023-11-14 NOTE — H&P ADULT - PROBLEM SELECTOR PLAN 4
Patient now complaining of "coffee ground" hematemesis after 3 days of NBNB vomiting  - unwitnessed in the ED, but given high risk will empirically cover for possible upper GI bleed  - given persistent vomiting now progressing to possible hematemesis, concerning for possible fidencio-santillan tear or progressing erosive esophagitis  - continue with IV PPI BID  - continue with sucralfate

## 2023-11-14 NOTE — H&P ADULT - PROBLEM SELECTOR PLAN 2
Patient with severe alcohol use disorder resulting in multiple admissions for withdrawal  - recent admission 10/26-10/30 for similar presentation  - last drink immediately prior to arrival  - current symptomatology likely not due to acute withdrawal, but has some evidence of mild withdrawal  - symptom triggered CIWA  - folic acid, thiamine, multivitamin  - SBIRT/SW consult  - nicotine patch given tobacco use

## 2023-11-14 NOTE — H&P ADULT - ASSESSMENT
43 year old F with alcohol use disorder who presents for intractable nausea and vomiting, found to have HANNAH, mild alcohol withdrawal, and concern for possible UGIB

## 2023-11-15 LAB
ALBUMIN SERPL ELPH-MCNC: 4.2 G/DL — SIGNIFICANT CHANGE UP (ref 3.3–5)
ALBUMIN SERPL ELPH-MCNC: 4.2 G/DL — SIGNIFICANT CHANGE UP (ref 3.3–5)
ALP SERPL-CCNC: 79 U/L — SIGNIFICANT CHANGE UP (ref 40–120)
ALP SERPL-CCNC: 79 U/L — SIGNIFICANT CHANGE UP (ref 40–120)
ALT FLD-CCNC: 48 U/L — HIGH (ref 10–45)
ALT FLD-CCNC: 48 U/L — HIGH (ref 10–45)
ANA TITR SER: NEGATIVE — SIGNIFICANT CHANGE UP
ANA TITR SER: NEGATIVE — SIGNIFICANT CHANGE UP
ANION GAP SERPL CALC-SCNC: 14 MMOL/L — SIGNIFICANT CHANGE UP (ref 5–17)
ANION GAP SERPL CALC-SCNC: 14 MMOL/L — SIGNIFICANT CHANGE UP (ref 5–17)
AST SERPL-CCNC: 58 U/L — HIGH (ref 10–40)
AST SERPL-CCNC: 58 U/L — HIGH (ref 10–40)
BILIRUB SERPL-MCNC: 0.8 MG/DL — SIGNIFICANT CHANGE UP (ref 0.2–1.2)
BILIRUB SERPL-MCNC: 0.8 MG/DL — SIGNIFICANT CHANGE UP (ref 0.2–1.2)
BUN SERPL-MCNC: 20 MG/DL — SIGNIFICANT CHANGE UP (ref 7–23)
BUN SERPL-MCNC: 20 MG/DL — SIGNIFICANT CHANGE UP (ref 7–23)
CALCIUM SERPL-MCNC: 9.5 MG/DL — SIGNIFICANT CHANGE UP (ref 8.4–10.5)
CALCIUM SERPL-MCNC: 9.5 MG/DL — SIGNIFICANT CHANGE UP (ref 8.4–10.5)
CHLORIDE SERPL-SCNC: 92 MMOL/L — LOW (ref 96–108)
CHLORIDE SERPL-SCNC: 92 MMOL/L — LOW (ref 96–108)
CO2 SERPL-SCNC: 31 MMOL/L — SIGNIFICANT CHANGE UP (ref 22–31)
CO2 SERPL-SCNC: 31 MMOL/L — SIGNIFICANT CHANGE UP (ref 22–31)
CREAT SERPL-MCNC: 1.21 MG/DL — SIGNIFICANT CHANGE UP (ref 0.5–1.3)
CREAT SERPL-MCNC: 1.21 MG/DL — SIGNIFICANT CHANGE UP (ref 0.5–1.3)
EGFR: 57 ML/MIN/1.73M2 — LOW
EGFR: 57 ML/MIN/1.73M2 — LOW
GLUCOSE SERPL-MCNC: 103 MG/DL — HIGH (ref 70–99)
GLUCOSE SERPL-MCNC: 103 MG/DL — HIGH (ref 70–99)
HAPTOGLOB SERPL-MCNC: 155 MG/DL — SIGNIFICANT CHANGE UP (ref 34–200)
HAPTOGLOB SERPL-MCNC: 155 MG/DL — SIGNIFICANT CHANGE UP (ref 34–200)
HCT VFR BLD CALC: 26.2 % — LOW (ref 34.5–45)
HCT VFR BLD CALC: 26.2 % — LOW (ref 34.5–45)
HCT VFR BLD CALC: 28.5 % — LOW (ref 34.5–45)
HCT VFR BLD CALC: 28.5 % — LOW (ref 34.5–45)
HGB BLD-MCNC: 8.4 G/DL — LOW (ref 11.5–15.5)
HGB BLD-MCNC: 8.4 G/DL — LOW (ref 11.5–15.5)
HGB BLD-MCNC: 9.1 G/DL — LOW (ref 11.5–15.5)
HGB BLD-MCNC: 9.1 G/DL — LOW (ref 11.5–15.5)
MAGNESIUM SERPL-MCNC: 1.5 MG/DL — LOW (ref 1.6–2.6)
MAGNESIUM SERPL-MCNC: 1.5 MG/DL — LOW (ref 1.6–2.6)
MCHC RBC-ENTMCNC: 23.7 PG — LOW (ref 27–34)
MCHC RBC-ENTMCNC: 23.7 PG — LOW (ref 27–34)
MCHC RBC-ENTMCNC: 24.1 PG — LOW (ref 27–34)
MCHC RBC-ENTMCNC: 24.1 PG — LOW (ref 27–34)
MCHC RBC-ENTMCNC: 31.9 GM/DL — LOW (ref 32–36)
MCHC RBC-ENTMCNC: 31.9 GM/DL — LOW (ref 32–36)
MCHC RBC-ENTMCNC: 32.1 GM/DL — SIGNIFICANT CHANGE UP (ref 32–36)
MCHC RBC-ENTMCNC: 32.1 GM/DL — SIGNIFICANT CHANGE UP (ref 32–36)
MCV RBC AUTO: 74 FL — LOW (ref 80–100)
MCV RBC AUTO: 74 FL — LOW (ref 80–100)
MCV RBC AUTO: 75.6 FL — LOW (ref 80–100)
MCV RBC AUTO: 75.6 FL — LOW (ref 80–100)
NRBC # BLD: 0 /100 WBCS — SIGNIFICANT CHANGE UP (ref 0–0)
PHOSPHATE SERPL-MCNC: 1.1 MG/DL — LOW (ref 2.5–4.5)
PHOSPHATE SERPL-MCNC: 1.1 MG/DL — LOW (ref 2.5–4.5)
PLATELET # BLD AUTO: 72 K/UL — LOW (ref 150–400)
PLATELET # BLD AUTO: 72 K/UL — LOW (ref 150–400)
PLATELET # BLD AUTO: 75 K/UL — LOW (ref 150–400)
PLATELET # BLD AUTO: 75 K/UL — LOW (ref 150–400)
POTASSIUM SERPL-MCNC: 3.1 MMOL/L — LOW (ref 3.5–5.3)
POTASSIUM SERPL-MCNC: 3.1 MMOL/L — LOW (ref 3.5–5.3)
POTASSIUM SERPL-SCNC: 3.1 MMOL/L — LOW (ref 3.5–5.3)
POTASSIUM SERPL-SCNC: 3.1 MMOL/L — LOW (ref 3.5–5.3)
PROT SERPL-MCNC: 6.6 G/DL — SIGNIFICANT CHANGE UP (ref 6–8.3)
PROT SERPL-MCNC: 6.6 G/DL — SIGNIFICANT CHANGE UP (ref 6–8.3)
RBC # BLD: 3.54 M/UL — LOW (ref 3.8–5.2)
RBC # BLD: 3.54 M/UL — LOW (ref 3.8–5.2)
RBC # BLD: 3.77 M/UL — LOW (ref 3.8–5.2)
RBC # BLD: 3.77 M/UL — LOW (ref 3.8–5.2)
RBC # FLD: 18.6 % — HIGH (ref 10.3–14.5)
RBC # FLD: 18.6 % — HIGH (ref 10.3–14.5)
RBC # FLD: 19.2 % — HIGH (ref 10.3–14.5)
RBC # FLD: 19.2 % — HIGH (ref 10.3–14.5)
SODIUM SERPL-SCNC: 137 MMOL/L — SIGNIFICANT CHANGE UP (ref 135–145)
SODIUM SERPL-SCNC: 137 MMOL/L — SIGNIFICANT CHANGE UP (ref 135–145)
UUN UR-MCNC: 277 MG/DL — SIGNIFICANT CHANGE UP
UUN UR-MCNC: 277 MG/DL — SIGNIFICANT CHANGE UP
WBC # BLD: 6.65 K/UL — SIGNIFICANT CHANGE UP (ref 3.8–10.5)
WBC # BLD: 6.65 K/UL — SIGNIFICANT CHANGE UP (ref 3.8–10.5)
WBC # BLD: 6.96 K/UL — SIGNIFICANT CHANGE UP (ref 3.8–10.5)
WBC # BLD: 6.96 K/UL — SIGNIFICANT CHANGE UP (ref 3.8–10.5)
WBC # FLD AUTO: 6.65 K/UL — SIGNIFICANT CHANGE UP (ref 3.8–10.5)
WBC # FLD AUTO: 6.65 K/UL — SIGNIFICANT CHANGE UP (ref 3.8–10.5)
WBC # FLD AUTO: 6.96 K/UL — SIGNIFICANT CHANGE UP (ref 3.8–10.5)
WBC # FLD AUTO: 6.96 K/UL — SIGNIFICANT CHANGE UP (ref 3.8–10.5)

## 2023-11-15 PROCEDURE — 99233 SBSQ HOSP IP/OBS HIGH 50: CPT | Mod: GC

## 2023-11-15 PROCEDURE — 99222 1ST HOSP IP/OBS MODERATE 55: CPT | Mod: GC

## 2023-11-15 RX ORDER — LANOLIN ALCOHOL/MO/W.PET/CERES
6 CREAM (GRAM) TOPICAL AT BEDTIME
Refills: 0 | Status: DISCONTINUED | OUTPATIENT
Start: 2023-11-15 | End: 2023-11-18

## 2023-11-15 RX ORDER — MAGNESIUM SULFATE 500 MG/ML
2 VIAL (ML) INJECTION ONCE
Refills: 0 | Status: COMPLETED | OUTPATIENT
Start: 2023-11-15 | End: 2023-11-15

## 2023-11-15 RX ORDER — POTASSIUM CHLORIDE 20 MEQ
40 PACKET (EA) ORAL ONCE
Refills: 0 | Status: COMPLETED | OUTPATIENT
Start: 2023-11-15 | End: 2023-11-15

## 2023-11-15 RX ORDER — INFLUENZA VIRUS VACCINE 15; 15; 15; 15 UG/.5ML; UG/.5ML; UG/.5ML; UG/.5ML
0.5 SUSPENSION INTRAMUSCULAR ONCE
Refills: 0 | Status: DISCONTINUED | OUTPATIENT
Start: 2023-11-15 | End: 2023-11-21

## 2023-11-15 RX ORDER — POTASSIUM PHOSPHATE, MONOBASIC POTASSIUM PHOSPHATE, DIBASIC 236; 224 MG/ML; MG/ML
15 INJECTION, SOLUTION INTRAVENOUS ONCE
Refills: 0 | Status: COMPLETED | OUTPATIENT
Start: 2023-11-15 | End: 2023-11-15

## 2023-11-15 RX ADMIN — Medication 6 MILLIGRAM(S): at 21:22

## 2023-11-15 RX ADMIN — POTASSIUM PHOSPHATE, MONOBASIC POTASSIUM PHOSPHATE, DIBASIC 62.5 MILLIMOLE(S): 236; 224 INJECTION, SOLUTION INTRAVENOUS at 11:28

## 2023-11-15 RX ADMIN — Medication 1 GRAM(S): at 23:01

## 2023-11-15 RX ADMIN — Medication 100 MILLIGRAM(S): at 00:46

## 2023-11-15 RX ADMIN — Medication 25 GRAM(S): at 09:39

## 2023-11-15 RX ADMIN — PANTOPRAZOLE SODIUM 40 MILLIGRAM(S): 20 TABLET, DELAYED RELEASE ORAL at 05:23

## 2023-11-15 RX ADMIN — Medication 1 GRAM(S): at 17:34

## 2023-11-15 RX ADMIN — Medication 1 GRAM(S): at 11:29

## 2023-11-15 RX ADMIN — Medication 1 PATCH: at 20:47

## 2023-11-15 RX ADMIN — Medication 1 GRAM(S): at 00:46

## 2023-11-15 RX ADMIN — Medication 1 GRAM(S): at 05:23

## 2023-11-15 RX ADMIN — Medication 1 PATCH: at 22:13

## 2023-11-15 RX ADMIN — Medication 100 MILLIGRAM(S): at 21:22

## 2023-11-15 RX ADMIN — Medication 1 MILLIGRAM(S): at 11:29

## 2023-11-15 RX ADMIN — Medication 105 MILLIGRAM(S): at 11:32

## 2023-11-15 RX ADMIN — Medication 1 PATCH: at 11:28

## 2023-11-15 RX ADMIN — PANTOPRAZOLE SODIUM 40 MILLIGRAM(S): 20 TABLET, DELAYED RELEASE ORAL at 17:34

## 2023-11-15 RX ADMIN — Medication 40 MILLIEQUIVALENT(S): at 09:39

## 2023-11-15 RX ADMIN — Medication 1 TABLET(S): at 11:29

## 2023-11-15 RX ADMIN — Medication 1 PATCH: at 18:45

## 2023-11-15 NOTE — CONSULT NOTE ADULT - SUBJECTIVE AND OBJECTIVE BOX
Chief Complaint:  Patient is a 43y old  Female who presents with a chief complaint of chest pain, nausea, vomiting (15 Nov 2023 09:03)      HPI:  42yo F w AUD (prior rehab with prompt resumption of EtOH use) with recurrent hospitalizations for alcohol withdrawal and associated n/v most recently August 2023 and October 2023, now presents to the ED with a couple of days of severe nausea and NBNB vomiting. GI consulted for dark emesis and anemia.    Patient reports that she began drinking again after discharge from hospital 10/30. She was admitted at that time for odynophagia and decision was made for medical therapy with f/u EGD within 1 month, however patient has not taken of the prescribed home medications. She reports that her odynophagia resolved once she left the hospital and she was able to eat and drink without issue. She was consuming 1 pint of vodka qD since then. She reports that this past weekend, she tried to stop drinking cold turkey and developed nausea/vomiting. She last vomited 11/14 in the [employer] on her way to the hospital. She reports she remembers one episode of darker emesis on Monday, but that may have occurred because she tried to drink Iced Tea. In ER patient noted with HANNAH (BUN and Cr now normalized) and electrolyte derangements (hypoNa, Phos, Mg) which are being repleted. Patient also with Hb 11.9 --> 8.4 today. Pt last BM 11/14, solid and she denies blood or dark stool. Patient currently feels hungry and denies n/v/AP/f/c/d. Despite discussion regarding need for OBDULIA and possible need for EGD patient refusing and would prefer to see how medical therapies help.     Allergies:  No Known Allergies      Home Medications:  -reports not taking any of rx meds since last hospitalization    Hospital Medications:  benzonatate 100 milliGRAM(s) Oral three times a day PRN  folic acid 1 milliGRAM(s) Oral daily  influenza   Vaccine 0.5 milliLiter(s) IntraMuscular once  lactated ringers. 1000 milliLiter(s) IV Continuous <Continuous>  LORazepam     Tablet 2 milliGRAM(s) Oral every 2 hours PRN  LORazepam     Tablet 2 milliGRAM(s) Oral every 1 hour PRN  melatonin 6 milliGRAM(s) Oral at bedtime  multivitamin 1 Tablet(s) Oral daily  nicotine - 21 mG/24Hr(s) Patch 1 Patch Transdermal daily  pantoprazole  Injectable 40 milliGRAM(s) IV Push two times a day  potassium phosphate IVPB 15 milliMole(s) IV Intermittent once  sucralfate 1 Gram(s) Oral four times a day  thiamine IVPB 500 milliGRAM(s) IV Intermittent daily      PMHX/PSHX:   History of alcohol use disorder    No significant past surgical history        Family history:  Family history of type II diabetes mellitus    FH: type 2 diabetes (Father)    FH: hypertension (Mother)        Social History:     Tob: Denies  EtOH: As above  Illicit Drugs: Denies    ROS:   General:  No wt loss, fevers, chills, night sweats, fatigue  Eyes:  Good vision, no reported pain  ENT:  No sore throat, pain, runny nose, dysphagia  CV:  No pain, palpitations, hypo/hypertension  Pulm:  No dyspnea, cough, tachypnea, wheezing  GI:  As per HPI  :  No pain, bleeding, incontinence, nocturia  Muscle:  No pain, weakness  Neuro:  No weakness, tingling, memory problems  Psych:  No fatigue, insomnia, mood problems, depression  Endocrine:  No polyuria, polydipsia, cold/heat intolerance  Heme:  No petechiae, ecchymosis, easy bruisability  Skin:  No rash, tattoos, scars, edema    PHYSICAL EXAM:   GENERAL:  No acute distress  HEENT:  Normocephalic/atraumatic, no scleral icterus, no tongue fasciculations  CHEST:  No accessory muscle use, breathing room air comfortably  HEART:  Regular rate and rhythm  ABDOMEN:  Soft, non-tender, non-distended, normoactive bowel sounds,  no masses  EXTREMITIES: No LE edema  SKIN:  No rash, no jaundice  NEURO:  Alert and oriented x 3, no asterixis, no tremor    Vital Signs:  Vital Signs Last 24 Hrs  T(C): 36.6 (15 Nov 2023 04:11), Max: 37.2 (14 Nov 2023 15:00)  T(F): 97.9 (15 Nov 2023 04:11), Max: 99 (14 Nov 2023 15:00)  HR: 89 (15 Nov 2023 09:03) (89 - 153)  BP: 113/74 (15 Nov 2023 09:03) (89/65 - 139/93)  BP(mean): --  RR: 18 (15 Nov 2023 04:11) (16 - 25)  SpO2: 97% (15 Nov 2023 09:03) (94% - 100%)    Parameters below as of 15 Nov 2023 09:03  Patient On (Oxygen Delivery Method): room air      Daily Height in cm: 154.94 (15 Nov 2023 01:01)    Daily     LABS:                        8.4    6.96  )-----------( 72       ( 15 Nov 2023 07:08 )             26.2     Mean Cell Volume: 74.0 fl (11-15-23 @ 07:08)    11-15    137  |  92<L>  |  20  ----------------------------<  103<H>  3.1<L>   |  31  |  1.21    Ca    9.5      15 Nov 2023 07:06  Phos  1.1     11-15  Mg     1.5     11-15    TPro  6.6  /  Alb  4.2  /  TBili  0.8  /  DBili  x   /  AST  58<H>  /  ALT  48<H>  /  AlkPhos  79  11-15    LIVER FUNCTIONS - ( 15 Nov 2023 07:06 )  Alb: 4.2 g/dL / Pro: 6.6 g/dL / ALK PHOS: 79 U/L / ALT: 48 U/L / AST: 58 U/L / GGT: x           PT/INR - ( 14 Nov 2023 15:19 )   PT: 10.7 sec;   INR: 1.02 ratio         PTT - ( 14 Nov 2023 15:19 )  PTT:32.0 sec  Urinalysis Basic - ( 15 Nov 2023 07:06 )    Color: x / Appearance: x / SG: x / pH: x  Gluc: 103 mg/dL / Ketone: x  / Bili: x / Urobili: x   Blood: x / Protein: x / Nitrite: x   Leuk Esterase: x / RBC: x / WBC x   Sq Epi: x / Non Sq Epi: x / Bacteria: x      Amylase Serum--      Lipase serum79       Ammonia--                          8.4    6.96  )-----------( 72       ( 15 Nov 2023 07:08 )             26.2                         11.9   12.63 )-----------( 223      ( 14 Nov 2023 15:19 )             35.2       Imaging:  < from: CT Abdomen and Pelvis No Cont (11.14.23 @ 20:34) >  ACC: [id number] EXAM:  CT ABDOMEN AND PELVIS   ORDERED BY: DEYSI DEAL     PROCEDURE DATE:  11/14/2023          INTERPRETATION:  CLINICAL INFORMATION: Abdominal pain, nausea, vomiting   in the setting of alcohol withdrawal..    COMPARISON: CT chestabdomen pelvis 4/10/2017.    CONTRAST/COMPLICATIONS:  IV Contrast: NONE  Oral Contrast: NONE  Complications: None reported at time of study completion    PROCEDURE:  CT of the Abdomen and Pelvis was performed.  Sagittal and coronal reformats were performed.    FINDINGS:  LOWER CHEST: Within normal limits.    LIVER: Within normal limits.  BILE DUCTS: Normal caliber.  GALLBLADDER: Within normal limits.  SPLEEN: Within normal limits.  PANCREAS: Within normal limits.  ADRENALS: Within normal limits.  KIDNEYS/URETERS: Within normal limits.    BLADDER: Within normal limits.  REPRODUCTIVE ORGANS: Uterus and adnexa within normal limits.    BOWEL: No bowel obstruction. Appendix is normal.  PERITONEUM: No ascites.  VESSELS: Limited evaluation the absence of IV contrast.  RETROPERITONEUM/LYMPH NODES: No lymphadenopathy.  ABDOMINAL WALL: Within normal limits.  BONES: Within normal limits.    IMPRESSION:  No acute pathology within the abdomen or pelvis.    --- End of Report ---           MANI GARCIA MD; Resident Radiologist  This document has been electronically signed.  ROBBIN KELSEY MD; Attending Radiologist  This document has been electronically signed. Nov 14 2023  9:42PM    < end of copied text >

## 2023-11-15 NOTE — PATIENT PROFILE ADULT - FUNCTIONAL ASSESSMENT - BASIC MOBILITY ASSESSMENT TYPE
[Initial Evaluation] : an initial evaluation of [Hypertension] : ~T hypertension [Father] : father Admission

## 2023-11-15 NOTE — CONSULT NOTE ADULT - ASSESSMENT
42yo F w AUD with recurrent hospitalizations for alcohol withdrawal and associated n/v most recently August 2023 and October 2023, now presents to the ED with a couple of days of severe nausea and NBNB vomiting. GI consulted for dark emesis and anemia.    # AUD  # Microcytic Anemia  Hb 11.9 --> 8.4    Recommendations:  - patient refusing endoscopic evaluation at this time (conversation 11/15 AM) - in favor of continuing with medications  - monitor CBC qD  - obtain iron panel  - monitor for signs of overt GIB  - can advance diet as tolerated  - PPI 40 IV BID for 2-3 days while admitted, then PO bid for 6-8 weeks empirically  - Magic mouth wash  - Carafate, can continue for 7-10 days  - Alcohol cessation; provide / services - 3rd admit since August for EtOH withdrawal and associated UGI symptoms  - Antiemetics PRN  - Please provide patient with Gastroenterology Clinic for outpatient follow up; [phone] ([facility] at [address] [place] [address]) or [phone] ([facility] at [address] [place] [address]).    All recommendations preliminary until note signed by service attending.    Thank you for involving us in the care of this patient. Please contact should any concern or questions arise.    Shaquille Cowart MD   Gastroenterology/Hepatology Fellow PGY-5  Available on Microsoft Teams 7am - 5pm  [phone]    NON-URGENT CONSULTS:  Please email:  [email or web address]   OR  [email or web address]    After 5pm, please contact the on-call GI fellow. [phone]    AT NIGHT AND ON WEEKENDS:  Contact on-call GI fellow via answering service ([phone]) from 5pm-8am and on weekends/holidays   44yo F w AUD with recurrent hospitalizations for alcohol withdrawal and associated n/v most recently August 2023 and October 2023, now presents to the ED with a couple of days of severe nausea and NBNB vomiting. GI consulted for dark emesis and anemia.    # AUD  # Microcytic Anemia (unclear if real based on trend)  *HDS  *Hb 11.9 --> 8.4    Recommendations:  - patient refusing endoscopic evaluation at this time (conversation 11/15 AM) - in favor of continuing with medications  - would repeat CBC today to confirm Hb trend 11.9 --> 8.4  - monitor CBC qD  - obtain iron panel  - monitor for signs of overt GIB  - can advance diet as tolerated  - PPI 40 IV BID for 2-3 days while admitted, then PO bid for 6-8 weeks empirically  - Magic mouth wash  - Carafate, can continue for 7-10 days  - Alcohol cessation; provide / services - 3rd admit since August for EtOH withdrawal and associated UGI symptoms  - Antiemetics PRN  - will sign off at this time, however please call back with questions or should any worsening/persistent issues arise.  Please provide patient with Gastroenterology Clinic to confirm appointment; [phone] ([facility] at [address] [place] [address]) or [phone] ([facility] at [address] [place] [address]) or [phone] ([facility] at [address] [place] [address]).    All recommendations preliminary until note signed by service attending.    Thank you for involving us in the care of this patient. Please contact should any concern or questions arise.    Shaquille Cowart MD   Gastroenterology/Hepatology Fellow PGY-5  Available on Microsoft Teams 7am - 5pm  [phone]    NON-URGENT CONSULTS:  Please email:  [email or web address]   OR  [email or web address]    After 5pm, please contact the on-call GI fellow. [phone]    AT NIGHT AND ON WEEKENDS:  Contact on-call GI fellow via answering service ([phone]) from 5pm-8am and on weekends/holidays

## 2023-11-15 NOTE — CONSULT NOTE ADULT - ATTENDING COMMENTS
Agree with above. Given prior odynophagia and now nausea/vomiting, would offer EGD for further evaluation, but patient is declining endoscopic evaluation. Would continue supportive care with antisecretories.

## 2023-11-15 NOTE — PROGRESS NOTE ADULT - PROBLEM SELECTOR PLAN 4
Patient now complaining of "coffee ground" hematemesis after 3 days of NBNB vomiting  - unwitnessed in the ED, but given high risk will empirically cover for possible upper GI bleed  - given persistent vomiting now progressing to possible hematemesis, concerning for possible fidencio-santillan tear or progressing erosive esophagitis    Plan:  - continue with IV PPI BID  - continue with sucralfate Patient now complaining of "coffee ground" hematemesis after 3 days of NBNB vomiting  - unwitnessed in the ED, but given high risk will empirically cover for possible upper GI bleed  - given persistent vomiting now progressing to possible hematemesis, concerning for possible fidencio-santillan tear or progressing erosive esophagitis    Plan:  - continue with IV PPI BID  - continue with sucralfate  -GI Consulted, pt refusing endoscopy at this time OP follow up

## 2023-11-15 NOTE — PHYSICAL THERAPY INITIAL EVALUATION ADULT - ADDITIONAL COMMENTS
pt lives alone in apt. 1 flight of stairs to enter. Prior to admission, pt was I with all functional mobility and ADLs without AD.

## 2023-11-15 NOTE — PROGRESS NOTE ADULT - ATTENDING COMMENTS
The patient is a 43F with h/o alcohol use disorder who presents for intractable nausea and vomiting, coffee ground emesis, found to have HANNAH, mild alcohol withdrawal    1. ETOH Abuse w/ withdrawal  2. UGIB  3. HANNAH    - AAOx3, not confused, minimal tremor, Etoh level: 27, Last drink prior to arrival   - On symptom triggered Benzo on CIWA    - C/w Thiamine, Folate, MVI and SW consult  - GI evaluated- patient refused EGD, c/w PPI, advised to stop drinking alcohol  - IV hydration for HANNAH, likely prerenal with low PO intake

## 2023-11-15 NOTE — PATIENT PROFILE ADULT - FALL HARM RISK - UNIVERSAL INTERVENTIONS
Bed in lowest position, wheels locked, appropriate side rails in place/Call bell, personal items and telephone in reach/Instruct patient to call for assistance before getting out of bed or chair/Non-slip footwear when patient is out of bed/[place] to call system/Physically safe environment - no spills, clutter or unnecessary equipment/Purposeful Proactive Rounding/Room/bathroom lighting operational, light cord in reach

## 2023-11-15 NOTE — PATIENT PROFILE ADULT - HEALTH LITERACY
Problem: Patient Care Overview  Goal: Individualization & Mutuality  Outcome: Ongoing (interventions implemented as appropriate)  Pt educated on hand washing and infection control.  Pt verbalized understanding.         
no

## 2023-11-15 NOTE — PROGRESS NOTE ADULT - ASSESSMENT
43 year old F with alcohol use disorder who presents for intractable nausea and vomiting, found to have HANNAH, mild alcohol withdrawal, and concern for possible UGIB.

## 2023-11-15 NOTE — PROGRESS NOTE ADULT - SUBJECTIVE AND OBJECTIVE BOX
PGY-1 Renee Quiñones  TEAM 4  Progress Note    Patient is a 43y old  Female who presents with a chief complaint of chest pain, nausea, vomiting (14 Nov 2023 19:40)      SUBJECTIVE / OVERNIGHT EVENTS:  OVN: No acute events  Labs and Imaging Reviewed  Patient seen and examined bedside.  Pt alert and oriented, engaged in conversation. Asked about eating, reinforced patient to be NPO Pending GI evaluation. Otherwise denies any HA, CP, SOB, N/V/D/C. Feels about the same since presentation. Notes a substernal "pressure" but tough to describe.      MEDICATIONS  (STANDING):  folic acid 1 milliGRAM(s) Oral daily  influenza   Vaccine 0.5 milliLiter(s) IntraMuscular once  lactated ringers. 1000 milliLiter(s) (100 mL/Hr) IV Continuous <Continuous>  magnesium sulfate  IVPB 2 Gram(s) IV Intermittent once  melatonin 6 milliGRAM(s) Oral at bedtime  multivitamin 1 Tablet(s) Oral daily  nicotine - 21 mG/24Hr(s) Patch 1 Patch Transdermal daily  pantoprazole  Injectable 40 milliGRAM(s) IV Push two times a day  potassium chloride    Tablet ER 40 milliEquivalent(s) Oral once  potassium phosphate IVPB 15 milliMole(s) IV Intermittent once  sucralfate 1 Gram(s) Oral four times a day  thiamine IVPB 500 milliGRAM(s) IV Intermittent daily    MEDICATIONS  (PRN):  benzonatate 100 milliGRAM(s) Oral three times a day PRN Cough  LORazepam     Tablet 2 milliGRAM(s) Oral every 2 hours PRN Symptom-triggered 2 point increase in CIWA-Ar  LORazepam     Tablet 2 milliGRAM(s) Oral every 1 hour PRN Symptom-triggered: each CIWA -Ar score 8 or GREATER      Vital Signs Last 24 Hrs  T(C): 36.6 (15 Nov 2023 04:11), Max: 37.2 (14 Nov 2023 15:00)  T(F): 97.9 (15 Nov 2023 04:11), Max: 99 (14 Nov 2023 15:00)  HR: 94 (15 Nov 2023 04:11) (94 - 153)  BP: 102/67 (15 Nov 2023 04:11) (89/65 - 139/93)  BP(mean): --  RR: 18 (15 Nov 2023 04:11) (16 - 25)  SpO2: 94% (15 Nov 2023 04:11) (94% - 100%)    Parameters below as of 15 Nov 2023 04:11  Patient On (Oxygen Delivery Method): room air      CAPILLARY BLOOD GLUCOSE        I&O's Summary      PHYSICAL EXAM:  GENERAL: NAD  HEAD:  Atraumatic, Normocephalic  EYES: EOMI  CHEST/LUNG: Clear to auscultation bilaterally; No wheeze  HEART: Regular rhythm, tachycardic; No murmurs, rubs, or gallops  ABDOMEN: Soft, Nontender, Nondistended; Bowel sounds present  EXTREMITIES:  2+ Peripheral Pulses, No clubbing, cyanosis, or edema  PSYCH: AAOx3  NEUROLOGY: non-focal    LABS:                        8.4    6.96  )-----------( 72       ( 15 Nov 2023 07:08 )             26.2     11-15    137  |  92<L>  |  20  ----------------------------<  103<H>  3.1<L>   |  31  |  1.21    Ca    9.5      15 Nov 2023 07:06  Phos  1.1     11-15  Mg     1.5     11-15    TPro  6.6  /  Alb  4.2  /  TBili  0.8  /  DBili  x   /  AST  58<H>  /  ALT  48<H>  /  AlkPhos  79  11-15    PT/INR - ( 14 Nov 2023 15:19 )   PT: 10.7 sec;   INR: 1.02 ratio         PTT - ( 14 Nov 2023 15:19 )  PTT:32.0 sec  CARDIAC MARKERS ( 14 Nov 2023 15:19 )  x     / x     / 111 U/L / x     / <1.0 ng/mL      Urinalysis Basic - ( 15 Nov 2023 07:06 )    Color: x / Appearance: x / SG: x / pH: x  Gluc: 103 mg/dL / Ketone: x  / Bili: x / Urobili: x   Blood: x / Protein: x / Nitrite: x   Leuk Esterase: x / RBC: x / WBC x   Sq Epi: x / Non Sq Epi: x / Bacteria: x  < from: CT Abdomen and Pelvis No Cont (11.14.23 @ 20:34) >    INTERPRETATION:  CLINICAL INFORMATION: Abdominal pain, nausea, vomiting   in the setting of alcohol withdrawal..    COMPARISON: CT chestabdomen pelvis 4/10/2017.    CONTRAST/COMPLICATIONS:  IV Contrast: NONE  Oral Contrast: NONE  Complications: None reported at time of study completion    PROCEDURE:  CT of the Abdomen and Pelvis was performed.  Sagittal and coronal reformats were performed.    FINDINGS:  LOWER CHEST: Within normal limits.    LIVER: Within normal limits.  BILE DUCTS: Normal caliber.  GALLBLADDER: Within normal limits.  SPLEEN: Within normal limits.  PANCREAS: Within normal limits.  ADRENALS: Within normal limits.  KIDNEYS/URETERS: Within normal limits.    BLADDER: Within normal limits.  REPRODUCTIVE ORGANS: Uterus and adnexa within normal limits.    BOWEL: No bowel obstruction. Appendix is normal.  PERITONEUM: No ascites.  VESSELS: Limited evaluation the absence of IV contrast.  RETROPERITONEUM/LYMPH NODES: No lymphadenopathy.  ABDOMINAL WALL: Within normal limits.  BONES: Within normal limits.    IMPRESSION:  No acute pathology within the abdomen or pelvis.    --- End of Report ---    < end of copied text >      Care Discussed with Consultants/Other Providers:

## 2023-11-15 NOTE — SBIRT NOTE ADULT - NSSBIRTALCPASSREFTXDET_GEN_A_CORE
SW met with pt at bedside to complete SBIRT assessment. Pt reports daily ETOH use- 1 pint of vodka daily. SW explored tx options with pt. Pt requesting to attend inpt ETOH treatment at [facility], where she went to treatment in August 2023. SW to assist in coordination of such services and will continue to collaborate with IDT.

## 2023-11-15 NOTE — PROGRESS NOTE ADULT - PROBLEM SELECTOR PLAN 8
Patient afebrile, no evidence of infection, likely reactive  - UA with bacteria, but pt denies any symptoms  - monitor off abx, follow up prelim infectious workup

## 2023-11-15 NOTE — PROGRESS NOTE ADULT - PROBLEM SELECTOR PLAN 1
Patient presenting with severe nausea and vomiting x 3 days  - initially NBNB, ? hematemesis, but now denying   - CT A/P w/o acute pathology  - no hx of EGD, though given severe alcohol use disorder, likely suggestive of erosive esophagitis    Plan:  - continue with sucralfate and PPI as below  - initial QTc prolonged, however improved after electrolyte correction and IVF

## 2023-11-15 NOTE — PROGRESS NOTE ADULT - PROBLEM SELECTOR PLAN 2
Patient with severe alcohol use disorder resulting in multiple admissions for withdrawal  - recent admission 10/26-10/30 for similar presentation  - last drink immediately prior to arrival    Plan:  - symptom triggered CIWA  - folic acid, thiamine, multivitamin  - SBIRT/SW consult  - nicotine patch given tobacco use

## 2023-11-15 NOTE — PHYSICAL THERAPY INITIAL EVALUATION ADULT - PERTINENT HX OF CURRENT PROBLEM, REHAB EVAL
43 year old F with alcohol use disorder resulting in multiple recent admissions who presents for 3 days of abdominal pain, nausea, and NBNB vomiting. The patient reports that since discharge on 10/30, she has been drinking 1 pint of vodka daily. Since Saturday 11/11, she has been experiencing intractable nausea, vomiting, and inability to tolerate any po intake. She reports vomiting approx once every hour, initially was yellow/green but since 11/13 PM has progressed to "coffee ground" emesis. She reports continued dark brown, grainy without gross hematemesis with associated central chest pain. She also reports generalized abdominal pain without . She also endorses symptoms of withdrawal including tremors, heightened anxiety, and irritability. She has had multiple admissions for alcohol withdrawal in the past but has never experienced DTs or seizures, or required ICU level of care for withdrawal. Also complaining of numbness and tingling of her bilateral fingers, which improved upon the time of my assessment. Last drink prior to arrival to ED, approximately 2 PM on 11/14. She denies any melena, hematochezia, dysuria, or shortness of breath. XRay Chest 11/14: Normal. CT Abd 11/14: No acute pathology within the abdomen or pelvis.

## 2023-11-16 LAB
ALBUMIN SERPL ELPH-MCNC: 4.3 G/DL — SIGNIFICANT CHANGE UP (ref 3.3–5)
ALBUMIN SERPL ELPH-MCNC: 4.3 G/DL — SIGNIFICANT CHANGE UP (ref 3.3–5)
ALP SERPL-CCNC: 84 U/L — SIGNIFICANT CHANGE UP (ref 40–120)
ALP SERPL-CCNC: 84 U/L — SIGNIFICANT CHANGE UP (ref 40–120)
ALT FLD-CCNC: 67 U/L — HIGH (ref 10–45)
ALT FLD-CCNC: 67 U/L — HIGH (ref 10–45)
ANION GAP SERPL CALC-SCNC: 11 MMOL/L — SIGNIFICANT CHANGE UP (ref 5–17)
ANION GAP SERPL CALC-SCNC: 11 MMOL/L — SIGNIFICANT CHANGE UP (ref 5–17)
AST SERPL-CCNC: 129 U/L — HIGH (ref 10–40)
AST SERPL-CCNC: 129 U/L — HIGH (ref 10–40)
BILIRUB SERPL-MCNC: 0.6 MG/DL — SIGNIFICANT CHANGE UP (ref 0.2–1.2)
BILIRUB SERPL-MCNC: 0.6 MG/DL — SIGNIFICANT CHANGE UP (ref 0.2–1.2)
BUN SERPL-MCNC: 12 MG/DL — SIGNIFICANT CHANGE UP (ref 7–23)
BUN SERPL-MCNC: 12 MG/DL — SIGNIFICANT CHANGE UP (ref 7–23)
CALCIUM SERPL-MCNC: 9.2 MG/DL — SIGNIFICANT CHANGE UP (ref 8.4–10.5)
CALCIUM SERPL-MCNC: 9.2 MG/DL — SIGNIFICANT CHANGE UP (ref 8.4–10.5)
CHLORIDE SERPL-SCNC: 99 MMOL/L — SIGNIFICANT CHANGE UP (ref 96–108)
CHLORIDE SERPL-SCNC: 99 MMOL/L — SIGNIFICANT CHANGE UP (ref 96–108)
CO2 SERPL-SCNC: 27 MMOL/L — SIGNIFICANT CHANGE UP (ref 22–31)
CO2 SERPL-SCNC: 27 MMOL/L — SIGNIFICANT CHANGE UP (ref 22–31)
CREAT SERPL-MCNC: 0.65 MG/DL — SIGNIFICANT CHANGE UP (ref 0.5–1.3)
CREAT SERPL-MCNC: 0.65 MG/DL — SIGNIFICANT CHANGE UP (ref 0.5–1.3)
CULTURE RESULTS: SIGNIFICANT CHANGE UP
CULTURE RESULTS: SIGNIFICANT CHANGE UP
EGFR: 111 ML/MIN/1.73M2 — SIGNIFICANT CHANGE UP
EGFR: 111 ML/MIN/1.73M2 — SIGNIFICANT CHANGE UP
FERRITIN SERPL-MCNC: 317 NG/ML — HIGH (ref 15–150)
FERRITIN SERPL-MCNC: 317 NG/ML — HIGH (ref 15–150)
GLUCOSE SERPL-MCNC: 90 MG/DL — SIGNIFICANT CHANGE UP (ref 70–99)
GLUCOSE SERPL-MCNC: 90 MG/DL — SIGNIFICANT CHANGE UP (ref 70–99)
HCT VFR BLD CALC: 28.9 % — LOW (ref 34.5–45)
HCT VFR BLD CALC: 28.9 % — LOW (ref 34.5–45)
HGB BLD-MCNC: 9.1 G/DL — LOW (ref 11.5–15.5)
HGB BLD-MCNC: 9.1 G/DL — LOW (ref 11.5–15.5)
IRON SATN MFR SERPL: 21 % — SIGNIFICANT CHANGE UP (ref 14–50)
IRON SATN MFR SERPL: 21 % — SIGNIFICANT CHANGE UP (ref 14–50)
IRON SATN MFR SERPL: 49 UG/DL — SIGNIFICANT CHANGE UP (ref 30–160)
IRON SATN MFR SERPL: 49 UG/DL — SIGNIFICANT CHANGE UP (ref 30–160)
MAGNESIUM SERPL-MCNC: 1.8 MG/DL — SIGNIFICANT CHANGE UP (ref 1.6–2.6)
MAGNESIUM SERPL-MCNC: 1.8 MG/DL — SIGNIFICANT CHANGE UP (ref 1.6–2.6)
MCHC RBC-ENTMCNC: 23.9 PG — LOW (ref 27–34)
MCHC RBC-ENTMCNC: 23.9 PG — LOW (ref 27–34)
MCHC RBC-ENTMCNC: 31.5 GM/DL — LOW (ref 32–36)
MCHC RBC-ENTMCNC: 31.5 GM/DL — LOW (ref 32–36)
MCV RBC AUTO: 76.1 FL — LOW (ref 80–100)
MCV RBC AUTO: 76.1 FL — LOW (ref 80–100)
NRBC # BLD: 0 /100 WBCS — SIGNIFICANT CHANGE UP (ref 0–0)
NRBC # BLD: 0 /100 WBCS — SIGNIFICANT CHANGE UP (ref 0–0)
PHOSPHATE SERPL-MCNC: 2.6 MG/DL — SIGNIFICANT CHANGE UP (ref 2.5–4.5)
PHOSPHATE SERPL-MCNC: 2.6 MG/DL — SIGNIFICANT CHANGE UP (ref 2.5–4.5)
PLATELET # BLD AUTO: 68 K/UL — LOW (ref 150–400)
PLATELET # BLD AUTO: 68 K/UL — LOW (ref 150–400)
POTASSIUM SERPL-MCNC: 3.4 MMOL/L — LOW (ref 3.5–5.3)
POTASSIUM SERPL-MCNC: 3.4 MMOL/L — LOW (ref 3.5–5.3)
POTASSIUM SERPL-SCNC: 3.4 MMOL/L — LOW (ref 3.5–5.3)
POTASSIUM SERPL-SCNC: 3.4 MMOL/L — LOW (ref 3.5–5.3)
PROT SERPL-MCNC: 6.7 G/DL — SIGNIFICANT CHANGE UP (ref 6–8.3)
PROT SERPL-MCNC: 6.7 G/DL — SIGNIFICANT CHANGE UP (ref 6–8.3)
RBC # BLD: 3.8 M/UL — SIGNIFICANT CHANGE UP (ref 3.8–5.2)
RBC # BLD: 3.8 M/UL — SIGNIFICANT CHANGE UP (ref 3.8–5.2)
RBC # FLD: 19.3 % — HIGH (ref 10.3–14.5)
RBC # FLD: 19.3 % — HIGH (ref 10.3–14.5)
SODIUM SERPL-SCNC: 137 MMOL/L — SIGNIFICANT CHANGE UP (ref 135–145)
SODIUM SERPL-SCNC: 137 MMOL/L — SIGNIFICANT CHANGE UP (ref 135–145)
SPECIMEN SOURCE: SIGNIFICANT CHANGE UP
SPECIMEN SOURCE: SIGNIFICANT CHANGE UP
TIBC SERPL-MCNC: 232 UG/DL — SIGNIFICANT CHANGE UP (ref 220–430)
TIBC SERPL-MCNC: 232 UG/DL — SIGNIFICANT CHANGE UP (ref 220–430)
UIBC SERPL-MCNC: 183 UG/DL — SIGNIFICANT CHANGE UP (ref 110–370)
UIBC SERPL-MCNC: 183 UG/DL — SIGNIFICANT CHANGE UP (ref 110–370)
WBC # BLD: 5.48 K/UL — SIGNIFICANT CHANGE UP (ref 3.8–10.5)
WBC # BLD: 5.48 K/UL — SIGNIFICANT CHANGE UP (ref 3.8–10.5)
WBC # FLD AUTO: 5.48 K/UL — SIGNIFICANT CHANGE UP (ref 3.8–10.5)
WBC # FLD AUTO: 5.48 K/UL — SIGNIFICANT CHANGE UP (ref 3.8–10.5)

## 2023-11-16 PROCEDURE — 93308 TTE F-UP OR LMTD: CPT | Mod: 26

## 2023-11-16 PROCEDURE — 99232 SBSQ HOSP IP/OBS MODERATE 35: CPT | Mod: GC

## 2023-11-16 RX ORDER — SODIUM CHLORIDE 9 MG/ML
1000 INJECTION, SOLUTION INTRAVENOUS
Refills: 0 | Status: DISCONTINUED | OUTPATIENT
Start: 2023-11-16 | End: 2023-11-17

## 2023-11-16 RX ADMIN — Medication 2 MILLIGRAM(S): at 14:02

## 2023-11-16 RX ADMIN — PANTOPRAZOLE SODIUM 40 MILLIGRAM(S): 20 TABLET, DELAYED RELEASE ORAL at 17:18

## 2023-11-16 RX ADMIN — Medication 1 PATCH: at 19:41

## 2023-11-16 RX ADMIN — Medication 2 MILLIGRAM(S): at 21:01

## 2023-11-16 RX ADMIN — Medication 105 MILLIGRAM(S): at 12:04

## 2023-11-16 RX ADMIN — Medication 1 PATCH: at 12:04

## 2023-11-16 RX ADMIN — PANTOPRAZOLE SODIUM 40 MILLIGRAM(S): 20 TABLET, DELAYED RELEASE ORAL at 05:33

## 2023-11-16 RX ADMIN — Medication 2 MILLIGRAM(S): at 17:55

## 2023-11-16 RX ADMIN — Medication 1 PATCH: at 06:09

## 2023-11-16 RX ADMIN — Medication 1 GRAM(S): at 05:33

## 2023-11-16 RX ADMIN — Medication 1 MILLIGRAM(S): at 12:04

## 2023-11-16 RX ADMIN — Medication 6 MILLIGRAM(S): at 21:01

## 2023-11-16 RX ADMIN — SODIUM CHLORIDE 75 MILLILITER(S): 9 INJECTION, SOLUTION INTRAVENOUS at 08:55

## 2023-11-16 RX ADMIN — Medication 2 MILLIGRAM(S): at 08:22

## 2023-11-16 RX ADMIN — Medication 1 GRAM(S): at 12:04

## 2023-11-16 RX ADMIN — Medication 1 PATCH: at 12:11

## 2023-11-16 RX ADMIN — Medication 100 MILLIGRAM(S): at 21:00

## 2023-11-16 RX ADMIN — Medication 1 TABLET(S): at 12:04

## 2023-11-16 RX ADMIN — Medication 100 MILLIGRAM(S): at 17:17

## 2023-11-16 RX ADMIN — Medication 1 GRAM(S): at 23:22

## 2023-11-16 RX ADMIN — Medication 1 GRAM(S): at 17:18

## 2023-11-16 NOTE — PROGRESS NOTE ADULT - ATTENDING COMMENTS
The patient is a 43F with h/o alcohol use disorder who presents for intractable nausea and vomiting, coffee ground emesis, found to have HANNAH, mild alcohol withdrawal    1. ETOH Abuse w/ withdrawal  2. UGIB  3. HANNAH    - Feels shaky, somewhat foggy, mild tremulous, AAOx3, not confused, minimal tremor, Etoh level: 27, Last drink prior to arrival   - will start on Benzo taper for ETOH withdrawal on CIWA given 48hrs passed  - C/w Thiamine, Folate, MVI   - Changed her mind and wanted Upper endoscopy, schedule for EGD tomorrow, NPO p MN, c/w PPI  - advised to stop drinking alcohol  - IV hydration prn   - SW following The patient is a 43F with h/o alcohol use disorder who presents for intractable nausea and vomiting, coffee ground emesis, found to have HANNAH, mild alcohol withdrawal    1. ETOH Abuse w/ withdrawal  2. UGIB  3. HANNAH    - Feels shaky, somewhat foggy, mild tremulous, AAOx3, not confused, minimal tremor, Etoh level: 27, Last drink prior to arrival   - will start on Benzo taper for ETOH withdrawal on CIWA given 48hrs passed  - C/w Thiamine, Folate, MVI   - Changed her mind and wanted Upper endoscopy, schedule for EGD tomorrow, NPO p MN, c/w PPI, Hb stable around 9.0  - advised to stop drinking alcohol  - IV hydration prn   - SW following The patient is a 43F with h/o alcohol use disorder who presents for intractable nausea and vomiting, coffee ground emesis, found to have HANNAH, mild alcohol withdrawal    1. ETOH Abuse w/ withdrawal  2. UGIB  3. HANNAH    - Feels shaky, somewhat foggy, mild tremulous, AAOx3, not confused, minimal tremor, Etoh level: 27, Last drink prior to arrival   - will start on Benzo taper for ETOH withdrawal on CIWA given 48hrs passed  - C/w Thiamine, Folate, MVI   - Changed her mind and wanted Upper endoscopy, schedule for EGD tomorrow, NPO p MN, c/w PPI, Carafate, Hb stable around 9.0  - advised to stop drinking alcohol  - IV hydration prn   - SW following

## 2023-11-16 NOTE — PROGRESS NOTE ADULT - ATTENDING COMMENTS
Agree with above. Patient is now amendable to EGD. Risks/benefits of EGD discussed with patient including that of bleeding/infection/perforation. She is agreeable to proceed. Keep NPO after midnight.

## 2023-11-16 NOTE — PROGRESS NOTE ADULT - PROBLEM SELECTOR PLAN 5
Patient developed retrosternal chest pain and generalized abdominal discomfort after persistent vomiting  - likely erosive esophagitis, however given reports of coffee ground emesis concern for possible UGIB    Plan:  - c/w IV PPI BID  - c/w sucralfate as above
Patient developed retrosternal chest pain and generalized abdominal discomfort after persistent vomiting  - likely erosive esophagitis, however given reports of coffee ground emesis concern for possible UGIB    Plan:  - c/w IV PPI BID  - c/w sucralfate as above

## 2023-11-16 NOTE — PROGRESS NOTE ADULT - PROBLEM SELECTOR PLAN 9
DVT PPx: SCDs, encourage ambulation (Improve Score 0)  Diet: CLD, advance as tolerated  Code: FULL  Dispo: pending improvement
DVT PPx: SCDs, encourage ambulation (Improve Score 0)  Diet: CLD, advance as tolerated  Code: FULL  Dispo: pending completion of EtoH withdrawal and symptomatic improvement prior to d/c to in patient EtOH rehab

## 2023-11-16 NOTE — PROGRESS NOTE ADULT - PROBLEM SELECTOR PLAN 4
Patient now complaining of "coffee ground" hematemesis after 3 days of NBNB vomiting  - unwitnessed in the ED, but given high risk will empirically cover for possible upper GI bleed  - given persistent vomiting now progressing to possible hematemesis, concerning for possible fidencio-santillan tear or progressing erosive esophagitis    Plan:  - continue with IV PPI BID, transition to PO PPI 11/17  - continue with sucralfate  -GI Consulted, pt refusing endoscopy at this time OP follow up

## 2023-11-16 NOTE — PROGRESS NOTE ADULT - PROBLEM SELECTOR PLAN 1
RESOLVED  Patient presenting with severe nausea and vomiting x 3 days  - initially NBNB, ? hematemesis, but now denying   - CT A/P w/o acute pathology  - no hx of EGD, though given severe alcohol use disorder, likely suggestive of erosive esophagitis  - GI consulted, offered EGD however patient declined opting for conservative treatment.     Plan:  - continue with sucralfate and PPI as below  - initial QTc prolonged, however improved after electrolyte correction and IVF

## 2023-11-16 NOTE — PROGRESS NOTE ADULT - SUBJECTIVE AND OBJECTIVE BOX
Chief Complaint:  Patient is a 43y old  Female who presents with a chief complaint of chest pain, nausea, vomiting (16 Nov 2023 07:19)       Interval Events:   Afebrile and stable   Reporting abdominal pain localized to epigastric region  Ongoing reports of n/v    Hospital Medications:  benzonatate 100 milliGRAM(s) Oral three times a day PRN  folic acid 1 milliGRAM(s) Oral daily  influenza   Vaccine 0.5 milliLiter(s) IntraMuscular once  lactated ringers. 1000 milliLiter(s) IV Continuous <Continuous>  LORazepam     Tablet 2 milliGRAM(s) Oral every 2 hours PRN  LORazepam     Tablet 2 milliGRAM(s) Oral every 1 hour PRN  melatonin 6 milliGRAM(s) Oral at bedtime  multivitamin 1 Tablet(s) Oral daily  nicotine - 21 mG/24Hr(s) Patch 1 Patch Transdermal daily  pantoprazole  Injectable 40 milliGRAM(s) IV Push two times a day  sucralfate 1 Gram(s) Oral four times a day  thiamine IVPB 500 milliGRAM(s) IV Intermittent daily      ROS:   Complete and normal except as mentioned above.    PHYSICAL EXAM:   Vital Signs:  Vital Signs Last 24 Hrs  T(C): 36.8 (16 Nov 2023 09:35), Max: 37.1 (16 Nov 2023 04:42)  T(F): 98.3 (16 Nov 2023 09:35), Max: 98.7 (16 Nov 2023 04:42)  HR: 80 (16 Nov 2023 09:35) (68 - 96)  BP: 110/73 (16 Nov 2023 09:35) (110/71 - 117/79)  BP(mean): --  RR: 18 (16 Nov 2023 09:35) (17 - 18)  SpO2: 99% (16 Nov 2023 09:35) (98% - 99%)    Parameters below as of 16 Nov 2023 09:35  Patient On (Oxygen Delivery Method): room air      Daily     Daily     GENERAL: no acute distress  NEURO: aox3  HEENT: anicteric sclera, no conjunctival pallor appreciated  CHEST: no respiratory distress, no accessory muscle use  CARDIAC: regular rate    ABDOMEN: soft,  tender epigastric region with point tenderness and worsening with leaning forward/respiration, non-distended, no rebound or guarding  EXTREMITIES: warm, well perfused  SKIN: no lesions noted    LABS:                          9.1    5.48  )-----------( 68       ( 16 Nov 2023 07:38 )             28.9     11-16    137  |  99  |  12  ----------------------------<  90  3.4<L>   |  27  |  0.65    Ca    9.2      16 Nov 2023 07:37  Phos  2.6     11-16  Mg     1.8     11-16    TPro  6.7  /  Alb  4.3  /  TBili  0.6  /  DBili  x   /  AST  129<H>  /  ALT  67<H>  /  AlkPhos  84  11-16    LIVER FUNCTIONS - ( 16 Nov 2023 07:37 )  Alb: 4.3 g/dL / Pro: 6.7 g/dL / ALK PHOS: 84 U/L / ALT: 67 U/L / AST: 129 U/L / GGT: x             Interval Diagnostic Studies:  No new imaging.

## 2023-11-16 NOTE — PROGRESS NOTE ADULT - PROBLEM SELECTOR PLAN 2
Patient with severe alcohol use disorder resulting in multiple admissions for withdrawal  - recent admission 10/26-10/30 for similar presentation  - last drink immediately prior to arrival  - CIWA well controlled 0-1     Plan:  - symptom triggered CIWA  - folic acid, thiamine, multivitamin  - SBIRT/SW consult  - nicotine patch given tobacco use

## 2023-11-16 NOTE — PROGRESS NOTE ADULT - PROBLEM SELECTOR PLAN 6
Patient found to have high AG metabolic acidosis  - HCO3 elevated to ,   - BHB elevated to 2.2, suggestive of possible starvation ketosis resulting in HAGMA  - pH compensated given likely concomitant metabolic alkalosis; likely contraction alkalosis as below
Patient found to have high AG metabolic acidosis  - HCO3 elevated to ,   - BHB elevated to 2.2, suggestive of possible starvation ketosis resulting in HAGMA  - pH compensated given likely concomitant metabolic alkalosis; likely contraction alkalosis as below

## 2023-11-16 NOTE — PROGRESS NOTE ADULT - PROBLEM SELECTOR PLAN 7
Patient with metabolic alkalosis, initial pH 7.6 but improved with IVF  - likely contraction alkalosis from vomiting and dec po intake  - continue with maintenance IVF, expect continued improvement
Patient with metabolic alkalosis, initial pH 7.6 but improved with IVF  - likely contraction alkalosis from vomiting and dec po intake  - continue with maintenance IVF, expect continued improvement

## 2023-11-16 NOTE — PROGRESS NOTE ADULT - SUBJECTIVE AND OBJECTIVE BOX
Shamar Brooks MD  Internal Medicine, PGY-3  Please Contact via TEAMS    SUBJECTIVE / OVERNIGHT EVENTS:  - Pt seen and examined at bedside  - ELINA  - Patient denies any complaints overnight. The patient denies any fevers, chills, nausea, vomiting, or increased pain. The patient is tolerating regular diet    MEDICATIONS  (STANDING):  folic acid 1 milliGRAM(s) Oral daily  influenza   Vaccine 0.5 milliLiter(s) IntraMuscular once  lactated ringers. 1000 milliLiter(s) (100 mL/Hr) IV Continuous <Continuous>  melatonin 6 milliGRAM(s) Oral at bedtime  multivitamin 1 Tablet(s) Oral daily  nicotine - 21 mG/24Hr(s) Patch 1 Patch Transdermal daily  pantoprazole  Injectable 40 milliGRAM(s) IV Push two times a day  sucralfate 1 Gram(s) Oral four times a day  thiamine IVPB 500 milliGRAM(s) IV Intermittent daily    MEDICATIONS  (PRN):  benzonatate 100 milliGRAM(s) Oral three times a day PRN Cough  LORazepam     Tablet 2 milliGRAM(s) Oral every 2 hours PRN Symptom-triggered 2 point increase in CIWA-Ar  LORazepam     Tablet 2 milliGRAM(s) Oral every 1 hour PRN Symptom-triggered: each CIWA -Ar score 8 or GREATER        11-15-23 @ 07:01  -  11-16-23 @ 07:00  --------------------------------------------------------  IN: 100 mL / OUT: 0 mL / NET: 100 mL        PHYSICAL EXAM:  Vital Signs Last 24 Hrs  T(C): 37.1 (16 Nov 2023 04:42), Max: 37.1 (16 Nov 2023 04:42)  T(F): 98.7 (16 Nov 2023 04:42), Max: 98.7 (16 Nov 2023 04:42)  HR: 68 (16 Nov 2023 04:42) (68 - 96)  BP: 116/75 (16 Nov 2023 04:42) (110/71 - 117/79)  BP(mean): --  RR: 18 (16 Nov 2023 04:42) (17 - 18)  SpO2: 99% (16 Nov 2023 04:42) (97% - 99%)    Parameters below as of 16 Nov 2023 04:42  Patient On (Oxygen Delivery Method): room air        CAPILLARY BLOOD GLUCOSE        I&O's Summary    15 Nov 2023 07:01  -  16 Nov 2023 07:00  --------------------------------------------------------  IN: 100 mL / OUT: 0 mL / NET: 100 mL        CONSTITUTIONAL: NAD, well-developed  RESPIRATORY: Normal respiratory effort; lungs are clear to auscultation bilaterally  CARDIOVASCULAR: Regular rate and rhythm, normal S1 and S2, no murmur/rub/gallop; No lower extremity edema; Peripheral pulses are 2+ bilaterally  ABDOMEN: Nontender to palpation, normoactive bowel sounds, no rebound/guarding; No hepatosplenomegaly  MUSCLOSKELETAL: no clubbing or cyanosis of digits; no joint swelling or tenderness to palpation  PSYCH: A+O to person, place, and time; affect appropriate    LABS:                        9.1    6.65  )-----------( 75       ( 15 Nov 2023 19:35 )             28.5     11-15    137  |  92<L>  |  20  ----------------------------<  103<H>  3.1<L>   |  31  |  1.21    Ca    9.5      15 Nov 2023 07:06  Phos  1.1     11-15  Mg     1.5     11-15    TPro  6.6  /  Alb  4.2  /  TBili  0.8  /  DBili  x   /  AST  58<H>  /  ALT  48<H>  /  AlkPhos  79  11-15    PT/INR - ( 14 Nov 2023 15:19 )   PT: 10.7 sec;   INR: 1.02 ratio         PTT - ( 14 Nov 2023 15:19 )  PTT:32.0 sec  CARDIAC MARKERS ( 14 Nov 2023 15:19 )  x     / x     / 111 U/L / x     / <1.0 ng/mL      Urinalysis Basic - ( 15 Nov 2023 07:06 )    Color: x / Appearance: x / SG: x / pH: x  Gluc: 103 mg/dL / Ketone: x  / Bili: x / Urobili: x   Blood: x / Protein: x / Nitrite: x   Leuk Esterase: x / RBC: x / WBC x   Sq Epi: x / Non Sq Epi: x / Bacteria: x        Culture - Blood (collected 14 Nov 2023 15:00)  Source: .Blood Blood-Peripheral  Preliminary Report (15 Nov 2023 20:02):    No growth at 24 hours    Culture - Blood (collected 14 Nov 2023 14:45)  Source: .Blood Blood-Peripheral  Preliminary Report (15 Nov 2023 20:02):    No growth at 24 hours        IMAGING:    [X] All pertinent imaging reviewed by me Shamar Brooks MD  Internal Medicine, PGY-3  Please Contact via TEAMS    SUBJECTIVE / OVERNIGHT EVENTS:  - Pt seen and examined at bedside  - CARISAON  - Patient reports that she has been feeling tremors and headaches and sweats during the night due to withdrawal, however, on chart review recorded CIWAs were 0. At this time CIWA was approx 5-6 given her symptoms. Additionally patient reports that she thought that she was getting an endoscopy, however she had previously declined any was not scheduled for one. She is now agreeable to the procedure and is requesting GI re-evaluation.     MEDICATIONS  (STANDING):  folic acid 1 milliGRAM(s) Oral daily  influenza   Vaccine 0.5 milliLiter(s) IntraMuscular once  lactated ringers. 1000 milliLiter(s) (100 mL/Hr) IV Continuous <Continuous>  melatonin 6 milliGRAM(s) Oral at bedtime  multivitamin 1 Tablet(s) Oral daily  nicotine - 21 mG/24Hr(s) Patch 1 Patch Transdermal daily  pantoprazole  Injectable 40 milliGRAM(s) IV Push two times a day  sucralfate 1 Gram(s) Oral four times a day  thiamine IVPB 500 milliGRAM(s) IV Intermittent daily    MEDICATIONS  (PRN):  benzonatate 100 milliGRAM(s) Oral three times a day PRN Cough  LORazepam     Tablet 2 milliGRAM(s) Oral every 2 hours PRN Symptom-triggered 2 point increase in CIWA-Ar  LORazepam     Tablet 2 milliGRAM(s) Oral every 1 hour PRN Symptom-triggered: each CIWA -Ar score 8 or GREATER        11-15-23 @ 07:01  -  11-16-23 @ 07:00  --------------------------------------------------------  IN: 100 mL / OUT: 0 mL / NET: 100 mL        PHYSICAL EXAM:  Vital Signs Last 24 Hrs  T(C): 37.1 (16 Nov 2023 04:42), Max: 37.1 (16 Nov 2023 04:42)  T(F): 98.7 (16 Nov 2023 04:42), Max: 98.7 (16 Nov 2023 04:42)  HR: 68 (16 Nov 2023 04:42) (68 - 96)  BP: 116/75 (16 Nov 2023 04:42) (110/71 - 117/79)  BP(mean): --  RR: 18 (16 Nov 2023 04:42) (17 - 18)  SpO2: 99% (16 Nov 2023 04:42) (97% - 99%)    Parameters below as of 16 Nov 2023 04:42  Patient On (Oxygen Delivery Method): room air        CAPILLARY BLOOD GLUCOSE        I&O's Summary    15 Nov 2023 07:01  -  16 Nov 2023 07:00  --------------------------------------------------------  IN: 100 mL / OUT: 0 mL / NET: 100 mL        CONSTITUTIONAL: NAD, well-developed  RESPIRATORY: Normal respiratory effort; lungs are clear to auscultation bilaterally  CARDIOVASCULAR: Regular rate and rhythm, normal S1 and S2, no murmur/rub/gallop; No lower extremity edema; Peripheral pulses are 2+ bilaterally  ABDOMEN: mild TTP diffusely no rebound or guarding   MUSCLOSKELETAL: no clubbing or cyanosis of digits; no joint swelling or tenderness to palpation  PSYCH: A+O to person, place, and time; affect appropriate    LABS:                        9.1    6.65  )-----------( 75       ( 15 Nov 2023 19:35 )             28.5     11-15    137  |  92<L>  |  20  ----------------------------<  103<H>  3.1<L>   |  31  |  1.21    Ca    9.5      15 Nov 2023 07:06  Phos  1.1     11-15  Mg     1.5     11-15    TPro  6.6  /  Alb  4.2  /  TBili  0.8  /  DBili  x   /  AST  58<H>  /  ALT  48<H>  /  AlkPhos  79  11-15    PT/INR - ( 14 Nov 2023 15:19 )   PT: 10.7 sec;   INR: 1.02 ratio         PTT - ( 14 Nov 2023 15:19 )  PTT:32.0 sec  CARDIAC MARKERS ( 14 Nov 2023 15:19 )  x     / x     / 111 U/L / x     / <1.0 ng/mL      Urinalysis Basic - ( 15 Nov 2023 07:06 )    Color: x / Appearance: x / SG: x / pH: x  Gluc: 103 mg/dL / Ketone: x  / Bili: x / Urobili: x   Blood: x / Protein: x / Nitrite: x   Leuk Esterase: x / RBC: x / WBC x   Sq Epi: x / Non Sq Epi: x / Bacteria: x        Culture - Blood (collected 14 Nov 2023 15:00)  Source: .Blood Blood-Peripheral  Preliminary Report (15 Nov 2023 20:02):    No growth at 24 hours    Culture - Blood (collected 14 Nov 2023 14:45)  Source: .Blood Blood-Peripheral  Preliminary Report (15 Nov 2023 20:02):    No growth at 24 hours        IMAGING:    [X] All pertinent imaging reviewed by me

## 2023-11-17 LAB
ALBUMIN SERPL ELPH-MCNC: 4.1 G/DL — SIGNIFICANT CHANGE UP (ref 3.3–5)
ALBUMIN SERPL ELPH-MCNC: 4.1 G/DL — SIGNIFICANT CHANGE UP (ref 3.3–5)
ALP SERPL-CCNC: 93 U/L — SIGNIFICANT CHANGE UP (ref 40–120)
ALP SERPL-CCNC: 93 U/L — SIGNIFICANT CHANGE UP (ref 40–120)
ALT FLD-CCNC: 94 U/L — HIGH (ref 10–45)
ALT FLD-CCNC: 94 U/L — HIGH (ref 10–45)
ANION GAP SERPL CALC-SCNC: 11 MMOL/L — SIGNIFICANT CHANGE UP (ref 5–17)
ANION GAP SERPL CALC-SCNC: 11 MMOL/L — SIGNIFICANT CHANGE UP (ref 5–17)
APTT BLD: 28 SEC — SIGNIFICANT CHANGE UP (ref 24.5–35.6)
APTT BLD: 28 SEC — SIGNIFICANT CHANGE UP (ref 24.5–35.6)
AST SERPL-CCNC: 153 U/L — HIGH (ref 10–40)
AST SERPL-CCNC: 153 U/L — HIGH (ref 10–40)
BILIRUB SERPL-MCNC: 0.3 MG/DL — SIGNIFICANT CHANGE UP (ref 0.2–1.2)
BILIRUB SERPL-MCNC: 0.3 MG/DL — SIGNIFICANT CHANGE UP (ref 0.2–1.2)
BLD GP AB SCN SERPL QL: NEGATIVE — SIGNIFICANT CHANGE UP
BLD GP AB SCN SERPL QL: NEGATIVE — SIGNIFICANT CHANGE UP
BUN SERPL-MCNC: 9 MG/DL — SIGNIFICANT CHANGE UP (ref 7–23)
BUN SERPL-MCNC: 9 MG/DL — SIGNIFICANT CHANGE UP (ref 7–23)
CALCIUM SERPL-MCNC: 9.3 MG/DL — SIGNIFICANT CHANGE UP (ref 8.4–10.5)
CALCIUM SERPL-MCNC: 9.3 MG/DL — SIGNIFICANT CHANGE UP (ref 8.4–10.5)
CHLORIDE SERPL-SCNC: 101 MMOL/L — SIGNIFICANT CHANGE UP (ref 96–108)
CHLORIDE SERPL-SCNC: 101 MMOL/L — SIGNIFICANT CHANGE UP (ref 96–108)
CO2 SERPL-SCNC: 25 MMOL/L — SIGNIFICANT CHANGE UP (ref 22–31)
CO2 SERPL-SCNC: 25 MMOL/L — SIGNIFICANT CHANGE UP (ref 22–31)
CREAT SERPL-MCNC: 0.71 MG/DL — SIGNIFICANT CHANGE UP (ref 0.5–1.3)
CREAT SERPL-MCNC: 0.71 MG/DL — SIGNIFICANT CHANGE UP (ref 0.5–1.3)
EGFR: 107 ML/MIN/1.73M2 — SIGNIFICANT CHANGE UP
EGFR: 107 ML/MIN/1.73M2 — SIGNIFICANT CHANGE UP
GLUCOSE SERPL-MCNC: 104 MG/DL — HIGH (ref 70–99)
GLUCOSE SERPL-MCNC: 104 MG/DL — HIGH (ref 70–99)
HCT VFR BLD CALC: 28.8 % — LOW (ref 34.5–45)
HCT VFR BLD CALC: 28.8 % — LOW (ref 34.5–45)
HGB BLD-MCNC: 9.1 G/DL — LOW (ref 11.5–15.5)
HGB BLD-MCNC: 9.1 G/DL — LOW (ref 11.5–15.5)
INR BLD: 0.96 RATIO — SIGNIFICANT CHANGE UP (ref 0.85–1.18)
INR BLD: 0.96 RATIO — SIGNIFICANT CHANGE UP (ref 0.85–1.18)
MAGNESIUM SERPL-MCNC: 1.6 MG/DL — SIGNIFICANT CHANGE UP (ref 1.6–2.6)
MAGNESIUM SERPL-MCNC: 1.6 MG/DL — SIGNIFICANT CHANGE UP (ref 1.6–2.6)
MCHC RBC-ENTMCNC: 23.8 PG — LOW (ref 27–34)
MCHC RBC-ENTMCNC: 23.8 PG — LOW (ref 27–34)
MCHC RBC-ENTMCNC: 31.6 GM/DL — LOW (ref 32–36)
MCHC RBC-ENTMCNC: 31.6 GM/DL — LOW (ref 32–36)
MCV RBC AUTO: 75.2 FL — LOW (ref 80–100)
MCV RBC AUTO: 75.2 FL — LOW (ref 80–100)
NRBC # BLD: 0 /100 WBCS — SIGNIFICANT CHANGE UP (ref 0–0)
NRBC # BLD: 0 /100 WBCS — SIGNIFICANT CHANGE UP (ref 0–0)
PHOSPHATE SERPL-MCNC: 3.1 MG/DL — SIGNIFICANT CHANGE UP (ref 2.5–4.5)
PHOSPHATE SERPL-MCNC: 3.1 MG/DL — SIGNIFICANT CHANGE UP (ref 2.5–4.5)
PLATELET # BLD AUTO: 77 K/UL — LOW (ref 150–400)
PLATELET # BLD AUTO: 77 K/UL — LOW (ref 150–400)
POTASSIUM SERPL-MCNC: 3.7 MMOL/L — SIGNIFICANT CHANGE UP (ref 3.5–5.3)
POTASSIUM SERPL-MCNC: 3.7 MMOL/L — SIGNIFICANT CHANGE UP (ref 3.5–5.3)
POTASSIUM SERPL-SCNC: 3.7 MMOL/L — SIGNIFICANT CHANGE UP (ref 3.5–5.3)
POTASSIUM SERPL-SCNC: 3.7 MMOL/L — SIGNIFICANT CHANGE UP (ref 3.5–5.3)
PROT SERPL-MCNC: 6.6 G/DL — SIGNIFICANT CHANGE UP (ref 6–8.3)
PROT SERPL-MCNC: 6.6 G/DL — SIGNIFICANT CHANGE UP (ref 6–8.3)
PROTHROM AB SERPL-ACNC: 10.1 SEC — SIGNIFICANT CHANGE UP (ref 9.5–13)
PROTHROM AB SERPL-ACNC: 10.1 SEC — SIGNIFICANT CHANGE UP (ref 9.5–13)
RBC # BLD: 3.83 M/UL — SIGNIFICANT CHANGE UP (ref 3.8–5.2)
RBC # BLD: 3.83 M/UL — SIGNIFICANT CHANGE UP (ref 3.8–5.2)
RBC # FLD: 19 % — HIGH (ref 10.3–14.5)
RBC # FLD: 19 % — HIGH (ref 10.3–14.5)
RH IG SCN BLD-IMP: POSITIVE — SIGNIFICANT CHANGE UP
RH IG SCN BLD-IMP: POSITIVE — SIGNIFICANT CHANGE UP
SODIUM SERPL-SCNC: 137 MMOL/L — SIGNIFICANT CHANGE UP (ref 135–145)
SODIUM SERPL-SCNC: 137 MMOL/L — SIGNIFICANT CHANGE UP (ref 135–145)
WBC # BLD: 5.43 K/UL — SIGNIFICANT CHANGE UP (ref 3.8–10.5)
WBC # BLD: 5.43 K/UL — SIGNIFICANT CHANGE UP (ref 3.8–10.5)
WBC # FLD AUTO: 5.43 K/UL — SIGNIFICANT CHANGE UP (ref 3.8–10.5)
WBC # FLD AUTO: 5.43 K/UL — SIGNIFICANT CHANGE UP (ref 3.8–10.5)

## 2023-11-17 PROCEDURE — 99232 SBSQ HOSP IP/OBS MODERATE 35: CPT | Mod: GC

## 2023-11-17 PROCEDURE — 99232 SBSQ HOSP IP/OBS MODERATE 35: CPT

## 2023-11-17 RX ORDER — MAGNESIUM SULFATE 500 MG/ML
2 VIAL (ML) INJECTION ONCE
Refills: 0 | Status: COMPLETED | OUTPATIENT
Start: 2023-11-17 | End: 2023-11-17

## 2023-11-17 RX ORDER — SODIUM CHLORIDE 9 MG/ML
1000 INJECTION, SOLUTION INTRAVENOUS
Refills: 0 | Status: DISCONTINUED | OUTPATIENT
Start: 2023-11-17 | End: 2023-11-19

## 2023-11-17 RX ADMIN — Medication 1 GRAM(S): at 17:11

## 2023-11-17 RX ADMIN — Medication 1 PATCH: at 12:08

## 2023-11-17 RX ADMIN — Medication 1 TABLET(S): at 12:07

## 2023-11-17 RX ADMIN — SODIUM CHLORIDE 100 MILLILITER(S): 9 INJECTION, SOLUTION INTRAVENOUS at 14:20

## 2023-11-17 RX ADMIN — Medication 1 PATCH: at 19:27

## 2023-11-17 RX ADMIN — Medication 2 MILLIGRAM(S): at 05:05

## 2023-11-17 RX ADMIN — Medication 1.5 MILLIGRAM(S): at 18:01

## 2023-11-17 RX ADMIN — Medication 1 GRAM(S): at 12:07

## 2023-11-17 RX ADMIN — Medication 1 PATCH: at 07:30

## 2023-11-17 RX ADMIN — Medication 2 MILLIGRAM(S): at 02:10

## 2023-11-17 RX ADMIN — Medication 1.5 MILLIGRAM(S): at 13:44

## 2023-11-17 RX ADMIN — PANTOPRAZOLE SODIUM 40 MILLIGRAM(S): 20 TABLET, DELAYED RELEASE ORAL at 05:05

## 2023-11-17 RX ADMIN — Medication 2 MILLIGRAM(S): at 10:02

## 2023-11-17 RX ADMIN — Medication 1.5 MILLIGRAM(S): at 22:06

## 2023-11-17 RX ADMIN — PANTOPRAZOLE SODIUM 40 MILLIGRAM(S): 20 TABLET, DELAYED RELEASE ORAL at 17:11

## 2023-11-17 RX ADMIN — Medication 100 MILLIGRAM(S): at 22:11

## 2023-11-17 RX ADMIN — SODIUM CHLORIDE 100 MILLILITER(S): 9 INJECTION, SOLUTION INTRAVENOUS at 22:07

## 2023-11-17 RX ADMIN — Medication 1 GRAM(S): at 23:16

## 2023-11-17 RX ADMIN — Medication 100 MILLIGRAM(S): at 02:13

## 2023-11-17 RX ADMIN — Medication 25 GRAM(S): at 08:28

## 2023-11-17 RX ADMIN — Medication 1 GRAM(S): at 05:06

## 2023-11-17 RX ADMIN — Medication 6 MILLIGRAM(S): at 22:06

## 2023-11-17 RX ADMIN — Medication 105 MILLIGRAM(S): at 12:07

## 2023-11-17 RX ADMIN — Medication 1 MILLIGRAM(S): at 12:07

## 2023-11-17 NOTE — PROGRESS NOTE ADULT - ASSESSMENT
43 year old F with alcohol use disorder who presents for intractable nausea and vomiting, found to have HANNAH, mild alcohol withdrawal, and concern for possible UGIB pending Endoscopy with GI.

## 2023-11-17 NOTE — PROGRESS NOTE ADULT - PROBLEM SELECTOR PLAN 4
DVT PPx: SCDs, encourage ambulation (Improve Score 0)  Diet: CLD, advance as tolerated  Code: FULL  Dispo: pending completion of EtoH withdrawal and symptomatic improvement prior to d/c to in patient EtOH rehab

## 2023-11-17 NOTE — PROGRESS NOTE ADULT - SUBJECTIVE AND OBJECTIVE BOX
PGY-1 Renee Quiñones  TEAM 4  Progress Note    Patient is a 43y old  Female who presents with a chief complaint of chest pain, nausea, vomiting (16 Nov 2023 11:12)      SUBJECTIVE / OVERNIGHT EVENTS:  OVN: No acute events  Labs and Imaging Reviewed  Patient seen and examined bedside.  CIWA on chart between 3-4  Pt sleeping, awoke to name call. Feeling okay overall. Asking about time of procedure. Made patient aware that time is dependent on GI schedule. Reinforced no eating or drinking. Patient agreed. Noting shes experiencing insomnia and anxiety but otherwise denies any SOB, CP, N/V/D/C.     MEDICATIONS  (STANDING):  folic acid 1 milliGRAM(s) Oral daily  influenza   Vaccine 0.5 milliLiter(s) IntraMuscular once  lactated ringers. 1000 milliLiter(s) (75 mL/Hr) IV Continuous <Continuous>  LORazepam     Tablet 2 milliGRAM(s) Oral every 4 hours  LORazepam     Tablet 1.5 milliGRAM(s) Oral every 4 hours  LORazepam     Tablet   Oral   magnesium sulfate  IVPB 2 Gram(s) IV Intermittent once  melatonin 6 milliGRAM(s) Oral at bedtime  multivitamin 1 Tablet(s) Oral daily  nicotine - 21 mG/24Hr(s) Patch 1 Patch Transdermal daily  pantoprazole  Injectable 40 milliGRAM(s) IV Push two times a day  sucralfate 1 Gram(s) Oral four times a day  thiamine IVPB 500 milliGRAM(s) IV Intermittent daily    MEDICATIONS  (PRN):  benzonatate 100 milliGRAM(s) Oral three times a day PRN Cough  LORazepam     Tablet 2 milliGRAM(s) Oral every 1 hour PRN Symptom-triggered: each CIWA -Ar score 8 or GREATER  LORazepam     Tablet 2 milliGRAM(s) Oral every 2 hours PRN Symptom-triggered 2 point increase in CIWA-Ar      Vital Signs Last 24 Hrs  T(C): 36.6 (17 Nov 2023 04:53), Max: 36.8 (16 Nov 2023 09:35)  T(F): 97.8 (17 Nov 2023 04:53), Max: 98.3 (16 Nov 2023 09:35)  HR: 70 (17 Nov 2023 04:53) (70 - 92)  BP: 138/88 (17 Nov 2023 04:53) (105/71 - 138/88)  BP(mean): --  RR: 18 (17 Nov 2023 04:53) (18 - 18)  SpO2: 97% (17 Nov 2023 04:53) (97% - 100%)    Parameters below as of 17 Nov 2023 04:53  Patient On (Oxygen Delivery Method): room air      CAPILLARY BLOOD GLUCOSE        I&O's Summary    16 Nov 2023 07:01  -  17 Nov 2023 07:00  --------------------------------------------------------  IN: 1030 mL / OUT: 0 mL / NET: 1030 mL        PHYSICAL EXAM:  GENERAL: NAD  HEAD:  Atraumatic, Normocephalic  EYES: EOMI  CHEST/LUNG: Clear to auscultation bilaterally; No wheeze  HEART: Regular rhythm, tachycardic; No murmurs, rubs, or gallops  ABDOMEN: Soft, Nontender, Nondistended; Bowel sounds present  EXTREMITIES:  2+ Peripheral Pulses, No clubbing, cyanosis, or edema  PSYCH: AAOx3  NEUROLOGY: non-focal    LABS:                        9.1    5.43  )-----------( 77       ( 17 Nov 2023 02:21 )             28.8     11-17    137  |  101  |  9   ----------------------------<  104<H>  3.7   |  25  |  0.71    Ca    9.3      17 Nov 2023 02:21  Phos  3.1     11-17  Mg     1.6     11-17    TPro  6.6  /  Alb  4.1  /  TBili  0.3  /  DBili  x   /  AST  153<H>  /  ALT  94<H>  /  AlkPhos  93  11-17    PT/INR - ( 17 Nov 2023 02:21 )   PT: 10.1 sec;   INR: 0.96 ratio         PTT - ( 17 Nov 2023 02:21 )  PTT:28.0 sec      Urinalysis Basic - ( 17 Nov 2023 02:21 )    Color: x / Appearance: x / SG: x / pH: x  Gluc: 104 mg/dL / Ketone: x  / Bili: x / Urobili: x   Blood: x / Protein: x / Nitrite: x   Leuk Esterase: x / RBC: x / WBC x   Sq Epi: x / Non Sq Epi: x / Bacteria: x        IMAGING:      Consultant(s) Notes Reviewed     Care Discussed with Consultants/Other Providers:

## 2023-11-17 NOTE — PROGRESS NOTE ADULT - PROBLEM SELECTOR PLAN 2
Patient now complaining of "coffee ground" hematemesis after 3 days of NBNB vomiting  - unwitnessed in the ED, but given high risk will empirically cover for possible upper GI bleed  - given persistent vomiting now progressing to possible hematemesis, concerning for possible fidencio-santillan tear or progressing erosive esophagitis    Plan:  - continue with IV PPI BID, transition to PO PPI 11/17  - continue with sucralfate  -GI Consulted, plan for endoscopy on 11/17 Patient now complaining of "coffee ground" hematemesis after 3 days of NBNB vomiting  - unwitnessed in the ED, but given high risk will empirically cover for possible upper GI bleed  - given persistent vomiting now progressing to possible hematemesis, concerning for possible fidencio-santillan tear or progressing erosive esophagitis    Plan:  - continue with IV PPI BID, transition to PO on DC  - continue with sucralfate  - GI Consulted, plan for endoscopy on 11/20 once ativan taper completed

## 2023-11-17 NOTE — PROGRESS NOTE ADULT - ATTENDING COMMENTS
The patient is a 43F with h/o alcohol use disorder who presents for intractable nausea and vomiting, coffee ground emesis, found to have HANNAH, mild alcohol withdrawal    1. ETOH Abuse w/ withdrawal  2. UGIB with coffee ground emesis  3. HANNAH, likely prerenal, improved    - c/o tremor, shaky, somewhat foggy, AAOx3, not confused, on admission Etoh level 27, Last drink the day prior to arrival   - On Low dose Ativan taper for ETOH withdrawal on CIWA scale   - C/w Thiamine, Folate, MVI   - GI plans noted- EGD in 3 days once medically optimized from ETOH withdrawal stand point, Hb stable >9, on PPIm 40mg IV bid  - SW following. Advised to stop drinking alcohol, she wants to go to inpatient ETOH rehab  - Nicotine patch

## 2023-11-17 NOTE — PROGRESS NOTE ADULT - PROBLEM SELECTOR PLAN 1
Patient with severe alcohol use disorder resulting in multiple admissions for withdrawal  - recent admission 10/26-10/30 for similar presentation  - last drink immediately prior to arrival  - CIWA 3-4 over last 24hrs    Plan:  - symptom triggered CIWA  - Ativan taper CIWA  - folic acid, thiamine, multivitamin  - SBIRT/SW consult  - nicotine patch given tobacco use

## 2023-11-17 NOTE — PROGRESS NOTE ADULT - SUBJECTIVE AND OBJECTIVE BOX
Chief Complaint:  Patient is a 43y old  Female who presents with a chief complaint of chest pain, nausea, vomiting (17 Nov 2023 08:01)       Interval Events:   Received ativan twice in AM felt tremors  Improved abdominal pain    Hospital Medications:  benzonatate 100 milliGRAM(s) Oral three times a day PRN  folic acid 1 milliGRAM(s) Oral daily  influenza   Vaccine 0.5 milliLiter(s) IntraMuscular once  LORazepam     Tablet 2 milliGRAM(s) Oral every 1 hour PRN  LORazepam     Tablet 1.5 milliGRAM(s) Oral every 4 hours  LORazepam     Tablet   Oral   LORazepam     Tablet 2 milliGRAM(s) Oral every 2 hours PRN  melatonin 6 milliGRAM(s) Oral at bedtime  multivitamin 1 Tablet(s) Oral daily  nicotine - 21 mG/24Hr(s) Patch 1 Patch Transdermal daily  pantoprazole  Injectable 40 milliGRAM(s) IV Push two times a day  sucralfate 1 Gram(s) Oral four times a day  thiamine IVPB 500 milliGRAM(s) IV Intermittent daily      ROS:   Complete and normal except as mentioned above.  PHYSICAL EXAM:   Vital Signs:  Vital Signs Last 24 Hrs  T(C): 36.6 (17 Nov 2023 04:53), Max: 36.8 (16 Nov 2023 12:35)  T(F): 97.8 (17 Nov 2023 04:53), Max: 98.3 (16 Nov 2023 12:35)  HR: 70 (17 Nov 2023 04:53) (70 - 92)  BP: 138/88 (17 Nov 2023 04:53) (105/71 - 138/88)  BP(mean): --  RR: 18 (17 Nov 2023 04:53) (18 - 18)  SpO2: 97% (17 Nov 2023 04:53) (97% - 100%)    Parameters below as of 17 Nov 2023 04:53  Patient On (Oxygen Delivery Method): room air      Daily     Daily     GENERAL: no acute distress  NEURO: aox3, tremulous   HEENT: anicteric sclera, no conjunctival pallor appreciated  CHEST: no respiratory distress, no accessory muscle use  CARDIAC: regular rate   ABDOMEN: soft, non-tender, non-distended, no rebound or guarding  EXTREMITIES: warm, well perfused, no edema  SKIN: no lesions noted    LABS:                          9.1    5.43  )-----------( 77       ( 17 Nov 2023 02:21 )             28.8     11-17    137  |  101  |  9   ----------------------------<  104<H>  3.7   |  25  |  0.71    Ca    9.3      17 Nov 2023 02:21  Phos  3.1     11-17  Mg     1.6     11-17    TPro  6.6  /  Alb  4.1  /  TBili  0.3  /  DBili  x   /  AST  153<H>  /  ALT  94<H>  /  AlkPhos  93  11-17    LIVER FUNCTIONS - ( 17 Nov 2023 02:21 )  Alb: 4.1 g/dL / Pro: 6.6 g/dL / ALK PHOS: 93 U/L / ALT: 94 U/L / AST: 153 U/L / GGT: x             Interval Diagnostic Studies:  No new imaging.

## 2023-11-17 NOTE — PROGRESS NOTE ADULT - ATTENDING COMMENTS
Agree with above. Patient exhibited withdrawal symptoms and is now on Ativan taper. She currently has no abdominal pain. Will reschedule EGD once patient is off Ativan due to withdrawal and associated anesthesia risks. Would offer EGD prior to discharge when withdrawal symptoms resolve and patient is off benzodiazepines.

## 2023-11-17 NOTE — PROGRESS NOTE ADULT - ASSESSMENT
44yo F w AUD with recurrent hospitalizations for alcohol withdrawal and associated n/v most recently August 2023 and October 2023, now presents to the ED with a couple of days of severe nausea and NBNB vomiting. GI consulted for dark emesis and anemia.    # N/V c/b epigastric pain  # Microcytic Anemia (unclear if real based on trend)  Unclear etiology, possibly 2/2 AUD, erosive esophagitis, PUD, less likely due to motility d/o. Reasonable pursue endoscopic evaluation. If negative, sx may be related to AUD or MSK, anterior cutaneous nerve entrapment at which point, if the latter, may benefit from trial localized anesthetic to trigger point.     Recommendations:  - Tentatively plan for EGD MOnday if medically optimized  - Daily CBC, CMP, coags; correct electrolytes via IV  - Transfuse if Hgb < 7  - Diet as tolerated  - Ensure adequate hydration   - PPI 40 IV BID for 2-3 days while admitted, then PO bid for 6-8 weeks empirically  - Magic mouth wash  - Carafate, 7-10 days  - Alcohol cessation; provide / services - 3rd admit since August for EtOH withdrawal and associated UGI symptoms  - Antiemetics as needed  - Rest of care per primary    Preliminary note until signed by Attending.    Thank you for involving us in this patient's care. Please reach out if any further questions or concerns.    Елена Thacker MD  Gastroenterology/Hepatology Fellow, PGY-7    NON-URGENT CONSULTS:  Please email [email or web address]  AT NIGHT AND ON WEEKENDS:  Contact on-call GI fellow via answering service ([phone]) from 5pm-8am and on weekends/holidays  MONDAY-FRIDAY 8AM-5PM:  Pager: [phone] ([facility])  Pager: [phone] ([facility])

## 2023-11-18 LAB
ANION GAP SERPL CALC-SCNC: 13 MMOL/L — SIGNIFICANT CHANGE UP (ref 5–17)
ANION GAP SERPL CALC-SCNC: 13 MMOL/L — SIGNIFICANT CHANGE UP (ref 5–17)
BUN SERPL-MCNC: 11 MG/DL — SIGNIFICANT CHANGE UP (ref 7–23)
BUN SERPL-MCNC: 11 MG/DL — SIGNIFICANT CHANGE UP (ref 7–23)
CALCIUM SERPL-MCNC: 10.3 MG/DL — SIGNIFICANT CHANGE UP (ref 8.4–10.5)
CALCIUM SERPL-MCNC: 10.3 MG/DL — SIGNIFICANT CHANGE UP (ref 8.4–10.5)
CHLORIDE SERPL-SCNC: 100 MMOL/L — SIGNIFICANT CHANGE UP (ref 96–108)
CHLORIDE SERPL-SCNC: 100 MMOL/L — SIGNIFICANT CHANGE UP (ref 96–108)
CO2 SERPL-SCNC: 24 MMOL/L — SIGNIFICANT CHANGE UP (ref 22–31)
CO2 SERPL-SCNC: 24 MMOL/L — SIGNIFICANT CHANGE UP (ref 22–31)
CREAT SERPL-MCNC: 0.78 MG/DL — SIGNIFICANT CHANGE UP (ref 0.5–1.3)
CREAT SERPL-MCNC: 0.78 MG/DL — SIGNIFICANT CHANGE UP (ref 0.5–1.3)
EGFR: 96 ML/MIN/1.73M2 — SIGNIFICANT CHANGE UP
EGFR: 96 ML/MIN/1.73M2 — SIGNIFICANT CHANGE UP
GLUCOSE SERPL-MCNC: 81 MG/DL — SIGNIFICANT CHANGE UP (ref 70–99)
GLUCOSE SERPL-MCNC: 81 MG/DL — SIGNIFICANT CHANGE UP (ref 70–99)
HCT VFR BLD CALC: 29.5 % — LOW (ref 34.5–45)
HCT VFR BLD CALC: 29.5 % — LOW (ref 34.5–45)
HGB BLD-MCNC: 9.4 G/DL — LOW (ref 11.5–15.5)
HGB BLD-MCNC: 9.4 G/DL — LOW (ref 11.5–15.5)
MAGNESIUM SERPL-MCNC: 1.9 MG/DL — SIGNIFICANT CHANGE UP (ref 1.6–2.6)
MAGNESIUM SERPL-MCNC: 1.9 MG/DL — SIGNIFICANT CHANGE UP (ref 1.6–2.6)
MCHC RBC-ENTMCNC: 24.2 PG — LOW (ref 27–34)
MCHC RBC-ENTMCNC: 24.2 PG — LOW (ref 27–34)
MCHC RBC-ENTMCNC: 31.9 GM/DL — LOW (ref 32–36)
MCHC RBC-ENTMCNC: 31.9 GM/DL — LOW (ref 32–36)
MCV RBC AUTO: 75.8 FL — LOW (ref 80–100)
MCV RBC AUTO: 75.8 FL — LOW (ref 80–100)
NRBC # BLD: 0 /100 WBCS — SIGNIFICANT CHANGE UP (ref 0–0)
NRBC # BLD: 0 /100 WBCS — SIGNIFICANT CHANGE UP (ref 0–0)
PHOSPHATE SERPL-MCNC: 3.5 MG/DL — SIGNIFICANT CHANGE UP (ref 2.5–4.5)
PHOSPHATE SERPL-MCNC: 3.5 MG/DL — SIGNIFICANT CHANGE UP (ref 2.5–4.5)
PLATELET # BLD AUTO: 110 K/UL — LOW (ref 150–400)
PLATELET # BLD AUTO: 110 K/UL — LOW (ref 150–400)
POTASSIUM SERPL-MCNC: 4.1 MMOL/L — SIGNIFICANT CHANGE UP (ref 3.5–5.3)
POTASSIUM SERPL-MCNC: 4.1 MMOL/L — SIGNIFICANT CHANGE UP (ref 3.5–5.3)
POTASSIUM SERPL-SCNC: 4.1 MMOL/L — SIGNIFICANT CHANGE UP (ref 3.5–5.3)
POTASSIUM SERPL-SCNC: 4.1 MMOL/L — SIGNIFICANT CHANGE UP (ref 3.5–5.3)
RBC # BLD: 3.89 M/UL — SIGNIFICANT CHANGE UP (ref 3.8–5.2)
RBC # BLD: 3.89 M/UL — SIGNIFICANT CHANGE UP (ref 3.8–5.2)
RBC # FLD: 19.8 % — HIGH (ref 10.3–14.5)
RBC # FLD: 19.8 % — HIGH (ref 10.3–14.5)
SODIUM SERPL-SCNC: 137 MMOL/L — SIGNIFICANT CHANGE UP (ref 135–145)
SODIUM SERPL-SCNC: 137 MMOL/L — SIGNIFICANT CHANGE UP (ref 135–145)
WBC # BLD: 5.45 K/UL — SIGNIFICANT CHANGE UP (ref 3.8–10.5)
WBC # BLD: 5.45 K/UL — SIGNIFICANT CHANGE UP (ref 3.8–10.5)
WBC # FLD AUTO: 5.45 K/UL — SIGNIFICANT CHANGE UP (ref 3.8–10.5)
WBC # FLD AUTO: 5.45 K/UL — SIGNIFICANT CHANGE UP (ref 3.8–10.5)

## 2023-11-18 PROCEDURE — 99232 SBSQ HOSP IP/OBS MODERATE 35: CPT

## 2023-11-18 RX ORDER — MAGNESIUM SULFATE 500 MG/ML
2 VIAL (ML) INJECTION ONCE
Refills: 0 | Status: COMPLETED | OUTPATIENT
Start: 2023-11-18 | End: 2023-11-18

## 2023-11-18 RX ORDER — LANOLIN ALCOHOL/MO/W.PET/CERES
10 CREAM (GRAM) TOPICAL AT BEDTIME
Refills: 0 | Status: DISCONTINUED | OUTPATIENT
Start: 2023-11-18 | End: 2023-11-21

## 2023-11-18 RX ORDER — POTASSIUM CHLORIDE 20 MEQ
40 PACKET (EA) ORAL ONCE
Refills: 0 | Status: DISCONTINUED | OUTPATIENT
Start: 2023-11-18 | End: 2023-11-18

## 2023-11-18 RX ORDER — SODIUM CHLORIDE 9 MG/ML
1000 INJECTION, SOLUTION INTRAVENOUS
Refills: 0 | Status: DISCONTINUED | OUTPATIENT
Start: 2023-11-18 | End: 2023-11-19

## 2023-11-18 RX ADMIN — Medication 1 MILLIGRAM(S): at 11:34

## 2023-11-18 RX ADMIN — Medication 25 GRAM(S): at 11:33

## 2023-11-18 RX ADMIN — Medication 1 MILLIGRAM(S): at 21:51

## 2023-11-18 RX ADMIN — Medication 100 MILLIGRAM(S): at 05:18

## 2023-11-18 RX ADMIN — Medication 1 PATCH: at 11:16

## 2023-11-18 RX ADMIN — PANTOPRAZOLE SODIUM 40 MILLIGRAM(S): 20 TABLET, DELAYED RELEASE ORAL at 05:18

## 2023-11-18 RX ADMIN — Medication 10 MILLIGRAM(S): at 21:51

## 2023-11-18 RX ADMIN — Medication 1 TABLET(S): at 11:34

## 2023-11-18 RX ADMIN — Medication 1 GRAM(S): at 05:18

## 2023-11-18 RX ADMIN — Medication 1 GRAM(S): at 18:29

## 2023-11-18 RX ADMIN — Medication 1.5 MILLIGRAM(S): at 11:27

## 2023-11-18 RX ADMIN — SODIUM CHLORIDE 50 MILLILITER(S): 9 INJECTION, SOLUTION INTRAVENOUS at 23:11

## 2023-11-18 RX ADMIN — Medication 1.5 MILLIGRAM(S): at 02:27

## 2023-11-18 RX ADMIN — Medication 1 GRAM(S): at 23:10

## 2023-11-18 RX ADMIN — Medication 1 PATCH: at 11:33

## 2023-11-18 RX ADMIN — PANTOPRAZOLE SODIUM 40 MILLIGRAM(S): 20 TABLET, DELAYED RELEASE ORAL at 18:29

## 2023-11-18 RX ADMIN — Medication 1.5 MILLIGRAM(S): at 05:18

## 2023-11-18 RX ADMIN — Medication 1 MILLIGRAM(S): at 18:29

## 2023-11-18 RX ADMIN — Medication 1 GRAM(S): at 11:34

## 2023-11-18 RX ADMIN — Medication 1 MILLIGRAM(S): at 15:11

## 2023-11-18 RX ADMIN — Medication 1 PATCH: at 18:53

## 2023-11-18 NOTE — PROGRESS NOTE ADULT - ASSESSMENT
43 year old F with alcohol use disorder who presents for intractable nausea and vomiting, found to have HANNAH, mild alcohol withdrawal on CIWA, and concern for possible UGIB pending Endoscopy with GI.  43 year old F with alcohol use disorder who presents for intractable nausea and vomiting, found to have HANNAH, mild alcohol withdrawal completed CIWA, outside window for withdrawal, and concern for possible UGIB pending Endoscopy with GI.

## 2023-11-18 NOTE — PROGRESS NOTE ADULT - ATTENDING COMMENTS
The patient is a 43F with h/o alcohol use disorder who presents for intractable nausea and vomiting, coffee ground emesis, found to have HANNAH, mild alcohol withdrawal    1. ETOH Abuse w/ withdrawal  2. UGIB with coffee ground emesis  3. HANNAH, likely prerenal, improved    - Feels lot better, less tremor, AAOx3, not confused, on admission Etoh level 27, Last drink the day prior to arrival   - On Low dose Ativan taper for ETOH withdrawal on CIWA scale   - C/w Thiamine, Folate, MVI   - GI plans noted- EGD in 2 days as Ativan taper will be finished tomorrow, Hb stable >9, on PPI 40mg IV bid  - SW following. Advised to stop drinking alcohol, she wants to go to inpatient ETOH rehab in [facility]  - Nicotine patch

## 2023-11-18 NOTE — PROGRESS NOTE ADULT - PROBLEM SELECTOR PLAN 1
Patient with severe alcohol use disorder resulting in multiple admissions for withdrawal  - recent admission 10/26-10/30 for similar presentation  - last drink immediately prior to arrival  - CIWA 3-4 over last 24hrs    Plan:  - symptom triggered CIWA  - Ativan taper CIWA  - folic acid, thiamine, multivitamin  - SBIRT/SW consulted  - nicotine patch given tobacco use Patient with severe alcohol use disorder resulting in multiple admissions for withdrawal  - recent admission 10/26-10/30 for similar presentation  - last drink immediately prior to arrival  - CIWA 0-1 last 24hrs, last ativan 1mg 5am 11/19    Plan:  - Completed CIWA, off ativan taper  - folic acid, thiamine, multivitamin  - SBIRT/SW consulted  - nicotine patch given tobacco use

## 2023-11-18 NOTE — PROGRESS NOTE ADULT - SUBJECTIVE AND OBJECTIVE BOX
PGY-1 Renee Quiñones  TEAM 4  Progress Note    Patient is a 43y old  Female who presents with a chief complaint of chest pain, nausea, vomiting (17 Nov 2023 11:24)      SUBJECTIVE / OVERNIGHT EVENTS:  OVN: No acute events  Labs and Imaging Reviewed  Patient seen and examined bedside.  Pt noting still has intermittent fogginess and her mind feels like it is racing. Having trouble staying asleep. Otherwise feels well. Tolerating PO. Denies CP, SOB, N/V/D/C.     MEDICATIONS  (STANDING):  folic acid 1 milliGRAM(s) Oral daily  influenza   Vaccine 0.5 milliLiter(s) IntraMuscular once  lactated ringers. 1000 milliLiter(s) (100 mL/Hr) IV Continuous <Continuous>  LORazepam     Tablet   Oral   LORazepam     Tablet 1 milliGRAM(s) Oral every 4 hours  LORazepam     Tablet 1.5 milliGRAM(s) Oral every 4 hours  magnesium sulfate  IVPB 2 Gram(s) IV Intermittent once  melatonin 10 milliGRAM(s) Oral at bedtime  multivitamin 1 Tablet(s) Oral daily  nicotine - 21 mG/24Hr(s) Patch 1 Patch Transdermal daily  pantoprazole  Injectable 40 milliGRAM(s) IV Push two times a day  potassium chloride   Powder 40 milliEquivalent(s) Oral once  sucralfate 1 Gram(s) Oral four times a day    MEDICATIONS  (PRN):  benzonatate 100 milliGRAM(s) Oral three times a day PRN Cough  LORazepam     Tablet 2 milliGRAM(s) Oral every 2 hours PRN Symptom-triggered 2 point increase in CIWA-Ar  LORazepam     Tablet 2 milliGRAM(s) Oral every 1 hour PRN Symptom-triggered: each CIWA -Ar score 8 or GREATER      Vital Signs Last 24 Hrs  T(C): 36.6 (18 Nov 2023 05:33), Max: 37 (17 Nov 2023 18:39)  T(F): 97.9 (18 Nov 2023 05:33), Max: 98.6 (17 Nov 2023 18:39)  HR: 82 (18 Nov 2023 05:33) (76 - 97)  BP: 123/82 (18 Nov 2023 05:33) (99/67 - 126/83)  BP(mean): --  RR: 18 (18 Nov 2023 05:33) (18 - 18)  SpO2: 99% (18 Nov 2023 05:33) (97% - 100%)    Parameters below as of 18 Nov 2023 05:33  Patient On (Oxygen Delivery Method): room air      CAPILLARY BLOOD GLUCOSE        I&O's Summary    17 Nov 2023 07:01  -  18 Nov 2023 07:00  --------------------------------------------------------  IN: 890 mL / OUT: 0 mL / NET: 890 mL        PHYSICAL EXAM:  GENERAL: NAD  HEAD:  Atraumatic, Normocephalic  EYES: EOMI  CHEST/LUNG: Clear to auscultation bilaterally; No wheeze  HEART: Regular rhythm, tachycardic; No murmurs, rubs, or gallops  ABDOMEN: Soft, Nontender, Nondistended; Bowel sounds present  EXTREMITIES:  2+ Peripheral Pulses, No clubbing, cyanosis, or edema  PSYCH: AAOx3  NEUROLOGY: non-focal    LABS:                        9.4    5.45  )-----------( 110      ( 18 Nov 2023 07:41 )             29.5     11-17    137  |  101  |  9   ----------------------------<  104<H>  3.7   |  25  |  0.71    Ca    9.3      17 Nov 2023 02:21  Phos  3.1     11-17  Mg     1.6     11-17    TPro  6.6  /  Alb  4.1  /  TBili  0.3  /  DBili  x   /  AST  153<H>  /  ALT  94<H>  /  AlkPhos  93  11-17    PT/INR - ( 17 Nov 2023 02:21 )   PT: 10.1 sec;   INR: 0.96 ratio         PTT - ( 17 Nov 2023 02:21 )  PTT:28.0 sec      Urinalysis Basic - ( 17 Nov 2023 02:21 )    Color: x / Appearance: x / SG: x / pH: x  Gluc: 104 mg/dL / Ketone: x  / Bili: x / Urobili: x   Blood: x / Protein: x / Nitrite: x   Leuk Esterase: x / RBC: x / WBC x   Sq Epi: x / Non Sq Epi: x / Bacteria: x        IMAGING:      Consultant(s) Notes Reviewed     Care Discussed with Consultants/Other Providers:

## 2023-11-18 NOTE — PROGRESS NOTE ADULT - PROBLEM SELECTOR PLAN 2
Patient now complaining of "coffee ground" hematemesis after 3 days of NBNB vomiting  - unwitnessed in the ED, but given high risk will empirically cover for possible upper GI bleed  - given persistent vomiting now progressing to possible hematemesis, concerning for possible fidencio-santillan tear or progressing erosive esophagitis    Plan:  - continue with IV PPI BID, transition to PO on DC  - continue with sucralfate  - GI Consulted, plan for endoscopy on tentatively 11/20 once ativan taper completed

## 2023-11-19 ENCOUNTER — TRANSCRIPTION ENCOUNTER (OUTPATIENT)
Age: 43
End: 2023-11-19

## 2023-11-19 LAB
CULTURE RESULTS: SIGNIFICANT CHANGE UP
SPECIMEN SOURCE: SIGNIFICANT CHANGE UP

## 2023-11-19 PROCEDURE — 99232 SBSQ HOSP IP/OBS MODERATE 35: CPT

## 2023-11-19 RX ORDER — SODIUM CHLORIDE 9 MG/ML
1000 INJECTION, SOLUTION INTRAVENOUS
Refills: 0 | Status: DISCONTINUED | OUTPATIENT
Start: 2023-11-19 | End: 2023-11-20

## 2023-11-19 RX ADMIN — Medication 1 MILLIGRAM(S): at 05:24

## 2023-11-19 RX ADMIN — Medication 1 PATCH: at 19:11

## 2023-11-19 RX ADMIN — Medication 1 PATCH: at 12:06

## 2023-11-19 RX ADMIN — SODIUM CHLORIDE 50 MILLILITER(S): 9 INJECTION, SOLUTION INTRAVENOUS at 19:54

## 2023-11-19 RX ADMIN — Medication 1 GRAM(S): at 23:05

## 2023-11-19 RX ADMIN — Medication 1 GRAM(S): at 17:33

## 2023-11-19 RX ADMIN — PANTOPRAZOLE SODIUM 40 MILLIGRAM(S): 20 TABLET, DELAYED RELEASE ORAL at 05:24

## 2023-11-19 RX ADMIN — Medication 1 TABLET(S): at 12:09

## 2023-11-19 RX ADMIN — Medication 1 PATCH: at 07:39

## 2023-11-19 RX ADMIN — Medication 1 PATCH: at 12:10

## 2023-11-19 RX ADMIN — Medication 1 MILLIGRAM(S): at 12:10

## 2023-11-19 RX ADMIN — Medication 1 MILLIGRAM(S): at 02:30

## 2023-11-19 RX ADMIN — Medication 1 GRAM(S): at 05:24

## 2023-11-19 RX ADMIN — Medication 1 GRAM(S): at 12:09

## 2023-11-19 RX ADMIN — PANTOPRAZOLE SODIUM 40 MILLIGRAM(S): 20 TABLET, DELAYED RELEASE ORAL at 17:33

## 2023-11-19 RX ADMIN — Medication 10 MILLIGRAM(S): at 21:29

## 2023-11-19 NOTE — DISCHARGE NOTE PROVIDER - NSFOLLOWUPCLINICSTOKEN_GEN_ALL_ED_FT
148759:2 weeks|| ||00\01||False; 860711:2 weeks|| ||00\01||False; 854208:2 weeks|| ||00\01||False; 453449:2 weeks|| ||00\01||False; 352319:2 weeks|| ||00\01||False; 335105:2 weeks|| ||00\01||False;

## 2023-11-19 NOTE — DISCHARGE NOTE PROVIDER - PROVIDER TOKENS
PROVIDER:[TOKEN:[[id number]],FOLLOWUP:[1 week],ESTABLISHEDPATIENT:[T]] PROVIDER:[TOKEN:[[id number]],FOLLOWUP:[1 week],ESTABLISHEDPATIENT:[T]] PROVIDER:[TOKEN:[[id number]],FOLLOWUP:[1 week],ESTABLISHEDPATIENT:[T]]

## 2023-11-19 NOTE — DISCHARGE NOTE PROVIDER - NSFOLLOWUPCLINICS_GEN_ALL_ED_FT
Gastroenterology at [facility]  Gastroenterology  [address] [place] [address]  Phone: [phone]  Fax:   Follow Up Time: 2 weeks     Gastroenterology at [facility]  Gastroenterology  [address] [place] [address]  Phone: [phone]  Fax:   Follow Up Time: 2 weeks     Gastroenterology at [facility]  Gastroenterology  [address] [place] [address]  Phone: [phone]  Fax:   Follow Up Time: 2 weeks     [facility] Gastroenterology  Gastroenterology  [address] [place] [address]  Phone: [phone]  Fax:   Follow Up Time: 2 weeks     [facility] Gastroenterology  Gastroenterology  [address] [place] [address]  Phone: [phone]  Fax:   Follow Up Time: 2 weeks     [facility] Gastroenterology  Gastroenterology  [address] [place] [address]  Phone: [phone]  Fax:   Follow Up Time: 2 weeks

## 2023-11-19 NOTE — DISCHARGE NOTE PROVIDER - CARE PROVIDER_API CALL
Malcom Lang [address] [address]  Phone: [phone]  Fax: [phone]  Established Patient  Follow Up Time: 1 week   Malcom Lang [address] [address]  Phone: [phone]  Fax: [phone]  Established Patient  Follow Up Time: 1 week   Malcom Lang [address] [address]  Phone: [phone]  Fax: [phone]  Established Patient  Follow Up Time: 1 week

## 2023-11-19 NOTE — DISCHARGE NOTE PROVIDER - NSDCFUADDAPPT_GEN_ALL_CORE_FT
APPTS ARE READY TO BE MADE: [ ] YES    Best Family or Patient Contact (if needed):    Additional Information about above appointments (if needed):    1:   2:   3:     Other comments or requests:    APPTS ARE READY TO BE MADE: [X] YES    Best Family or Patient Contact (if needed):    Additional Information about above appointments (if needed):    1:   2:   3:     Other comments or requests:    APPTS ARE READY TO BE MADE: [X] YES    Best Family or Patient Contact (if needed):    Additional Information about above appointments (if needed):    1:   2:   3:     Other comments or requests:     Outreached on 11/29, 12/1, and 12/6 which have been unsuccessful. Will await call back from patient/caregiver to coordinate follow up care.

## 2023-11-19 NOTE — PROGRESS NOTE ADULT - SUBJECTIVE AND OBJECTIVE BOX
PGY-1 Renee Quiñones  TEAM  Progress Note    Patient is a 43y old  Female who presents with a chief complaint of chest pain, nausea, vomiting (19 Nov 2023 12:44)      SUBJECTIVE / OVERNIGHT EVENTS:  OVN: No acute events  Labs and Imaging Reviewed  Patient seen and examined bedside.  Pt noting still has intermittent fogginess and her mind feels like it is racing. Having trouble staying asleep. Otherwise feels well. Tolerating PO. Denies CP, SOB, N/V/D/C.       MEDICATIONS  (STANDING):  folic acid 1 milliGRAM(s) Oral daily  influenza   Vaccine 0.5 milliLiter(s) IntraMuscular once  lactated ringers. 1000 milliLiter(s) (50 mL/Hr) IV Continuous <Continuous>  melatonin 10 milliGRAM(s) Oral at bedtime  multivitamin 1 Tablet(s) Oral daily  nicotine - 21 mG/24Hr(s) Patch 1 Patch Transdermal daily  pantoprazole  Injectable 40 milliGRAM(s) IV Push two times a day  sucralfate 1 Gram(s) Oral four times a day    MEDICATIONS  (PRN):  benzonatate 100 milliGRAM(s) Oral three times a day PRN Cough      Vital Signs Last 24 Hrs  T(C): 37.1 (19 Nov 2023 05:46), Max: 37.1 (18 Nov 2023 20:59)  T(F): 98.7 (19 Nov 2023 05:46), Max: 98.7 (18 Nov 2023 20:59)  HR: 91 (19 Nov 2023 05:46) (64 - 91)  BP: 104/71 (19 Nov 2023 05:46) (101/67 - 108/73)  BP(mean): --  RR: 18 (19 Nov 2023 05:46) (18 - 18)  SpO2: 99% (19 Nov 2023 05:46) (96% - 99%)    Parameters below as of 19 Nov 2023 05:46  Patient On (Oxygen Delivery Method): room air      CAPILLARY BLOOD GLUCOSE        I&O's Summary    18 Nov 2023 07:01  -  19 Nov 2023 07:00  --------------------------------------------------------  IN: 480 mL / OUT: 450 mL / NET: 30 mL        PHYSICAL EXAM:  GENERAL: NAD  HEAD:  Atraumatic, Normocephalic  EYES: EOMI  CHEST/LUNG: Clear to auscultation bilaterally; No wheeze  HEART: Regular rhythm, tachycardic; No murmurs, rubs, or gallops  ABDOMEN: Soft, Nontender, Nondistended; Bowel sounds present  EXTREMITIES:  2+ Peripheral Pulses, No clubbing, cyanosis, or edema  PSYCH: AAOx3  NEUROLOGY: non-    LABS:                        9.4    5.45  )-----------( 110      ( 18 Nov 2023 07:41 )             29.5     11-18    137  |  100  |  11  ----------------------------<  81  4.1   |  24  |  0.78    Ca    10.3      18 Nov 2023 07:41  Phos  3.5     11-18  Mg     1.9     11-18            Urinalysis Basic - ( 18 Nov 2023 07:41 )    Color: x / Appearance: x / SG: x / pH: x  Gluc: 81 mg/dL / Ketone: x  / Bili: x / Urobili: x   Blood: x / Protein: x / Nitrite: x   Leuk Esterase: x / RBC: x / WBC x   Sq Epi: x / Non Sq Epi: x / Bacteria: x    Care Discussed with Consultants/Other Providers:

## 2023-11-19 NOTE — PROGRESS NOTE ADULT - ASSESSMENT
43 year old F with alcohol use disorder who presents for intractable nausea and vomiting, found to have HANNAH, mild alcohol withdrawal completed CIWA, outside window for withdrawal, and concern for possible UGIB pending Endoscopy with GI.

## 2023-11-19 NOTE — DISCHARGE NOTE PROVIDER - HOSPITAL COURSE
43 year old F with alcohol use disorder resulting in multiple recent admissions who presented for 3 days of abdominal pain, nausea, and NBNB vomiting. The patient reports that since discharge on 10/30, she has been drinking 1 pint of vodka daily. Since Saturday 11/11, she has been experiencing intractable nausea, vomiting, and inability to tolerate any po intake. She reports vomiting approx once every hour, initially was yellow/green but since 11/13 PM has progressed to "coffee ground" emesis. She reports continued dark brown, grainy without gross hematemesis with associated central chest pain. She also reports generalized abdominal pain without . She also endorses symptoms of withdrawal including tremors, heightened anxiety, and irritability. She has had multiple admissions for alcohol withdrawal in the past but has never experienced DTs or seizures, or required ICU level of care for withdrawal. Also complaining of numbness and tingling of her bilateral fingers, which improved upon the time of my assessment. Last drink prior to arrival to ED, approximately 2 PM on 11/14.     While hospitalized patient completed a high risk ativan taper. 43 year old F with alcohol use disorder resulting in multiple recent admissions who presented for 3 days of abdominal pain, nausea, and NBNB vomiting. The patient reports that since discharge on 10/30, she has been drinking 1 pint of vodka daily. Since Saturday 11/11, she has been experiencing intractable nausea, vomiting, and inability to tolerate any po intake. She reports vomiting approx once every hour, initially was yellow/green but since 11/13 PM has progressed to "coffee ground" emesis. She reports continued dark brown, grainy without gross hematemesis with associated central chest pain. She also reports generalized abdominal pain without . She also endorses symptoms of withdrawal including tremors, heightened anxiety, and irritability. She has had multiple admissions for alcohol withdrawal in the past but has never experienced DTs or seizures, or required ICU level of care for withdrawal. Also complaining of numbness and tingling of her bilateral fingers, which improved upon the time of my assessment. Last drink prior to arrival to ED, approximately 2 PM on 11/14.     While hospitalized patient completed a high risk ativan taper and is now outside the window for alcohol withdrawal. She did not experience any significant adverse events. Her electrolytes were replenished, she tolerated oral intake and was able to maintain her own electrolytes. She had no additional episodes of bloody emesis. Her nausea was well controlled with PRN Zofran. She underwent an Endoscopy which did not demonstrate any acute pathology of the esophagus however the procedure was aborted early secondary to retained stomach contents. She underwent a Gastric emptying study which showed _______________.     On day of discharge, patient is clinically stable with no new exam findings or acute symptoms compared to prior. The patient was seen by the attending physician on the date of discharge and deemed stable and acceptable for discharge. The patient's chronic medical conditions were treated accordingly per the patient's home medication regimen. The patient's medication reconciliation (with changes made to chronic medications), follow up appointments, discharge orders, instructions, and significant lab and diagnostic studies are as noted.    Important Medication Changes and Reason:  [ ] Started on Carafate 1g PRN q6hrs for Reflux symptoms  [ ] Started on Protonix 40mg BID with breakfast and dinner for Reflux symptoms     Active of Pending issues Requiring Follow up:  [ ] Please follow up with a GI doctor for ongoing care of your reflux symptoms  [ ] Please follow up with your PCP for routine post hospitalization can    Discharge Diagnoses:     [ ] Alcohol Withdrawal  [ ] HANNAH 43 year old F with alcohol use disorder resulting in multiple recent admissions who presented for 3 days of abdominal pain, nausea, and NBNB vomiting. The patient reports that since discharge on 10/30, she has been drinking 1 pint of vodka daily. Since Saturday 11/11, she has been experiencing intractable nausea, vomiting, and inability to tolerate any po intake. She reports vomiting approx once every hour, initially was yellow/green but since 11/13 PM has progressed to "coffee ground" emesis. She reports continued dark brown, grainy without gross hematemesis with associated central chest pain. She also reports generalized abdominal pain without . She also endorses symptoms of withdrawal including tremors, heightened anxiety, and irritability. She has had multiple admissions for alcohol withdrawal in the past but has never experienced DTs or seizures, or required ICU level of care for withdrawal. Also complaining of numbness and tingling of her bilateral fingers, which improved upon the time of my assessment. Last drink prior to arrival to ED, approximately 2 PM on 11/14.     While hospitalized patient completed a high risk ativan taper and is now outside the window for alcohol withdrawal. She did not experience any significant adverse events. Her electrolytes were replenished, she tolerated oral intake and was able to maintain her own electrolytes. She had no additional episodes of bloody emesis. Her nausea was well controlled with PRN Zofran. She underwent an Endoscopy which did not demonstrate any acute pathology of the esophagus however the procedure was aborted early secondary to retained stomach contents. She underwent a Gastric emptying study which was unremarkable.    On day of discharge, patient is clinically stable with no new exam findings or acute symptoms compared to prior. The patient was seen by the attending physician on the date of discharge and deemed stable and acceptable for discharge. The patient's chronic medical conditions were treated accordingly per the patient's home medication regimen. The patient's medication reconciliation (with changes made to chronic medications), follow up appointments, discharge orders, instructions, and significant lab and diagnostic studies are as noted.    Important Medication Changes and Reason:  [ ] Started on Carafate 1g PRN q6hrs for Reflux symptoms  [ ] Started on Protonix 40mg BID with breakfast and dinner for Reflux symptoms   [ ] Started on Naltrexone 50mg QD to help curb your cravings.     Active of Pending issues Requiring Follow up:  [ ] Please follow up with a GI doctor for ongoing care of your reflux symptoms  [ ] Please follow up with your PCP for routine post hospitalization can    Discharge Diagnoses:     [ ] Alcohol Withdrawal  [ ] HANNAH 43 year old F with alcohol use disorder resulting in multiple recent admissions who presented for 3 days of abdominal pain, nausea, and NBNB vomiting. The patient reports that since discharge on 10/30, she has been drinking 1 pint of vodka daily. Since Saturday 11/11, she has been experiencing intractable nausea, vomiting, and inability to tolerate any po intake. She reports vomiting approx once every hour, initially was yellow/green but since 11/13 PM has progressed to "coffee ground" emesis. She reports continued dark brown, grainy without gross hematemesis with associated central chest pain. She also reports generalized abdominal pain without . She also endorses symptoms of withdrawal including tremors, heightened anxiety, and irritability. She has had multiple admissions for alcohol withdrawal in the past but has never experienced DTs or seizures, or required ICU level of care for withdrawal. Also complaining of numbness and tingling of her bilateral fingers, which improved upon the time of my assessment. Last drink prior to arrival to ED, approximately 2 PM on 11/14.     While hospitalized patient completed a high risk ativan taper and is now outside the window for alcohol withdrawal. She did not experience any significant adverse events. Her electrolytes were replenished, she tolerated oral intake and was able to maintain her own electrolytes. She had no additional episodes of bloody emesis. Her nausea was well controlled with PRN Zofran. She underwent an Endoscopy which did not demonstrate any acute pathology of the esophagus however the procedure was aborted early secondary to retained stomach contents. She underwent a Gastric emptying study which was unremarkable.    On day of discharge, patient is clinically stable with no new exam findings or acute symptoms compared to prior. The patient was seen by the attending physician on the date of discharge and deemed stable and acceptable for discharge. The patient's chronic medical conditions were treated accordingly per the patient's home medication regimen. The patient's medication reconciliation (with changes made to chronic medications), follow up appointments, discharge orders, instructions, and significant lab and diagnostic studies are as noted.    Important Medication Changes and Reason:  [ ] Started on Carafate 1g PRN q6hrs for Reflux symptoms  [ ] Started on Protonix 40mg BID with breakfast and dinner for Reflux symptoms   [ ] Started on Naltrexone 50mg QD to help curb your cravings.     Active of Pending issues Requiring Follow up:  [ ] Please follow up with a GI doctor for ongoing care of your reflux symptoms  [ ] Please follow up with your PCP for routine post hospitalization care within 1 week to obtain repeat blood work to monitor your liver function    Discharge Diagnoses:     [ ] Alcohol Withdrawal  [ ] HANNAH

## 2023-11-19 NOTE — DISCHARGE NOTE PROVIDER - NSDCCAREPROVSEEN_GEN_ALL_CORE_FT
[facility] Resident Team 4 [facility] Resident Team 4 [facility] Resident Team 4

## 2023-11-19 NOTE — PROGRESS NOTE ADULT - ATTENDING COMMENTS
The patient is a 43F with h/o alcohol use disorder who presents for intractable nausea and vomiting, coffee ground emesis, found to have HANNAH, mild alcohol withdrawal    1. ETOH Abuse w/ withdrawal  2. UGIB with coffee ground emesis  3. HANNAH, likely prerenal, improved    - Feels lot better, CIWA 0-1, no tremor, AAOx3, not confused, on admission Etoh level 27, Last drink the day prior to arrival   - Completed Ativan taper for ETOH withdrawal on CIWA scale   - C/w Thiamine, Folate, MVI   - GI plans noted- EGD in am, NPO p MN, c/w PPI, Hb stable >9  - SW following. Advised to stop drinking alcohol, she wants to go to inpatient ETOH rehab in [place]  - Nicotine patch.

## 2023-11-19 NOTE — DISCHARGE NOTE PROVIDER - NSDCMRMEDTOKEN_GEN_ALL_CORE_FT
naltrexone 50 mg oral tablet: 1 tab(s) orally once a day  Protonix 40 mg oral delayed release tablet: 1 tab(s) orally twice a day before breakfast and dinner  sucralfate 1 g oral tablet: 1 tab(s) orally 4 times a day

## 2023-11-19 NOTE — PROGRESS NOTE ADULT - PROBLEM SELECTOR PLAN 1
Patient with severe alcohol use disorder resulting in multiple admissions for withdrawal  - recent admission 10/26-10/30 for similar presentation  - last drink immediately prior to arrival  - CIWA 0-1 last 24hrs, last ativan 1mg 5am 11/19    Plan:  - Completed CIWA, off ativan taper  - folic acid, thiamine, multivitamin  - SBIRT/SW consulted  - nicotine patch given tobacco use

## 2023-11-19 NOTE — DISCHARGE NOTE PROVIDER - NSDCCPTREATMENT_GEN_ALL_CORE_FT
PRINCIPAL PROCEDURE  Procedure: Endoscopy, UGI  Findings and Treatment: Findings:  The examined esophagus was normal. No biopsies or other specimens were collected for this exam.  A medium amount of food (residue) was found in the entire examined stomach. No biopsies or other specimens were collected for this exam. Exam terminated.                                                                                                        Impression:            - The procedure was aborted due to presence of food.   - Normal esophagus. No specimens collected  - A medium amount of food (residue) in the stomach. No specimens collected.  Recommendation:    - Resume previous diet today.  - Gastric emptying study to assess for gastroparesis< from: Upper Endoscopy (11.20.23 @ 08:31) >        SECONDARY PROCEDURE  Procedure: CAT scan  Findings and Treatment:   < end of copied text >  COMPARISON: CT chestabdomen pelvis 4/10/2017.  CONTRAST/COMPLICATIONS:  IV Contrast: NONE  Oral Contrast: NONE  Complications: None reported at time of study completion  PROCEDURE:  CT of the Abdomen and Pelvis was performed.  Sagittal and coronal reformats were performed.  FINDINGS:  LOWER CHEST: Within normal limits.  LIVER: Within normal limits.  BILE DUCTS: Normal caliber.  GALLBLADDER: Within normal limits.  SPLEEN: Within normal limits.  PANCREAS: Within normal limits.  ADRENALS: Within normal limits.  KIDNEYS/URETERS: Within normal limits.  BLADDER: Within normal limits.  REPRODUCTIVE ORGANS: Uterus and adnexa within normal limits.  BOWEL: No bowel obstruction. Appendix is normal.  PERITONEUM: No ascites.  VESSELS: Limited evaluation the absence of IV contrast.  RETROPERITONEUM/LYMPH NODES: No lymphadenopathy.  ABDOMINAL WALL: Within normal limits.  BONES: Within normal limits.  IMPRESSION:  No acute pathology within the abdomen or pelvis.  --- End of Report ---< from: CT Abdomen and Pelvis No Cont (11.14.23 @ 20:34) >      Procedure: Gastric emptying study  Findings and Treatment:      PRINCIPAL PROCEDURE  Procedure: Endoscopy, UGI  Findings and Treatment: Findings:  The examined esophagus was normal. No biopsies or other specimens were collected for this exam.  A medium amount of food (residue) was found in the entire examined stomach. No biopsies or other specimens were collected for this exam. Exam terminated.                                                                                                        Impression:            - The procedure was aborted due to presence of food.   - Normal esophagus. No specimens collected  - A medium amount of food (residue) in the stomach. No specimens collected.  Recommendation:    - Resume previous diet today.  - Gastric emptying study to assess for gastroparesis< from: Upper Endoscopy (11.20.23 @ 08:31) >        SECONDARY PROCEDURE  Procedure: CAT scan  Findings and Treatment:   < end of copied text >  COMPARISON: CT chestabdomen pelvis 4/10/2017.  CONTRAST/COMPLICATIONS:  IV Contrast: NONE  Oral Contrast: NONE  Complications: None reported at time of study completion  PROCEDURE:  CT of the Abdomen and Pelvis was performed.  Sagittal and coronal reformats were performed.  FINDINGS:  LOWER CHEST: Within normal limits.  LIVER: Within normal limits.  BILE DUCTS: Normal caliber.  GALLBLADDER: Within normal limits.  SPLEEN: Within normal limits.  PANCREAS: Within normal limits.  ADRENALS: Within normal limits.  KIDNEYS/URETERS: Within normal limits.  BLADDER: Within normal limits.  REPRODUCTIVE ORGANS: Uterus and adnexa within normal limits.  BOWEL: No bowel obstruction. Appendix is normal.  PERITONEUM: No ascites.  VESSELS: Limited evaluation the absence of IV contrast.  RETROPERITONEUM/LYMPH NODES: No lymphadenopathy.  ABDOMINAL WALL: Within normal limits.  BONES: Within normal limits.  IMPRESSION:  No acute pathology within the abdomen or pelvis.  --- End of Report ---< from: CT Abdomen and Pelvis No Cont (11.14.23 @ 20:34) >      Procedure: Gastric emptying study  Findings and Treatment: Unremarkable findings

## 2023-11-19 NOTE — DISCHARGE NOTE PROVIDER - NSDCCPCAREPLAN_GEN_ALL_CORE_FT
PRINCIPAL DISCHARGE DIAGNOSIS  Diagnosis: Alcohol withdrawal  Assessment and Plan of Treatment: You were treated for alcohol withdrawal. You were started on medication (ativan) to slowly return to your baseline function without any alcohol in your system.   Refrain completely from alcohol use. Alcohol withdrawal is a medical emergency and can cause seizures. The risk for addiction and complications, including cirrhosis (liver damage) and varices (which can cause bleeding), can be dangerous, irreversible, and deadly.   For your reflux related to your alcohol you have been prescribed:  Carafate-Take 1 tab up to 4 times a day for reflux symtpoms  Protonix-Take 1 tabe before breakfast and dinner      SECONDARY DISCHARGE DIAGNOSES  Diagnosis: HANNAH (acute kidney injury)  Assessment and Plan of Treatment: During your admission you were found to have a kidney injury. This was likely because you were dehydrated and had consumed a large amount of alcohol. You were given intravenous fluids for rehydration and electrolyte supplements. Your kidney function improved after the fluids. Please follow up with your primary care doctor within 1 week of discharge.  Goal: To prevent future kidney injuries.  - If you experience burning with urination, decreased urine output, flank pain, with a fever (TEMP >100.4F), call your doctor or go to the ED immediately  - Oral hydration  - Take medication as prescribed   - Follow up your primary care physician

## 2023-11-20 LAB
ANION GAP SERPL CALC-SCNC: 10 MMOL/L — SIGNIFICANT CHANGE UP (ref 5–17)
ANION GAP SERPL CALC-SCNC: 10 MMOL/L — SIGNIFICANT CHANGE UP (ref 5–17)
APTT BLD: 32 SEC — SIGNIFICANT CHANGE UP (ref 24.5–35.6)
APTT BLD: 32 SEC — SIGNIFICANT CHANGE UP (ref 24.5–35.6)
BUN SERPL-MCNC: 12 MG/DL — SIGNIFICANT CHANGE UP (ref 7–23)
BUN SERPL-MCNC: 12 MG/DL — SIGNIFICANT CHANGE UP (ref 7–23)
CALCIUM SERPL-MCNC: 9.9 MG/DL — SIGNIFICANT CHANGE UP (ref 8.4–10.5)
CALCIUM SERPL-MCNC: 9.9 MG/DL — SIGNIFICANT CHANGE UP (ref 8.4–10.5)
CHLORIDE SERPL-SCNC: 103 MMOL/L — SIGNIFICANT CHANGE UP (ref 96–108)
CHLORIDE SERPL-SCNC: 103 MMOL/L — SIGNIFICANT CHANGE UP (ref 96–108)
CO2 SERPL-SCNC: 25 MMOL/L — SIGNIFICANT CHANGE UP (ref 22–31)
CO2 SERPL-SCNC: 25 MMOL/L — SIGNIFICANT CHANGE UP (ref 22–31)
CREAT SERPL-MCNC: 0.79 MG/DL — SIGNIFICANT CHANGE UP (ref 0.5–1.3)
CREAT SERPL-MCNC: 0.79 MG/DL — SIGNIFICANT CHANGE UP (ref 0.5–1.3)
EGFR: 95 ML/MIN/1.73M2 — SIGNIFICANT CHANGE UP
EGFR: 95 ML/MIN/1.73M2 — SIGNIFICANT CHANGE UP
GLUCOSE SERPL-MCNC: 101 MG/DL — HIGH (ref 70–99)
GLUCOSE SERPL-MCNC: 101 MG/DL — HIGH (ref 70–99)
HCT VFR BLD CALC: 28.8 % — LOW (ref 34.5–45)
HCT VFR BLD CALC: 28.8 % — LOW (ref 34.5–45)
HGB BLD-MCNC: 9.2 G/DL — LOW (ref 11.5–15.5)
HGB BLD-MCNC: 9.2 G/DL — LOW (ref 11.5–15.5)
INR BLD: 1.04 RATIO — SIGNIFICANT CHANGE UP (ref 0.85–1.18)
INR BLD: 1.04 RATIO — SIGNIFICANT CHANGE UP (ref 0.85–1.18)
MAGNESIUM SERPL-MCNC: 1.7 MG/DL — SIGNIFICANT CHANGE UP (ref 1.6–2.6)
MAGNESIUM SERPL-MCNC: 1.7 MG/DL — SIGNIFICANT CHANGE UP (ref 1.6–2.6)
MCHC RBC-ENTMCNC: 24 PG — LOW (ref 27–34)
MCHC RBC-ENTMCNC: 24 PG — LOW (ref 27–34)
MCHC RBC-ENTMCNC: 31.9 GM/DL — LOW (ref 32–36)
MCHC RBC-ENTMCNC: 31.9 GM/DL — LOW (ref 32–36)
MCV RBC AUTO: 75.2 FL — LOW (ref 80–100)
MCV RBC AUTO: 75.2 FL — LOW (ref 80–100)
NRBC # BLD: 0 /100 WBCS — SIGNIFICANT CHANGE UP (ref 0–0)
NRBC # BLD: 0 /100 WBCS — SIGNIFICANT CHANGE UP (ref 0–0)
PHOSPHATE SERPL-MCNC: 3.9 MG/DL — SIGNIFICANT CHANGE UP (ref 2.5–4.5)
PHOSPHATE SERPL-MCNC: 3.9 MG/DL — SIGNIFICANT CHANGE UP (ref 2.5–4.5)
PLATELET # BLD AUTO: 320 K/UL — SIGNIFICANT CHANGE UP (ref 150–400)
PLATELET # BLD AUTO: 320 K/UL — SIGNIFICANT CHANGE UP (ref 150–400)
POTASSIUM SERPL-MCNC: 4.5 MMOL/L — SIGNIFICANT CHANGE UP (ref 3.5–5.3)
POTASSIUM SERPL-MCNC: 4.5 MMOL/L — SIGNIFICANT CHANGE UP (ref 3.5–5.3)
POTASSIUM SERPL-SCNC: 4.5 MMOL/L — SIGNIFICANT CHANGE UP (ref 3.5–5.3)
POTASSIUM SERPL-SCNC: 4.5 MMOL/L — SIGNIFICANT CHANGE UP (ref 3.5–5.3)
PROTHROM AB SERPL-ACNC: 11.4 SEC — SIGNIFICANT CHANGE UP (ref 9.5–13)
PROTHROM AB SERPL-ACNC: 11.4 SEC — SIGNIFICANT CHANGE UP (ref 9.5–13)
RBC # BLD: 3.83 M/UL — SIGNIFICANT CHANGE UP (ref 3.8–5.2)
RBC # BLD: 3.83 M/UL — SIGNIFICANT CHANGE UP (ref 3.8–5.2)
RBC # FLD: 20.3 % — HIGH (ref 10.3–14.5)
RBC # FLD: 20.3 % — HIGH (ref 10.3–14.5)
SODIUM SERPL-SCNC: 138 MMOL/L — SIGNIFICANT CHANGE UP (ref 135–145)
SODIUM SERPL-SCNC: 138 MMOL/L — SIGNIFICANT CHANGE UP (ref 135–145)
WBC # BLD: 5.86 K/UL — SIGNIFICANT CHANGE UP (ref 3.8–10.5)
WBC # BLD: 5.86 K/UL — SIGNIFICANT CHANGE UP (ref 3.8–10.5)
WBC # FLD AUTO: 5.86 K/UL — SIGNIFICANT CHANGE UP (ref 3.8–10.5)
WBC # FLD AUTO: 5.86 K/UL — SIGNIFICANT CHANGE UP (ref 3.8–10.5)

## 2023-11-20 PROCEDURE — 99232 SBSQ HOSP IP/OBS MODERATE 35: CPT | Mod: GC

## 2023-11-20 PROCEDURE — 43235 EGD DIAGNOSTIC BRUSH WASH: CPT | Mod: GC

## 2023-11-20 RX ORDER — POLYETHYLENE GLYCOL 3350 17 G/17G
17 POWDER, FOR SOLUTION ORAL ONCE
Refills: 0 | Status: COMPLETED | OUTPATIENT
Start: 2023-11-20 | End: 2023-11-20

## 2023-11-20 RX ORDER — SENNA PLUS 8.6 MG/1
2 TABLET ORAL AT BEDTIME
Refills: 0 | Status: DISCONTINUED | OUTPATIENT
Start: 2023-11-20 | End: 2023-11-21

## 2023-11-20 RX ADMIN — Medication 1 GRAM(S): at 11:12

## 2023-11-20 RX ADMIN — Medication 1 TABLET(S): at 11:12

## 2023-11-20 RX ADMIN — Medication 1 GRAM(S): at 23:03

## 2023-11-20 RX ADMIN — Medication 1 PATCH: at 11:12

## 2023-11-20 RX ADMIN — PANTOPRAZOLE SODIUM 40 MILLIGRAM(S): 20 TABLET, DELAYED RELEASE ORAL at 05:34

## 2023-11-20 RX ADMIN — Medication 1 GRAM(S): at 05:34

## 2023-11-20 RX ADMIN — PANTOPRAZOLE SODIUM 40 MILLIGRAM(S): 20 TABLET, DELAYED RELEASE ORAL at 17:51

## 2023-11-20 RX ADMIN — POLYETHYLENE GLYCOL 3350 17 GRAM(S): 17 POWDER, FOR SOLUTION ORAL at 15:03

## 2023-11-20 RX ADMIN — Medication 1 PATCH: at 11:09

## 2023-11-20 RX ADMIN — Medication 1 MILLIGRAM(S): at 11:11

## 2023-11-20 RX ADMIN — Medication 1 GRAM(S): at 17:51

## 2023-11-20 RX ADMIN — Medication 1 PATCH: at 19:19

## 2023-11-20 RX ADMIN — Medication 1 PATCH: at 07:55

## 2023-11-20 RX ADMIN — Medication 10 MILLIGRAM(S): at 21:04

## 2023-11-20 NOTE — PROGRESS NOTE ADULT - ASSESSMENT
43 year old F with alcohol use disorder who presents for intractable nausea and vomiting, found to have HANNAH, mild alcohol withdrawal completed CIWA, outside window for withdrawal, and concern for possible UGIB s/p  Endoscopy with GI concern for gastroparesis.

## 2023-11-20 NOTE — PROGRESS NOTE ADULT - PROBLEM SELECTOR PLAN 1
Patient with severe alcohol use disorder resulting in multiple admissions for withdrawal  - recent admission 10/26-10/30 for similar presentation  - last drink immediately prior to arrival    Plan:  - Completed CIWA, off ativan taper  - folic acid, thiamine, multivitamin  - SBIRT/SW consulted-->plan for ETOH rehab center on DC  - nicotine patch given tobacco use

## 2023-11-20 NOTE — PROGRESS NOTE ADULT - ATTENDING COMMENTS
43F with h/o alcohol use disorder who presents for intractable nausea and vomiting, coffee ground emesis, found to have HANNAH, mild alcohol withdrawal, s/p taper; EGD scheduled 11/20, aborted as noted with food bolus is stomach, no esophageal abnormalities noted. Pending GES to eval for gastroparesis.     1. ETOH Abuse w/ withdrawal  2. UGIB with coffee ground emesis  3. HANNAH, likely prerenal, improved      - EGD (11/20) aborted as noted with food bolus is stomach, no esophageal abnormalities; Hb remains stable   - pending GES to r/o gastroparesis   - s/p ativan taper for etoh withdrawal, off CIWA   - c/w Thiamine, Folate, MVI   - SW following. Advised to stop drinking alcohol, she wants to go to inpatient ETOH rehab in [place]  - Nicotine patch

## 2023-11-20 NOTE — PRE PROCEDURE NOTE - PRE PROCEDURE EVALUATION
Attending Physician:  Dr Ross                           Procedure: EGD    Indication for Procedure: nausea and vomiting   ________________________________________________________  PAST MEDICAL & SURGICAL HISTORY:  History of alcohol use disorder  1 pint of vodka/day      No significant past surgical history        ALLERGIES:  No Known Allergies    HOME MEDICATIONS:    AICD/PPM: [ ] yes   [x ] no    PERTINENT LAB DATA:                        9.2    5.86  )-----------( 320      ( 20 Nov 2023 03:47 )             28.8     11-20    138  |  103  |  12  ----------------------------<  101<H>  4.5   |  25  |  0.79    Ca    9.9      20 Nov 2023 03:47  Phos  3.9     11-20  Mg     1.7     11-20      PT/INR - ( 20 Nov 2023 03:47 )   PT: 11.4 sec;   INR: 1.04 ratio         PTT - ( 20 Nov 2023 03:47 )  PTT:32.0 sec            PHYSICAL EXAMINATION:    T(C): 36.5  HR: 70  BP: 102/61  RR: 17  SpO2: 99%    Constitutional: NAD    Neck:  No JVD  Respiratory: no resp distress   Cardiovascular: rrr  Extremities: No peripheral edema  Neurological: A/O x 3, no focal deficits        COMMENTS:    The patient is a suitable candidate for the planned procedure unless box checked [ ]  No, explain:

## 2023-11-20 NOTE — PROGRESS NOTE ADULT - SUBJECTIVE AND OBJECTIVE BOX
PGY-1 Renee Quiñones  TEAM 4  Progress Note    Patient is a 43y old  Female who presents with a chief complaint of chest pain, nausea, vomiting (19 Nov 2023 12:48)      SUBJECTIVE / OVERNIGHT EVENTS:  OVN: No acute events  Labs and Imaging Reviewed  Patient seen and examined bedside in endoscopy unit.   Feels well. Little anxious over procedure. Otherwise denies visual/auditory hallucinations. Still noting some fogginess/haziness overnight but did not sleep well. Denies HA, CP, SOB, N/V/D/C.    MEDICATIONS  (STANDING):  folic acid 1 milliGRAM(s) Oral daily  influenza   Vaccine 0.5 milliLiter(s) IntraMuscular once  melatonin 10 milliGRAM(s) Oral at bedtime  multivitamin 1 Tablet(s) Oral daily  nicotine - 21 mG/24Hr(s) Patch 1 Patch Transdermal daily  pantoprazole  Injectable 40 milliGRAM(s) IV Push two times a day  sucralfate 1 Gram(s) Oral four times a day    MEDICATIONS  (PRN):  benzonatate 100 milliGRAM(s) Oral three times a day PRN Cough      Vital Signs Last 24 Hrs  T(C): 36.7 (20 Nov 2023 12:34), Max: 37.1 (19 Nov 2023 23:42)  T(F): 98 (20 Nov 2023 12:34), Max: 98.7 (19 Nov 2023 23:42)  HR: 74 (20 Nov 2023 12:34) (64 - 106)  BP: 107/73 (20 Nov 2023 12:34) (98/62 - 129/80)  BP(mean): --  RR: 17 (20 Nov 2023 12:34) (17 - 19)  SpO2: 99% (20 Nov 2023 12:34) (95% - 99%)    Parameters below as of 20 Nov 2023 12:34  Patient On (Oxygen Delivery Method): room air      CAPILLARY BLOOD GLUCOSE        I&O's Summary      PHYSICAL EXAM:  GENERAL: NAD  HEAD:  Atraumatic, Normocephalic  EYES: EOMI  CHEST/LUNG: Clear to auscultation bilaterally; No wheeze  HEART: Regular rhythm, tachycardic; No murmurs, rubs, or gallops  ABDOMEN: Soft, Nontender, Nondistended; Bowel sounds present  EXTREMITIES:  2+ Peripheral Pulses, No clubbing, cyanosis, or edema  PSYCH: AAOx3  NEUROLOGY: non-focal    LABS:                        9.2    5.86  )-----------( 320      ( 20 Nov 2023 03:47 )             28.8     11-20    138  |  103  |  12  ----------------------------<  101<H>  4.5   |  25  |  0.79    Ca    9.9      20 Nov 2023 03:47  Phos  3.9     11-20  Mg     1.7     11-20      PT/INR - ( 20 Nov 2023 03:47 )   PT: 11.4 sec;   INR: 1.04 ratio         PTT - ( 20 Nov 2023 03:47 )  PTT:32.0 sec      Urinalysis Basic - ( 20 Nov 2023 03:47 )    Color: x / Appearance: x / SG: x / pH: x  Gluc: 101 mg/dL / Ketone: x  / Bili: x / Urobili: x   Blood: x / Protein: x / Nitrite: x   Leuk Esterase: x / RBC: x / WBC x   Sq Epi: x / Non Sq Epi: x / Bacteria: x    Consultant(s) Notes Reviewed     Care Discussed with Consultants/Other Providers:

## 2023-11-20 NOTE — PROGRESS NOTE ADULT - PROBLEM SELECTOR PLAN 2
Patient now complaining of "coffee ground" hematemesis after 3 days of NBNB vomiting  - unwitnessed in the ED, but given high risk will empirically cover for possible upper GI bleed  - given persistent vomiting now progressing to possible hematemesis, concerning for possible fidencio-santillan tear or progressing erosive esophagitis    Plan:  - continue with IV PPI BID, transition to PO on DC  - continue with sucralfate  - GI Consulted, s/p endoscopy no obvious lesions/bleeding procedure aborted 2/2 food in stomach  - NM for gastroparesis pending 11/21 in am NPO@Midnight

## 2023-11-21 ENCOUNTER — TRANSCRIPTION ENCOUNTER (OUTPATIENT)
Age: 43
End: 2023-11-21

## 2023-11-21 VITALS
HEART RATE: 94 BPM | DIASTOLIC BLOOD PRESSURE: 70 MMHG | TEMPERATURE: 98 F | OXYGEN SATURATION: 100 % | RESPIRATION RATE: 16 BRPM | SYSTOLIC BLOOD PRESSURE: 104 MMHG

## 2023-11-21 LAB
GLUCOSE BLDC GLUCOMTR-MCNC: 99 MG/DL — SIGNIFICANT CHANGE UP (ref 70–99)
GLUCOSE BLDC GLUCOMTR-MCNC: 99 MG/DL — SIGNIFICANT CHANGE UP (ref 70–99)

## 2023-11-21 PROCEDURE — 84156 ASSAY OF PROTEIN URINE: CPT

## 2023-11-21 PROCEDURE — A9548: CPT

## 2023-11-21 PROCEDURE — 83735 ASSAY OF MAGNESIUM: CPT

## 2023-11-21 PROCEDURE — 80053 COMPREHEN METABOLIC PANEL: CPT

## 2023-11-21 PROCEDURE — A9541: CPT

## 2023-11-21 PROCEDURE — 82962 GLUCOSE BLOOD TEST: CPT

## 2023-11-21 PROCEDURE — 85730 THROMBOPLASTIN TIME PARTIAL: CPT

## 2023-11-21 PROCEDURE — 78264 GASTRIC EMPTYING IMG STUDY: CPT | Mod: 26

## 2023-11-21 PROCEDURE — 85610 PROTHROMBIN TIME: CPT

## 2023-11-21 PROCEDURE — 85025 COMPLETE CBC W/AUTO DIFF WBC: CPT

## 2023-11-21 PROCEDURE — 99239 HOSP IP/OBS DSCHRG MGMT >30: CPT | Mod: GC

## 2023-11-21 PROCEDURE — 84480 ASSAY TRIIODOTHYRONINE (T3): CPT

## 2023-11-21 PROCEDURE — 93308 TTE F-UP OR LMTD: CPT

## 2023-11-21 PROCEDURE — 82010 KETONE BODYS QUAN: CPT

## 2023-11-21 PROCEDURE — 82803 BLOOD GASES ANY COMBINATION: CPT

## 2023-11-21 PROCEDURE — 83010 ASSAY OF HAPTOGLOBIN QUANT: CPT

## 2023-11-21 PROCEDURE — 84439 ASSAY OF FREE THYROXINE: CPT

## 2023-11-21 PROCEDURE — 84436 ASSAY OF TOTAL THYROXINE: CPT

## 2023-11-21 PROCEDURE — 96375 TX/PRO/DX INJ NEW DRUG ADDON: CPT

## 2023-11-21 PROCEDURE — 83605 ASSAY OF LACTIC ACID: CPT

## 2023-11-21 PROCEDURE — 84300 ASSAY OF URINE SODIUM: CPT

## 2023-11-21 PROCEDURE — 85027 COMPLETE CBC AUTOMATED: CPT

## 2023-11-21 PROCEDURE — 84484 ASSAY OF TROPONIN QUANT: CPT

## 2023-11-21 PROCEDURE — 84132 ASSAY OF SERUM POTASSIUM: CPT

## 2023-11-21 PROCEDURE — 81001 URINALYSIS AUTO W/SCOPE: CPT

## 2023-11-21 PROCEDURE — 80048 BASIC METABOLIC PNL TOTAL CA: CPT

## 2023-11-21 PROCEDURE — 84443 ASSAY THYROID STIM HORMONE: CPT

## 2023-11-21 PROCEDURE — 82435 ASSAY OF BLOOD CHLORIDE: CPT

## 2023-11-21 PROCEDURE — 83615 LACTATE (LD) (LDH) ENZYME: CPT

## 2023-11-21 PROCEDURE — 80307 DRUG TEST PRSMV CHEM ANLYZR: CPT

## 2023-11-21 PROCEDURE — 82947 ASSAY GLUCOSE BLOOD QUANT: CPT

## 2023-11-21 PROCEDURE — 85018 HEMOGLOBIN: CPT

## 2023-11-21 PROCEDURE — 87086 URINE CULTURE/COLONY COUNT: CPT

## 2023-11-21 PROCEDURE — 82330 ASSAY OF CALCIUM: CPT

## 2023-11-21 PROCEDURE — 97161 PT EVAL LOW COMPLEX 20 MIN: CPT

## 2023-11-21 PROCEDURE — 86140 C-REACTIVE PROTEIN: CPT

## 2023-11-21 PROCEDURE — 86900 BLOOD TYPING SEROLOGIC ABO: CPT

## 2023-11-21 PROCEDURE — 99285 EMERGENCY DEPT VISIT HI MDM: CPT

## 2023-11-21 PROCEDURE — 82570 ASSAY OF URINE CREATININE: CPT

## 2023-11-21 PROCEDURE — 82550 ASSAY OF CK (CPK): CPT

## 2023-11-21 PROCEDURE — 86850 RBC ANTIBODY SCREEN: CPT

## 2023-11-21 PROCEDURE — 84702 CHORIONIC GONADOTROPIN TEST: CPT

## 2023-11-21 PROCEDURE — 74176 CT ABD & PELVIS W/O CONTRAST: CPT | Mod: MA

## 2023-11-21 PROCEDURE — 83690 ASSAY OF LIPASE: CPT

## 2023-11-21 PROCEDURE — 85014 HEMATOCRIT: CPT

## 2023-11-21 PROCEDURE — 83550 IRON BINDING TEST: CPT

## 2023-11-21 PROCEDURE — 78264 GASTRIC EMPTYING IMG STUDY: CPT

## 2023-11-21 PROCEDURE — 83935 ASSAY OF URINE OSMOLALITY: CPT

## 2023-11-21 PROCEDURE — 71045 X-RAY EXAM CHEST 1 VIEW: CPT

## 2023-11-21 PROCEDURE — 84295 ASSAY OF SERUM SODIUM: CPT

## 2023-11-21 PROCEDURE — 84540 ASSAY OF URINE/UREA-N: CPT

## 2023-11-21 PROCEDURE — 84100 ASSAY OF PHOSPHORUS: CPT

## 2023-11-21 PROCEDURE — 86901 BLOOD TYPING SEROLOGIC RH(D): CPT

## 2023-11-21 PROCEDURE — 86038 ANTINUCLEAR ANTIBODIES: CPT

## 2023-11-21 PROCEDURE — 82553 CREATINE MB FRACTION: CPT

## 2023-11-21 PROCEDURE — 87040 BLOOD CULTURE FOR BACTERIA: CPT

## 2023-11-21 PROCEDURE — 83540 ASSAY OF IRON: CPT

## 2023-11-21 PROCEDURE — 96374 THER/PROPH/DIAG INJ IV PUSH: CPT

## 2023-11-21 PROCEDURE — 82728 ASSAY OF FERRITIN: CPT

## 2023-11-21 PROCEDURE — 84133 ASSAY OF URINE POTASSIUM: CPT

## 2023-11-21 RX ORDER — PANTOPRAZOLE SODIUM 20 MG/1
1 TABLET, DELAYED RELEASE ORAL
Qty: 90 | Refills: 0
Start: 2023-11-21 | End: 2024-01-04

## 2023-11-21 RX ORDER — NALTREXONE HYDROCHLORIDE 50 MG/1
1 TABLET, FILM COATED ORAL
Qty: 30 | Refills: 0
Start: 2023-11-21 | End: 2023-12-20

## 2023-11-21 RX ORDER — SUCRALFATE 1 G
1 TABLET ORAL
Qty: 120 | Refills: 0
Start: 2023-11-21 | End: 2023-12-20

## 2023-11-21 RX ORDER — NALTREXONE HYDROCHLORIDE 50 MG/1
50 TABLET, FILM COATED ORAL DAILY
Refills: 0 | Status: DISCONTINUED | OUTPATIENT
Start: 2023-11-21 | End: 2023-11-21

## 2023-11-21 RX ADMIN — Medication 1 GRAM(S): at 05:28

## 2023-11-21 RX ADMIN — Medication 1 GRAM(S): at 11:42

## 2023-11-21 RX ADMIN — Medication 1 PATCH: at 07:00

## 2023-11-21 RX ADMIN — Medication 1 PATCH: at 11:49

## 2023-11-21 RX ADMIN — NALTREXONE HYDROCHLORIDE 50 MILLIGRAM(S): 50 TABLET, FILM COATED ORAL at 11:43

## 2023-11-21 RX ADMIN — Medication 1 PATCH: at 11:45

## 2023-11-21 RX ADMIN — Medication 1 TABLET(S): at 11:42

## 2023-11-21 RX ADMIN — PANTOPRAZOLE SODIUM 40 MILLIGRAM(S): 20 TABLET, DELAYED RELEASE ORAL at 05:28

## 2023-11-21 RX ADMIN — Medication 1 MILLIGRAM(S): at 11:42

## 2023-11-21 NOTE — PROGRESS NOTE ADULT - PROBLEM SELECTOR PLAN 4
DVT PPx: SCDs, encourage ambulation (Improve Score 0)  Diet: CLD, advance as tolerated  Code: FULL  Dispo: d/c to in patient EtOH rehab vs home with family pending NM scan

## 2023-11-21 NOTE — PROGRESS NOTE ADULT - PROBLEM SELECTOR PROBLEM 4
Prophylactic measure
Prophylactic measure
Upper GI bleed
Prophylactic measure
Upper GI bleed

## 2023-11-21 NOTE — DISCHARGE NOTE NURSING/CASE MANAGEMENT/SOCIAL WORK - NSDCPEFALRISK_GEN_ALL_CORE
For information on Fall & Injury Prevention, visit: [email or web address] OR  [email or web address] OR  https://www.cdc.gov/steadi/patient.html

## 2023-11-21 NOTE — PROGRESS NOTE ADULT - ATTENDING COMMENTS
43F with h/o alcohol use disorder who presents for intractable nausea and vomiting, coffee ground emesis, found to have HANNAH, mild alcohol withdrawal, s/p taper; EGD scheduled 11/20, aborted as noted with food bolus is stomach, no esophageal abnormalities noted. Pending GES to eval for gastroparesis.     1. ETOH Abuse w/ withdrawal  2. UGIB with coffee ground emesis  3. HANNAH, likely prerenal, improved      - EGD (11/20) aborted as noted with food bolus is stomach, no esophageal abnormalities; Hb remains stable   - pending GES to r/o gastroparesis today   - s/p ativan taper for etoh withdrawal, off CIWA   - c/w Thiamine, Folate, MVI   - d/w pt regarding inpt rehab for alcohol abuse, no longer intersted as she states it has not helped her in the past - amenable to trying naltrexone, will rx on discharge and advised pt to f/u PMD clinic for naltrexone injections and LFT monitoring   - Nicotine patch     likely d/c home after GES study, outpt f/u with PMD and GI   43 minutes spent coordinating discharge

## 2023-11-21 NOTE — PROGRESS NOTE ADULT - SUBJECTIVE AND OBJECTIVE BOX
PGY-1 Renee Quiñones   TEAM 4  Progress Note    Patient is a 43y old  Female who presents with a chief complaint of chest pain, nausea, vomiting (20 Nov 2023 12:44)      SUBJECTIVE / OVERNIGHT EVENTS:  OVN: No acute events  No labs or imaging to review  Patient seen and examined bedside.  Pt awake noting she did not sleep much last night, thinks her sleep schedule is off. Spent time educating patient on discharge planning. Pt at this time not interested in ETOH rehab would like to go home. Explained risks vs benefits patient aware and wants to go home anyway.     MEDICATIONS  (STANDING):  folic acid 1 milliGRAM(s) Oral daily  influenza   Vaccine 0.5 milliLiter(s) IntraMuscular once  melatonin 10 milliGRAM(s) Oral at bedtime  multivitamin 1 Tablet(s) Oral daily  nicotine - 21 mG/24Hr(s) Patch 1 Patch Transdermal daily  pantoprazole  Injectable 40 milliGRAM(s) IV Push two times a day  senna 2 Tablet(s) Oral at bedtime  sucralfate 1 Gram(s) Oral four times a day    MEDICATIONS  (PRN):  benzonatate 100 milliGRAM(s) Oral three times a day PRN Cough      Vital Signs Last 24 Hrs  T(C): 36.6 (21 Nov 2023 04:55), Max: 36.8 (20 Nov 2023 20:35)  T(F): 97.9 (21 Nov 2023 04:55), Max: 98.3 (20 Nov 2023 20:35)  HR: 72 (21 Nov 2023 04:55) (72 - 106)  BP: 104/66 (21 Nov 2023 04:55) (100/67 - 129/80)  BP(mean): --  RR: 18 (21 Nov 2023 04:55) (17 - 19)  SpO2: 98% (21 Nov 2023 04:55) (95% - 99%)    Parameters below as of 21 Nov 2023 04:55  Patient On (Oxygen Delivery Method): room air      CAPILLARY BLOOD GLUCOSE        I&O's Summary    20 Nov 2023 07:01  -  21 Nov 2023 07:00  --------------------------------------------------------  IN: 600 mL / OUT: 0 mL / NET: 600 mL        PHYSICAL EXAM:  GENERAL: NAD  HEAD:  Atraumatic, Normocephalic  EYES: EOMI  CHEST/LUNG: Clear to auscultation bilaterally; No wheeze  HEART: Regular rhythm, tachycardic; No murmurs, rubs, or gallops  ABDOMEN: Soft, Nontender, Nondistended; Bowel sounds present  EXTREMITIES:  2+ Peripheral Pulses, No clubbing, cyanosis, or edema  PSYCH: AAOx3  NEUROLOGY: non-focal    LABS:                        9.2    5.86  )-----------( 320      ( 20 Nov 2023 03:47 )             28.8     11-20    138  |  103  |  12  ----------------------------<  101<H>  4.5   |  25  |  0.79    Ca    9.9      20 Nov 2023 03:47  Phos  3.9     11-20  Mg     1.7     11-20      PT/INR - ( 20 Nov 2023 03:47 )   PT: 11.4 sec;   INR: 1.04 ratio         PTT - ( 20 Nov 2023 03:47 )  PTT:32.0 sec      Urinalysis Basic - ( 20 Nov 2023 03:47 )    Color: x / Appearance: x / SG: x / pH: x  Gluc: 101 mg/dL / Ketone: x  / Bili: x / Urobili: x   Blood: x / Protein: x / Nitrite: x   Leuk Esterase: x / RBC: x / WBC x   Sq Epi: x / Non Sq Epi: x / Bacteria: x    Consultant(s) Notes Reviewed     Care Discussed with Consultants/Other Providers:

## 2023-11-21 NOTE — PROGRESS NOTE ADULT - PROBLEM SELECTOR PROBLEM 2
Upper GI bleed
Alcohol withdrawal
Upper GI bleed
Alcohol withdrawal

## 2023-11-21 NOTE — PROGRESS NOTE ADULT - PROBLEM SELECTOR PLAN 1
Patient with severe alcohol use disorder resulting in multiple admissions for withdrawal  - recent admission 10/26-10/30 for similar presentation  - last drink immediately prior to arrival    Plan:  - Completed CIWA, off ativan taper  - folic acid, thiamine, multivitamin  - SBIRT/SW consulted-->plan for ETOH rehab center on DC vs home with family  - nicotine patch given tobacco use  -Consider naltrexone PO vs Injection

## 2023-11-21 NOTE — PROGRESS NOTE ADULT - PROVIDER SPECIALTY LIST ADULT
Gastroenterology
Gastroenterology
Internal Medicine

## 2023-11-21 NOTE — PROGRESS NOTE ADULT - REASON FOR ADMISSION
chest pain, nausea, vomiting

## 2023-11-21 NOTE — PROGRESS NOTE ADULT - PROBLEM SELECTOR PLAN 3
Patient developed retrosternal chest pain and generalized abdominal discomfort after persistent vomiting  - likely erosive esophagitis, however given reports of coffee ground emesis concern for possible UGIB    Plan:  - c/w IV PPI BID  - c/w sucralfate as above
Patient developed retrosternal chest pain and generalized abdominal discomfort after persistent vomiting  - likely erosive esophagitis, however given reports of coffee ground emesis concern for possible UGIB    Plan:  - c/w IV PPI BID, dc on po BID  - c/w sucralfate as above
Patient presenting with HANNAH, Cr 4.1 from baseline 0.6  - likely prerenal in the setting of poor po intake and intractable nausea and vomiting    Plan:  - continue IVF  - monitor Cr  - avoid nephrotoxic agents
Patient presenting with HANNAH, Cr 4.1 from baseline 0.6  - likely prerenal in the setting of poor po intake and intractable nausea and vomiting    Plan:  - continue IVF  - monitor Cr  - avoid nephrotoxic agents
Patient developed retrosternal chest pain and generalized abdominal discomfort after persistent vomiting  - likely erosive esophagitis, however given reports of coffee ground emesis concern for possible UGIB    Plan:  - c/w IV PPI BID  - c/w sucralfate as above

## 2023-11-21 NOTE — PROGRESS NOTE ADULT - PROBLEM SELECTOR PLAN 2
Patient now complaining of "coffee ground" hematemesis after 3 days of NBNB vomiting  - unwitnessed in the ED, but given high risk will empirically cover for possible upper GI bleed  - given persistent vomiting now progressing to possible hematemesis, concerning for possible fidencio-santillan tear or progressing erosive esophagitis    Plan:  - continue with IV PPI BID, transition to PO on DC  - continue with sucralfate  - GI Consulted, s/p endoscopy no obvious lesions/bleeding procedure aborted 2/2 food in stomach  - NM for gastroparesis pending 11/21 in am

## 2023-11-21 NOTE — PROGRESS NOTE ADULT - PROBLEM SELECTOR PROBLEM 1
Alcohol withdrawal
Intractable nausea and vomiting
Alcohol withdrawal
Intractable nausea and vomiting

## 2023-11-21 NOTE — PROGRESS NOTE ADULT - PROBLEM SELECTOR PROBLEM 3
Retrosternal chest pain
HANNAH (acute kidney injury)
HANNAH (acute kidney injury)

## 2023-11-21 NOTE — DISCHARGE NOTE NURSING/CASE MANAGEMENT/SOCIAL WORK - PATIENT PORTAL LINK FT
You can access the FollowMyHealth Patient Portal offered by [facility] by registering at the following website: [email or web address]. By joining [employer]’s FollowMyHealth portal, you will also be able to view your health information using other applications (apps) compatible with our system.

## 2023-12-01 NOTE — CHART NOTE - NSCHARTNOTEFT_GEN_A_CORE
1 attempt was made to reach patient, which have been unsuccessful. Unable to leave voicemail on 12/01.

## 2023-12-30 NOTE — PATIENT PROFILE ADULT - INTERNATIONAL TRAVEL
Physician Discharge Summary     Patient ID:  William Smallwood  [id number]  61 y.o.  1967    Admit Date: 12/28/2023    Discharge Date: 12/30/2023    Admission Diagnoses: Peripheral vascular disease, unspecified ([address] [facility]) [I73.9]  Peripheral vascular disease ([address] [facility]) [I73.9]    Discharge Diagnoses:  Principal Problem:    Peripheral vascular disease ([address] [facility])  Resolved Problems:    * No resolved hospital problems. *       Last Procedure: Procedure(s):  RIGHT FEMORAL POPLITEAL BYPASS WITH PTFE      Hospital Course:   Normal hospital course for this procedure. Patient underwent right femoral popliteal bypass surgery and was admitted to the hospital.  His foot regained pulses and he did well. He was felt able to be discharged on POD #2. Plavix was added and his aspirin was changed to 81 mg daily. Consults: None    Disposition: home    Patient Instructions:   Current Discharge Medication List        START taking these medications    Details   aspirin 81 MG EC tablet Take 1 tablet by mouth daily  Qty: 30 tablet, Refills: 3      clopidogrel (PLAVIX) 75 MG tablet Take 1 tablet by mouth daily  Qty: 30 tablet, Refills: 6      oxyCODONE (ROXICODONE) 5 MG immediate release tablet Take 1 tablet by mouth every 6 hours as needed for Pain for up to 7 days. Intended supply: 7 days.  Take lowest dose possible to manage pain Max Daily Amount: 20 mg  Qty: 28 tablet, Refills: 0    Comments: Reduce doses taken as pain becomes manageable  Associated Diagnoses: Peripheral vascular disease ([address])           CONTINUE these medications which have NOT CHANGED    Details   cloNIDine (CATAPRES) 0.3 MG tablet Take 1 tablet by mouth 3 times daily      traZODone (DESYREL) 50 MG tablet Take 1 tablet by mouth nightly      atorvastatin (LIPITOR) 10 MG tablet Take 1 tablet by mouth nightly           STOP taking these medications       oxyCODONE-acetaminophen (PERCOCET) 5-325 MG per tablet Comments:   Reason for Stopping:         cyclobenzaprine (FLEXERIL) 10
No

## 2024-01-07 ENCOUNTER — INPATIENT (INPATIENT)
Facility: HOSPITAL | Age: 44
LOS: 0 days | Discharge: LEFT BEFORE DISCHARGE | DRG: 894 | End: 2024-01-08
Attending: STUDENT IN AN ORGANIZED HEALTH CARE EDUCATION/TRAINING PROGRAM | Admitting: STUDENT IN AN ORGANIZED HEALTH CARE EDUCATION/TRAINING PROGRAM
Payer: MEDICAID

## 2024-01-07 VITALS
SYSTOLIC BLOOD PRESSURE: 109 MMHG | HEART RATE: 139 BPM | OXYGEN SATURATION: 98 % | DIASTOLIC BLOOD PRESSURE: 77 MMHG | HEIGHT: 61 IN | WEIGHT: 102.96 LBS | RESPIRATION RATE: 17 BRPM | TEMPERATURE: 98 F

## 2024-01-07 DIAGNOSIS — R07.9 CHEST PAIN, UNSPECIFIED: ICD-10-CM

## 2024-01-07 DIAGNOSIS — R74.01 ELEVATION OF LEVELS OF LIVER TRANSAMINASE LEVELS: ICD-10-CM

## 2024-01-07 DIAGNOSIS — N17.9 ACUTE KIDNEY FAILURE, UNSPECIFIED: ICD-10-CM

## 2024-01-07 DIAGNOSIS — E87.6 HYPOKALEMIA: ICD-10-CM

## 2024-01-07 DIAGNOSIS — F10.239 ALCOHOL DEPENDENCE WITH WITHDRAWAL, UNSPECIFIED: ICD-10-CM

## 2024-01-07 LAB
ALBUMIN SERPL ELPH-MCNC: 4.5 G/DL — SIGNIFICANT CHANGE UP (ref 3.3–5)
ALBUMIN SERPL ELPH-MCNC: 4.5 G/DL — SIGNIFICANT CHANGE UP (ref 3.3–5)
ALBUMIN SERPL ELPH-MCNC: 7.3 G/DL — HIGH (ref 3.3–5)
ALBUMIN SERPL ELPH-MCNC: 7.3 G/DL — HIGH (ref 3.3–5)
ALP SERPL-CCNC: 121 U/L — HIGH (ref 40–120)
ALP SERPL-CCNC: 121 U/L — HIGH (ref 40–120)
ALP SERPL-CCNC: 83 U/L — SIGNIFICANT CHANGE UP (ref 40–120)
ALP SERPL-CCNC: 83 U/L — SIGNIFICANT CHANGE UP (ref 40–120)
ALT FLD-CCNC: 109 U/L — HIGH (ref 10–45)
ALT FLD-CCNC: 109 U/L — HIGH (ref 10–45)
ALT FLD-CCNC: 64 U/L — HIGH (ref 10–45)
ALT FLD-CCNC: 64 U/L — HIGH (ref 10–45)
AMMONIA BLD-MCNC: 48 UMOL/L — SIGNIFICANT CHANGE UP (ref 11–55)
AMMONIA BLD-MCNC: 48 UMOL/L — SIGNIFICANT CHANGE UP (ref 11–55)
ANION GAP SERPL CALC-SCNC: 21 MMOL/L — HIGH (ref 5–17)
ANION GAP SERPL CALC-SCNC: 21 MMOL/L — HIGH (ref 5–17)
ANION GAP SERPL CALC-SCNC: 24 MMOL/L — HIGH (ref 5–17)
ANION GAP SERPL CALC-SCNC: 24 MMOL/L — HIGH (ref 5–17)
ANION GAP SERPL CALC-SCNC: 32 MMOL/L — HIGH (ref 5–17)
ANION GAP SERPL CALC-SCNC: 32 MMOL/L — HIGH (ref 5–17)
ANISOCYTOSIS BLD QL: SIGNIFICANT CHANGE UP
ANISOCYTOSIS BLD QL: SIGNIFICANT CHANGE UP
APTT BLD: 33.5 SEC — SIGNIFICANT CHANGE UP (ref 24.5–35.6)
APTT BLD: 33.5 SEC — SIGNIFICANT CHANGE UP (ref 24.5–35.6)
AST SERPL-CCNC: 51 U/L — HIGH (ref 10–40)
AST SERPL-CCNC: 51 U/L — HIGH (ref 10–40)
AST SERPL-CCNC: 84 U/L — HIGH (ref 10–40)
AST SERPL-CCNC: 84 U/L — HIGH (ref 10–40)
BASE EXCESS BLDV CALC-SCNC: 12.1 MMOL/L — HIGH (ref -2–3)
BASE EXCESS BLDV CALC-SCNC: 12.1 MMOL/L — HIGH (ref -2–3)
BASE EXCESS BLDV CALC-SCNC: 18.6 MMOL/L — HIGH (ref -2–3)
BASE EXCESS BLDV CALC-SCNC: 18.6 MMOL/L — HIGH (ref -2–3)
BASE EXCESS BLDV CALC-SCNC: 24.4 MMOL/L — HIGH (ref -2–3)
BASE EXCESS BLDV CALC-SCNC: 24.4 MMOL/L — HIGH (ref -2–3)
BASOPHILS # BLD AUTO: 0 K/UL — SIGNIFICANT CHANGE UP (ref 0–0.2)
BASOPHILS # BLD AUTO: 0 K/UL — SIGNIFICANT CHANGE UP (ref 0–0.2)
BASOPHILS NFR BLD AUTO: 0 % — SIGNIFICANT CHANGE UP (ref 0–2)
BASOPHILS NFR BLD AUTO: 0 % — SIGNIFICANT CHANGE UP (ref 0–2)
BILIRUB SERPL-MCNC: 0.8 MG/DL — SIGNIFICANT CHANGE UP (ref 0.2–1.2)
BILIRUB SERPL-MCNC: 0.8 MG/DL — SIGNIFICANT CHANGE UP (ref 0.2–1.2)
BILIRUB SERPL-MCNC: 1.5 MG/DL — HIGH (ref 0.2–1.2)
BILIRUB SERPL-MCNC: 1.5 MG/DL — HIGH (ref 0.2–1.2)
BUN SERPL-MCNC: 15 MG/DL — SIGNIFICANT CHANGE UP (ref 7–23)
BUN SERPL-MCNC: 15 MG/DL — SIGNIFICANT CHANGE UP (ref 7–23)
BUN SERPL-MCNC: 16 MG/DL — SIGNIFICANT CHANGE UP (ref 7–23)
BUN SERPL-MCNC: 16 MG/DL — SIGNIFICANT CHANGE UP (ref 7–23)
BUN SERPL-MCNC: 18 MG/DL — SIGNIFICANT CHANGE UP (ref 7–23)
BUN SERPL-MCNC: 18 MG/DL — SIGNIFICANT CHANGE UP (ref 7–23)
CA-I SERPL-SCNC: 0.96 MMOL/L — LOW (ref 1.15–1.33)
CA-I SERPL-SCNC: 0.96 MMOL/L — LOW (ref 1.15–1.33)
CA-I SERPL-SCNC: 1.05 MMOL/L — LOW (ref 1.15–1.33)
CA-I SERPL-SCNC: 1.05 MMOL/L — LOW (ref 1.15–1.33)
CA-I SERPL-SCNC: 1.07 MMOL/L — LOW (ref 1.15–1.33)
CA-I SERPL-SCNC: 1.07 MMOL/L — LOW (ref 1.15–1.33)
CALCIUM SERPL-MCNC: 11.9 MG/DL — HIGH (ref 8.4–10.5)
CALCIUM SERPL-MCNC: 11.9 MG/DL — HIGH (ref 8.4–10.5)
CALCIUM SERPL-MCNC: 9 MG/DL — SIGNIFICANT CHANGE UP (ref 8.4–10.5)
CALCIUM SERPL-MCNC: 9 MG/DL — SIGNIFICANT CHANGE UP (ref 8.4–10.5)
CALCIUM SERPL-MCNC: 9.2 MG/DL — SIGNIFICANT CHANGE UP (ref 8.4–10.5)
CALCIUM SERPL-MCNC: 9.2 MG/DL — SIGNIFICANT CHANGE UP (ref 8.4–10.5)
CHLORIDE BLDV-SCNC: 76 MMOL/L — LOW (ref 96–108)
CHLORIDE BLDV-SCNC: 76 MMOL/L — LOW (ref 96–108)
CHLORIDE BLDV-SCNC: 80 MMOL/L — LOW (ref 96–108)
CHLORIDE BLDV-SCNC: 80 MMOL/L — LOW (ref 96–108)
CHLORIDE BLDV-SCNC: 87 MMOL/L — LOW (ref 96–108)
CHLORIDE BLDV-SCNC: 87 MMOL/L — LOW (ref 96–108)
CHLORIDE SERPL-SCNC: 80 MMOL/L — LOW (ref 96–108)
CHLORIDE SERPL-SCNC: 80 MMOL/L — LOW (ref 96–108)
CHLORIDE SERPL-SCNC: 81 MMOL/L — LOW (ref 96–108)
CHLORIDE SERPL-SCNC: 81 MMOL/L — LOW (ref 96–108)
CHLORIDE SERPL-SCNC: <70 MMOL/L — LOW (ref 96–108)
CHLORIDE SERPL-SCNC: <70 MMOL/L — LOW (ref 96–108)
CO2 BLDV-SCNC: 39 MMOL/L — HIGH (ref 22–26)
CO2 BLDV-SCNC: 39 MMOL/L — HIGH (ref 22–26)
CO2 BLDV-SCNC: 45 MMOL/L — HIGH (ref 22–26)
CO2 BLDV-SCNC: 45 MMOL/L — HIGH (ref 22–26)
CO2 BLDV-SCNC: 52 MMOL/L — HIGH (ref 22–26)
CO2 BLDV-SCNC: 52 MMOL/L — HIGH (ref 22–26)
CO2 SERPL-SCNC: 33 MMOL/L — HIGH (ref 22–31)
CO2 SERPL-SCNC: 35 MMOL/L — HIGH (ref 22–31)
CO2 SERPL-SCNC: 35 MMOL/L — HIGH (ref 22–31)
CREAT SERPL-MCNC: 1.66 MG/DL — HIGH (ref 0.5–1.3)
CREAT SERPL-MCNC: 1.66 MG/DL — HIGH (ref 0.5–1.3)
CREAT SERPL-MCNC: 1.78 MG/DL — HIGH (ref 0.5–1.3)
CREAT SERPL-MCNC: 1.78 MG/DL — HIGH (ref 0.5–1.3)
CREAT SERPL-MCNC: 2.83 MG/DL — HIGH (ref 0.5–1.3)
CREAT SERPL-MCNC: 2.83 MG/DL — HIGH (ref 0.5–1.3)
DACRYOCYTES BLD QL SMEAR: SLIGHT — SIGNIFICANT CHANGE UP
DACRYOCYTES BLD QL SMEAR: SLIGHT — SIGNIFICANT CHANGE UP
EGFR: 20 ML/MIN/1.73M2 — LOW
EGFR: 20 ML/MIN/1.73M2 — LOW
EGFR: 36 ML/MIN/1.73M2 — LOW
EGFR: 36 ML/MIN/1.73M2 — LOW
EGFR: 39 ML/MIN/1.73M2 — LOW
EGFR: 39 ML/MIN/1.73M2 — LOW
EOSINOPHIL # BLD AUTO: 0.11 K/UL — SIGNIFICANT CHANGE UP (ref 0–0.5)
EOSINOPHIL # BLD AUTO: 0.11 K/UL — SIGNIFICANT CHANGE UP (ref 0–0.5)
EOSINOPHIL NFR BLD AUTO: 0.9 % — SIGNIFICANT CHANGE UP (ref 0–6)
EOSINOPHIL NFR BLD AUTO: 0.9 % — SIGNIFICANT CHANGE UP (ref 0–6)
ETHANOL SERPL-MCNC: <10 MG/DL — SIGNIFICANT CHANGE UP (ref 0–10)
ETHANOL SERPL-MCNC: <10 MG/DL — SIGNIFICANT CHANGE UP (ref 0–10)
FLUAV AG NPH QL: SIGNIFICANT CHANGE UP
FLUAV AG NPH QL: SIGNIFICANT CHANGE UP
FLUBV AG NPH QL: SIGNIFICANT CHANGE UP
FLUBV AG NPH QL: SIGNIFICANT CHANGE UP
GAS PNL BLDV: 130 MMOL/L — LOW (ref 136–145)
GAS PNL BLDV: 130 MMOL/L — LOW (ref 136–145)
GAS PNL BLDV: 131 MMOL/L — LOW (ref 136–145)
GAS PNL BLDV: SIGNIFICANT CHANGE UP
GIANT PLATELETS BLD QL SMEAR: PRESENT — SIGNIFICANT CHANGE UP
GIANT PLATELETS BLD QL SMEAR: PRESENT — SIGNIFICANT CHANGE UP
GLUCOSE BLDV-MCNC: 111 MG/DL — HIGH (ref 70–99)
GLUCOSE BLDV-MCNC: 111 MG/DL — HIGH (ref 70–99)
GLUCOSE BLDV-MCNC: 112 MG/DL — HIGH (ref 70–99)
GLUCOSE BLDV-MCNC: 112 MG/DL — HIGH (ref 70–99)
GLUCOSE BLDV-MCNC: 154 MG/DL — HIGH (ref 70–99)
GLUCOSE BLDV-MCNC: 154 MG/DL — HIGH (ref 70–99)
GLUCOSE SERPL-MCNC: 100 MG/DL — HIGH (ref 70–99)
GLUCOSE SERPL-MCNC: 100 MG/DL — HIGH (ref 70–99)
GLUCOSE SERPL-MCNC: 117 MG/DL — HIGH (ref 70–99)
GLUCOSE SERPL-MCNC: 117 MG/DL — HIGH (ref 70–99)
GLUCOSE SERPL-MCNC: 142 MG/DL — HIGH (ref 70–99)
GLUCOSE SERPL-MCNC: 142 MG/DL — HIGH (ref 70–99)
HCG SERPL-ACNC: <2 MIU/ML — SIGNIFICANT CHANGE UP
HCG SERPL-ACNC: <2 MIU/ML — SIGNIFICANT CHANGE UP
HCO3 BLDV-SCNC: 37 MMOL/L — HIGH (ref 22–29)
HCO3 BLDV-SCNC: 37 MMOL/L — HIGH (ref 22–29)
HCO3 BLDV-SCNC: 44 MMOL/L — HIGH (ref 22–29)
HCO3 BLDV-SCNC: 44 MMOL/L — HIGH (ref 22–29)
HCO3 BLDV-SCNC: 50 MMOL/L — CRITICAL HIGH (ref 22–29)
HCO3 BLDV-SCNC: 50 MMOL/L — CRITICAL HIGH (ref 22–29)
HCT VFR BLD CALC: 41.1 % — SIGNIFICANT CHANGE UP (ref 34.5–45)
HCT VFR BLD CALC: 41.1 % — SIGNIFICANT CHANGE UP (ref 34.5–45)
HCT VFR BLDA CALC: 32 % — LOW (ref 34.5–46.5)
HCT VFR BLDA CALC: 32 % — LOW (ref 34.5–46.5)
HCT VFR BLDA CALC: 35 % — SIGNIFICANT CHANGE UP (ref 34.5–46.5)
HCT VFR BLDA CALC: 35 % — SIGNIFICANT CHANGE UP (ref 34.5–46.5)
HCT VFR BLDA CALC: 43 % — SIGNIFICANT CHANGE UP (ref 34.5–46.5)
HCT VFR BLDA CALC: 43 % — SIGNIFICANT CHANGE UP (ref 34.5–46.5)
HGB BLD CALC-MCNC: 10.8 G/DL — LOW (ref 11.7–16.1)
HGB BLD CALC-MCNC: 10.8 G/DL — LOW (ref 11.7–16.1)
HGB BLD CALC-MCNC: 11.6 G/DL — LOW (ref 11.7–16.1)
HGB BLD CALC-MCNC: 11.6 G/DL — LOW (ref 11.7–16.1)
HGB BLD CALC-MCNC: 14.2 G/DL — SIGNIFICANT CHANGE UP (ref 11.7–16.1)
HGB BLD CALC-MCNC: 14.2 G/DL — SIGNIFICANT CHANGE UP (ref 11.7–16.1)
HGB BLD-MCNC: 13.4 G/DL — SIGNIFICANT CHANGE UP (ref 11.5–15.5)
HGB BLD-MCNC: 13.4 G/DL — SIGNIFICANT CHANGE UP (ref 11.5–15.5)
INR BLD: 1.02 RATIO — SIGNIFICANT CHANGE UP (ref 0.85–1.18)
INR BLD: 1.02 RATIO — SIGNIFICANT CHANGE UP (ref 0.85–1.18)
LACTATE BLDV-MCNC: 1.4 MMOL/L — SIGNIFICANT CHANGE UP (ref 0.5–2)
LACTATE BLDV-MCNC: 1.4 MMOL/L — SIGNIFICANT CHANGE UP (ref 0.5–2)
LACTATE BLDV-MCNC: 1.7 MMOL/L — SIGNIFICANT CHANGE UP (ref 0.5–2)
LACTATE BLDV-MCNC: 1.7 MMOL/L — SIGNIFICANT CHANGE UP (ref 0.5–2)
LACTATE BLDV-MCNC: 4.4 MMOL/L — CRITICAL HIGH (ref 0.5–2)
LACTATE BLDV-MCNC: 4.4 MMOL/L — CRITICAL HIGH (ref 0.5–2)
LIDOCAIN IGE QN: 47 U/L — SIGNIFICANT CHANGE UP (ref 7–60)
LIDOCAIN IGE QN: 47 U/L — SIGNIFICANT CHANGE UP (ref 7–60)
LYMPHOCYTES # BLD AUTO: 17.5 % — SIGNIFICANT CHANGE UP (ref 13–44)
LYMPHOCYTES # BLD AUTO: 17.5 % — SIGNIFICANT CHANGE UP (ref 13–44)
LYMPHOCYTES # BLD AUTO: 2.16 K/UL — SIGNIFICANT CHANGE UP (ref 1–3.3)
LYMPHOCYTES # BLD AUTO: 2.16 K/UL — SIGNIFICANT CHANGE UP (ref 1–3.3)
MACROCYTES BLD QL: SIGNIFICANT CHANGE UP
MACROCYTES BLD QL: SIGNIFICANT CHANGE UP
MAGNESIUM SERPL-MCNC: 1.4 MG/DL — LOW (ref 1.6–2.6)
MAGNESIUM SERPL-MCNC: 1.4 MG/DL — LOW (ref 1.6–2.6)
MAGNESIUM SERPL-MCNC: 1.6 MG/DL — SIGNIFICANT CHANGE UP (ref 1.6–2.6)
MAGNESIUM SERPL-MCNC: 1.6 MG/DL — SIGNIFICANT CHANGE UP (ref 1.6–2.6)
MANUAL SMEAR VERIFICATION: SIGNIFICANT CHANGE UP
MANUAL SMEAR VERIFICATION: SIGNIFICANT CHANGE UP
MCHC RBC-ENTMCNC: 23.9 PG — LOW (ref 27–34)
MCHC RBC-ENTMCNC: 23.9 PG — LOW (ref 27–34)
MCHC RBC-ENTMCNC: 32.6 GM/DL — SIGNIFICANT CHANGE UP (ref 32–36)
MCHC RBC-ENTMCNC: 32.6 GM/DL — SIGNIFICANT CHANGE UP (ref 32–36)
MCV RBC AUTO: 73.3 FL — LOW (ref 80–100)
MCV RBC AUTO: 73.3 FL — LOW (ref 80–100)
MONOCYTES # BLD AUTO: 0.22 K/UL — SIGNIFICANT CHANGE UP (ref 0–0.9)
MONOCYTES # BLD AUTO: 0.22 K/UL — SIGNIFICANT CHANGE UP (ref 0–0.9)
MONOCYTES NFR BLD AUTO: 1.8 % — LOW (ref 2–14)
MONOCYTES NFR BLD AUTO: 1.8 % — LOW (ref 2–14)
NEUTROPHILS # BLD AUTO: 9.86 K/UL — HIGH (ref 1.8–7.4)
NEUTROPHILS # BLD AUTO: 9.86 K/UL — HIGH (ref 1.8–7.4)
NEUTROPHILS NFR BLD AUTO: 78.9 % — HIGH (ref 43–77)
NEUTROPHILS NFR BLD AUTO: 78.9 % — HIGH (ref 43–77)
NEUTS BAND # BLD: 0.9 % — SIGNIFICANT CHANGE UP (ref 0–8)
NEUTS BAND # BLD: 0.9 % — SIGNIFICANT CHANGE UP (ref 0–8)
OVALOCYTES BLD QL SMEAR: SLIGHT — SIGNIFICANT CHANGE UP
OVALOCYTES BLD QL SMEAR: SLIGHT — SIGNIFICANT CHANGE UP
PCO2 BLDV: 50 MMHG — HIGH (ref 39–42)
PCO2 BLDV: 50 MMHG — HIGH (ref 39–42)
PCO2 BLDV: 51 MMHG — HIGH (ref 39–42)
PCO2 BLDV: 51 MMHG — HIGH (ref 39–42)
PCO2 BLDV: 52 MMHG — HIGH (ref 39–42)
PCO2 BLDV: 52 MMHG — HIGH (ref 39–42)
PH BLDV: 7.48 — HIGH (ref 7.32–7.43)
PH BLDV: 7.48 — HIGH (ref 7.32–7.43)
PH BLDV: 7.54 — HIGH (ref 7.32–7.43)
PH BLDV: 7.54 — HIGH (ref 7.32–7.43)
PH BLDV: 7.59 — HIGH (ref 7.32–7.43)
PH BLDV: 7.59 — HIGH (ref 7.32–7.43)
PHOSPHATE SERPL-MCNC: 2.1 MG/DL — LOW (ref 2.5–4.5)
PHOSPHATE SERPL-MCNC: 2.1 MG/DL — LOW (ref 2.5–4.5)
PHOSPHATE SERPL-MCNC: 2.6 MG/DL — SIGNIFICANT CHANGE UP (ref 2.5–4.5)
PHOSPHATE SERPL-MCNC: 2.6 MG/DL — SIGNIFICANT CHANGE UP (ref 2.5–4.5)
PLAT MORPH BLD: ABNORMAL
PLAT MORPH BLD: ABNORMAL
PLATELET # BLD AUTO: 350 K/UL — SIGNIFICANT CHANGE UP (ref 150–400)
PLATELET # BLD AUTO: 350 K/UL — SIGNIFICANT CHANGE UP (ref 150–400)
PO2 BLDV: 16 MMHG — LOW (ref 25–45)
PO2 BLDV: 16 MMHG — LOW (ref 25–45)
PO2 BLDV: 34 MMHG — SIGNIFICANT CHANGE UP (ref 25–45)
PO2 BLDV: 34 MMHG — SIGNIFICANT CHANGE UP (ref 25–45)
PO2 BLDV: 44 MMHG — SIGNIFICANT CHANGE UP (ref 25–45)
PO2 BLDV: 44 MMHG — SIGNIFICANT CHANGE UP (ref 25–45)
POIKILOCYTOSIS BLD QL AUTO: SIGNIFICANT CHANGE UP
POIKILOCYTOSIS BLD QL AUTO: SIGNIFICANT CHANGE UP
POTASSIUM BLDV-SCNC: 2.7 MMOL/L — CRITICAL LOW (ref 3.5–5.1)
POTASSIUM BLDV-SCNC: 2.7 MMOL/L — CRITICAL LOW (ref 3.5–5.1)
POTASSIUM BLDV-SCNC: 3.3 MMOL/L — LOW (ref 3.5–5.1)
POTASSIUM BLDV-SCNC: 3.3 MMOL/L — LOW (ref 3.5–5.1)
POTASSIUM BLDV-SCNC: 3.7 MMOL/L — SIGNIFICANT CHANGE UP (ref 3.5–5.1)
POTASSIUM BLDV-SCNC: 3.7 MMOL/L — SIGNIFICANT CHANGE UP (ref 3.5–5.1)
POTASSIUM SERPL-MCNC: 2.8 MMOL/L — CRITICAL LOW (ref 3.5–5.3)
POTASSIUM SERPL-MCNC: 2.8 MMOL/L — CRITICAL LOW (ref 3.5–5.3)
POTASSIUM SERPL-MCNC: 2.9 MMOL/L — CRITICAL LOW (ref 3.5–5.3)
POTASSIUM SERPL-MCNC: 2.9 MMOL/L — CRITICAL LOW (ref 3.5–5.3)
POTASSIUM SERPL-MCNC: 3.3 MMOL/L — LOW (ref 3.5–5.3)
POTASSIUM SERPL-MCNC: 3.3 MMOL/L — LOW (ref 3.5–5.3)
POTASSIUM SERPL-SCNC: 2.8 MMOL/L — CRITICAL LOW (ref 3.5–5.3)
POTASSIUM SERPL-SCNC: 2.8 MMOL/L — CRITICAL LOW (ref 3.5–5.3)
POTASSIUM SERPL-SCNC: 2.9 MMOL/L — CRITICAL LOW (ref 3.5–5.3)
POTASSIUM SERPL-SCNC: 2.9 MMOL/L — CRITICAL LOW (ref 3.5–5.3)
POTASSIUM SERPL-SCNC: 3.3 MMOL/L — LOW (ref 3.5–5.3)
POTASSIUM SERPL-SCNC: 3.3 MMOL/L — LOW (ref 3.5–5.3)
PROT SERPL-MCNC: 10.6 G/DL — HIGH (ref 6–8.3)
PROT SERPL-MCNC: 10.6 G/DL — HIGH (ref 6–8.3)
PROT SERPL-MCNC: 7.3 G/DL — SIGNIFICANT CHANGE UP (ref 6–8.3)
PROT SERPL-MCNC: 7.3 G/DL — SIGNIFICANT CHANGE UP (ref 6–8.3)
PROTHROM AB SERPL-ACNC: 11.2 SEC — SIGNIFICANT CHANGE UP (ref 9.5–13)
PROTHROM AB SERPL-ACNC: 11.2 SEC — SIGNIFICANT CHANGE UP (ref 9.5–13)
RBC # BLD: 5.61 M/UL — HIGH (ref 3.8–5.2)
RBC # BLD: 5.61 M/UL — HIGH (ref 3.8–5.2)
RBC # FLD: 15 % — HIGH (ref 10.3–14.5)
RBC # FLD: 15 % — HIGH (ref 10.3–14.5)
RBC BLD AUTO: ABNORMAL
RBC BLD AUTO: ABNORMAL
RSV RNA NPH QL NAA+NON-PROBE: SIGNIFICANT CHANGE UP
RSV RNA NPH QL NAA+NON-PROBE: SIGNIFICANT CHANGE UP
SAO2 % BLDV: 19.6 % — LOW (ref 67–88)
SAO2 % BLDV: 19.6 % — LOW (ref 67–88)
SAO2 % BLDV: 50.8 % — LOW (ref 67–88)
SAO2 % BLDV: 50.8 % — LOW (ref 67–88)
SAO2 % BLDV: 73 % — SIGNIFICANT CHANGE UP (ref 67–88)
SAO2 % BLDV: 73 % — SIGNIFICANT CHANGE UP (ref 67–88)
SARS-COV-2 RNA SPEC QL NAA+PROBE: SIGNIFICANT CHANGE UP
SARS-COV-2 RNA SPEC QL NAA+PROBE: SIGNIFICANT CHANGE UP
SODIUM SERPL-SCNC: 135 MMOL/L — SIGNIFICANT CHANGE UP (ref 135–145)
SODIUM SERPL-SCNC: 135 MMOL/L — SIGNIFICANT CHANGE UP (ref 135–145)
SODIUM SERPL-SCNC: 136 MMOL/L — SIGNIFICANT CHANGE UP (ref 135–145)
SODIUM SERPL-SCNC: 136 MMOL/L — SIGNIFICANT CHANGE UP (ref 135–145)
SODIUM SERPL-SCNC: 137 MMOL/L — SIGNIFICANT CHANGE UP (ref 135–145)
SODIUM SERPL-SCNC: 137 MMOL/L — SIGNIFICANT CHANGE UP (ref 135–145)
STOMATOCYTES BLD QL SMEAR: SLIGHT — SIGNIFICANT CHANGE UP
STOMATOCYTES BLD QL SMEAR: SLIGHT — SIGNIFICANT CHANGE UP
TARGETS BLD QL SMEAR: SIGNIFICANT CHANGE UP
TARGETS BLD QL SMEAR: SIGNIFICANT CHANGE UP
TROPONIN T, HIGH SENSITIVITY RESULT: 9 NG/L — SIGNIFICANT CHANGE UP (ref 0–51)
TROPONIN T, HIGH SENSITIVITY RESULT: 9 NG/L — SIGNIFICANT CHANGE UP (ref 0–51)
WBC # BLD: 12.36 K/UL — HIGH (ref 3.8–10.5)
WBC # BLD: 12.36 K/UL — HIGH (ref 3.8–10.5)
WBC # FLD AUTO: 12.36 K/UL — HIGH (ref 3.8–10.5)
WBC # FLD AUTO: 12.36 K/UL — HIGH (ref 3.8–10.5)

## 2024-01-07 PROCEDURE — 99291 CRITICAL CARE FIRST HOUR: CPT

## 2024-01-07 PROCEDURE — 71045 X-RAY EXAM CHEST 1 VIEW: CPT | Mod: 26

## 2024-01-07 PROCEDURE — 99222 1ST HOSP IP/OBS MODERATE 55: CPT

## 2024-01-07 PROCEDURE — 76705 ECHO EXAM OF ABDOMEN: CPT | Mod: 26

## 2024-01-07 PROCEDURE — 74176 CT ABD & PELVIS W/O CONTRAST: CPT | Mod: 26,MA

## 2024-01-07 RX ORDER — INFLUENZA VIRUS VACCINE 15; 15; 15; 15 UG/.5ML; UG/.5ML; UG/.5ML; UG/.5ML
0.5 SUSPENSION INTRAMUSCULAR ONCE
Refills: 0 | Status: DISCONTINUED | OUTPATIENT
Start: 2024-01-07 | End: 2024-01-08

## 2024-01-07 RX ORDER — SODIUM CHLORIDE 9 MG/ML
1000 INJECTION, SOLUTION INTRAVENOUS
Refills: 0 | Status: DISCONTINUED | OUTPATIENT
Start: 2024-01-07 | End: 2024-01-08

## 2024-01-07 RX ORDER — LANOLIN ALCOHOL/MO/W.PET/CERES
3 CREAM (GRAM) TOPICAL AT BEDTIME
Refills: 0 | Status: DISCONTINUED | OUTPATIENT
Start: 2024-01-07 | End: 2024-01-08

## 2024-01-07 RX ORDER — SUCRALFATE 1 G
1 TABLET ORAL
Refills: 0 | Status: DISCONTINUED | OUTPATIENT
Start: 2024-01-07 | End: 2024-01-08

## 2024-01-07 RX ORDER — POTASSIUM CHLORIDE 20 MEQ
40 PACKET (EA) ORAL ONCE
Refills: 0 | Status: COMPLETED | OUTPATIENT
Start: 2024-01-07 | End: 2024-01-07

## 2024-01-07 RX ORDER — THIAMINE MONONITRATE (VIT B1) 100 MG
100 TABLET ORAL ONCE
Refills: 0 | Status: COMPLETED | OUTPATIENT
Start: 2024-01-07 | End: 2024-01-07

## 2024-01-07 RX ORDER — POTASSIUM CHLORIDE 20 MEQ
10 PACKET (EA) ORAL
Refills: 0 | Status: COMPLETED | OUTPATIENT
Start: 2024-01-07 | End: 2024-01-07

## 2024-01-07 RX ORDER — PANTOPRAZOLE SODIUM 20 MG/1
40 TABLET, DELAYED RELEASE ORAL
Refills: 0 | Status: DISCONTINUED | OUTPATIENT
Start: 2024-01-07 | End: 2024-01-08

## 2024-01-07 RX ORDER — MAGNESIUM SULFATE 500 MG/ML
1 VIAL (ML) INJECTION ONCE
Refills: 0 | Status: COMPLETED | OUTPATIENT
Start: 2024-01-07 | End: 2024-01-07

## 2024-01-07 RX ORDER — FOLIC ACID 0.8 MG
1 TABLET ORAL ONCE
Refills: 0 | Status: COMPLETED | OUTPATIENT
Start: 2024-01-07 | End: 2024-01-07

## 2024-01-07 RX ORDER — SODIUM CHLORIDE 9 MG/ML
2000 INJECTION INTRAMUSCULAR; INTRAVENOUS; SUBCUTANEOUS ONCE
Refills: 0 | Status: COMPLETED | OUTPATIENT
Start: 2024-01-07 | End: 2024-01-07

## 2024-01-07 RX ORDER — FOLIC ACID 0.8 MG
1 TABLET ORAL DAILY
Refills: 0 | Status: DISCONTINUED | OUTPATIENT
Start: 2024-01-07 | End: 2024-01-08

## 2024-01-07 RX ORDER — NICOTINE POLACRILEX 2 MG
1 GUM BUCCAL EVERY 24 HOURS
Refills: 0 | Status: DISCONTINUED | OUTPATIENT
Start: 2024-01-07 | End: 2024-01-08

## 2024-01-07 RX ORDER — CALCIUM GLUCONATE 100 MG/ML
1 VIAL (ML) INTRAVENOUS ONCE
Refills: 0 | Status: COMPLETED | OUTPATIENT
Start: 2024-01-07 | End: 2024-01-07

## 2024-01-07 RX ADMIN — Medication 1 PATCH: at 18:28

## 2024-01-07 RX ADMIN — Medication 100 MILLIEQUIVALENT(S): at 17:32

## 2024-01-07 RX ADMIN — Medication 40 MILLIEQUIVALENT(S): at 14:10

## 2024-01-07 RX ADMIN — Medication 100 GRAM(S): at 14:11

## 2024-01-07 RX ADMIN — Medication 1 MILLIGRAM(S): at 11:54

## 2024-01-07 RX ADMIN — Medication 1 MILLIGRAM(S): at 11:53

## 2024-01-07 RX ADMIN — Medication 1 GRAM(S): at 17:22

## 2024-01-07 RX ADMIN — Medication 50 MILLIGRAM(S): at 17:32

## 2024-01-07 RX ADMIN — Medication 100 MILLIEQUIVALENT(S): at 18:38

## 2024-01-07 RX ADMIN — Medication 1 GRAM(S): at 23:56

## 2024-01-07 RX ADMIN — Medication 1 PATCH: at 18:19

## 2024-01-07 RX ADMIN — Medication 100 GRAM(S): at 16:31

## 2024-01-07 RX ADMIN — Medication 100 MILLIEQUIVALENT(S): at 20:49

## 2024-01-07 RX ADMIN — Medication 3 MILLIGRAM(S): at 23:56

## 2024-01-07 RX ADMIN — Medication 1 GRAM(S): at 22:08

## 2024-01-07 RX ADMIN — Medication 100 MILLIGRAM(S): at 11:53

## 2024-01-07 RX ADMIN — SODIUM CHLORIDE 2000 MILLILITER(S): 9 INJECTION INTRAMUSCULAR; INTRAVENOUS; SUBCUTANEOUS at 11:56

## 2024-01-07 RX ADMIN — SODIUM CHLORIDE 2000 MILLILITER(S): 9 INJECTION INTRAMUSCULAR; INTRAVENOUS; SUBCUTANEOUS at 14:49

## 2024-01-07 NOTE — ED PROVIDER NOTE - ATTENDING CONTRIBUTION TO CARE
attending Luciana: 43yF h/o alcohol use disorder with multiple recent admissions p/w 3 days of nausea, NBNB vomiting, and midsternal intermittent moderate chest pain when vomiting in setting of abruptly stopping her alcohol intake 2 days ago. Denies prior withdrawal seizures. Exam as above. Will place on CIWA protocol, ekg, place on tele, labs eval lytes, trop, IVF, ativan electrolyte repletion PRN, will require admission

## 2024-01-07 NOTE — H&P ADULT - HISTORY OF PRESENT ILLNESS
43F PM alcohol use disorder with multiple prior admissions for this problem, presents with alcohol withdrawal. Pt reports she normally drinks about 1 pint of vodka per day. She was in her normal state of health until Saturday, when she developed viral symptoms, and developed nausea, and nonbloody, nonbilious vomiting, as well as midsternal chest pain. This continued to worsen, and patient was subsequently brought to hospital for evaluation de to withdrawal symptoms.   Upon arrival, VSS. CBC with WBC count of 12. CMP notable for transaminitis, and Cr elevation to 2.83 (downtrended to 1.78), Blood alcohol level not elevated. RUQ u/s performed and without signs of acute cholecystitis, however with liver parenchyma with steatosis.  Of note, patient has multiple alcohol withdrawal admissions, however has never required ICU admission or intubation.    Patient is now admitted for evaluation and treatment of alcohol withdrawal.

## 2024-01-07 NOTE — ED PROVIDER NOTE - OBJECTIVE STATEMENT
44 y/o female w/ PMH alcohol use disorder resulting in multiple recent admissions c/o 3 day history of nausea, NBNB vomiting, and midsternal intermittent moderate chest pain a/w vomiting. Pt admitted to resolved fever yesterday. Pt is otherwise asymptomatic. Denies dizziness, SOB, abdominal pain, dysuria, hematuria.

## 2024-01-07 NOTE — H&P ADULT - ASSESSMENT
43F PMH AUD with multiple admissions, presents with 3 days of nausea and vomiting, now in alcohol withdrawal

## 2024-01-07 NOTE — ED ADULT NURSE NOTE - NSFALLUNIVINTERV_ED_ALL_ED
Bed/Stretcher in lowest position, wheels locked, appropriate side rails in place/Call bell, personal items and telephone in reach/Instruct patient to call for assistance before getting out of bed/chair/stretcher/Non-slip footwear applied when patient is off stretcher/[place] to call system/Physically safe environment - no spills, clutter or unnecessary equipment/Purposeful proactive rounding/Room/bathroom lighting operational, light cord in reach Bed/Stretcher in lowest position, wheels locked, appropriate side rails in place/Call bell, personal items and telephone in reach/Instruct patient to call for assistance before getting out of bed/chair/stretcher/Non-slip footwear applied when patient is off stretcher/[place] to call system/Physically safe environment - no spills, clutter or unnecessary equipment/Purposeful proactive rounding/Room/bathroom lighting operational, light cord in reach

## 2024-01-07 NOTE — PATIENT PROFILE ADULT - FALL HARM RISK - UNIVERSAL INTERVENTIONS
Bed in lowest position, wheels locked, appropriate side rails in place/Call bell, personal items and telephone in reach/Instruct patient to call for assistance before getting out of bed or chair/Non-slip footwear when patient is out of bed/[place] to call system/Physically safe environment - no spills, clutter or unnecessary equipment/Purposeful Proactive Rounding/Room/bathroom lighting operational, light cord in reach Bed in lowest position, wheels locked, appropriate side rails in place/Call bell, personal items and telephone in reach/Instruct patient to call for assistance before getting out of bed or chair/Non-slip footwear when patient is out of bed/[place] to call system/Physically safe environment - no spills, clutter or unnecessary equipment/Purposeful Proactive Rounding/Room/bathroom lighting operational, light cord in reach

## 2024-01-07 NOTE — ED PROVIDER NOTE - PHYSICAL EXAMINATION
GENERAL: NAD  HEENT:  Atraumatic  CHEST/LUNG: Chest rise equal bilaterally  HEART: Sinus tachycardia  ABDOMEN: Soft, Nontender, Nondistended  EXTREMITIES:  Extremities warm  PSYCH: A&Ox3  SKIN: No obvious rashes or lesions  MSK: No cervical spine TTP, able to range neck to the left and right  NEUROLOGY: strength and sensation intact in all extremities. Ambulatory without difficulty.

## 2024-01-07 NOTE — H&P ADULT - PROBLEM SELECTOR PLAN 3
In setting of alcohol abuse. Patient liver parenchyma with steatosis on right upper quadrant ultrasound  - Alcohol abstinence outpatient  - Continue to trend

## 2024-01-07 NOTE — ED PROVIDER NOTE - CADM POA CENTRAL LINE
FOCUS/GOAL  Bowel management, Bladder management, Pain management, and Medical management    ASSESSMENT, INTERVENTIONS AND CONTINUING PLAN FOR GOAL:  Patient A&O x4. Assist of 1 w/ walker. No complaints of pain. Incontinent of bladder. Potassium was 3.2 and scheduled Potassium Chloride given in AM. Follow up lab draw tomorrow AM. Continue w/ plan of care.      No

## 2024-01-07 NOTE — ED PROVIDER NOTE - CLINICAL SUMMARY MEDICAL DECISION MAKING FREE TEXT BOX
44 y/o female w/ PMH alcohol use disorder resulting in multiple recent admissions c/o 3 day history of nausea, NBNB vomiting, and midsternal intermittent moderate chest pain a/w vomiting. Pt admitted to resolved fever yesterday. Pt is otherwise asymptomatic. Denies dizziness, SOB, abdominal pain, dysuria, hematuria. Sinus tachycardia, mildly anxious. Will treat w/ benzos for ETOH withdrawal. Will screen for ACS (troponin), anemia/electrolytes, and chest x ray to screen for cardiopulmonary pathology. Potential admission for WA protocol.

## 2024-01-07 NOTE — ED ADULT NURSE NOTE - OBJECTIVE STATEMENT
Patient is a 44 yo female A&OX4 PMH alcohol use disorder presenting to the ED from home for alcohol withdrawal. Pt states last drink was Friday, endorses chest pain since Patient is a 42 yo female A&OX4 PMH alcohol use disorder presenting to the ED from home for alcohol withdrawal. Pt states last drink was Friday, endorses chest pain since Patient is a 44 yo female A&OX4 PMH alcohol use disorder presenting to the ED from home for alcohol withdrawal. Pt states last drink was Friday, endorses chest pain since having last drink on Friday. Pt endorses n/vomiting accompanied with midsternal intermittent moderate chest pain. Reports subjective fever yesterday. Denies SOB, abd pain/diarrhea, urinary symptoms, verbal and visual hallucinations, delusions. 20G placed in RAC. Call bell within reach, side rails up, bed in lowest position. Patient is a 42 yo female A&OX4 PMH alcohol use disorder presenting to the ED from home for alcohol withdrawal. Pt states last drink was Friday, endorses chest pain since having last drink on Friday. Pt endorses n/vomiting accompanied with midsternal intermittent moderate chest pain. Reports subjective fever yesterday. Denies SOB, abd pain/diarrhea, urinary symptoms, verbal and visual hallucinations, delusions. 20G placed in RAC. Call bell within reach, side rails up, bed in lowest position.

## 2024-01-07 NOTE — H&P ADULT - PROBLEM SELECTOR PLAN 1
Patient presents with alcohol withdrawal in setting of vomiting for 3 days. BAL <10 on admission. Multiple previous admissions for this problem, never intubated or admitted to ICU for withdrawal.   - CIWA protocol as ordered  - SBIRT consult   - IVF as below

## 2024-01-07 NOTE — ED PROVIDER NOTE - PROGRESS NOTE DETAILS
Jarrett MOON: Sinus tachycardia, QTc 528 Jarrett MOON: Pt is currently receiving CT scan. Hospitalist notified and will f/u w/ pending CT scan results. Admitted to [facility]. Jarrett MOON: Pt is currently receiving CT scan. Hospitalist notified and will f/u w/ pending CT scan results. Admitted to [facility].

## 2024-01-07 NOTE — H&P ADULT - NSHPPHYSICALEXAM_GEN_ALL_CORE
VITAL SIGNS:  T(C): 36.8 (01-07-24 @ 14:00), Max: 36.9 (01-07-24 @ 11:14)  T(F): 98.3 (01-07-24 @ 14:00), Max: 98.4 (01-07-24 @ 11:14)  HR: 85 (01-07-24 @ 14:00) (85 - 139)  BP: 113/81 (01-07-24 @ 14:00) (109/77 - 115/74)  BP(mean): 91 (01-07-24 @ 14:00) (91 - 97)  RR: 18 (01-07-24 @ 14:00) (17 - 20)  SpO2: 100% (01-07-24 @ 14:00) (96% - 100%)    PHYSICAL EXAM:  Constitutional: WDWN resting comfortably in bed; NAD  Head: NC/AT  Eyes: PERRL, EOMI, anicteric sclera  ENT: no nasal discharge; uvula midline, no oropharyngeal erythema or exudates; MMM  Neck: supple; no JVD or thyromegaly  Respiratory: CTA B/L; no W/R/R, no retractions  Cardiac: +S1/S2; RRR; no M/R/G; PMI non-displaced  Gastrointestinal: abdomen soft, NT/ND; no rebound or guarding; +BSx4  Back: spine midline, no bony tenderness or step-offs; no CVAT B/L  Extremities: WWP, no clubbing or cyanosis; no peripheral edema  Musculoskeletal: NROM x4; no joint swelling, tenderness or erythema  Vascular: 2+ radial, femoral, DP/PT pulses B/L  Dermatologic: skin warm, dry and intact; no diaphoresis   Lymphatic: no submandibular or cervical LAD  Neurologic: AAOx3; CNII-XII grossly intact; very fine tremor with hands outstretched   Psychiatric: affect and characteristics of appearance, verbalizations, behaviors are appropriate

## 2024-01-07 NOTE — H&P ADULT - PROBLEM SELECTOR PLAN 4
Likely dehydration secondary to severe vomiting over the past 3 days given concurrent hypochloremia.  - LR w. 20 mEq at 100 mL/hr  - Additional supplementation as ordered

## 2024-01-07 NOTE — H&P ADULT - PROBLEM SELECTOR PLAN 2
Patient with Cr elevated to 2.83 on admission, baseline appears to be 0.7 from prior hospitalizations. Likely dehydration secondary to severe vomiting over the past 3 days.  - LR w. 20 mEq at 100 mL/hr  - Avoid nephrotoxins if possible

## 2024-01-07 NOTE — ED ADULT NURSE NOTE - CHIEF COMPLAINT QUOTE
non-radiating chest pain, palpitations, LUQ pain, N/V x3 days. stopped drinking 1 pint of vodka per day x3 days ago. Denies seizures r/t withdrawal.

## 2024-01-07 NOTE — ED ADULT TRIAGE NOTE - CHIEF COMPLAINT QUOTE
non-radiating chest pain, palpitations, LUQ pain, N/V x3 days. stopped drinking 1 pint per day x3 days ago. Denies seizures r/t withdrawal. non-radiating chest pain, palpitations, LUQ pain, N/V x3 days. stopped drinking 1 pint of vodka per day x3 days ago. Denies seizures r/t withdrawal.

## 2024-01-07 NOTE — ED PROVIDER NOTE - CARE PLAN
1 Principal Discharge DX:	Chest pain   Principal Discharge DX:	Chest pain  Secondary Diagnosis:	HANNAH (acute kidney injury)  Secondary Diagnosis:	Alcohol dependence with withdrawal

## 2024-01-08 ENCOUNTER — TRANSCRIPTION ENCOUNTER (OUTPATIENT)
Age: 44
End: 2024-01-08

## 2024-01-08 VITALS
OXYGEN SATURATION: 97 % | RESPIRATION RATE: 18 BRPM | DIASTOLIC BLOOD PRESSURE: 76 MMHG | TEMPERATURE: 99 F | HEART RATE: 98 BPM | SYSTOLIC BLOOD PRESSURE: 117 MMHG

## 2024-01-08 DIAGNOSIS — F10.239 ALCOHOL DEPENDENCE WITH WITHDRAWAL, UNSPECIFIED: ICD-10-CM

## 2024-01-08 LAB
ANION GAP SERPL CALC-SCNC: 13 MMOL/L — SIGNIFICANT CHANGE UP (ref 5–17)
ANION GAP SERPL CALC-SCNC: 13 MMOL/L — SIGNIFICANT CHANGE UP (ref 5–17)
ANION GAP SERPL CALC-SCNC: 14 MMOL/L — SIGNIFICANT CHANGE UP (ref 5–17)
ANION GAP SERPL CALC-SCNC: 14 MMOL/L — SIGNIFICANT CHANGE UP (ref 5–17)
BUN SERPL-MCNC: 12 MG/DL — SIGNIFICANT CHANGE UP (ref 7–23)
CALCIUM SERPL-MCNC: 10.1 MG/DL — SIGNIFICANT CHANGE UP (ref 8.4–10.5)
CALCIUM SERPL-MCNC: 10.1 MG/DL — SIGNIFICANT CHANGE UP (ref 8.4–10.5)
CALCIUM SERPL-MCNC: 9.7 MG/DL — SIGNIFICANT CHANGE UP (ref 8.4–10.5)
CALCIUM SERPL-MCNC: 9.7 MG/DL — SIGNIFICANT CHANGE UP (ref 8.4–10.5)
CHLORIDE SERPL-SCNC: 91 MMOL/L — LOW (ref 96–108)
CHLORIDE SERPL-SCNC: 91 MMOL/L — LOW (ref 96–108)
CHLORIDE SERPL-SCNC: 93 MMOL/L — LOW (ref 96–108)
CHLORIDE SERPL-SCNC: 93 MMOL/L — LOW (ref 96–108)
CO2 SERPL-SCNC: 32 MMOL/L — HIGH (ref 22–31)
CO2 SERPL-SCNC: 32 MMOL/L — HIGH (ref 22–31)
CO2 SERPL-SCNC: 33 MMOL/L — HIGH (ref 22–31)
CO2 SERPL-SCNC: 33 MMOL/L — HIGH (ref 22–31)
CREAT SERPL-MCNC: 0.73 MG/DL — SIGNIFICANT CHANGE UP (ref 0.5–1.3)
CREAT SERPL-MCNC: 0.73 MG/DL — SIGNIFICANT CHANGE UP (ref 0.5–1.3)
CREAT SERPL-MCNC: 0.94 MG/DL — SIGNIFICANT CHANGE UP (ref 0.5–1.3)
CREAT SERPL-MCNC: 0.94 MG/DL — SIGNIFICANT CHANGE UP (ref 0.5–1.3)
EGFR: 104 ML/MIN/1.73M2 — SIGNIFICANT CHANGE UP
EGFR: 104 ML/MIN/1.73M2 — SIGNIFICANT CHANGE UP
EGFR: 77 ML/MIN/1.73M2 — SIGNIFICANT CHANGE UP
EGFR: 77 ML/MIN/1.73M2 — SIGNIFICANT CHANGE UP
GLUCOSE SERPL-MCNC: 107 MG/DL — HIGH (ref 70–99)
GLUCOSE SERPL-MCNC: 107 MG/DL — HIGH (ref 70–99)
GLUCOSE SERPL-MCNC: 121 MG/DL — HIGH (ref 70–99)
GLUCOSE SERPL-MCNC: 121 MG/DL — HIGH (ref 70–99)
MAGNESIUM SERPL-MCNC: 1.9 MG/DL — SIGNIFICANT CHANGE UP (ref 1.6–2.6)
MAGNESIUM SERPL-MCNC: 1.9 MG/DL — SIGNIFICANT CHANGE UP (ref 1.6–2.6)
MAGNESIUM SERPL-MCNC: 2 MG/DL — SIGNIFICANT CHANGE UP (ref 1.6–2.6)
MAGNESIUM SERPL-MCNC: 2 MG/DL — SIGNIFICANT CHANGE UP (ref 1.6–2.6)
PHOSPHATE SERPL-MCNC: 0.7 MG/DL — CRITICAL LOW (ref 2.5–4.5)
PHOSPHATE SERPL-MCNC: 0.7 MG/DL — CRITICAL LOW (ref 2.5–4.5)
PHOSPHATE SERPL-MCNC: 2.9 MG/DL — SIGNIFICANT CHANGE UP (ref 2.5–4.5)
PHOSPHATE SERPL-MCNC: 2.9 MG/DL — SIGNIFICANT CHANGE UP (ref 2.5–4.5)
POTASSIUM SERPL-MCNC: 3.6 MMOL/L — SIGNIFICANT CHANGE UP (ref 3.5–5.3)
POTASSIUM SERPL-MCNC: 3.6 MMOL/L — SIGNIFICANT CHANGE UP (ref 3.5–5.3)
POTASSIUM SERPL-MCNC: 3.7 MMOL/L — SIGNIFICANT CHANGE UP (ref 3.5–5.3)
POTASSIUM SERPL-MCNC: 3.7 MMOL/L — SIGNIFICANT CHANGE UP (ref 3.5–5.3)
POTASSIUM SERPL-SCNC: 3.6 MMOL/L — SIGNIFICANT CHANGE UP (ref 3.5–5.3)
POTASSIUM SERPL-SCNC: 3.6 MMOL/L — SIGNIFICANT CHANGE UP (ref 3.5–5.3)
POTASSIUM SERPL-SCNC: 3.7 MMOL/L — SIGNIFICANT CHANGE UP (ref 3.5–5.3)
POTASSIUM SERPL-SCNC: 3.7 MMOL/L — SIGNIFICANT CHANGE UP (ref 3.5–5.3)
SODIUM SERPL-SCNC: 138 MMOL/L — SIGNIFICANT CHANGE UP (ref 135–145)

## 2024-01-08 PROCEDURE — 99233 SBSQ HOSP IP/OBS HIGH 50: CPT

## 2024-01-08 PROCEDURE — 71045 X-RAY EXAM CHEST 1 VIEW: CPT

## 2024-01-08 PROCEDURE — 83690 ASSAY OF LIPASE: CPT

## 2024-01-08 PROCEDURE — 84702 CHORIONIC GONADOTROPIN TEST: CPT

## 2024-01-08 PROCEDURE — 85025 COMPLETE CBC W/AUTO DIFF WBC: CPT

## 2024-01-08 PROCEDURE — 84100 ASSAY OF PHOSPHORUS: CPT

## 2024-01-08 PROCEDURE — 74176 CT ABD & PELVIS W/O CONTRAST: CPT | Mod: MA

## 2024-01-08 PROCEDURE — 85610 PROTHROMBIN TIME: CPT

## 2024-01-08 PROCEDURE — 82803 BLOOD GASES ANY COMBINATION: CPT

## 2024-01-08 PROCEDURE — 83735 ASSAY OF MAGNESIUM: CPT

## 2024-01-08 PROCEDURE — 96374 THER/PROPH/DIAG INJ IV PUSH: CPT

## 2024-01-08 PROCEDURE — 84484 ASSAY OF TROPONIN QUANT: CPT

## 2024-01-08 PROCEDURE — 85014 HEMATOCRIT: CPT

## 2024-01-08 PROCEDURE — 83605 ASSAY OF LACTIC ACID: CPT

## 2024-01-08 PROCEDURE — 80053 COMPREHEN METABOLIC PANEL: CPT

## 2024-01-08 PROCEDURE — 82140 ASSAY OF AMMONIA: CPT

## 2024-01-08 PROCEDURE — 80307 DRUG TEST PRSMV CHEM ANLYZR: CPT

## 2024-01-08 PROCEDURE — 84295 ASSAY OF SERUM SODIUM: CPT

## 2024-01-08 PROCEDURE — 87637 SARSCOV2&INF A&B&RSV AMP PRB: CPT

## 2024-01-08 PROCEDURE — 36415 COLL VENOUS BLD VENIPUNCTURE: CPT

## 2024-01-08 PROCEDURE — 82435 ASSAY OF BLOOD CHLORIDE: CPT

## 2024-01-08 PROCEDURE — 76705 ECHO EXAM OF ABDOMEN: CPT

## 2024-01-08 PROCEDURE — 84132 ASSAY OF SERUM POTASSIUM: CPT

## 2024-01-08 PROCEDURE — 96375 TX/PRO/DX INJ NEW DRUG ADDON: CPT

## 2024-01-08 PROCEDURE — 80048 BASIC METABOLIC PNL TOTAL CA: CPT

## 2024-01-08 PROCEDURE — 99291 CRITICAL CARE FIRST HOUR: CPT | Mod: 25

## 2024-01-08 PROCEDURE — 82947 ASSAY GLUCOSE BLOOD QUANT: CPT

## 2024-01-08 PROCEDURE — 85730 THROMBOPLASTIN TIME PARTIAL: CPT

## 2024-01-08 PROCEDURE — 85018 HEMOGLOBIN: CPT

## 2024-01-08 PROCEDURE — 82330 ASSAY OF CALCIUM: CPT

## 2024-01-08 RX ORDER — ONDANSETRON 8 MG/1
4 TABLET, FILM COATED ORAL ONCE
Refills: 0 | Status: DISCONTINUED | OUTPATIENT
Start: 2024-01-08 | End: 2024-01-08

## 2024-01-08 RX ORDER — NICOTINE POLACRILEX 2 MG
1 GUM BUCCAL DAILY
Refills: 0 | Status: DISCONTINUED | OUTPATIENT
Start: 2024-01-08 | End: 2024-01-08

## 2024-01-08 RX ORDER — SUCRALFATE 1 G
1 TABLET ORAL
Qty: 0 | Refills: 0 | DISCHARGE
Start: 2024-01-08

## 2024-01-08 RX ORDER — POTASSIUM CHLORIDE 20 MEQ
20 PACKET (EA) ORAL ONCE
Refills: 0 | Status: COMPLETED | OUTPATIENT
Start: 2024-01-08 | End: 2024-01-08

## 2024-01-08 RX ORDER — FOLIC ACID 0.8 MG
1 TABLET ORAL
Qty: 0 | Refills: 0 | DISCHARGE
Start: 2024-01-08

## 2024-01-08 RX ORDER — POTASSIUM PHOSPHATE, MONOBASIC POTASSIUM PHOSPHATE, DIBASIC 236; 224 MG/ML; MG/ML
30 INJECTION, SOLUTION INTRAVENOUS ONCE
Refills: 0 | Status: COMPLETED | OUTPATIENT
Start: 2024-01-08 | End: 2024-01-08

## 2024-01-08 RX ADMIN — Medication 50 MILLIGRAM(S): at 05:37

## 2024-01-08 RX ADMIN — PANTOPRAZOLE SODIUM 40 MILLIGRAM(S): 20 TABLET, DELAYED RELEASE ORAL at 05:37

## 2024-01-08 RX ADMIN — Medication 1 GRAM(S): at 05:37

## 2024-01-08 RX ADMIN — Medication 50 MILLIGRAM(S): at 00:57

## 2024-01-08 RX ADMIN — Medication 1 PATCH: at 13:04

## 2024-01-08 RX ADMIN — Medication 30 MILLILITER(S): at 17:14

## 2024-01-08 RX ADMIN — Medication 1 GRAM(S): at 17:12

## 2024-01-08 RX ADMIN — Medication 85 MILLIMOLE(S): at 03:10

## 2024-01-08 RX ADMIN — Medication 1 MILLIGRAM(S): at 13:03

## 2024-01-08 RX ADMIN — Medication 1 PATCH: at 18:33

## 2024-01-08 RX ADMIN — Medication 1 TABLET(S): at 13:03

## 2024-01-08 RX ADMIN — Medication 20 MILLIEQUIVALENT(S): at 02:45

## 2024-01-08 RX ADMIN — Medication 1 GRAM(S): at 13:03

## 2024-01-08 RX ADMIN — SODIUM CHLORIDE 100 MILLILITER(S): 9 INJECTION, SOLUTION INTRAVENOUS at 00:56

## 2024-01-08 RX ADMIN — POTASSIUM PHOSPHATE, MONOBASIC POTASSIUM PHOSPHATE, DIBASIC 83.33 MILLIMOLE(S): 236; 224 INJECTION, SOLUTION INTRAVENOUS at 03:11

## 2024-01-08 RX ADMIN — Medication 50 MILLIGRAM(S): at 13:02

## 2024-01-08 RX ADMIN — Medication 1 PATCH: at 16:18

## 2024-01-08 NOTE — CHART NOTE - NSCHARTNOTEFT_GEN_A_CORE
Notified by RN ,  pt is requesting to sign out  AMA tonight, pt was due to be discharged in the morning. Pt was seen and evaluated at bedside. Pt is a & o x 3 , CIWA score is 0 , pt denies any CP, nausea, SOB. As per pt, her roommate has been yelling and being irrational to staff . Pt states despite multiple attempts to rationalize with her roommate, the roommate continued yelling out statements that upset the patient . Pt decline po ativan . I attempted to offer patient a new bed assignment for the night, however she declined . Dr. Billy made aware and states patient can sign out AMA. Pt has full capacity .  AMA forms signed and risk and benefits discussed. Pt verbalizes understanding .                             Anuradha Gonzalez FN-C   Department of Medicine- Night coverage  Spectra # [id number] Notified by RN ,  pt is requesting to sign out  AMA tonight, pt was due to be discharged in the morning. Pt was seen and evaluated at bedside. Pt is a & o x 3 , CIWA score is 0 , pt denies any CP, nausea, SOB. As per pt, her roommate has been yelling and being irrational to staff . Pt states despite multiple attempts to rationalize with her roommate, the roommate continued yelling out statements that upset the patient . Pt decline po ativan . I attempted to offer patient a new bed assignment for the night, however she declined . Dr. Billy made aware and states patient can sign out AMA. Pt has full capacity .  AMA forms signed and risk and benefits discussed. Pt verbalizes understanding .                             Anuradha Gonzalez FN-C   Department of Medicine- Night coverage  Spectra # [id number] Notified by RN ,  pt is requesting to sign out  AMA tonight, pt was due to be discharged in the morning. Pt was seen and evaluated at bedside. Pt is a & o x 3 , CIWA score is 0 , pt denies any CP, nausea, SOB. As per pt, her roommate has been yelling and being irrational to staff . Pt states despite multiple attempts to rationalize with her roommate, the roommate continued yelling out statements that upset the patient . Pt decline po ativan . I attempted to offer patient a new bed assignment for the night, however she declined . Dr. Billy made aware and states patient can sign out AMA. Pt has full capacity .  AMA forms signed and risk ( worsening of conditions and death) and benefits discussed. Pt verbalizes understanding . Pt signed AMA forms .                   Anuradha CARDOZA-C   Department of Medicine- Night coverage  Spectra # [id number] Notified by RN ,  pt is requesting to sign out  AMA tonight, pt was due to be discharged in the morning. Pt was seen and evaluated at bedside. Pt is a & o x 3 , CIWA score is 0 , pt denies any CP, nausea, SOB. As per pt, her roommate has been yelling and being irrational to staff . Pt states despite multiple attempts to rationalize with her roommate, the roommate continued yelling out statements that upset the patient . Pt decline po ativan . I attempted to offer patient a new bed assignment for the night, however she declined . Dr. Billy made aware and states patient can sign out AMA. Pt has full capacity .  AMA forms signed and risk ( worsening of conditions and death) and benefits discussed. Pt verbalizes understanding . Pt signed AMA forms .                   Anuradha CARDOZA-C   Department of Medicine- Night coverage  Spectra # [id number]

## 2024-01-08 NOTE — PROGRESS NOTE ADULT - PROBLEM SELECTOR PLAN 4
Likely dehydration secondary to severe vomiting over the past 3 days given concurrent hypochloremia.  - s/p supplementation  - resolved

## 2024-01-08 NOTE — DISCHARGE NOTE PROVIDER - CARE PROVIDER_API CALL
Tutu Billy  Internal Medicine  [address] [place] [id number]  Phone: [phone]  Fax: [phone]  Follow Up Time: Routine   Tutu Billy  Internal Medicine  [address] [place] [address]  Phone: [phone]  Fax: [phone]  Follow Up Time: Routine

## 2024-01-08 NOTE — PROGRESS NOTE ADULT - PROBLEM SELECTOR PLAN 2
Patient with Cr elevated to 2.83 on admission, baseline appears to be 0.7 from prior hospitalizations. Likely dehydration secondary to severe vomiting over the past 3 days.  - s/p IVF now at baseline  -resolved

## 2024-01-08 NOTE — DISCHARGE NOTE PROVIDER - PROVIDER TOKENS
PROVIDER:[TOKEN:[[id number]],FOLLOWUP:[Routine]] PROVIDER:[TOKEN:[[id number]],FOLLOWUP:[Routine]]

## 2024-01-08 NOTE — DISCHARGE NOTE PROVIDER - NSFOLLOWUPCLINICS_GEN_ALL_ED_FT
[facility] [place] - Primary Care  Primary Care  [address] [place] [address]  Phone: [phone]  Fax:   Follow Up Time: 1-3 days     [facility] - Primary Care  Primary Care  [address] [place] [address]  Phone: [phone]  Fax:   Follow Up Time: 1-3 days

## 2024-01-08 NOTE — PROGRESS NOTE ADULT - SUBJECTIVE AND OBJECTIVE BOX
[facility] Division of Hospital Medicine  Tutu Billy  MS Teams      SUBJECTIVE / OVERNIGHT EVENTS:  No events overnight  this am, mild anxiety but denies palpitations/tremors  denies f/chills/cough/sob/ab pain    ADDITIONAL REVIEW OF SYSTEMS:    MEDICATIONS  (STANDING):  chlordiazePOXIDE 50 milliGRAM(s) Oral every 8 hours  chlordiazePOXIDE   Oral   folic acid 1 milliGRAM(s) Oral daily  influenza   Vaccine 0.5 milliLiter(s) IntraMuscular once  lactated ringers 1000 milliLiter(s) (100 mL/Hr) IV Continuous <Continuous>  multivitamin 1 Tablet(s) Oral daily  nicotine -  14 mG/24Hr(s) Patch 1 Patch Transdermal daily  ondansetron Injectable 4 milliGRAM(s) IV Push once  pantoprazole    Tablet 40 milliGRAM(s) Oral before breakfast  sucralfate 1 Gram(s) Oral four times a day    MEDICATIONS  (PRN):  aluminum hydroxide/magnesium hydroxide/simethicone Suspension 30 milliLiter(s) Oral every 4 hours PRN Dyspepsia  LORazepam     Tablet 2 milliGRAM(s) Oral every 2 hours PRN CIWA-Ar score increase by 2 points and a total score of 7 or less  LORazepam   Injectable 2 milliGRAM(s) IV Push every 1 hour PRN CIWA-Ar score 8 or greater  melatonin 3 milliGRAM(s) Oral at bedtime PRN Insomnia  trimethobenzamide Injectable 200 milliGRAM(s) IntraMuscular every 8 hours PRN Nausea and/or Vomiting      I&O's Summary    07 Jan 2024 07:01  -  08 Jan 2024 07:00  --------------------------------------------------------  IN: 1950 mL / OUT: 500 mL / NET: 1450 mL    08 Jan 2024 07:01  -  08 Jan 2024 16:33  --------------------------------------------------------  IN: 500 mL / OUT: 600 mL / NET: -100 mL        PHYSICAL EXAM:  Vital Signs Last 24 Hrs  T(C): 37 (08 Jan 2024 14:31), Max: 37.2 (07 Jan 2024 23:35)  T(F): 98.6 (08 Jan 2024 14:31), Max: 99 (07 Jan 2024 23:35)  HR: 98 (08 Jan 2024 14:31) (86 - 116)  BP: 117/76 (08 Jan 2024 14:31) (100/70 - 117/76)  BP(mean): 79 (07 Jan 2024 22:12) (79 - 79)  RR: 18 (08 Jan 2024 14:31) (16 - 18)  SpO2: 97% (08 Jan 2024 14:31) (95% - 97%)    Parameters below as of 08 Jan 2024 14:31  Patient On (Oxygen Delivery Method): room air      CONSTITUTIONAL: NAD, well-developed  EYES: PERRLA; conjunctiva and sclera clear  ENMT: Moist oral mucosa, no pharyngeal injection or exudates, no tongue fasciculations   NECK: Supple, no palpable masses  RESPIRATORY: Normal respiratory effort; lungs are clear to auscultation bilaterally  CARDIOVASCULAR: Regular rate and rhythm, normal S1 and S2, no murmur; No lower extremity edema; Peripheral pulses are 2+ bilaterally  ABDOMEN: Nontender to palpation, normoactive bowel sounds, no rebound/guarding  MUSCULOSKELETAL:  no clubbing or cyanosis of digits; no joint swelling or tenderness to palpation  PSYCH: A+O to person, place, and time; affect appropriate  NEUROLOGY: CN 2-12 are intact and symmetric; no gross sensory deficits, no tremors  SKIN: No rashes; no palpable lesions    LABS:                        13.4   12.36 )-----------( 350      ( 07 Jan 2024 11:43 )             41.1     01-08    138  |  93<L>  |  12  ----------------------------<  121<H>  3.6   |  32<H>  |  0.73    Ca    9.7      08 Jan 2024 06:21  Phos  2.9     01-08  Mg     1.9     01-08    TPro  7.3  /  Alb  4.5  /  TBili  0.8  /  DBili  x   /  AST  51<H>  /  ALT  64<H>  /  AlkPhos  83  01-07    PT/INR - ( 07 Jan 2024 11:43 )   PT: 11.2 sec;   INR: 1.02 ratio          [facility] Division of Hospital Medicine  Tutu Billy  MS Teams      SUBJECTIVE / OVERNIGHT EVENTS:  No events overnight  this am, mild anxiety but denies palpitations/tremors  denies f/chills/cough/sob/ab pain    ADDITIONAL REVIEW OF SYSTEMS:    MEDICATIONS  (STANDING):  chlordiazePOXIDE 50 milliGRAM(s) Oral every 8 hours  chlordiazePOXIDE   Oral   folic acid 1 milliGRAM(s) Oral daily  influenza   Vaccine 0.5 milliLiter(s) IntraMuscular once  lactated ringers 1000 milliLiter(s) (100 mL/Hr) IV Continuous <Continuous>  multivitamin 1 Tablet(s) Oral daily  nicotine -  14 mG/24Hr(s) Patch 1 Patch Transdermal daily  ondansetron Injectable 4 milliGRAM(s) IV Push once  pantoprazole    Tablet 40 milliGRAM(s) Oral before breakfast  sucralfate 1 Gram(s) Oral four times a day    MEDICATIONS  (PRN):  aluminum hydroxide/magnesium hydroxide/simethicone Suspension 30 milliLiter(s) Oral every 4 hours PRN Dyspepsia  LORazepam     Tablet 2 milliGRAM(s) Oral every 2 hours PRN CIWA-Ar score increase by 2 points and a total score of 7 or less  LORazepam   Injectable 2 milliGRAM(s) IV Push every 1 hour PRN CIWA-Ar score 8 or greater  melatonin 3 milliGRAM(s) Oral at bedtime PRN Insomnia  trimethobenzamide Injectable 200 milliGRAM(s) IntraMuscular every 8 hours PRN Nausea and/or Vomiting      I&O's Summary    07 Jan 2024 07:01  -  08 Jan 2024 07:00  --------------------------------------------------------  IN: 1950 mL / OUT: 500 mL / NET: 1450 mL    08 Jan 2024 07:01  -  08 Jan 2024 16:33  --------------------------------------------------------  IN: 500 mL / OUT: 600 mL / NET: -100 mL        PHYSICAL EXAM:  Vital Signs Last 24 Hrs  T(C): 37 (08 Jan 2024 14:31), Max: 37.2 (07 Jan 2024 23:35)  T(F): 98.6 (08 Jan 2024 14:31), Max: 99 (07 Jan 2024 23:35)  HR: 98 (08 Jan 2024 14:31) (86 - 116)  BP: 117/76 (08 Jan 2024 14:31) (100/70 - 117/76)  BP(mean): 79 (07 Jan 2024 22:12) (79 - 79)  RR: 18 (08 Jan 2024 14:31) (16 - 18)  SpO2: 97% (08 Jan 2024 14:31) (95% - 97%)    Parameters below as of 08 Jan 2024 14:31  Patient On (Oxygen Delivery Method): room air      CONSTITUTIONAL: NAD, well-developed  EYES: PERRLA; conjunctiva and sclera clear  ENMT: Moist oral mucosa, no pharyngeal injection or exudates, no tongue fasciculations   NECK: Supple, no palpable masses  RESPIRATORY: Normal respiratory effort; lungs are clear to auscultation bilaterally  CARDIOVASCULAR: Regular rate and rhythm, normal S1 and S2, no murmur; No lower extremity edema; Peripheral pulses are 2+ bilaterally  ABDOMEN: Nontender to palpation, normoactive bowel sounds, no rebound/guarding  MUSCULOSKELETAL:  no clubbing or cyanosis of digits; no joint swelling or tenderness to palpation  PSYCH: A+O to person, place, and time; affect appropriate  NEUROLOGY: CN 2-12 are intact and symmetric; no gross sensory deficits, no tremors  SKIN: No rashes; no palpable lesions    LABS:                        13.4   12.36 )-----------( 350      ( 07 Jan 2024 11:43 )             41.1     01-08    138  |  93<L>  |  12  ----------------------------<  121<H>  3.6   |  32<H>  |  0.73    Ca    9.7      08 Jan 2024 06:21  Phos  2.9     01-08  Mg     1.9     01-08    TPro  7.3  /  Alb  4.5  /  TBili  0.8  /  DBili  x   /  AST  51<H>  /  ALT  64<H>  /  AlkPhos  83  01-07    PT/INR - ( 07 Jan 2024 11:43 )   PT: 11.2 sec;   INR: 1.02 ratio

## 2024-01-08 NOTE — DISCHARGE NOTE PROVIDER - NSDCMRMEDTOKEN_GEN_ALL_CORE_FT
folic acid 1 mg oral tablet: 1 tab(s) orally once a day  Multiple Vitamins oral tablet: 1 tab(s) orally once a day  sucralfate 1 g oral tablet: 1 tab(s) orally 4 times a day

## 2024-01-08 NOTE — DISCHARGE NOTE PROVIDER - NSDCCPCAREPLAN_GEN_ALL_CORE_FT
PRINCIPAL DISCHARGE DIAGNOSIS  Diagnosis: Alcohol dependence with withdrawal  Assessment and Plan of Treatment: Follow up with outpatient Alcoholic program      SECONDARY DISCHARGE DIAGNOSES  Diagnosis: HANNAH (acute kidney injury)  Assessment and Plan of Treatment: drink plenty of fluids    Diagnosis: Alcohol dependence with withdrawal  Assessment and Plan of Treatment:      PRINCIPAL DISCHARGE DIAGNOSIS  Diagnosis: Alcohol dependence with withdrawal  Assessment and Plan of Treatment: Follow up with outpatient Alcoholic program. Pt signed out AMA      SECONDARY DISCHARGE DIAGNOSES  Diagnosis: HANNAH (acute kidney injury)  Assessment and Plan of Treatment: drink plenty of fluids    Diagnosis: Alcohol dependence with withdrawal  Assessment and Plan of Treatment:

## 2024-01-08 NOTE — DISCHARGE NOTE PROVIDER - HOSPITAL COURSE
HPI:  43F PMH alcohol use disorder with multiple prior admissions for this problem, presents with alcohol withdrawal. Pt reports she normally drinks about 1 pint of vodka per day. She was in her normal state of health until Saturday, when she developed viral symptoms, and developed nausea, and nonbloody, nonbilious vomiting, as well as midsternal chest pain. This continued to worsen, and patient was subsequently brought to hospital for evaluation de to withdrawal symptoms.   Upon arrival, VSS. CBC with WBC count of 12. CMP notable for transaminitis, and Cr elevation to 2.83 (downtrended to 1.78), Blood alcohol level not elevated. RUQ u/s performed and without signs of acute cholecystitis, however with liver parenchyma with steatosis.  Of note, patient has multiple alcohol withdrawal admissions, however has never required ICU admission or intubation.    Patient is now admitted for evaluation and treatment of alcohol withdrawal.  (07 Jan 2024 15:48)    Hospital Course: Patient presents with alcohol withdrawal in setting of vomiting for 3 days. BAL <10 on admission. CIWA protocol ordered and started on Librium taper. SBIRT consulted. Hospital course c/b HANNAH (acute kidney injury) Cr elevated to 2.83 on admission likely dehydration secondary to severe vomiting over the past 3 days, s/p IVF now at baseline. Pt developed transaminitis, liver parenchyma with steatosis on right upper quadrant ultrasound. Pt will follow up with Alcohol abstinence outpatient. Patient signed AMA forms.       Advanced Directives:   [ x] Full code  [ ] DNR  [ ] Hospice    Discharge Diagnoses: Alcohol Withdrawal             HPI:  43F PMH alcohol use disorder with multiple prior admissions for this problem, presents with alcohol withdrawal. Pt reports she normally drinks about 1 pint of vodka per day. She was in her normal state of health until Saturday, when she developed viral symptoms, and developed nausea, and nonbloody, nonbilious vomiting, as well as midsternal chest pain. This continued to worsen, and patient was subsequently brought to hospital for evaluation de to withdrawal symptoms.   Upon arrival, VSS. CBC with WBC count of 12. CMP notable for transaminitis, and Cr elevation to 2.83 (downtrended to 1.78), Blood alcohol level not elevated. RUQ u/s performed and without signs of acute cholecystitis, however with liver parenchyma with steatosis.  Of note, patient has multiple alcohol withdrawal admissions, however has never required ICU admission or intubation.    Patient is now admitted for evaluation and treatment of alcohol withdrawal.  (07 Jan 2024 15:48)    Hospital Course: Patient presents with alcohol withdrawal in setting of vomiting for 3 days. BAL <10 on admission. CIWA protocol ordered and started on Librium taper. SBIRT consulted. Hospital course c/b HANNAH (acute kidney injury) Cr elevated to 2.83 on admission likely dehydration secondary to severe vomiting over the past 3 days, s/p IVF now at baseline. Pt developed transaminitis, liver parenchyma with steatosis on right upper quadrant ultrasound. Pt will follow up with Alcohol abstinence outpatient. Patient signed AMA forms.       Advanced Directives:   [ x] Full code  [ ] DNR  [ ] Hospice    Discharge Diagnoses:   Alcohol Withdrawal   Hypokalemia  Transaminitis

## 2024-01-09 NOTE — DISCHARGE NOTE NURSING/CASE MANAGEMENT/SOCIAL WORK - PATIENT PORTAL LINK FT
You can access the FollowMyHealth Patient Portal offered by [facility] by registering at the following website: [email or web address]. By joining [facility]’s FollowMyHealth portal, you will also be able to view your health information using other applications (apps) compatible with our system. You can access the FollowMyHealth Patient Portal offered by [facility] by registering at the following website: [email or web address]. By joining [facility]’s FollowMyHealth portal, you will also be able to view your health information using other applications (apps) compatible with our system.

## 2024-01-09 NOTE — DISCHARGE NOTE NURSING/CASE MANAGEMENT/SOCIAL WORK - NSDCPEFALRISK_GEN_ALL_CORE
For information on Fall & Injury Prevention, visit: [email or web address] OR  [email or web address] OR  https://www.cdc.gov/steadi/patient.html For information on Fall & Injury Prevention, visit: [email or web address] OR  [email or web address] OR  https://www.cdc.gov/steadi/patient.html

## 2024-09-13 ENCOUNTER — INPATIENT (INPATIENT)
Facility: HOSPITAL | Age: 44
LOS: 3 days | Discharge: HOME | DRG: 379 | End: 2024-09-17
Attending: STUDENT IN AN ORGANIZED HEALTH CARE EDUCATION/TRAINING PROGRAM | Admitting: STUDENT IN AN ORGANIZED HEALTH CARE EDUCATION/TRAINING PROGRAM
Payer: MEDICAID

## 2024-09-13 VITALS
TEMPERATURE: 99 F | HEART RATE: 99 BPM | SYSTOLIC BLOOD PRESSURE: 116 MMHG | OXYGEN SATURATION: 97 % | HEIGHT: 61 IN | RESPIRATION RATE: 18 BRPM | DIASTOLIC BLOOD PRESSURE: 83 MMHG | WEIGHT: 100.09 LBS

## 2024-09-13 DIAGNOSIS — Z29.9 ENCOUNTER FOR PROPHYLACTIC MEASURES, UNSPECIFIED: ICD-10-CM

## 2024-09-13 DIAGNOSIS — K92.2 GASTROINTESTINAL HEMORRHAGE, UNSPECIFIED: ICD-10-CM

## 2024-09-13 DIAGNOSIS — F10.939 ALCOHOL USE, UNSPECIFIED WITH WITHDRAWAL, UNSPECIFIED: ICD-10-CM

## 2024-09-13 DIAGNOSIS — K92.0 HEMATEMESIS: ICD-10-CM

## 2024-09-13 DIAGNOSIS — L02.213 CUTANEOUS ABSCESS OF CHEST WALL: ICD-10-CM

## 2024-09-13 DIAGNOSIS — D64.9 ANEMIA, UNSPECIFIED: ICD-10-CM

## 2024-09-13 LAB
ALBUMIN SERPL ELPH-MCNC: 4.4 G/DL — SIGNIFICANT CHANGE UP (ref 3.3–5)
ALP SERPL-CCNC: 119 U/L — SIGNIFICANT CHANGE UP (ref 40–120)
ALT FLD-CCNC: 91 U/L — HIGH (ref 10–45)
ANION GAP SERPL CALC-SCNC: 16 MMOL/L — SIGNIFICANT CHANGE UP (ref 5–17)
ANISOCYTOSIS BLD QL: SLIGHT — SIGNIFICANT CHANGE UP
AST SERPL-CCNC: 256 U/L — HIGH (ref 10–40)
BASOPHILS # BLD AUTO: 0.04 K/UL — SIGNIFICANT CHANGE UP (ref 0–0.2)
BASOPHILS # BLD AUTO: 0.07 K/UL — SIGNIFICANT CHANGE UP (ref 0–0.2)
BASOPHILS NFR BLD AUTO: 0.6 % — SIGNIFICANT CHANGE UP (ref 0–2)
BASOPHILS NFR BLD AUTO: 0.9 % — SIGNIFICANT CHANGE UP (ref 0–2)
BILIRUB SERPL-MCNC: 0.3 MG/DL — SIGNIFICANT CHANGE UP (ref 0.2–1.2)
BUN SERPL-MCNC: 8 MG/DL — SIGNIFICANT CHANGE UP (ref 7–23)
CALCIUM SERPL-MCNC: 9.9 MG/DL — SIGNIFICANT CHANGE UP (ref 8.4–10.5)
CHLORIDE SERPL-SCNC: 100 MMOL/L — SIGNIFICANT CHANGE UP (ref 96–108)
CO2 SERPL-SCNC: 27 MMOL/L — SIGNIFICANT CHANGE UP (ref 22–31)
CREAT SERPL-MCNC: 0.46 MG/DL — LOW (ref 0.5–1.3)
DACRYOCYTES BLD QL SMEAR: SLIGHT — SIGNIFICANT CHANGE UP
EGFR: 120 ML/MIN/1.73M2 — SIGNIFICANT CHANGE UP
EOSINOPHIL # BLD AUTO: 0.06 K/UL — SIGNIFICANT CHANGE UP (ref 0–0.5)
EOSINOPHIL # BLD AUTO: 0.13 K/UL — SIGNIFICANT CHANGE UP (ref 0–0.5)
EOSINOPHIL NFR BLD AUTO: 0.9 % — SIGNIFICANT CHANGE UP (ref 0–6)
EOSINOPHIL NFR BLD AUTO: 1.8 % — SIGNIFICANT CHANGE UP (ref 0–6)
FLUAV AG NPH QL: SIGNIFICANT CHANGE UP
FLUBV AG NPH QL: SIGNIFICANT CHANGE UP
GAS PNL BLDV: SIGNIFICANT CHANGE UP
GIANT PLATELETS BLD QL SMEAR: PRESENT — SIGNIFICANT CHANGE UP
GLUCOSE SERPL-MCNC: 102 MG/DL — HIGH (ref 70–99)
HCG SERPL-ACNC: <2 MIU/ML — SIGNIFICANT CHANGE UP
HCT VFR BLD CALC: 28.5 % — LOW (ref 34.5–45)
HCT VFR BLD CALC: 28.9 % — LOW (ref 34.5–45)
HCT VFR BLD CALC: 29 % — LOW (ref 34.5–45)
HCT VFR BLD CALC: 33.1 % — LOW (ref 34.5–45)
HGB BLD-MCNC: 10.6 G/DL — LOW (ref 11.5–15.5)
HGB BLD-MCNC: 9 G/DL — LOW (ref 11.5–15.5)
HGB BLD-MCNC: 9.1 G/DL — LOW (ref 11.5–15.5)
HGB BLD-MCNC: 9.4 G/DL — LOW (ref 11.5–15.5)
IMM GRANULOCYTES NFR BLD AUTO: 1.3 % — HIGH (ref 0–0.9)
LIDOCAIN IGE QN: 38 U/L — SIGNIFICANT CHANGE UP (ref 7–60)
LYMPHOCYTES # BLD AUTO: 2.32 K/UL — SIGNIFICANT CHANGE UP (ref 1–3.3)
LYMPHOCYTES # BLD AUTO: 2.45 K/UL — SIGNIFICANT CHANGE UP (ref 1–3.3)
LYMPHOCYTES # BLD AUTO: 33.6 % — SIGNIFICANT CHANGE UP (ref 13–44)
LYMPHOCYTES # BLD AUTO: 34 % — SIGNIFICANT CHANGE UP (ref 13–44)
MAGNESIUM SERPL-MCNC: 1.6 MG/DL — SIGNIFICANT CHANGE UP (ref 1.6–2.6)
MANUAL SMEAR VERIFICATION: SIGNIFICANT CHANGE UP
MCHC RBC-ENTMCNC: 23.6 PG — LOW (ref 27–34)
MCHC RBC-ENTMCNC: 23.7 PG — LOW (ref 27–34)
MCHC RBC-ENTMCNC: 24.2 PG — LOW (ref 27–34)
MCHC RBC-ENTMCNC: 24.3 PG — LOW (ref 27–34)
MCHC RBC-ENTMCNC: 31.5 GM/DL — LOW (ref 32–36)
MCHC RBC-ENTMCNC: 31.6 GM/DL — LOW (ref 32–36)
MCHC RBC-ENTMCNC: 32 GM/DL — SIGNIFICANT CHANGE UP (ref 32–36)
MCHC RBC-ENTMCNC: 32.4 GM/DL — SIGNIFICANT CHANGE UP (ref 32–36)
MCV RBC AUTO: 74.9 FL — LOW (ref 80–100)
MCV RBC AUTO: 74.9 FL — LOW (ref 80–100)
MCV RBC AUTO: 75 FL — LOW (ref 80–100)
MCV RBC AUTO: 75.6 FL — LOW (ref 80–100)
MICROCYTES BLD QL: SLIGHT — SIGNIFICANT CHANGE UP
MONOCYTES # BLD AUTO: 0.32 K/UL — SIGNIFICANT CHANGE UP (ref 0–0.9)
MONOCYTES # BLD AUTO: 0.6 K/UL — SIGNIFICANT CHANGE UP (ref 0–0.9)
MONOCYTES NFR BLD AUTO: 4.4 % — SIGNIFICANT CHANGE UP (ref 2–14)
MONOCYTES NFR BLD AUTO: 8.8 % — SIGNIFICANT CHANGE UP (ref 2–14)
MRSA PCR RESULT.: SIGNIFICANT CHANGE UP
NEUTROPHILS # BLD AUTO: 3.72 K/UL — SIGNIFICANT CHANGE UP (ref 1.8–7.4)
NEUTROPHILS # BLD AUTO: 4.33 K/UL — SIGNIFICANT CHANGE UP (ref 1.8–7.4)
NEUTROPHILS NFR BLD AUTO: 54.4 % — SIGNIFICANT CHANGE UP (ref 43–77)
NEUTROPHILS NFR BLD AUTO: 59.3 % — SIGNIFICANT CHANGE UP (ref 43–77)
NRBC # BLD: 0 /100 WBCS — SIGNIFICANT CHANGE UP (ref 0–0)
NT-PROBNP SERPL-SCNC: <36 PG/ML — SIGNIFICANT CHANGE UP (ref 0–300)
OVALOCYTES BLD QL SMEAR: SLIGHT — SIGNIFICANT CHANGE UP
PLAT MORPH BLD: ABNORMAL
PLATELET # BLD AUTO: 197 K/UL — SIGNIFICANT CHANGE UP (ref 150–400)
PLATELET # BLD AUTO: 237 K/UL — SIGNIFICANT CHANGE UP (ref 150–400)
PLATELET # BLD AUTO: 249 K/UL — SIGNIFICANT CHANGE UP (ref 150–400)
PLATELET # BLD AUTO: 261 K/UL — SIGNIFICANT CHANGE UP (ref 150–400)
POIKILOCYTOSIS BLD QL AUTO: SLIGHT — SIGNIFICANT CHANGE UP
POTASSIUM SERPL-MCNC: 2.8 MMOL/L — CRITICAL LOW (ref 3.5–5.3)
POTASSIUM SERPL-SCNC: 2.8 MMOL/L — CRITICAL LOW (ref 3.5–5.3)
PROT SERPL-MCNC: 8.3 G/DL — SIGNIFICANT CHANGE UP (ref 6–8.3)
RBC # BLD: 3.8 M/UL — SIGNIFICANT CHANGE UP (ref 3.8–5.2)
RBC # BLD: 3.86 M/UL — SIGNIFICANT CHANGE UP (ref 3.8–5.2)
RBC # BLD: 3.87 M/UL — SIGNIFICANT CHANGE UP (ref 3.8–5.2)
RBC # BLD: 4.38 M/UL — SIGNIFICANT CHANGE UP (ref 3.8–5.2)
RBC # FLD: 16.3 % — HIGH (ref 10.3–14.5)
RBC # FLD: 16.4 % — HIGH (ref 10.3–14.5)
RBC # FLD: 16.4 % — HIGH (ref 10.3–14.5)
RBC # FLD: 16.7 % — HIGH (ref 10.3–14.5)
RBC BLD AUTO: ABNORMAL
RSV RNA NPH QL NAA+NON-PROBE: SIGNIFICANT CHANGE UP
S AUREUS DNA NOSE QL NAA+PROBE: SIGNIFICANT CHANGE UP
SARS-COV-2 RNA SPEC QL NAA+PROBE: SIGNIFICANT CHANGE UP
SCHISTOCYTES BLD QL AUTO: SLIGHT — SIGNIFICANT CHANGE UP
SODIUM SERPL-SCNC: 143 MMOL/L — SIGNIFICANT CHANGE UP (ref 135–145)
TROPONIN T, HIGH SENSITIVITY RESULT: <6 NG/L — SIGNIFICANT CHANGE UP (ref 0–51)
TROPONIN T, HIGH SENSITIVITY RESULT: <6 NG/L — SIGNIFICANT CHANGE UP (ref 0–51)
WBC # BLD: 6.83 K/UL — SIGNIFICANT CHANGE UP (ref 3.8–10.5)
WBC # BLD: 7.3 K/UL — SIGNIFICANT CHANGE UP (ref 3.8–10.5)
WBC # BLD: 8.03 K/UL — SIGNIFICANT CHANGE UP (ref 3.8–10.5)
WBC # BLD: 8.58 K/UL — SIGNIFICANT CHANGE UP (ref 3.8–10.5)
WBC # FLD AUTO: 6.83 K/UL — SIGNIFICANT CHANGE UP (ref 3.8–10.5)
WBC # FLD AUTO: 7.3 K/UL — SIGNIFICANT CHANGE UP (ref 3.8–10.5)
WBC # FLD AUTO: 8.03 K/UL — SIGNIFICANT CHANGE UP (ref 3.8–10.5)
WBC # FLD AUTO: 8.58 K/UL — SIGNIFICANT CHANGE UP (ref 3.8–10.5)

## 2024-09-13 PROCEDURE — 99223 1ST HOSP IP/OBS HIGH 75: CPT | Mod: GC

## 2024-09-13 PROCEDURE — 93308 TTE F-UP OR LMTD: CPT | Mod: 26

## 2024-09-13 PROCEDURE — 71275 CT ANGIOGRAPHY CHEST: CPT | Mod: 26,MC

## 2024-09-13 PROCEDURE — 71045 X-RAY EXAM CHEST 1 VIEW: CPT | Mod: 26

## 2024-09-13 PROCEDURE — 99285 EMERGENCY DEPT VISIT HI MDM: CPT

## 2024-09-13 RX ORDER — ONDANSETRON 2 MG/ML
4 INJECTION, SOLUTION INTRAMUSCULAR; INTRAVENOUS ONCE
Refills: 0 | Status: COMPLETED | OUTPATIENT
Start: 2024-09-13 | End: 2024-09-13

## 2024-09-13 RX ORDER — PANTOPRAZOLE SODIUM 40 MG
80 TABLET, DELAYED RELEASE (ENTERIC COATED) ORAL ONCE
Refills: 0 | Status: COMPLETED | OUTPATIENT
Start: 2024-09-13 | End: 2024-09-13

## 2024-09-13 RX ORDER — DOXYCYCLINE MONOHYDRATE 100 MG
100 TABLET ORAL ONCE
Refills: 0 | Status: COMPLETED | OUTPATIENT
Start: 2024-09-13 | End: 2024-09-13

## 2024-09-13 RX ORDER — FOLIC ACID 1 MG
1 TABLET ORAL ONCE
Refills: 0 | Status: COMPLETED | OUTPATIENT
Start: 2024-09-13 | End: 2024-09-13

## 2024-09-13 RX ORDER — PANTOPRAZOLE SODIUM 40 MG
40 TABLET, DELAYED RELEASE (ENTERIC COATED) ORAL
Refills: 0 | Status: DISCONTINUED | OUTPATIENT
Start: 2024-09-13 | End: 2024-09-16

## 2024-09-13 RX ORDER — LORAZEPAM 4 MG/ML
2 INJECTION INTRAMUSCULAR; INTRAVENOUS
Refills: 0 | Status: DISCONTINUED | OUTPATIENT
Start: 2024-09-13 | End: 2024-09-16

## 2024-09-13 RX ORDER — LORAZEPAM 4 MG/ML
2 INJECTION INTRAMUSCULAR; INTRAVENOUS ONCE
Refills: 0 | Status: DISCONTINUED | OUTPATIENT
Start: 2024-09-13 | End: 2024-09-13

## 2024-09-13 RX ORDER — THIAMINE HCL 250 MG
100 TABLET ORAL ONCE
Refills: 0 | Status: COMPLETED | OUTPATIENT
Start: 2024-09-13 | End: 2024-09-13

## 2024-09-13 RX ORDER — POTASSIUM CHLORIDE 10 MEQ
10 TABLET, EXT RELEASE, PARTICLES/CRYSTALS ORAL
Refills: 0 | Status: COMPLETED | OUTPATIENT
Start: 2024-09-13 | End: 2024-09-13

## 2024-09-13 RX ORDER — SUCRALFATE 1 G/10ML
1 SUSPENSION ORAL
Refills: 0 | Status: DISCONTINUED | OUTPATIENT
Start: 2024-09-13 | End: 2024-09-16

## 2024-09-13 RX ADMIN — Medication 100 MILLIGRAM(S): at 09:24

## 2024-09-13 RX ADMIN — Medication 100 MILLIEQUIVALENT(S): at 16:37

## 2024-09-13 RX ADMIN — LORAZEPAM 2 MILLIGRAM(S): 4 INJECTION INTRAMUSCULAR; INTRAVENOUS at 09:25

## 2024-09-13 RX ADMIN — Medication 100 MILLIEQUIVALENT(S): at 11:40

## 2024-09-13 RX ADMIN — Medication 80 MILLIGRAM(S): at 08:34

## 2024-09-13 RX ADMIN — Medication 1 MILLIGRAM(S): at 10:29

## 2024-09-13 RX ADMIN — Medication 100 MILLIGRAM(S): at 14:22

## 2024-09-13 RX ADMIN — SUCRALFATE 1 GRAM(S): 1 SUSPENSION ORAL at 21:17

## 2024-09-13 RX ADMIN — Medication 40 MILLIGRAM(S): at 21:17

## 2024-09-13 RX ADMIN — Medication 100 GRAM(S): at 10:30

## 2024-09-13 RX ADMIN — Medication 100 MILLIEQUIVALENT(S): at 09:28

## 2024-09-13 RX ADMIN — ONDANSETRON 4 MILLIGRAM(S): 2 INJECTION, SOLUTION INTRAMUSCULAR; INTRAVENOUS at 09:35

## 2024-09-13 RX ADMIN — Medication 100 MILLIGRAM(S): at 12:38

## 2024-09-13 NOTE — ED PROVIDER NOTE - CLINICAL SUMMARY MEDICAL DECISION MAKING FREE TEXT BOX
44 year old female with no PMH coming in for acute onset chest pain and chest wall abscess of 1month duration history and physical significant concerning for GI ulcers vs esophageal varices vs cardiac arrythmia vs chest wall infection vs dehydration.      Plan:   1. Chest pain:   - cardiac monitoring   - trans-thoracic Echocardiogram   - CTA of chest   - CBC, CMP  - LR bolus     2. Chest wall abscess  - CTA of chest   - Incision and drainage of abscess  -  Pip/tazo + vanc 44 year old female with no PMH coming in for acute onset chest pain and chest wall abscess of 1month duration history and physical significant concerning for GI ulcers vs esophageal varices vs cardiac arrythmia vs chest wall infection vs dehydration.      Plan:   1. Chest pain:   - cardiac monitoring   - trans-thoracic Echocardiogram   - CTA of chest   - CBC, CMP  - BNP. Troponin   - Fecal occult blood   - LR bolus     2. Chest wall abscess  - CTA of chest   - Incision and drainage of abscess  -  Pip/tazo + vanc

## 2024-09-13 NOTE — H&P ADULT - SOCIAL HISTORY: ALCOHOL USE
Yes, last drink yesterday evening (1 pint of vodka), last drink prior to that was 5 days before, pt with hx alcohol use disorder

## 2024-09-13 NOTE — H&P ADULT - HISTORY OF PRESENT ILLNESS
Ms. Poornima Zepeda is a 44F with PMHx alcohol use disorder, alcohol withdrawal, active smoker with 15 pack-year hx, presenting with acute-onset chest pain starting last night associated with coffee ground emesis. Last night patient had chest pain which she describes as chest pressure and palpitations and felt as if her heart was "beating too fast". The pain was associated with nausea, vomiting and being unable to hold food or water down. Vomit is yellow/green tinged and patient describes it as looking like "coffee". Patient says she drank 1 pint of vodka last night prior to onset of symptoms, her last drink before that was 5 days ago. Of note, patient has had an abscess on the chest of 1 month duration causing " external" chest pain. She has gotten an I&D in the past but says her current pain feels unrelated to the abscess, sh states her current chest pain feels more internal and left-sided. LMP 2 months ago but patient says she has had irregular periods since the age of 14.    In the ED, patient was tachycardic to 104, afebrile & otherwise vitally stable. Ms. Poornima Zepeda is a 44F with PMHx alcohol use disorder, alcohol withdrawal, active smoker with 15 pack-year hx, presenting with acute-onset chest pain starting last night associated with coffee ground emesis x4days. Last night patient had chest pain which she describes as chest pressure and palpitations and felt as if her heart was "beating too fast". The pain was associated with nausea, vomiting and being unable to hold food or water down. Vomiting has been for the past 4 days and is yellow/green tinged, patient describes it as looking like "coffee". Patient says she drank 1 pint of vodka last night prior to onset of chest pain. Of note, patient has a hx of prior admissions for alcohol withdrawal and has been seen by GI for the same concerns for hyperemesis i/s/o alcohol use.  Of note, patient has had an abscess on the chest of 1 month duration causing "external" chest pain. She has gotten an I&D in the past but says her current pain feels unrelated to the abscess, she states her current chest pain feels more internal and left-sided. LMP 2 months ago but patient says she has had irregular periods since the age of 14.    In the ED, patient was tachycardic to 104, afebrile & otherwise vitally stable. Labs were significant for Hgb 10.6->9.4, abnormal platelet & red cell morphology, K 2.8, , ALT 91; troponins negative x2. CTA showed no pulmonary embolus, 3.7 x 2.4 cm superficial subcutaneous collection within the anterior chest wall. POCUS TTE showed physiologic appearing pericardial effusion, A-line predominant lung blunt; 3.92 x 1.28 x 3.81cm collection of the right chest wall, consistent with abscess, but with one region of internal color flow, cannot entirely exclude vascular component.

## 2024-09-13 NOTE — H&P ADULT - PROBLEM SELECTOR PLAN 3
Pt with abscess in R midline of chest/breast, reportedly appeared 1 yr ago when she had it drained, and it recurred. Painful to touch, erythematous, edematous.  CTA Chest on admission: 3.7 x 2.4 cm superficial subcutaneous collection within the anterior chest wall.  POCUS TTE: 3.92 x 1.28 x 3.81cm collection of the right chest wall, consistent with abscess, but with one region of internal color flow. Cannot exclude vascular component.  - Surgery consult for possible I&D, appreciate recs; can send for culture and MRSA swab  - Monitor off abx for now as pt afebrile & without leukocytosis  - Chart review to see if any prior culture data exists

## 2024-09-13 NOTE — ED PROVIDER NOTE - PROGRESS NOTE DETAILS
Mora Akins, Attending Physician: Patient mildly anemic however compared to prior where hemoglobin was 13.4 in January this may be significant.  Will trend CBC.  Patient's potassium low which it has been in the past.  Will repeat pleat accordingly.  Patient also mildly hypercarbic however no metabolic acidosis at this time.  Lactate mildly elevated in the setting of dehydration and emesis. Labs reviewed, hemoglobin 10.6, January 7, 2024 hemoglobin was 13.4.  Potassium also 2.8 today magnesium 1.6, will replete magnesium and potassium.  Given concern for possible upper GI bleed oral potassium would be an appropriate at this time.  POCUS findings consistent with right chest wall abscess, however there was an internal vascular component.  POCUS echo without acute findings.  Patient with mild nausea at this time not actively vomiting, some anxiety, no headaches, tongue fasciculation, eyelid fasciculation at rest, no agitation, alert and oriented x 3, no hallucinations or diaphoresis.  Will give Ativan to prevent progression of withdrawal symptoms. Labs reviewed, hemoglobin 10.6, January 7, 2024 hemoglobin was 13.4.  Potassium also 2.8 today magnesium 1.6, will replete magnesium and potassium.  Given concern for possible upper GI bleed oral potassium would be an appropriate at this time.  POCUS findings consistent with right chest wall abscess, however there was an internal vascular component.  POCUS echo without acute findings.  Patient with mild nausea at this time not actively vomiting, some anxiety, no headaches, tongue fasciculation, eyelid fasciculation at rest, no agitation, alert and oriented x 3, no hallucinations or diaphoresis.  Will give Ativan to prevent progression of withdrawal symptoms. -Nico Rodgers PA-C previously attempted to send FOB ([facility]) no obvious BRB or melena, inadequate sample due to minimal stool in the vault, unable to be run by lab. Patient updated on results, CT not showing deeper infection or abscess, no PE, spoke to patient, patient reports that abscess was incised and drained over a month ago and reports there is excessive bleeding at that time.  On ultrasound there is some internal color flow noted in the abscess.  Patient not meeting sepsis criteria at this time, treated with IV abx, will admit to medicine and patient will likely require nonemergent incision and drainage inpatient when hemoglobin more stable or with surgical consultation.  Discussed with ED attending. -Nico Rodgers PA-C

## 2024-09-13 NOTE — ED ADULT NURSE NOTE - CAS TRG GEN SKIN COLOR
Medication Sig Start Date End Date Taking? Authorizing Provider   prasugrel (EFFIENT) 10 MG TABS TAKE 1 TABLET DAILY 4/25/22  Yes Judson Canavan, MD   carvedilol (COREG) 3.125 MG tablet TAKE 1 TABLET TWICE A DAY WITH MEALS  Patient taking differently: Take 3.125 mg by mouth daily 12/27/21  Yes Judson Canavan, MD   pantoprazole (PROTONIX) 40 MG tablet Take 40 mg by mouth daily   Yes Historical Provider, MD   Coenzyme Q10 (CO Q 10) 100 MG CAPS Take 1 capsule by mouth   Yes Historical Provider, MD   finasteride (PROSCAR) 5 MG tablet Take 5 mg by mouth daily   Yes Historical Provider, MD   sitaGLIPtan-metformin (JANUMET)  MG per tablet Take 1 tablet by mouth 2 times daily (with meals) 3/5/20  Yes CURTIS Chavira - CNP   glipiZIDE (GLUCOTROL) 10 MG tablet Take 10 mg by mouth daily    Yes Historical Provider, MD   tamsulosin (FLOMAX) 0.4 MG capsule Take 0.4 mg by mouth 6/29/19  Yes Historical Provider, MD   pioglitazone (ACTOS) 30 MG tablet Take 30 mg by mouth daily  11/11/18  Yes Historical Provider, MD   pravastatin (PRAVACHOL) 80 MG tablet Take 80 mg by mouth daily   Yes Historical Provider, MD   aspirin 81 MG tablet Take 81 mg by mouth daily   Yes Historical Provider, MD   Bismuth Subsalicylate (PEPTO-BISMOL PO) Take 1 tablet by mouth as needed    Yes Historical Provider, MD   Celecoxib (CELEBREX PO) Take by mouth   Patient not taking: Reported on 9/21/2022    Historical Provider, MD     Allergies:  Brilinta [ticagrelor], Crestor [rosuvastatin calcium], Penicillins, Lipitor, Plavix [clopidogrel bisulfate], and Victoza [liraglutide]    Social History:   reports that he has never smoked. He has never used smokeless tobacco. He reports that he does not drink alcohol and does not use drugs. Family History: family history includes Breast Cancer in his mother; Breast Cancer (age of onset: 61) in his sister; Heart Disease in his brother.     Review of Systems   Review of Systems   Constitutional: Negative. Respiratory: Negative. Cardiovascular: Negative. Musculoskeletal:  Positive for back pain. Negative for myalgias. Neurological: Negative. OBJECTIVE:    Vital signs:    /74   Pulse 94   Ht 5' 9\" (1.753 m)   Wt 282 lb (127.9 kg)   SpO2 96%   BMI 41.64 kg/m²      Physical Exam:  Constitutional:  Comfortable and alert, NAD, appears stated age, obese  Eyes: PERRL, sclera nonicteric  Neck:  Supple, no masses, no thyroidmegaly, no JVD  Skin:  Warm and dry; no rash or lesions; +sternal incision, no drainage, scabbing  Heart: Regular, normal apex, S1 and S2 normal, no M/G/R  Lungs:  Normal respiratory effort; clear; no wheezing/rhonchi/rales  Abdomen: soft, non tender, + bowel sounds  Extremities:  Trace BLE edema  Neuro: alert and oriented, moves legs and arms equally, normal mood and affect    Data Reviewed:      CABG 9/18/2020:  Urgent CABG X 4, with pedicled LIMA to LAD, sequential Greater Saphenous VG to OM 1 then on to OM 2, separate single Greater SVG to PDA of RCA, SGC, CPB, EVH Right Greater Saphenous vein, DIONI, Epiaortic ultrasound, Doppler verification of grafts, Bilateral 5 level intercostal nerve block(Exparel), Platelet gel application. Sternal plating. Echo 9/17/2020:  Normal left ventricular systolic function with ejection fraction of 55-60%. No regional wall motion abnormalites are seen. Mild concentric left ventricular hypertrophy. Grade I diastolic dysfunction with normal filling pressure. Compared to previous study from 2- no changes noted in left   ventricular function. No significant valvular heart disease. Definity echo contrast was injected.      Coronary angiogram 9/14/2020:  Non-STEMI  PROCEDURES PERFORMED    Left heart catheterization  LVgram  Coronary angiogam  Coronary cath  Atherectomy of RCA  IVUS of RCA  PROCEDURE DESCRIPTION   Risks/benefits/alternatives/outcomes were discussed with patient and/or family and informed consent was obtained. Using the [facility] scale, the patient's right radial artery was found to be a level B. Patient was prepped draped in the usual sterile fashion. Local anaesthetic was applied over puncture site. Using a front wall technique, a 4/5 Latvian Terumo sheath was inserted into right radial artery. Verapamil, nitroglycerin were administered through the sheath. Heparin was administered. Diagnostic 5 Latvian pigtail, ultra, Hunter catheters were used for diagnostic angiograms. Pigtail was used for LV gram, ultra was taken and was used to cannulate the RCA but left main could not be cannulated with this and ultimately was cannulated with a Hunter catheter. Attention turned towards coronary intervention as noted below. At the conclusion of the procedure, a TR band was placed over the puncture site and hemostasis was obtained. There were no immediate complications. I supervised sedation with versed 3 mg/fentanyl 150 Mcg during the procedure. 300 cc contrast was utilized. <20cc EBL. LVEDP  18   GRADIENT ACROSS AORTIC VALVE  none   LV FUNCTION EF 40 %   WALL MOTION  base to mid inferior hypokinesis   MITRAL REGURGITATION  mild      LM Less than 10% [id number] stenosis            LAD Proximal-mid stents are noted with 50% in-stent restenosis. Distal vessel has 50 to 60% stenosis. There are well-developed left-to-right collaterals. D1 is a small to medium sized vessel with ostial/proximal/mid 60 to 70% stenosis. LCX  Large vessel, proximal and mid 90% stenosis. The distal vessel into OM 2 is stented and the stent is widely patent with less than 10% stenosis. RI Tortuous, small vessel,Less than 10% [id number] stenosis            RCA Dominant, proximal 10 to 20% stenosis, the proximal and mid vessel are extensively stented and there is a mid-distal 100% occlusion with well-developed left to right collaterals.       PERCUTANEOUS INTERVENTION DESCRIPTION    Heparin was used for Normal for race

## 2024-09-13 NOTE — ED ADULT TRIAGE NOTE - PATIENT ON (OXYGEN DELIVERY METHOD)
room air
PAST MEDICAL HISTORY:  Cardiac pacemaker MICRA for mobitz type 11    CVA (cerebrovascular accident) resdiual right sided weakness    CVA (cerebrovascular accident)     DM (diabetes mellitus)     H/O gastroesophageal reflux (GERD)     HTN (hypertension)     PAD (peripheral artery disease)     Wound of right leg

## 2024-09-13 NOTE — ED PROVIDER NOTE - OBJECTIVE STATEMENT
44 year old female with no PMH coming in for acute onset chest pain and chest wall abscess of 1month duration. Last night patient had chest pain she describes as chest pressure and palpitations feeling as if her heart was "beating too fast". Last night she also has nausea and vomiting and unable to hold food or water down. Vomit is yellow/ green tinged and looks like "coffee". Pt has had  abscess on the chest of 1 month duration causing " external" chest pain. LMP 2 months ago but does endorse irregular periods since the age of 14     Denies: fevers, abnormal stool or change in bowel habits, changes in bowel habits, denies abdominal pain.    Endorses: Drinking 1 pint of vodka last night, chills and hot flashes at night 44 year old female with no PMH coming in for acute onset chest pain and chest wall abscess of 1month duration. Last night patient had chest pain she describes as chest pressure and palpitations feeling as if her heart was "beating too fast". Last night she also has nausea and vomiting and unable to hold food or water down. Vomit is yellow/ green tinged and looks like "coffee". Pt has had  abscess on the chest of 1 month duration causing " external" chest pain. LMP 2 months ago but does endorse irregular periods since the age of 14       Mora Akins, Attending Physician: 44 year old female with no PMH coming in for acute onset chest pain starting last night associated with coffee ground emesis an  and chest wall abscess of 1month duration. Last night patient had chest pain she describes as chest pressure and palpitations feeling as if her heart was "beating too fast". Last night she also has nausea and vomiting and unable to hold food or water down. Vomit is yellow/ green tinged and looks like "coffee". Pt has had  abscess on the chest of 1 month duration causing " external" chest pain. LMP 2 months ago but does endorse irregular periods since the age of 14     Denies: fevers, abnormal stool or change in bowel habits, changes in bowel habits, denies abdominal pain.    Endorses: Drinking 1 pint of vodka last night, chills and hot flashes at night    Mora Akins, Attending Physician: And palpitations.  Separately patient has had a chest wall abscess for approximately 1 month duration, worsening.  She has had this I&D in the past but this feels separate as her chest pain feels more internal and feels left-sided.  No radiation.  Last drink yesterday evening.  Patient does have a history of withdrawals.  Last drink prior to that was 5 days ago.  Patient is a smoker - 1/2 pack x 30 years (15 pack years). 44 year old female with no PMH coming in for acute onset chest pain and chest wall abscess of 1month duration. Last night patient had chest pain she describes as chest pressure and palpitations feeling as if her heart was "beating too fast". Last night she also has nausea and vomiting and unable to hold food or water down. Vomit is yellow/ green tinged and looks like "coffee.". Pt has had  abscess on the chest of 1 month duration causing " external" chest pain. LMP 2 months ago but does endorse irregular periods since the age of 14     Patient reports left-sided chest pain, heaviness sensation.  Patient denies shortness of breath, abdominal pain, fevers, chills, active vomiting, change in stools, melena, hematochezia, known history of esophageal varices, anticoagulant use.  Patient last drink was approximately 11 PM last night, drank a pint of vodka, last drink prior to that was 5 days ago.  Patient has history of alcohol withdrawal requiring hospitalization    Mora Akins, Attending Physician: 44 year old female with no PMH coming in for acute onset chest pain starting last night associated with coffee ground emesis an  and chest wall abscess of 1month duration. Last night patient had chest pain she describes as chest pressure and palpitations feeling as if her heart was "beating too fast". Last night she also has nausea and vomiting and unable to hold food or water down. Vomit is yellow/ green tinged and looks like "coffee". Pt has had  abscess on the chest of 1 month duration causing " external" chest pain. LMP 2 months ago but does endorse irregular periods since the age of 14     Denies: fevers, abnormal stool or change in bowel habits, changes in bowel habits, denies abdominal pain.    Endorses: Drinking 1 pint of vodka last night, chills and hot flashes at night    Mora Akins, Attending Physician: And palpitations.  Separately patient has had a chest wall abscess for approximately 1 month duration, worsening.  She has had this I&D in the past but this feels separate as her chest pain feels more internal and feels left-sided.  No radiation.  Last drink yesterday evening.  Patient does have a history of withdrawals.  Last drink prior to that was 5 days ago.  Patient is a smoker - 1/2 pack x 30 years (15 pack years).

## 2024-09-13 NOTE — ED PROVIDER NOTE - PHYSICAL EXAMINATION
Neuro: A&O x3   HEENT: extra occular movement intact  CV: tachycardic, normal s1 s2, presence of abscess over the medial upper portion of the right breast, abcsess appears warm to touch, erythematous, size of a gold ball, does not appear to have any crepitus   Pulm: CTA bilaterally   GI: abdomen soft, NT, ND GEN: Pt in NAD, A&O x3.  PSYCH: Affect appropriate.  EYES: Sclera white w/o injection.   ENT: Head NCAT. MMM. Neck supple FROM.  RESP: CTA b/l, no wheezes, rales, or rhonchi.   CARDIAC: Tachycardic, regular, clear distinct S1, S2, no appreciable murmurs.  ABD: Abdomen soft, non-tender. No CVAT b/l.  VASC: 2+ radial and dorsalis pedis pulses b/l. No edema or calf tenderness.  SKIN: approx 4x3cm region of induration with central fluctuance right parasternal region/anterior chest wall with overlying erythema. No streaking, no crepitus.

## 2024-09-13 NOTE — ED PROVIDER NOTE - CARE PLAN
1 Principal Discharge DX:	UGIB (upper gastrointestinal bleed)  Secondary Diagnosis:	Alcohol abuse with withdrawal  Secondary Diagnosis:	Chest wall abscess  Secondary Diagnosis:	Hypokalemia

## 2024-09-13 NOTE — H&P ADULT - ASSESSMENT
Ms. Poornima Zepeda is a 44F with PMHx alcohol use disorder, alcohol withdrawal, active smoker with 15 pack-year hx, presenting with acute-onset chest pain starting last night associated with coffee ground emesis x4days. Admitted for alc withdrawal, symptom-triggered CIWA protocol initiated, and GI consulted.

## 2024-09-13 NOTE — H&P ADULT - NSHPLABSRESULTS_GEN_ALL_CORE
Personally reviewed labs, imaging, ekg                           9.0    8.58  )-----------( 249      ( 13 Sep 2024 17:09 )             28.5       09-13    143  |  100  |  8   ----------------------------<  102<H>  2.8<LL>   |  27  |  0.46<L>    Ca    9.9      13 Sep 2024 08:18  Mg     1.6     09-13    TPro  8.3  /  Alb  4.4  /  TBili  0.3  /  DBili  x   /  AST  256<H>  /  ALT  91<H>  /  AlkPhos  119  09-13          Urinalysis Basic - ( 13 Sep 2024 08:18 )    Color: x / Appearance: x / SG: x / pH: x  Gluc: 102 mg/dL / Ketone: x  / Bili: x / Urobili: x   Blood: x / Protein: x / Nitrite: x   Leuk Esterase: x / RBC: x / WBC x   Sq Epi: x / Non Sq Epi: x / Bacteria: x      Lactate Trend      CAPILLARY BLOOD GLUCOSE      POCT Blood Glucose.: 95 mg/dL (13 Sep 2024 16:35)      Personal interpretation EKG: NSR, QTc 459 ms.

## 2024-09-13 NOTE — H&P ADULT - PROBLEM SELECTOR PLAN 2
Pt with few days of vomiting starting on 9/10, mostly dark green color with a "coffee-like" appearance, was unable to keep food and water down.  Prior endoscopy from 11/20/2023: normal esophagus but a medium amt of food found in stomach, rest of study was aborted, gastric emptying study recommended for gastroparesis.  GI also consulted on past admission in 11/2023 for odynophagia after vomiting, at that time stated emesis was likely 2/2 AUD given that she is able to tolerate PO intake when not actively withdrawing.  - IV PPI BID  - Trend CBC, transfuse if Hgb <7  - CLD, advance as tolerated  - GI consult, appreciate recs

## 2024-09-13 NOTE — H&P ADULT - PROBLEM SELECTOR PLAN 1
Hx AUD with prior admissions for withdrawal. Pt admitted for coffee-ground emesis 2/2 alcohol use, last drink was 1 pint of vodka on 9/12.  - Symptom-triggered CIWA   - SBIRT  - Thiamine supplementation  -  consult, cessation counseling

## 2024-09-13 NOTE — ED PROVIDER NOTE - ATTENDING APP SHARED VISIT CONTRIBUTION OF CARE
Mora Akins, Attending Physician: 44F with hx of ETOH dependence here for acute onset chest pain starting last night, left sided, non-radiating associatd with palpitations and coffee ground emesis. No SOB. Separately patient has had a chest wall abscess for approximately 1 month duration, worsening.  She has had this I&D in the past but this feels separate as her chest pain feels more internal and feels left-sided.  No radiation.  Last drink yesterday evening.  Patient does have a history of withdrawals.  Last drink prior to that was 5 days ago.  Patient is a smoker - 1/2 pack x 30 years (15 pack years).    PE:  Gen: NAD  Head: NCAT  ENT: MMM  Chest: RRR, normal perfusion, R chest wall with erythema with induration and fluctuance 3.5cmx 2cm just right of substernal region  Lungs: Symmetrical chest rise, lungs CTAB  Abdomen: soft, NTND, No rebound/guarding  Ext: No gross deformities  Neuro: awake and alert, Moving all extremities equally, no fasciculations  Skin: no rashes    MDM: 44F with ETOH abuse with coffee ground emesis and chest pain with endoscopy suggestive of incomplete gastric emptying on prior admission - no clincial signs of withdrawal at this time. Differential diagnosis includes not limited to: Esophageal varices, hematemesis, alcoholic cardiomyopathy, abscess with fistula however the abscess seems clinically unrelated given location of pain.  Abscess may require I&D.  Will obtain labs, cross-sectional imaging and dispo pending workup.

## 2024-09-13 NOTE — H&P ADULT - NSHPPHYSICALEXAM_GEN_ALL_CORE
LOS:     VITALS:   T(C): 36.7 (09-13-24 @ 10:54), Max: 37.3 (09-13-24 @ 09:02)  HR: 81 (09-13-24 @ 17:02) (81 - 104)  BP: 122/79 (09-13-24 @ 17:02) (116/83 - 124/88)  RR: 18 (09-13-24 @ 17:02) (18 - 18)  SpO2: 99% (09-13-24 @ 17:02) (97% - 99%)    GENERAL: NAD, lying in bed comfortably  HEAD:  Atraumatic, Normocephalic  EYES: EOMI, PERRLA, conjunctiva and sclera clear  ENT: Moist mucous membranes  NECK: Supple, No JVD  CHEST/LUNG: +Abscess of R/middle chest wall with erythema, edema, tender to palpation. Lungs clear to auscultation b/l; No rales, rhonchi, wheezing, or rubs.  HEART: Regular rate and rhythm; No murmurs, rubs, or gallops  ABDOMEN: BSx4; Soft, nontender, nondistended  EXTREMITIES:  2+ Peripheral Pulses, brisk capillary refill. No clubbing, cyanosis, or edema  NERVOUS SYSTEM:  A&Ox3, no focal deficits   SKIN: No rashes or lesions

## 2024-09-13 NOTE — PATIENT PROFILE ADULT - FALL HARM RISK - HARM RISK INTERVENTIONS

## 2024-09-13 NOTE — H&P ADULT - PROBLEM SELECTOR PLAN 5
DVT: SCDs  Diet: CLD, advance as tolerated  Activity: As tolerated  Dispo: Home, pending clinical improvement

## 2024-09-13 NOTE — H&P ADULT - PROBLEM SELECTOR PLAN 4
Pt with hx chronically low Hgb. Hgb on admission 10.6->9.4.  - F/u iron studies, B12, folate  - Transfuse if <7

## 2024-09-13 NOTE — H&P ADULT - NSHPREVIEWOFSYSTEMS_GEN_ALL_CORE
Review of Systems:     CONSTITUTIONAL: No fever, weight loss  EYES: No eye pain, visual disturbances, or discharge  ENMT:  No difficulty hearing, tinnitus, vertigo; No sinus or throat pain  RESPIRATORY: No SOB. No cough, wheezing, chills or hemoptysis  CARDIOVASCULAR: +Chest pain x1day, +Palpitations. No dizziness or leg swelling  GASTROINTESTINAL: +Nausea/Vomiting x4days, ?Coffee ground emesis (not seen). No abdominal or epigastric pain. No diarrhea or constipation. No melena or hematochezia.  GENITOURINARY: No dysuria, frequency, hematuria, or incontinence  NEUROLOGICAL: No headaches, memory loss, loss of strength, numbness, or tremors  SKIN: No itching, burning, rashes, or lesions   LYMPH NODES: No enlarged glands  ENDOCRINE: No heat or cold intolerance; No hair loss  MUSCULOSKELETAL: No joint pain or swelling; No muscle, back pain  PSYCHIATRIC: No depression, anxiety, mood swings, or difficulty sleeping  HEME/LYMPH: No easy bruising, or bleeding gums

## 2024-09-13 NOTE — ED ADULT NURSE NOTE - OBJECTIVE STATEMENT
The pt is a 44 Y F with NO PMH. pt came in via triage for CO palpations. PT is AOx4 breathing evenly on room air and able to ambulate independently and speak in full sentences spontaneously. PT co palpations but was noted to have an abscess on her mid chest right breast that was noted to be red and warm. PT states that she has had her abscess drained 2 times, once here and once at , but the redness did not go away. PT also co  po intact since Tuesday with vomiting. Pt was placed in a gown in a stretcher with comfort and safety measures provided, pt has full motor and sensory function intact.

## 2024-09-14 DIAGNOSIS — E87.6 HYPOKALEMIA: ICD-10-CM

## 2024-09-14 LAB
A1C WITH ESTIMATED AVERAGE GLUCOSE RESULT: 5 % — SIGNIFICANT CHANGE UP (ref 4–5.6)
ANION GAP SERPL CALC-SCNC: 11 MMOL/L — SIGNIFICANT CHANGE UP (ref 5–17)
ANION GAP SERPL CALC-SCNC: 13 MMOL/L — SIGNIFICANT CHANGE UP (ref 5–17)
BUN SERPL-MCNC: 14 MG/DL — SIGNIFICANT CHANGE UP (ref 7–23)
BUN SERPL-MCNC: 14 MG/DL — SIGNIFICANT CHANGE UP (ref 7–23)
CALCIUM SERPL-MCNC: 9 MG/DL — SIGNIFICANT CHANGE UP (ref 8.4–10.5)
CALCIUM SERPL-MCNC: 9.1 MG/DL — SIGNIFICANT CHANGE UP (ref 8.4–10.5)
CHLORIDE SERPL-SCNC: 101 MMOL/L — SIGNIFICANT CHANGE UP (ref 96–108)
CHLORIDE SERPL-SCNC: 107 MMOL/L — SIGNIFICANT CHANGE UP (ref 96–108)
CO2 SERPL-SCNC: 23 MMOL/L — SIGNIFICANT CHANGE UP (ref 22–31)
CO2 SERPL-SCNC: 27 MMOL/L — SIGNIFICANT CHANGE UP (ref 22–31)
CREAT SERPL-MCNC: 0.46 MG/DL — LOW (ref 0.5–1.3)
CREAT SERPL-MCNC: 0.65 MG/DL — SIGNIFICANT CHANGE UP (ref 0.5–1.3)
EGFR: 111 ML/MIN/1.73M2 — SIGNIFICANT CHANGE UP
EGFR: 120 ML/MIN/1.73M2 — SIGNIFICANT CHANGE UP
ESTIMATED AVERAGE GLUCOSE: 97 MG/DL — SIGNIFICANT CHANGE UP (ref 68–114)
FERRITIN SERPL-MCNC: 280 NG/ML — HIGH (ref 15–150)
FOLATE SERPL-MCNC: 4.1 NG/ML — LOW
GLUCOSE SERPL-MCNC: 110 MG/DL — HIGH (ref 70–99)
GLUCOSE SERPL-MCNC: 149 MG/DL — HIGH (ref 70–99)
IRON SATN MFR SERPL: 28 % — SIGNIFICANT CHANGE UP (ref 14–50)
IRON SATN MFR SERPL: 68 UG/DL — SIGNIFICANT CHANGE UP (ref 30–160)
MAGNESIUM SERPL-MCNC: 1.6 MG/DL — SIGNIFICANT CHANGE UP (ref 1.6–2.6)
MAGNESIUM SERPL-MCNC: 1.6 MG/DL — SIGNIFICANT CHANGE UP (ref 1.6–2.6)
MRSA PCR RESULT.: SIGNIFICANT CHANGE UP
PHOSPHATE SERPL-MCNC: 2.3 MG/DL — LOW (ref 2.5–4.5)
PHOSPHATE SERPL-MCNC: 3.9 MG/DL — SIGNIFICANT CHANGE UP (ref 2.5–4.5)
POTASSIUM SERPL-MCNC: 2.9 MMOL/L — CRITICAL LOW (ref 3.5–5.3)
POTASSIUM SERPL-MCNC: 4.9 MMOL/L — SIGNIFICANT CHANGE UP (ref 3.5–5.3)
POTASSIUM SERPL-SCNC: 2.9 MMOL/L — CRITICAL LOW (ref 3.5–5.3)
POTASSIUM SERPL-SCNC: 4.9 MMOL/L — SIGNIFICANT CHANGE UP (ref 3.5–5.3)
S AUREUS DNA NOSE QL NAA+PROBE: SIGNIFICANT CHANGE UP
SODIUM SERPL-SCNC: 141 MMOL/L — SIGNIFICANT CHANGE UP (ref 135–145)
SODIUM SERPL-SCNC: 141 MMOL/L — SIGNIFICANT CHANGE UP (ref 135–145)
TIBC SERPL-MCNC: 245 UG/DL — SIGNIFICANT CHANGE UP (ref 220–430)
TSH SERPL-MCNC: 1.23 UIU/ML — SIGNIFICANT CHANGE UP (ref 0.27–4.2)
UIBC SERPL-MCNC: 177 UG/DL — SIGNIFICANT CHANGE UP (ref 110–370)
VIT B12 SERPL-MCNC: 643 PG/ML — SIGNIFICANT CHANGE UP (ref 232–1245)

## 2024-09-14 PROCEDURE — 93010 ELECTROCARDIOGRAM REPORT: CPT

## 2024-09-14 PROCEDURE — 99233 SBSQ HOSP IP/OBS HIGH 50: CPT | Mod: GC

## 2024-09-14 RX ORDER — FOLIC ACID 1 MG
1 TABLET ORAL DAILY
Refills: 0 | Status: DISCONTINUED | OUTPATIENT
Start: 2024-09-14 | End: 2024-09-17

## 2024-09-14 RX ORDER — POTASSIUM CHLORIDE 10 MEQ
40 TABLET, EXT RELEASE, PARTICLES/CRYSTALS ORAL EVERY 4 HOURS
Refills: 0 | Status: COMPLETED | OUTPATIENT
Start: 2024-09-14 | End: 2024-09-14

## 2024-09-14 RX ORDER — POTASSIUM PHOSPHATE 236; 224 MG/ML; MG/ML
15 INJECTION, SOLUTION INTRAVENOUS ONCE
Refills: 0 | Status: COMPLETED | OUTPATIENT
Start: 2024-09-14 | End: 2024-09-14

## 2024-09-14 RX ORDER — ACETAMINOPHEN 325 MG/1
675 TABLET ORAL ONCE
Refills: 0 | Status: COMPLETED | OUTPATIENT
Start: 2024-09-14 | End: 2024-09-14

## 2024-09-14 RX ORDER — OXYCODONE HYDROCHLORIDE 5 MG/1
2.5 TABLET ORAL ONCE
Refills: 0 | Status: DISCONTINUED | OUTPATIENT
Start: 2024-09-14 | End: 2024-09-14

## 2024-09-14 RX ORDER — HYDROMORPHONE HYDROCHLORIDE 2 MG/1
0.5 TABLET ORAL ONCE
Refills: 0 | Status: DISCONTINUED | OUTPATIENT
Start: 2024-09-14 | End: 2024-09-14

## 2024-09-14 RX ORDER — SULFAMETHOXAZOLE AND TRIMETHOPRIM 800; 160 MG/1; MG/1
1 TABLET ORAL
Refills: 0 | Status: DISCONTINUED | OUTPATIENT
Start: 2024-09-14 | End: 2024-09-17

## 2024-09-14 RX ORDER — LIDOCAINE HCL 20 MG/ML
30 VIAL (ML) INJECTION ONCE
Refills: 0 | Status: DISCONTINUED | OUTPATIENT
Start: 2024-09-14 | End: 2024-09-16

## 2024-09-14 RX ADMIN — Medication 40 MILLIGRAM(S): at 05:05

## 2024-09-14 RX ADMIN — SUCRALFATE 1 GRAM(S): 1 SUSPENSION ORAL at 05:05

## 2024-09-14 RX ADMIN — SUCRALFATE 1 GRAM(S): 1 SUSPENSION ORAL at 17:37

## 2024-09-14 RX ADMIN — ACETAMINOPHEN 675 MILLIGRAM(S): 325 TABLET ORAL at 21:10

## 2024-09-14 RX ADMIN — HYDROMORPHONE HYDROCHLORIDE 0.5 MILLIGRAM(S): 2 TABLET ORAL at 19:32

## 2024-09-14 RX ADMIN — Medication 40 MILLIEQUIVALENT(S): at 09:49

## 2024-09-14 RX ADMIN — Medication 40 MILLIGRAM(S): at 17:38

## 2024-09-14 RX ADMIN — ACETAMINOPHEN 270 MILLIGRAM(S): 325 TABLET ORAL at 20:30

## 2024-09-14 RX ADMIN — SULFAMETHOXAZOLE AND TRIMETHOPRIM 1 TABLET(S): 800; 160 TABLET ORAL at 17:38

## 2024-09-14 RX ADMIN — Medication 1 MILLIGRAM(S): at 11:24

## 2024-09-14 RX ADMIN — OXYCODONE HYDROCHLORIDE 2.5 MILLIGRAM(S): 5 TABLET ORAL at 23:11

## 2024-09-14 RX ADMIN — Medication 40 MILLIEQUIVALENT(S): at 13:29

## 2024-09-14 RX ADMIN — POTASSIUM PHOSPHATE 62.5 MILLIMOLE(S): 236; 224 INJECTION, SOLUTION INTRAVENOUS at 09:49

## 2024-09-14 RX ADMIN — HYDROMORPHONE HYDROCHLORIDE 0.5 MILLIGRAM(S): 2 TABLET ORAL at 18:18

## 2024-09-14 RX ADMIN — Medication 40 MILLIEQUIVALENT(S): at 07:47

## 2024-09-14 NOTE — CONSULT NOTE ADULT - ATTENDING COMMENTS
43 yo F pmh Etoh abuse c/b withdrawal in past presenting with chest pain in setting of abscess, n/v/ and coffee ground emesis.  Anemia does appear worse than last hgb 3 months ago, but has been documented as far back as 2023.  Stable hgb since admit.  No need for IP endoscopy at this time, however will need EGD/Colon as OP for anemia workup.  She is amenable to this plan.

## 2024-09-14 NOTE — CONSULT NOTE ADULT - ASSESSMENT
Impression:   44F with PMHx alcohol use disorder, alcohol withdrawal, active smoker with 15 pack-year hx, presenting with acute-onset chest pain starting last night associated with nausea and vomiting. Concerned for CGE.     #Nausea and vomiting  #Concern for CGE   - Baseline hgb around 9 from 11/2023, patient had hgb 13 in 1/2024, now presented with 10.6 to 9. Has normal BUN as well. No risk factors for a bleed, denied AC, and NSAID use.   - Less concerned for a bleed at this time given her lab values, also had EGD in 2023 which was normal except for food in the stomach. Nausea and vomiting likely due to recent alcohol use which is resolved now.     Recommendations:   - No indication for EGD at this time.   - Place her on PO PPI daily as she has heartburn.   - Diet as tolerated.   - CBC daily and Transfuse if hgb < 7.     Thank you for the consult. Please call us back if you have any questions or concerns.     All recommendations are tentative until the note is attested by an attending.     Divya Coleman, PGY-6  Gastroenterology/Hepatology Fellow  Available on [employer] Teams  [phone] (Short Range Pager)  [phone] (Long Range Pager)    After 5pm, please contact the on-call GI fellow.      Impression:   44F with PMHx alcohol use disorder, alcohol withdrawal, active smoker with 15 pack-year hx, presenting with acute-onset chest pain starting last night associated with nausea and vomiting. Concerned for CGE.     #Nausea and vomiting  #Concern for CGE   - Baseline hgb around 9 from 11/2023, patient had hgb 13 in 1/2024, now presented with 10.6 to 9. Has normal BUN as well. No risk factors for a bleed, denied AC, and NSAID use. Iron studies are normal with elevated ferritin.   - Less concerned for a bleed at this time given her lab values, also had EGD in 2023 which was normal except for food in the stomach. Nausea and vomiting likely due to recent alcohol use which is resolved now.     Recommendations:   - No indication for EGD at this time.   - Place her on PO PPI daily as she has heartburn.   - Diet as tolerated.   - Recommend o/p EGD and colonoscopy after discharge for follow up of anemia.   - CBC daily and Transfuse if hgb < 7.     Discussed the case with Dr. Chance.     Thank you for the consult. Please call us back if you have any questions or concerns.     All recommendations are tentative until the note is attested by an attending.     Divya Coleman, PGY-6  Gastroenterology/Hepatology Fellow  Available on [employer] Teams  [phone] (Short Range Pager)  [phone] (Long Range Pager)    After 5pm, please contact the on-call GI fellow.      Impression:   44F with PMHx alcohol use disorder, alcohol withdrawal, active smoker with 15 pack-year hx, presenting with acute-onset chest pain starting last night associated with nausea and vomiting. Concerned for CGE.     #Nausea and vomiting  #Concern for CGE   - Baseline hgb around 9 from 11/2023, patient had hgb 13 in 1/2024, now presented with 10.6 to 9. Has normal BUN as well. No risk factors for a bleed, denied AC, and NSAID use. Iron studies are normal with elevated ferritin.   - Less concerned for a bleed at this time given her lab values, also had EGD in 2023 which was normal except for food in the stomach. Nausea and vomiting likely due to recent alcohol use which is resolved now.     Recommendations:   - No indication for EGD at this time.   - Place her on PO PPI daily as she has heartburn.   - Diet as tolerated.   - Recommend o/p EGD and colonoscopy after discharge for follow up of anemia.   - CBC daily and Transfuse if hgb < 7.     Discussed the case with Dr. Chance.     GI will plan to sign off at this time. Please feel free to reach out to our team with any follow up questions. Please provide patient with Gastroenterology Clinic number to confirm/arrange appointment; [phone] ([facility] at [address] [place] [address]) or [phone] ([facility] at [address] [place] [address]) or [phone] ([facility] at [address] [facility]).    All recommendations are tentative until the note is attested by an attending.     Divya Coleman, PGY-6  Gastroenterology/Hepatology Fellow  Available on [employer] Teams  [phone] (Short Range Pager)  [phone] (Long Range Pager)    After 5pm, please contact the on-call GI fellow.

## 2024-09-14 NOTE — PROVIDER CONTACT NOTE (OTHER) - SITUATION
Pt requesting to eat and would like to advance her diet.
Pt reporting headache and 7/10 pain at the abscess drainage site in R midline of chest area.

## 2024-09-14 NOTE — PROVIDER CONTACT NOTE (OTHER) - BACKGROUND
Pt admitted for GI hemorrhage and s/p I&D of right chest abscess 9/14. Hx of alcohol abuse and hypokalemia.
Pt admitted for GI hemorrhage with hx of alcohol abuse, R chest wall abscess, and anemia.

## 2024-09-14 NOTE — PROGRESS NOTE ADULT - ASSESSMENT
Ms. Poornima Zepeda is a 44F with PMHx alcohol use disorder, alcohol withdrawal, active smoker with 15 pack-year hx, presenting with acute-onset chest pain starting last night associated with coffee ground emesis x4days. Admitted for alc withdrawal, symptom-triggered CIWA protocol initiated, and GI consulted. Ms. Poornima Zepeda is a 44F with PMHx alcohol use disorder, alcohol withdrawal, active smoker with 15 pack-year hx, chest wall abscess that returned s/p I&D, presenting with acute-onset chest pain starting last night associated with coffee ground emesis x4days. Admitted for alc withdrawal, symptom-triggered CIWA protocol initiated, surgery consulted for I&D of chest wall abscess, GI consulted.

## 2024-09-14 NOTE — PROVIDER CONTACT NOTE (OTHER) - ASSESSMENT
Pt a/ox4, VSS. Pt remains asymptomatic and no signs of bleeding noted. Pt reports she had green emesis earlier in day that has resolved.
Pt a/ox4, VSS. Pt reports 7/10 pain at drainage site and headache which occurred following the procedure.

## 2024-09-14 NOTE — PROGRESS NOTE ADULT - PROBLEM SELECTOR PLAN 2
Pt with few days of vomiting starting on 9/10, mostly dark green color with a "coffee-like" appearance, was unable to keep food and water down.  Prior endoscopy from 11/20/2023: normal esophagus but a medium amt of food found in stomach, rest of study was aborted, gastric emptying study recommended for gastroparesis.  GI also consulted on past admission in 11/2023 for odynophagia after vomiting, at that time stated emesis was likely 2/2 AUD given that she is able to tolerate PO intake when not actively withdrawing.  - IV PPI BID  - Trend CBC, transfuse if Hgb <7  - CLD, advance as tolerated  - GI consult, appreciate recs Pt with few days of vomiting starting on 9/10, mostly dark green color with a "coffee-like" appearance, was unable to keep food and water down.  Prior endoscopy from 11/20/2023: normal esophagus but a medium amt of food found in stomach, rest of study was aborted, gastric emptying study recommended for gastroparesis.  GI also consulted on past admission in 11/2023 for odynophagia after vomiting, at that time stated emesis was likely 2/2 AUD given that she is able to tolerate PO intake when not actively withdrawing.  - IV PPI BID  - Trend CBC, transfuse if Hgb <7  - Advance as tolerated  - GI consult, appreciate recs Pt with few days of vomiting starting on 9/10, mostly dark green color with a "coffee-like" appearance, was unable to keep food and water down.  Prior endoscopy from 11/20/2023: normal esophagus but a medium amt of food found in stomach, rest of study was aborted, gastric emptying study recommended for gastroparesis.  GI also consulted on past admission in 11/2023 for odynophagia after vomiting, at that time stated emesis was likely 2/2 AUD given that she is able to tolerate PO intake when not actively withdrawing.  - IV PPI BID  - Trend CBC, transfuse if Hgb <7  - Advance as tolerated  - GI consulted, appreciate recs

## 2024-09-14 NOTE — CONSULT NOTE ADULT - ASSESSMENT
44F PMHx ETOH use disorder, active smoker, prior chest wall abscess s/p I/D, who presented to [facility] ED w CGE and CP. Admitted for ETOH withdrawal. Surgery consulted for evaluation of chest wall abscess seen on exam and CT.     Pt is HDS, afebrile. Exam w 3x4cm area of fluctuance just to R of sternum. Labs wnl. CT w 3.7 x 2.4cm abscess.     Plan:   -Bedside I/D  -Abx per Primary team   -F/U wound Cx   -Pain control per primary team  -Daily packing per surgical team for now   -Global care per primary team     Discussed with Attending Surgeon Dr. Stalin Echavarria MD PGY4  ACS, [id number]

## 2024-09-14 NOTE — PROVIDER CONTACT NOTE (OTHER) - ACTION/TREATMENT ORDERED:
Will order Ofirmev and reassess after tx.
Okay to advance pt's diet to regular diet. Continue monitoring.

## 2024-09-14 NOTE — PROGRESS NOTE ADULT - PROBLEM SELECTOR PLAN 3
Pt with abscess in R midline of chest/breast, reportedly appeared 1 yr ago when she had it drained, and it recurred. Painful to touch, erythematous, edematous.  CTA Chest on admission: 3.7 x 2.4 cm superficial subcutaneous collection within the anterior chest wall.  POCUS TTE: 3.92 x 1.28 x 3.81cm collection of the right chest wall, consistent with abscess, but with one region of internal color flow. Cannot exclude vascular component.  - Surgery consult for possible I&D, appreciate recs; can send for culture and MRSA swab  - Monitor off abx for now as pt afebrile & without leukocytosis  - Chart review to see if any prior culture data exists Pt with abscess in R midline of chest/breast, reportedly appeared 1 yr ago when she had it drained, and it recurred. Painful to touch, erythematous, edematous.  CTA Chest on admission: 3.7 x 2.4 cm superficial subcutaneous collection within the anterior chest wall.  POCUS TTE: 3.92 x 1.28 x 3.81cm collection of the right chest wall, consistent with abscess, but with one region of internal color flow. Cannot exclude vascular component.  - Surgery consulted for possible I&D, appreciate recs; can send for culture and MRSA swab  - Monitor off abx for now as pt afebrile & without leukocytosis  - Chart review to see if any prior culture data exists Pt with abscess in R midline of chest/breast, reportedly appeared 1 yr ago when she had it drained, and it recurred. Painful to touch, erythematous, edematous. Pt afebrile & without leukocytosis  CTA Chest on admission: 3.7 x 2.4 cm superficial subcutaneous collection within the anterior chest wall.  POCUS TTE: 3.92 x 1.28 x 3.81cm collection of the right chest wall, consistent with abscess, but with one region of internal color flow. Cannot exclude vascular component.  Surgery consulted, I&D on 9/14, send for culture and MRSA swab.  - Empiric bactrim PO bid (9/14- )

## 2024-09-14 NOTE — CONSULT NOTE ADULT - SUBJECTIVE AND OBJECTIVE BOX
SURGERY CONSULT NOTE  --------------------------------------------------------------------------------------------    Patient is a 44y old  Female who presents with a chief complaint of coffee-ground emesis (14 Sep 2024 06:49)      HPI: 44F PMHx ETOH use disorder, active smoker, prior chest wall abscess s/p I/D, who presented to [facility] ED w CGE and CP. Admitted for ETOH withdrawal. Surgery consulted for evaluation of chest wall abscess seen on exam and CT.     Pt is HDS, afebrile. Labs wnl. CT w 3.7 x 2.4cm abscess.     Seen and examined. Reports abscess has been present x 1 month. Denies associated fevers, chills, purulent drainage, trauma to the area, SOB.       PAST MEDICAL & SURGICAL HISTORY:  History of alcohol use disorder  1 pint of vodka/day      No significant past surgical history        FAMILY HISTORY:  FH: type 2 diabetes (Father)    FH: hypertension (Mother)      [] Family history not pertinent as reviewed with the patient and family    ALLERGIES: No Known Allergies      CURRENT MEDICATIONS  MEDICATIONS (STANDING): folic acid 1 milliGRAM(s) Oral daily  lidocaine 1% Injectable 30 milliLiter(s) Local Injection once  pantoprazole  Injectable 40 milliGRAM(s) IV Push two times a day  potassium chloride    Tablet ER 40 milliEquivalent(s) Oral every 4 hours  sucralfate 1 Gram(s) Oral two times a day  trimethoprim  160 mG/sulfamethoxazole 800 mG 1 Tablet(s) Oral two times a day    MEDICATIONS (PRN):LORazepam   Injectable 2 milliGRAM(s) IV Push every 1 hour PRN CIWA-Ar score 8 or greater    --------------------------------------------------------------------------------------------    Vitals:   T(C): 36.8 (09-14-24 @ 12:29), Max: 37.1 (09-13-24 @ 18:00)  HR: 85 (09-14-24 @ 12:29) (81 - 92)  BP: 113/77 (09-14-24 @ 12:29) (97/61 - 122/79)  RR: 18 (09-14-24 @ 12:29) (18 - 18)  SpO2: 98% (09-14-24 @ 12:29) (94% - 99%)  CAPILLARY BLOOD GLUCOSE      POCT Blood Glucose.: 95 mg/dL (13 Sep 2024 16:35)    CAPILLARY BLOOD GLUCOSE      POCT Blood Glucose.: 95 mg/dL (13 Sep 2024 16:35)      Height (cm): 154.9 (09-13 @ 07:12)  Weight (kg): 45.4 (09-13 @ 07:12)  BMI (kg/m2): 18.9 (09-13 @ 07:12)  BSA (m2): 1.41 (09-13 @ 07:12)    PHYSICAL EXAM:   General: Alert, NAD  Neuro: A+Ox3  HEENT: NC/AT, no asymmetry, no scleral icterus  Neck: Soft, supple  Cardio: RRR   Resp: Airway patent, unlabored breathing  Thorax: No chest wall tenderness  GI/Abd: Soft, NT/ND, no rebound/guarding, no masses palpated  Vascular: All 4 extremities warm, B/l radial pulses palpable, b/l femoral pulse palpable,  b/l DP/PT palpable  Skin: Intact, no breakdown  Musculoskeletal: All 4 extremities moving spontaneously, no limitations  --------------------------------------------------------------------------------------------    LABS  CBC (09-13 @ 17:09)                              9.0<L>                         8.58    )----------------(  249        --    % Neutrophils, --    % Lymphocytes, ANC: --                                  28.5<L>  CBC (09-13 @ 16:02)                              9.4<L>                         8.03    )----------------(  237        --    % Neutrophils, --    % Lymphocytes, ANC: --                                  29.0<L>    BMP (09-14 @ 06:01)             141     |  101     |  14    		Ca++ --      Ca 9.0                ---------------------------------( 149<H>		Mg 1.6                2.9<LL>  |  27      |  0.46<L>			Ph 2.3<L>  BMP (09-13 @ 08:18)             143     |  100     |  8     		Ca++ --      Ca 9.9                ---------------------------------( 102<H>		Mg 1.6                2.8<LL>  |  27      |  0.46<L>			Ph --        LFTs (09-13 @ 08:18)      TPro 8.3 / Alb 4.4 / TBili 0.3 / DBili -- / <H> / ALT 91<H> / AlkPhos 119          VBG (09-13 @ 08:05)     7.43 / 48<H> / 23<L> / 32<H> / 6.6<H> / 37.0<L>%     Lactate: 2.3<H>    --------------------------------------------------------------------------------------------    MICROBIOLOGY  Urinalysis (09-14 @ 06:01):     Color:  / Appearance:  / SG:  / pH:  / Gluc: 149<H> / Ketones:  / Bili:  / Urobili:  / Protein : / Nitrites:  / Leuk.Est:  / RBC:  / WBC:  / Sq Epi:  / Non Sq Epi:  / Bacteria          --------------------------------------------------------------------------------------------    IMAGING   SURGERY CONSULT NOTE  --------------------------------------------------------------------------------------------    Patient is a 44y old  Female who presents with a chief complaint of coffee-ground emesis (14 Sep 2024 06:49)      HPI: 44F PMHx ETOH use disorder, active smoker, prior chest wall abscess s/p I/D, who presented to [facility] ED w CGE and CP. Admitted for ETOH withdrawal. Surgery consulted for evaluation of chest wall abscess seen on exam and CT.     Pt is HDS, afebrile. Labs wnl. CT w 3.7 x 2.4cm abscess.     Seen and examined. Reports abscess has been present x 1 month. Denies associated fevers, chills, purulent drainage, trauma to the area, SOB.       PAST MEDICAL & SURGICAL HISTORY:  History of alcohol use disorder  1 pint of vodka/day      No significant past surgical history        FAMILY HISTORY:  FH: type 2 diabetes (Father)    FH: hypertension (Mother)      [] Family history not pertinent as reviewed with the patient and family    ALLERGIES: No Known Allergies      CURRENT MEDICATIONS  MEDICATIONS (STANDING): folic acid 1 milliGRAM(s) Oral daily  lidocaine 1% Injectable 30 milliLiter(s) Local Injection once  pantoprazole  Injectable 40 milliGRAM(s) IV Push two times a day  potassium chloride    Tablet ER 40 milliEquivalent(s) Oral every 4 hours  sucralfate 1 Gram(s) Oral two times a day  trimethoprim  160 mG/sulfamethoxazole 800 mG 1 Tablet(s) Oral two times a day    MEDICATIONS (PRN):LORazepam   Injectable 2 milliGRAM(s) IV Push every 1 hour PRN CIWA-Ar score 8 or greater    --------------------------------------------------------------------------------------------    Vitals:   T(C): 36.8 (24 @ 12:29), Max: 37.1 (24 @ 18:00)  HR: 85 (24 @ 12:29) (81 - 92)  BP: 113/77 (24 @ 12:29) (97/61 - 122/79)  RR: 18 (24 @ 12:29) (18 - 18)  SpO2: 98% (24 @ 12:29) (94% - 99%)  CAPILLARY BLOOD GLUCOSE      POCT Blood Glucose.: 95 mg/dL (13 Sep 2024 16:35)    CAPILLARY BLOOD GLUCOSE      POCT Blood Glucose.: 95 mg/dL (13 Sep 2024 16:35)      Height (cm): 154.9 ( @ 07:12)  Weight (kg): 45.4 ( 07:12)  BMI (kg/m2): 18.9 ( 07:12)  BSA (m2): 1.41 ( 07:12)    PHYSICAL EXAM:   General: Alert, NAD  Neuro: A+Ox3  HEENT: NC/AT, no asymmetry, no scleral icterus  Neck: Soft, supple  Cardio: RRR   Resp: Airway patent, unlabored breathing  Thorax: R chest wall with ~3x4cm area of fluctuance with overlying erythema just to right of sternum, mildly ttp, no spontaneous drainage, no punctate opening, no surrounding crepitus or bullae   GI/Abd: Soft, NT/ND, no rebound/guarding, no masses palpated  Vascular: All 4 extremities warm   Skin: Intact, no breakdown  Musculoskeletal: All 4 extremities moving spontaneously, no limitations  --------------------------------------------------------------------------------------------    LABS  CBC ( @ 17:09)                              9.0<L>                         8.58    )----------------(  249        --    % Neutrophils, --    % Lymphocytes, ANC: --                                  28.5<L>  CBC ( @ 16:02)                              9.4<L>                         8.03    )----------------(  237        --    % Neutrophils, --    % Lymphocytes, ANC: --                                  29.0<L>    BMP ( @ 06:01)             141     |  101     |  14    		Ca++ --      Ca 9.0                ---------------------------------( 149<H>		Mg 1.6                2.9<LL>  |  27      |  0.46<L>			Ph 2.3<L>  BMP ( @ 08:18)             143     |  100     |  8     		Ca++ --      Ca 9.9                ---------------------------------( 102<H>		Mg 1.6                2.8<LL>  |  27      |  0.46<L>			Ph --        LFTs ( 08:18)      TPro 8.3 / Alb 4.4 / TBili 0.3 / DBili -- / <H> / ALT 91<H> / AlkPhos 119          VBG ( @ 08:05)     7.43 / 48<H> / 23<L> / 32<H> / 6.6<H> / 37.0<L>%     Lactate: 2.3<H>    --------------------------------------------------------------------------------------------    MICROBIOLOGY  Urinalysis ( @ 06:01):     Color:  / Appearance:  / SG:  / pH:  / Gluc: 149<H> / Ketones:  / Bili:  / Urobili:  / Protein : / Nitrites:  / Leuk.Est:  / RBC:  / WBC:  / Sq Epi:  / Non Sq Epi:  / Bacteria          --------------------------------------------------------------------------------------------    IMAGING  < from: CT Angio Chest PE Protocol w/ IV Cont (24 @ 14:04) >    ACC: [id number] EXAM:  CT ANGIO CHEST PULM ART [facility]   ORDERED BY: VIRAL BRISENO     PROCEDURE DATE:  2024          INTERPRETATION:  .  Patient: ARABELLA BHANDARI  : 1980  MRN: [id number]  ACC: [id number]  Order Date: 2024 2:04 PM  Exam: CT ANGIO CHEST PULMONARY ARTERY    INDICATION, CLINICAL INFORMATION: Chest pain r/o PE, eval for deeper   extension of chest wall abscess  TECHNIQUE: CT pulmonary angiogram of the chest . Coronal, sagittal and   3-D/MIP images were reconstructed/reviewed.  CONTRAST/COMPLICATIONS:  IV Contrast: Omnipaque 350  50 cc administered   50 cc discarded  Complications: None reported at time of study completion    COMPARISON: 4/10/2017 chest CT.    FINDINGS:    PULMONARY VESSELS: No pulmonary embolus. Main pulmonary artery normal in   diameter.    HEART AND VASCULATURE: Heart size is normal. No pericardial effusion. No   aortic aneurysm or dissection. Mild coronary calcification.    LUNGS, AIRWAYS, PLEURA: Patent central airways. Clear lungs. No pleural   effusions or pneumothorax.    MEDIASTINUM: No mass or lymphadenopathy.    UPPER ABDOMEN: Within normal limits.    BONES AND SOFT TISSUES: No aggressive osseous lesion. 3.7 x 2.4 cm   superficial subcutaneous collection within the anterior chest wall.    LOWER NECK: Within normal limits.    IMPRESSION:    No pulmonary embolus.    3.7 x 2.4 cm superficial subcutaneous collection within the anterior   chest wall.    --- End of Report ---            MIGUELITO GAO MD; Attending Radiologist  This document has been electronically signed. Sep 13 2024  2:42PM    < end of copied text >

## 2024-09-14 NOTE — PROGRESS NOTE ADULT - SUBJECTIVE AND OBJECTIVE BOX
****************************************************  Ling Ng MD | PGY-1 Internal Medicine | TEAMS  ****************************************************    PATIENT: ARABELLA BHANDARI, MRN: [id number]    CHIEF COMPLAINT: Patient is a 44y old  Female who presents with a chief complaint of coffee-ground emesis (13 Sep 2024 16:49)      INTERVAL HISTORY/OVERNIGHT EVENTS: No overnight events.       MEDICATIONS:  MEDICATIONS  (STANDING):  pantoprazole  Injectable 40 milliGRAM(s) IV Push two times a day  potassium chloride    Tablet ER 40 milliEquivalent(s) Oral every 4 hours  sucralfate 1 Gram(s) Oral two times a day    MEDICATIONS  (PRN):  LORazepam   Injectable 2 milliGRAM(s) IV Push every 1 hour PRN CIWA-Ar score 8 or greater      ALLERGIES: Allergies    No Known Allergies    Intolerances        OBJECTIVE:  ICU Vital Signs Last 24 Hrs  T(C): 37 (14 Sep 2024 04:23), Max: 37.3 (13 Sep 2024 09:02)  T(F): 98.6 (14 Sep 2024 04:23), Max: 99.1 (13 Sep 2024 09:02)  HR: 89 (14 Sep 2024 04:23) (81 - 104)  BP: 97/61 (14 Sep 2024 04:23) (97/61 - 124/88)  BP(mean): --  ABP: --  ABP(mean): --  RR: 18 (14 Sep 2024 04:23) (18 - 18)  SpO2: 96% (14 Sep 2024 04:23) (94% - 99%)    O2 Parameters below as of 14 Sep 2024 04:23  Patient On (Oxygen Delivery Method): room air            CAPILLARY BLOOD GLUCOSE      POCT Blood Glucose.: 95 mg/dL (13 Sep 2024 16:35)    CAPILLARY BLOOD GLUCOSE      POCT Blood Glucose.: 95 mg/dL (13 Sep 2024 16:35)    I&O's Summary    Daily Height in cm: 154.94 (13 Sep 2024 07:12)    Daily     PHYSICAL EXAMINATION:  General: Comfortable, no acute distress, cooperative with exam.  HEENT: EOMI, PERRLA, conjunctiva clear.  Respiratory: CTA b/l, normal respiratory effort, no coughing, wheezes, crackles, or rales.  CV: RRR, no murmurs, rubs or gallops. No JVD. Distal pulses intact.  Abdominal: Soft, nontender, nondistended; no rebound or guarding; +bowel sounds.  Neurology: AOx3, no focal neuro defects, BOWER.  Extremities: No pitting edema, +peripheral pulses.      LABS:                          9.0    8.58  )-----------( 249      ( 13 Sep 2024 17:09 )             28.5     09-14    141  |  101  |  14  ----------------------------<  149<H>  2.9<LL>   |  27  |  0.46<L>    Ca    9.0      14 Sep 2024 06:01  Phos  2.3     09-14  Mg     1.6     09-14    TPro  8.3  /  Alb  4.4  /  TBili  0.3  /  DBili  x   /  AST  256<H>  /  ALT  91<H>  /  AlkPhos  119  09-13    LIVER FUNCTIONS - ( 13 Sep 2024 08:18 )  Alb: 4.4 g/dL / Pro: 8.3 g/dL / ALK PHOS: 119 U/L / ALT: 91 U/L / AST: 256 U/L / GGT: x                   Urinalysis Basic - ( 14 Sep 2024 06:01 )    Color: x / Appearance: x / SG: x / pH: x  Gluc: 149 mg/dL / Ketone: x  / Bili: x / Urobili: x   Blood: x / Protein: x / Nitrite: x   Leuk Esterase: x / RBC: x / WBC x   Sq Epi: x / Non Sq Epi: x / Bacteria: x     ****************************************************  Ling Ng MD | PGY-1 Internal Medicine | TEAMS  ****************************************************    PATIENT: ARABELLA BHANDARI, MRN: [id number]    CHIEF COMPLAINT: Patient is a 44y old  Female who presents with a chief complaint of coffee-ground emesis (13 Sep 2024 16:49)      INTERVAL HISTORY/OVERNIGHT EVENTS: No overnight events. Patient seen and examined at bedside, reports increased chest pain at the site of the chest wall abscess but no "internal" chest pain at this time. Does not have any sx of alcohol withdrawal, has been able to tolerate PO intake (patient eating breakfast when examined).      MEDICATIONS:  MEDICATIONS  (STANDING):  pantoprazole  Injectable 40 milliGRAM(s) IV Push two times a day  potassium chloride    Tablet ER 40 milliEquivalent(s) Oral every 4 hours  sucralfate 1 Gram(s) Oral two times a day    MEDICATIONS  (PRN):  LORazepam   Injectable 2 milliGRAM(s) IV Push every 1 hour PRN CIWA-Ar score 8 or greater      ALLERGIES: Allergies    No Known Allergies    Intolerances        OBJECTIVE:  ICU Vital Signs Last 24 Hrs  T(C): 37 (14 Sep 2024 04:23), Max: 37.3 (13 Sep 2024 09:02)  T(F): 98.6 (14 Sep 2024 04:23), Max: 99.1 (13 Sep 2024 09:02)  HR: 89 (14 Sep 2024 04:23) (81 - 104)  BP: 97/61 (14 Sep 2024 04:23) (97/61 - 124/88)  BP(mean): --  ABP: --  ABP(mean): --  RR: 18 (14 Sep 2024 04:23) (18 - 18)  SpO2: 96% (14 Sep 2024 04:23) (94% - 99%)    O2 Parameters below as of 14 Sep 2024 04:23  Patient On (Oxygen Delivery Method): room air            CAPILLARY BLOOD GLUCOSE      POCT Blood Glucose.: 95 mg/dL (13 Sep 2024 16:35)    CAPILLARY BLOOD GLUCOSE      POCT Blood Glucose.: 95 mg/dL (13 Sep 2024 16:35)    I&O's Summary    Daily Height in cm: 154.94 (13 Sep 2024 07:12)    Daily     PHYSICAL EXAMINATION:  General: Comfortable, no acute distress, cooperative with exam.  HEENT: EOMI, PERRLA, conjunctiva clear.  Respiratory: CTA b/l, normal respiratory effort, no coughing, wheezes, crackles, or rales.  CV: RRR, no murmurs, rubs or gallops. No JVD. Distal pulses intact.  Abdominal: Soft, nontender, nondistended; no rebound or guarding; +bowel sounds.  Neurology: AOx3, no focal neuro defects, BOWER.  Extremities: No pitting edema, +peripheral pulses.  Skin: +Abscess of R/middle chest wall with erythema, edema, tender to palpation. No other rashes or bruises.      LABS:                          9.0    8.58  )-----------( 249      ( 13 Sep 2024 17:09 )             28.5     09-14    141  |  101  |  14  ----------------------------<  149<H>  2.9<LL>   |  27  |  0.46<L>    Ca    9.0      14 Sep 2024 06:01  Phos  2.3     09-14  Mg     1.6     09-14    TPro  8.3  /  Alb  4.4  /  TBili  0.3  /  DBili  x   /  AST  256<H>  /  ALT  91<H>  /  AlkPhos  119  09-13    LIVER FUNCTIONS - ( 13 Sep 2024 08:18 )  Alb: 4.4 g/dL / Pro: 8.3 g/dL / ALK PHOS: 119 U/L / ALT: 91 U/L / AST: 256 U/L / GGT: x                   Urinalysis Basic - ( 14 Sep 2024 06:01 )    Color: x / Appearance: x / SG: x / pH: x  Gluc: 149 mg/dL / Ketone: x  / Bili: x / Urobili: x   Blood: x / Protein: x / Nitrite: x   Leuk Esterase: x / RBC: x / WBC x   Sq Epi: x / Non Sq Epi: x / Bacteria: x

## 2024-09-14 NOTE — PROGRESS NOTE ADULT - PROBLEM SELECTOR PLAN 5
DVT: SCDs  Diet: CLD, advance as tolerated  Activity: As tolerated  Dispo: Home, pending clinical improvement Pt with K 2.9 on 9/14.  - Replete with 15mM KPhos IV, 40meq PO K q4h x3  - Recheck afternoon BMP

## 2024-09-14 NOTE — PROGRESS NOTE ADULT - PROBLEM SELECTOR PLAN 4
Pt with hx chronically low Hgb. Hgb on admission 10.6->9.4.  - F/u iron studies, B12, folate  - Transfuse if <7 Pt with hx chronically low Hgb. Hgb on admission 10.6->9.4.  Folate low (4.1). Ferritin high (280). B12 wnl.  - Folic acid supplementation  - Transfuse if <7 Pt with hx chronically low Hgb. Hgb on admission 10.6->9.4->9.1. MCV 75.  Folate low (4.1). Ferritin high (280). B12 wnl. Iron studies wnl.  Microcytic anemia, likely due to folate deficiency and possible ACD.  - Folic acid supplementation  - CBC daily; Transfuse if <7

## 2024-09-15 DIAGNOSIS — F10.139 ALCOHOL ABUSE WITH WITHDRAWAL, UNSPECIFIED: ICD-10-CM

## 2024-09-15 DIAGNOSIS — N61.1 ABSCESS OF THE BREAST AND NIPPLE: ICD-10-CM

## 2024-09-15 DIAGNOSIS — L02.213 CUTANEOUS ABSCESS OF CHEST WALL: ICD-10-CM

## 2024-09-15 LAB
ANION GAP SERPL CALC-SCNC: 13 MMOL/L — SIGNIFICANT CHANGE UP (ref 5–17)
BUN SERPL-MCNC: 13 MG/DL — SIGNIFICANT CHANGE UP (ref 7–23)
CALCIUM SERPL-MCNC: 9.2 MG/DL — SIGNIFICANT CHANGE UP (ref 8.4–10.5)
CHLORIDE SERPL-SCNC: 105 MMOL/L — SIGNIFICANT CHANGE UP (ref 96–108)
CO2 SERPL-SCNC: 21 MMOL/L — LOW (ref 22–31)
CREAT SERPL-MCNC: 0.41 MG/DL — LOW (ref 0.5–1.3)
EGFR: 124 ML/MIN/1.73M2 — SIGNIFICANT CHANGE UP
GLUCOSE SERPL-MCNC: 96 MG/DL — SIGNIFICANT CHANGE UP (ref 70–99)
GRAM STN FLD: ABNORMAL
HCT VFR BLD CALC: 31.5 % — LOW (ref 34.5–45)
HGB BLD-MCNC: 9.7 G/DL — LOW (ref 11.5–15.5)
MAGNESIUM SERPL-MCNC: 1.5 MG/DL — LOW (ref 1.6–2.6)
MCHC RBC-ENTMCNC: 23.4 PG — LOW (ref 27–34)
MCHC RBC-ENTMCNC: 30.8 GM/DL — LOW (ref 32–36)
MCV RBC AUTO: 76.1 FL — LOW (ref 80–100)
NRBC # BLD: 0 /100 WBCS — SIGNIFICANT CHANGE UP (ref 0–0)
PHOSPHATE SERPL-MCNC: 2.7 MG/DL — SIGNIFICANT CHANGE UP (ref 2.5–4.5)
PLATELET # BLD AUTO: 294 K/UL — SIGNIFICANT CHANGE UP (ref 150–400)
POTASSIUM SERPL-MCNC: 4.6 MMOL/L — SIGNIFICANT CHANGE UP (ref 3.5–5.3)
POTASSIUM SERPL-SCNC: 4.6 MMOL/L — SIGNIFICANT CHANGE UP (ref 3.5–5.3)
RBC # BLD: 4.14 M/UL — SIGNIFICANT CHANGE UP (ref 3.8–5.2)
RBC # FLD: 17.2 % — HIGH (ref 10.3–14.5)
SODIUM SERPL-SCNC: 139 MMOL/L — SIGNIFICANT CHANGE UP (ref 135–145)
SPECIMEN SOURCE: SIGNIFICANT CHANGE UP
WBC # BLD: 5.95 K/UL — SIGNIFICANT CHANGE UP (ref 3.8–10.5)
WBC # FLD AUTO: 5.95 K/UL — SIGNIFICANT CHANGE UP (ref 3.8–10.5)

## 2024-09-15 PROCEDURE — 99233 SBSQ HOSP IP/OBS HIGH 50: CPT | Mod: GC

## 2024-09-15 PROCEDURE — 99222 1ST HOSP IP/OBS MODERATE 55: CPT

## 2024-09-15 RX ORDER — ACETAMINOPHEN 325 MG/1
675 TABLET ORAL ONCE
Refills: 0 | Status: DISCONTINUED | OUTPATIENT
Start: 2024-09-15 | End: 2024-09-16

## 2024-09-15 RX ORDER — OXYCODONE HYDROCHLORIDE 5 MG/1
2.5 TABLET ORAL ONCE
Refills: 0 | Status: DISCONTINUED | OUTPATIENT
Start: 2024-09-15 | End: 2024-09-15

## 2024-09-15 RX ORDER — HYDROMORPHONE HYDROCHLORIDE 2 MG/1
0.2 TABLET ORAL ONCE
Refills: 0 | Status: DISCONTINUED | OUTPATIENT
Start: 2024-09-15 | End: 2024-09-15

## 2024-09-15 RX ORDER — POLYETHYLENE GLYCOL 3350 17 G/17G
17 POWDER, FOR SOLUTION ORAL DAILY
Refills: 0 | Status: DISCONTINUED | OUTPATIENT
Start: 2024-09-15 | End: 2024-09-17

## 2024-09-15 RX ORDER — FOLIC ACID 1 MG
1 TABLET ORAL
Qty: 30 | Refills: 0
Start: 2024-09-15 | End: 2024-10-14

## 2024-09-15 RX ORDER — SENNA 187 MG
2 TABLET ORAL AT BEDTIME
Refills: 0 | Status: DISCONTINUED | OUTPATIENT
Start: 2024-09-15 | End: 2024-09-17

## 2024-09-15 RX ORDER — PANTOPRAZOLE SODIUM 40 MG
1 TABLET, DELAYED RELEASE (ENTERIC COATED) ORAL
Qty: 60 | Refills: 0
Start: 2024-09-15 | End: 2024-10-14

## 2024-09-15 RX ORDER — SULFAMETHOXAZOLE AND TRIMETHOPRIM 800; 160 MG/1; MG/1
1 TABLET ORAL
Qty: 18 | Refills: 0
Start: 2024-09-15 | End: 2024-09-23

## 2024-09-15 RX ADMIN — Medication 25 GRAM(S): at 09:42

## 2024-09-15 RX ADMIN — OXYCODONE HYDROCHLORIDE 2.5 MILLIGRAM(S): 5 TABLET ORAL at 00:07

## 2024-09-15 RX ADMIN — Medication 40 MILLIGRAM(S): at 17:12

## 2024-09-15 RX ADMIN — HYDROMORPHONE HYDROCHLORIDE 0.2 MILLIGRAM(S): 2 TABLET ORAL at 22:16

## 2024-09-15 RX ADMIN — OXYCODONE HYDROCHLORIDE 2.5 MILLIGRAM(S): 5 TABLET ORAL at 04:44

## 2024-09-15 RX ADMIN — Medication 40 MILLIGRAM(S): at 05:04

## 2024-09-15 RX ADMIN — Medication 3 MILLIGRAM(S): at 21:18

## 2024-09-15 RX ADMIN — SULFAMETHOXAZOLE AND TRIMETHOPRIM 1 TABLET(S): 800; 160 TABLET ORAL at 17:12

## 2024-09-15 RX ADMIN — SUCRALFATE 1 GRAM(S): 1 SUSPENSION ORAL at 05:05

## 2024-09-15 RX ADMIN — HYDROMORPHONE HYDROCHLORIDE 0.2 MILLIGRAM(S): 2 TABLET ORAL at 21:16

## 2024-09-15 RX ADMIN — Medication 2 TABLET(S): at 21:17

## 2024-09-15 RX ADMIN — OXYCODONE HYDROCHLORIDE 2.5 MILLIGRAM(S): 5 TABLET ORAL at 05:54

## 2024-09-15 RX ADMIN — Medication 1 MILLIGRAM(S): at 11:21

## 2024-09-15 RX ADMIN — SULFAMETHOXAZOLE AND TRIMETHOPRIM 1 TABLET(S): 800; 160 TABLET ORAL at 05:05

## 2024-09-15 RX ADMIN — Medication 25 GRAM(S): at 11:21

## 2024-09-15 RX ADMIN — SUCRALFATE 1 GRAM(S): 1 SUSPENSION ORAL at 17:12

## 2024-09-15 RX ADMIN — OXYCODONE HYDROCHLORIDE 2.5 MILLIGRAM(S): 5 TABLET ORAL at 17:24

## 2024-09-15 RX ADMIN — Medication 500 MILLILITER(S): at 14:09

## 2024-09-15 NOTE — PROGRESS NOTE ADULT - PROBLEM SELECTOR PLAN 2
Pt with few days of vomiting starting on 9/10, mostly dark green color with a "coffee-like" appearance, was unable to keep food and water down.  Prior endoscopy from 11/20/2023: normal esophagus but a medium amt of food found in stomach, rest of study was aborted, gastric emptying study recommended for gastroparesis.  GI also consulted on past admission in 11/2023 for odynophagia after vomiting, at that time stated emesis was likely 2/2 AUD given that she is able to tolerate PO intake when not actively withdrawing.  - IV PPI BID  - Trend CBC, transfuse if Hgb <7  - Advance as tolerated  - GI consulted, appreciate recs

## 2024-09-15 NOTE — PROCEDURE NOTE - ADDITIONAL PROCEDURE DETAILS
10cc 1% Lidocaine injected circumferentially around abscess. 11 Blade used to incise over area of maximal fluctuance. ~15cc pus expressed. Wound cx obtained. 1" packing placed. 4x4 and paper tape for dressing.

## 2024-09-15 NOTE — DISCHARGE NOTE PROVIDER - NSFOLLOWUPCLINICS_GEN_ALL_ED_FT
Gastroenterology at [facility]  Gastroenterology  [address] [place] [id number]  Phone: [phone]  Fax:     [facility]  Podiatry/Wound Care  [address] [place] [address]  Phone: [phone]  Fax: [phone]    [facility] [place]  Internal Medicine  [address] [place] [address]  Phone: [phone]  Fax: [phone]

## 2024-09-15 NOTE — CHART NOTE - NSCHARTNOTEFT_GEN_A_CORE
Pt reported headache and chest pain at I&D site that was mod-severe in the early evening. Ordered IV Tylenol with minimal to no pain relief. 2 Hours later was called for severe headache behind one eye. Evaluated patient at bedside who was now reporting bilateral headache at the temples and behind the eyes. No anisocoria, unilateral lacrimation/injection, or rhinorrhea suggesting against cluster headache. Given severe pain ISO I&D today ordered oxy2.5mg trial. Pt reported headache and chest pain at I&D site that was mod-severe in the early evening. Ordered IV Tylenol with minimal to no pain relief. 2 Hours later was called for severe headache behind one eye. Evaluated patient at bedside who was now reporting bilateral headache at the temples and behind the eyes. No anisocoria, unilateral lacrimation/injection, or rhinorrhea suggesting against cluster headache. Given severe pain ISO I&D today ordered oxy2.5mg trial with decrease in chest site pain and resolution of headache.    Pain at chest site back to 7/10 without headache ~4AM. Ordered another 2.5mg oxycodone with further pain management deferred to the day team.

## 2024-09-15 NOTE — PROGRESS NOTE ADULT - PROBLEM SELECTOR PLAN 4
Pt with hx chronically low Hgb. Hgb on admission 10.6->9.4->9.1. MCV 75.  Folate low (4.1). Ferritin high (280). B12 wnl. Iron studies wnl.  Microcytic anemia, likely due to folate deficiency and possible ACD.  - Folic acid supplementation  - CBC daily; Transfuse if <7

## 2024-09-15 NOTE — DISCHARGE NOTE PROVIDER - HOSPITAL COURSE
HPI:  Ms. Poornima Zepeda is a 44F with PMHx alcohol use disorder, alcohol withdrawal, active smoker with 15 pack-year hx, presenting with acute-onset chest pain starting last night associated with coffee ground emesis x4days. Last night patient had chest pain which she describes as chest pressure and palpitations and felt as if her heart was "beating too fast". The pain was associated with nausea, vomiting and being unable to hold food or water down. Vomiting has been for the past 4 days and is yellow/green tinged, patient describes it as looking like "coffee". Patient says she drank 1 pint of vodka last night prior to onset of chest pain. Of note, patient has a hx of prior admissions for alcohol withdrawal and has been seen by GI for the same concerns for hyperemesis i/s/o alcohol use.  Of note, patient has had an abscess on the chest of 1 month duration causing "external" chest pain. She has gotten an I&D in the past but says her current pain feels unrelated to the abscess, she states her current chest pain feels more internal and left-sided. LMP 2 months ago but patient says she has had irregular periods since the age of 14.    In the ED, patient was tachycardic to 104, afebrile & otherwise vitally stable. Labs were significant for Hgb 10.6->9.4, abnormal platelet & red cell morphology, K 2.8, , ALT 91; troponins negative x2. CTA showed no pulmonary embolus, 3.7 x 2.4 cm superficial subcutaneous collection within the anterior chest wall. POCUS TTE showed physiologic appearing pericardial effusion, A-line predominant lung blunt; 3.92 x 1.28 x 3.81cm collection of the right chest wall, consistent with abscess, but with one region of internal color flow, cannot entirely exclude vascular component. (13 Sep 2024 16:49)    On admission, GI was consulted and recommended no acute intervention. Pt was found to have breast abscess, surgery was consulted and performed I&D at bedside. Pt started on bactrim, folate, mIVF, and multivitamins. On day of discharge, patient deemed medically ready for discharge.     Medication changes:   Started bactrim DS BID for 10 days total   Started Protonix 40 mg BID HPI:  Ms. Poornima Zepeda is a 44F with PMHx alcohol use disorder, alcohol withdrawal, active smoker with 15 pack-year hx, presenting with acute-onset chest pain starting last night associated with coffee ground emesis x4days. Last night patient had chest pain which she describes as chest pressure and palpitations and felt as if her heart was "beating too fast". The pain was associated with nausea, vomiting and being unable to hold food or water down. Vomiting has been for the past 4 days and is yellow/green tinged, patient describes it as looking like "coffee". Patient says she drank 1 pint of vodka last night prior to onset of chest pain. Of note, patient has a hx of prior admissions for alcohol withdrawal and has been seen by GI for the same concerns for hyperemesis i/s/o alcohol use.  Of note, patient has had an abscess on the chest of 1 month duration causing "external" chest pain. She has gotten an I&D in the past but says her current pain feels unrelated to the abscess, she states her current chest pain feels more internal and left-sided. LMP 2 months ago but patient says she has had irregular periods since the age of 14.    In the ED, patient was tachycardic to 104, afebrile & otherwise vitally stable. Labs were significant for Hgb 10.6->9.4, abnormal platelet & red cell morphology, K 2.8, , ALT 91; troponins negative x2. CTA showed no pulmonary embolus, 3.7 x 2.4 cm superficial subcutaneous collection within the anterior chest wall. POCUS TTE showed physiologic appearing pericardial effusion, A-line predominant lung blunt; 3.92 x 1.28 x 3.81cm collection of the right chest wall, consistent with abscess, but with one region of internal color flow, cannot entirely exclude vascular component. (13 Sep 2024 16:49)    On admission, GI was consulted and recommended no acute intervention. Pt was found to have breast abscess, surgery was consulted and performed I&D at bedside. Pt started on bactrim, folate, mIVF, and multivitamins. On day of discharge, patient deemed medically ready for discharge.     Medication changes:   Started bactrim DS BID for 10 days total   Started Protonix 40 mg BID   Started Miralax and Senna

## 2024-09-15 NOTE — CHART NOTE - NSCHARTNOTEFT_GEN_A_CORE
Patient dressing change at bedside, 0.5 inch packing replaced.   Further dressing changes to be performed by nursing staff- order placed.     Recs:  - patient will require visiting nursing for dressing changes daily if discharged  - continue daily dressing changes       ACS/Trauma [id number] Patient dressing change at bedside, 0.5 inch packing replaced.   Further dressing changes to be performed by nursing staff- order placed.     Recs:  - patient will require visiting nursing for dressing changes daily if discharged  - continue daily dressing changes   - f/u with Dr. Lupe Gant outpatient 2 weeks from discharge  - ACS/Trauma to sign off please reconsult as needed    ACS/Trauma [id number]

## 2024-09-15 NOTE — PROGRESS NOTE ADULT - SUBJECTIVE AND OBJECTIVE BOX
PROGRESS NOTE:     Patient is a 44y old  Female who presents with a chief complaint of coffee-ground emesis (14 Sep 2024 17:45)      SUBJECTIVE / OVERNIGHT EVENTS:    No acute events overnight. Patient examined at bedside with no acute complaints.     Pain:  Bowel Movements:  Urination:  OOB:  PT:    REVIEW OF SYSTEMS:    CONSTITUTIONAL: No weakness, fevers or chills  EYES/ENT: No visual changes;  No vertigo or throat pain   NECK: No pain or stiffness  RESPIRATORY: No cough, wheezing, hemoptysis; No shortness of breath  CARDIOVASCULAR: No chest pain or palpitations  GASTROINTESTINAL: No abdominal or epigastric pain. No nausea, vomiting, or hematemesis; No diarrhea or constipation. No melena or hematochezia.  GENITOURINARY: No dysuria, frequency or hematuria  NEUROLOGICAL: No numbness or weakness  SKIN: No itching, rashes      MEDICATIONS  (STANDING):  folic acid 1 milliGRAM(s) Oral daily  lidocaine 1% Injectable 30 milliLiter(s) Local Injection once  pantoprazole  Injectable 40 milliGRAM(s) IV Push two times a day  sucralfate 1 Gram(s) Oral two times a day  trimethoprim  160 mG/sulfamethoxazole 800 mG 1 Tablet(s) Oral two times a day    MEDICATIONS  (PRN):  LORazepam   Injectable 2 milliGRAM(s) IV Push every 1 hour PRN CIWA-Ar score 8 or greater      CAPILLARY BLOOD GLUCOSE        I&O's Summary    14 Sep 2024 07:01  -  15 Sep 2024 06:57  --------------------------------------------------------  IN: 760 mL / OUT: 0 mL / NET: 760 mL        VITALS:   T(C): 37.1 (09-15-24 @ 03:55), Max: 37.1 (09-15-24 @ 03:55)  HR: 67 (09-15-24 @ 03:55) (67 - 85)  BP: 118/75 (09-15-24 @ 03:55) (97/60 - 118/75)  RR: 18 (09-15-24 @ 03:55) (18 - 18)  SpO2: 100% (09-15-24 @ 03:55) (96% - 100%)    GENERAL: NAD, lying in bed comfortably  HEAD:  Atraumatic, normocephalic  EYES: EOMI, PERRLA, conjunctiva and sclera clear  ENT: Moist mucous membranes  NECK: Supple, no JVD  HEART: Regular rate and rhythm, no murmurs, rubs, or gallops  LUNGS: Unlabored respirations.  Clear to auscultation bilaterally, no crackles, wheezing, or rhonchi  ABDOMEN: Soft, nontender, nondistended, +BS  EXTREMITIES: 2+ peripheral pulses bilaterally. No clubbing, cyanosis, or edema  NERVOUS SYSTEM:  A&Ox3, no focal deficits   SKIN: No rashes or lesions    LABS:                        9.0    8.58  )-----------( 249      ( 13 Sep 2024 17:09 )             28.5     09-14    141  |  107  |  14  ----------------------------<  110<H>  4.9   |  23  |  0.65    Ca    9.1      14 Sep 2024 15:26  Phos  3.9     09-14  Mg     1.6     09-14    TPro  8.3  /  Alb  4.4  /  TBili  0.3  /  DBili  x   /  AST  256<H>  /  ALT  91<H>  /  AlkPhos  119  09-13          Urinalysis Basic - ( 14 Sep 2024 15:26 )    Color: x / Appearance: x / SG: x / pH: x  Gluc: 110 mg/dL / Ketone: x  / Bili: x / Urobili: x   Blood: x / Protein: x / Nitrite: x   Leuk Esterase: x / RBC: x / WBC x   Sq Epi: x / Non Sq Epi: x / Bacteria: x        Culture - Abscess with Gram Stain (collected 14 Sep 2024 18:43)  Source: .Abscess Drainage  Gram Stain (15 Sep 2024 02:33):    Rare polymorphonuclear leukocytes seen per low power field    Moderate Gram positive cocci in pairs seen per oil power field        RADIOLOGY & ADDITIONAL TESTS:  Results Reviewed:   Imaging Personally Reviewed:  Electrocardiogram Personally Reviewed:    COORDINATION OF CARE:  Care Discussed with Consultants/Other Providers [Y/N]:  Prior or Outpatient Records Reviewed [Y/N]:   PROGRESS NOTE:     Patient is a 44y old  Female who presents with a chief complaint of coffee-ground emesis (14 Sep 2024 17:45)      SUBJECTIVE / OVERNIGHT EVENTS:    No acute events overnight. Patient examined at bedside and reports headache, dehydration, and site pain.     Pain: persistent   Bowel Movements: regular   Urination: regular   OOB: as tolerated     REVIEW OF SYSTEMS:    CONSTITUTIONAL: No weakness, fevers or chills  EYES/ENT: No visual changes;  No vertigo or throat pain   NECK: No pain or stiffness  RESPIRATORY: No cough, wheezing, hemoptysis; No shortness of breath  CARDIOVASCULAR: No chest pain or palpitations  GASTROINTESTINAL: No abdominal or epigastric pain. No nausea, vomiting, or hematemesis; No diarrhea or constipation. No melena or hematochezia.  GENITOURINARY: No dysuria, frequency or hematuria  NEUROLOGICAL: No numbness or weakness  SKIN: pain at abscess site       MEDICATIONS  (STANDING):  folic acid 1 milliGRAM(s) Oral daily  lidocaine 1% Injectable 30 milliLiter(s) Local Injection once  pantoprazole  Injectable 40 milliGRAM(s) IV Push two times a day  sucralfate 1 Gram(s) Oral two times a day  trimethoprim  160 mG/sulfamethoxazole 800 mG 1 Tablet(s) Oral two times a day    MEDICATIONS  (PRN):  LORazepam   Injectable 2 milliGRAM(s) IV Push every 1 hour PRN CIWA-Ar score 8 or greater      CAPILLARY BLOOD GLUCOSE        I&O's Summary    14 Sep 2024 07:01  -  15 Sep 2024 06:57  --------------------------------------------------------  IN: 760 mL / OUT: 0 mL / NET: 760 mL        VITALS:   T(C): 37.1 (09-15-24 @ 03:55), Max: 37.1 (09-15-24 @ 03:55)  HR: 67 (09-15-24 @ 03:55) (67 - 85)  BP: 118/75 (09-15-24 @ 03:55) (97/60 - 118/75)  RR: 18 (09-15-24 @ 03:55) (18 - 18)  SpO2: 100% (09-15-24 @ 03:55) (96% - 100%)    GENERAL: NAD, lying in bed comfortably  HEAD:  Atraumatic, normocephalic  EYES: EOMI, PERRLA, conjunctiva and sclera clear  ENT: Moist mucous membranes  NECK: Supple, no JVD  HEART: Regular rate and rhythm, no murmurs, rubs, or gallops  LUNGS: Unlabored respirations.  Clear to auscultation bilaterally, no crackles, wheezing, or rhonchi  ABDOMEN: Soft, nontender, nondistended, +BS  EXTREMITIES: 2+ peripheral pulses bilaterally. No clubbing, cyanosis, or edema  NERVOUS SYSTEM:  A&Ox3, no focal deficits   SKIN: abscess drainage site covered with dressing     LABS:                        9.0    8.58  )-----------( 249      ( 13 Sep 2024 17:09 )             28.5     09-14    141  |  107  |  14  ----------------------------<  110<H>  4.9   |  23  |  0.65    Ca    9.1      14 Sep 2024 15:26  Phos  3.9     09-14  Mg     1.6     09-14    TPro  8.3  /  Alb  4.4  /  TBili  0.3  /  DBili  x   /  AST  256<H>  /  ALT  91<H>  /  AlkPhos  119  09-13          Urinalysis Basic - ( 14 Sep 2024 15:26 )    Color: x / Appearance: x / SG: x / pH: x  Gluc: 110 mg/dL / Ketone: x  / Bili: x / Urobili: x   Blood: x / Protein: x / Nitrite: x   Leuk Esterase: x / RBC: x / WBC x   Sq Epi: x / Non Sq Epi: x / Bacteria: x        Culture - Abscess with Gram Stain (collected 14 Sep 2024 18:43)  Source: .Abscess Drainage  Gram Stain (15 Sep 2024 02:33):    Rare polymorphonuclear leukocytes seen per low power field    Moderate Gram positive cocci in pairs seen per oil power field        RADIOLOGY & ADDITIONAL TESTS:  Results Reviewed:   Imaging Personally Reviewed:  Electrocardiogram Personally Reviewed:    COORDINATION OF CARE:  Care Discussed with Consultants/Other Providers [Y/N]:  Prior or Outpatient Records Reviewed [Y/N]:

## 2024-09-15 NOTE — PROGRESS NOTE ADULT - PROBLEM SELECTOR PLAN 3
Pt with abscess in R midline of chest/breast, reportedly appeared 1 yr ago when she had it drained, and it recurred. Painful to touch, erythematous, edematous. Pt afebrile & without leukocytosis  CTA Chest on admission: 3.7 x 2.4 cm superficial subcutaneous collection within the anterior chest wall.  POCUS TTE: 3.92 x 1.28 x 3.81cm collection of the right chest wall, consistent with abscess, but with one region of internal color flow. Cannot exclude vascular component.  Surgery consulted, I&D on 9/14, send for culture and MRSA swab.  - Empiric bactrim PO bid (9/14- ) Pt with abscess in R midline of chest/breast, reportedly appeared 1 yr ago when she had it drained, and it recurred. Painful to touch, erythematous, edematous. Pt afebrile & without leukocytosis  CTA Chest on admission: 3.7 x 2.4 cm superficial subcutaneous collection within the anterior chest wall.  POCUS TTE: 3.92 x 1.28 x 3.81cm collection of the right chest wall, consistent with abscess, but with one region of internal color flow. Cannot exclude vascular component.  Surgery consulted, I&D on 9/14, send for culture and MRSA swab.  - Empiric bactrim PO bid (9/14- ). Plan for dc with 10 day course

## 2024-09-15 NOTE — DISCHARGE NOTE PROVIDER - NSDCFUADDAPPT_GEN_ALL_CORE_FT
APPTS ARE READY TO BE MADE: [X] YES    Best Family or Patient Contact (if needed):    Additional Information about above appointments (if needed):    1: Wound care   2: PCP   3: GI     Other comments or requests:    APPTS ARE READY TO BE MADE: [X] YES    Best Family or Patient Contact (if needed):    Additional Information about above appointments (if needed):    1: Wound care   2: PCP   3: GI     Other comments or requests:   Patient was outreached on 9/16 9/17 9/18 but did not answer. A voicemail was left for the patient to return our call.

## 2024-09-15 NOTE — DISCHARGE NOTE PROVIDER - NSDCMRMEDTOKEN_GEN_ALL_CORE_FT
folic acid 1 mg oral tablet: 1 tab(s) orally once a day  Multiple Vitamins oral tablet: 1 tab(s) orally once a day  Protonix 40 mg oral delayed release tablet: 1 tab(s) orally 2 times a day  sucralfate 1 g oral tablet: 1 tab(s) orally 4 times a day  sulfamethoxazole-trimethoprim 800 mg-160 mg oral tablet: 1 tab(s) orally 2 times a day   0.5 inch Iodoform Packing: For wound dressing changes  4x4 Gauze: For wound dressing changes  folic acid 1 mg oral tablet: 1 tab(s) orally once a day  Paper Tape: For wound dressing changes  polyethylene glycol 3350 oral powder for reconstitution: 17 gram(s) orally once a day as needed for Constipation  Protonix 40 mg oral delayed release tablet: 1 tab(s) orally 2 times a day  senna leaf extract oral tablet: 2 tab(s) orally once a day (at bedtime)  sucralfate 1 g oral tablet: 1 tab(s) orally 4 times a day  sulfamethoxazole-trimethoprim 800 mg-160 mg oral tablet: 1 tab(s) orally 2 times a day Take twice a day until 9/24/24

## 2024-09-15 NOTE — DISCHARGE NOTE PROVIDER - NSDCCPCAREPLAN_GEN_ALL_CORE_FT
PRINCIPAL DISCHARGE DIAGNOSIS  Diagnosis: Chest wall abscess  Assessment and Plan of Treatment: You were admitted for pain in your chest and found to have an abscess in your breast. Surgery was consulted and recommended a procedure to clean out the abscess, which was performed. Please take your antibiotics as prescribed to completion. Please change your dressings regularly. Please follow up with your PCP and the surgery team on discharge.      SECONDARY DISCHARGE DIAGNOSES  Diagnosis: Coffee ground emesis  Assessment and Plan of Treatment: You were evaluated for concern for a bleed in your gastrointestinal tract. You were monitored and evaluated by a GI team, which recommended no intervention. You were monitored until it was deemed that there was no acute bleed. Please follow up with the GI doctor upon leaving the hospital.     PRINCIPAL DISCHARGE DIAGNOSIS  Diagnosis: Chest wall abscess  Assessment and Plan of Treatment: You were admitted for pain in your chest and found to have an abscess in your breast. Surgery was consulted and recommended a procedure to clean out the abscess, which was performed. Please take your antibiotics as prescribed to completion. Please change your dressings regularly. Please follow up with your PCP and the surgery team on discharge.      SECONDARY DISCHARGE DIAGNOSES  Diagnosis: Coffee ground emesis  Assessment and Plan of Treatment: You were evaluated for concern for a bleed in your gastrointestinal tract. You were monitored and evaluated by a GI team, which recommended no intervention. You were monitored until it was deemed that there was no acute bleed. Please follow up with the GI doctor upon leaving the hospital.   PLEASE ABSTAIN FROM ALCOHOL.

## 2024-09-15 NOTE — DISCHARGE NOTE PROVIDER - NSDCHHHOMEBOUND_GEN_ALL_CORE
[id number]    POD#2 Laparoscopic hysterectomy, radha salpingo oopherectomy, pelvic washing  Orientation: A&O x4  Vitals/Tele: VSS on RA  Pain management: Sched Tylenol, Ibuprofen.   IV Access/drains: PIV SL. Bruises at IV sites.    Diet: Regular  Mobility: Ax1 GB/W.  GI/: No BM this shift. Voiding in BR  Wound/Skin: 5 Abd lap sites.Rash radha under breasts. Scattered bruises.  Discharge Plan:Pending  Other Import Info: Pt was able to ambulate to BR x1. Pt concerns about bruise on upper arm, refused to take Heparin. MD will come to see in AM.        Pain greater than 7 on scale of 10 on ambulation
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
## 2024-09-15 NOTE — PROGRESS NOTE ADULT - ASSESSMENT
Ms. Poornima Zepeda is a 44F with PMHx alcohol use disorder, alcohol withdrawal, active smoker with 15 pack-year hx, chest wall abscess that returned s/p I&D, presenting with acute-onset chest pain starting last night associated with coffee ground emesis x4days. Admitted for alc withdrawal, symptom-triggered CIWA protocol initiated, surgery consulted for I&D of chest wall abscess, GI consulted.

## 2024-09-15 NOTE — PROGRESS NOTE ADULT - PROBLEM SELECTOR PLAN 5
Pt with K 2.9 on 9/14.  - Replete with 15mM KPhos IV, 40meq PO K q4h x3  - Recheck afternoon BMP Pt with K 2.9 on 9/14.  - Replete with 15mM KPhos IV, 40meq PO K q4h x3    IMPROVED

## 2024-09-16 LAB
ANION GAP SERPL CALC-SCNC: 15 MMOL/L — SIGNIFICANT CHANGE UP (ref 5–17)
BUN SERPL-MCNC: 8 MG/DL — SIGNIFICANT CHANGE UP (ref 7–23)
CALCIUM SERPL-MCNC: 9.4 MG/DL — SIGNIFICANT CHANGE UP (ref 8.4–10.5)
CHLORIDE SERPL-SCNC: 98 MMOL/L — SIGNIFICANT CHANGE UP (ref 96–108)
CO2 SERPL-SCNC: 22 MMOL/L — SIGNIFICANT CHANGE UP (ref 22–31)
CREAT SERPL-MCNC: 0.47 MG/DL — LOW (ref 0.5–1.3)
CULTURE RESULTS: ABNORMAL
EGFR: 120 ML/MIN/1.73M2 — SIGNIFICANT CHANGE UP
GLUCOSE SERPL-MCNC: 92 MG/DL — SIGNIFICANT CHANGE UP (ref 70–99)
HCT VFR BLD CALC: 30.4 % — LOW (ref 34.5–45)
HGB BLD-MCNC: 9.9 G/DL — LOW (ref 11.5–15.5)
MAGNESIUM SERPL-MCNC: 2 MG/DL — SIGNIFICANT CHANGE UP (ref 1.6–2.6)
MCHC RBC-ENTMCNC: 24.2 PG — LOW (ref 27–34)
MCHC RBC-ENTMCNC: 32.6 GM/DL — SIGNIFICANT CHANGE UP (ref 32–36)
MCV RBC AUTO: 74.3 FL — LOW (ref 80–100)
NRBC # BLD: 0 /100 WBCS — SIGNIFICANT CHANGE UP (ref 0–0)
PHOSPHATE SERPL-MCNC: 3.3 MG/DL — SIGNIFICANT CHANGE UP (ref 2.5–4.5)
PLATELET # BLD AUTO: 381 K/UL — SIGNIFICANT CHANGE UP (ref 150–400)
POTASSIUM SERPL-MCNC: 4.4 MMOL/L — SIGNIFICANT CHANGE UP (ref 3.5–5.3)
POTASSIUM SERPL-SCNC: 4.4 MMOL/L — SIGNIFICANT CHANGE UP (ref 3.5–5.3)
RBC # BLD: 4.09 M/UL — SIGNIFICANT CHANGE UP (ref 3.8–5.2)
RBC # FLD: 17 % — HIGH (ref 10.3–14.5)
SODIUM SERPL-SCNC: 135 MMOL/L — SIGNIFICANT CHANGE UP (ref 135–145)
SPECIMEN SOURCE: SIGNIFICANT CHANGE UP
WBC # BLD: 6.85 K/UL — SIGNIFICANT CHANGE UP (ref 3.8–10.5)
WBC # FLD AUTO: 6.85 K/UL — SIGNIFICANT CHANGE UP (ref 3.8–10.5)

## 2024-09-16 PROCEDURE — 99232 SBSQ HOSP IP/OBS MODERATE 35: CPT | Mod: GC

## 2024-09-16 RX ORDER — ACETAMINOPHEN 325 MG/1
675 TABLET ORAL ONCE
Refills: 0 | Status: COMPLETED | OUTPATIENT
Start: 2024-09-16 | End: 2024-09-16

## 2024-09-16 RX ORDER — KETOROLAC TROMETHAMINE 30 MG/ML
15 INJECTION, SOLUTION INTRAMUSCULAR ONCE
Refills: 0 | Status: DISCONTINUED | OUTPATIENT
Start: 2024-09-16 | End: 2024-09-16

## 2024-09-16 RX ADMIN — SULFAMETHOXAZOLE AND TRIMETHOPRIM 1 TABLET(S): 800; 160 TABLET ORAL at 18:37

## 2024-09-16 RX ADMIN — ACETAMINOPHEN 270 MILLIGRAM(S): 325 TABLET ORAL at 18:42

## 2024-09-16 RX ADMIN — Medication 2 TABLET(S): at 22:25

## 2024-09-16 RX ADMIN — ACETAMINOPHEN 270 MILLIGRAM(S): 325 TABLET ORAL at 09:46

## 2024-09-16 RX ADMIN — SUCRALFATE 1 GRAM(S): 1 SUSPENSION ORAL at 05:01

## 2024-09-16 RX ADMIN — SULFAMETHOXAZOLE AND TRIMETHOPRIM 1 TABLET(S): 800; 160 TABLET ORAL at 05:01

## 2024-09-16 RX ADMIN — KETOROLAC TROMETHAMINE 15 MILLIGRAM(S): 30 INJECTION, SOLUTION INTRAMUSCULAR at 12:28

## 2024-09-16 RX ADMIN — KETOROLAC TROMETHAMINE 15 MILLIGRAM(S): 30 INJECTION, SOLUTION INTRAMUSCULAR at 11:14

## 2024-09-16 RX ADMIN — ACETAMINOPHEN 675 MILLIGRAM(S): 325 TABLET ORAL at 10:15

## 2024-09-16 RX ADMIN — Medication 40 MILLIGRAM(S): at 05:01

## 2024-09-16 RX ADMIN — Medication 5 MILLIGRAM(S): at 09:43

## 2024-09-16 RX ADMIN — Medication 3 MILLIGRAM(S): at 22:25

## 2024-09-16 RX ADMIN — POLYETHYLENE GLYCOL 3350 17 GRAM(S): 17 POWDER, FOR SOLUTION ORAL at 05:01

## 2024-09-16 RX ADMIN — ACETAMINOPHEN 270 MILLIGRAM(S): 325 TABLET ORAL at 22:24

## 2024-09-16 RX ADMIN — ACETAMINOPHEN 675 MILLIGRAM(S): 325 TABLET ORAL at 23:24

## 2024-09-16 RX ADMIN — Medication 1 MILLIGRAM(S): at 11:17

## 2024-09-16 NOTE — PROGRESS NOTE ADULT - PROBLEM SELECTOR PLAN 1
Hx AUD with prior admissions for withdrawal. Pt admitted for coffee-ground emesis 2/2 alcohol use, last drink was 1 pint of vodka on 9/12.  - Symptom-triggered CIWA   - SBIRT  - Thiamine supplementation  -  consult, cessation counseling Pt with abscess in R midline of chest/breast, reportedly appeared 1 yr ago when she had it drained, and it recurred. Painful to touch, erythematous, edematous. Pt afebrile & without leukocytosis  CTA Chest on admission: 3.7 x 2.4 cm superficial subcutaneous collection within the anterior chest wall.  POCUS TTE: 3.92 x 1.28 x 3.81cm collection of the right chest wall, consistent with abscess, but with one region of internal color flow. Cannot exclude vascular component.  Surgery consulted, I&D on 9/14, send for culture and MRSA swab.  - Empiric bactrim PO bid (9/14- ). Plan for dc with 10 day course  - nursing home care to be set up

## 2024-09-16 NOTE — PROGRESS NOTE ADULT - SUBJECTIVE AND OBJECTIVE BOX
Progress Note    09-13-24 (3d)    Patient is a 44y old  Female who presents with a chief complaint of coffee-ground emesis (15 Sep 2024 12:25)      Subjective / Overnight Events :  - No acute events overnight.  - Pt seen and examined at bedside.     Additional ROS (if any):    MEDICATIONS  (STANDING):  acetaminophen   IVPB .. 675 milliGRAM(s) IV Intermittent once  folic acid 1 milliGRAM(s) Oral daily  lidocaine 1% Injectable 30 milliLiter(s) Local Injection once  pantoprazole  Injectable 40 milliGRAM(s) IV Push two times a day  senna 2 Tablet(s) Oral at bedtime  sucralfate 1 Gram(s) Oral two times a day  trimethoprim  160 mG/sulfamethoxazole 800 mG 1 Tablet(s) Oral two times a day    MEDICATIONS  (PRN):  LORazepam   Injectable 2 milliGRAM(s) IV Push every 1 hour PRN CIWA-Ar score 8 or greater  melatonin 3 milliGRAM(s) Oral at bedtime PRN Insomnia  polyethylene glycol 3350 17 Gram(s) Oral daily PRN Constipation          PHYSICAL EXAM:  Vital Signs Last 24 Hrs  T(C): 37.1 (16 Sep 2024 05:02), Max: 37.1 (15 Sep 2024 10:50)  T(F): 98.7 (16 Sep 2024 05:02), Max: 98.7 (15 Sep 2024 10:50)  HR: 58 (16 Sep 2024 05:02) (58 - 76)  BP: 99/65 (16 Sep 2024 05:02) (91/59 - 99/65)  BP(mean): --  RR: 18 (16 Sep 2024 05:02) (17 - 18)  SpO2: 98% (16 Sep 2024 05:02) (97% - 98%)    Parameters below as of 16 Sep 2024 05:02  Patient On (Oxygen Delivery Method): room air        I&O's Summary    14 Sep 2024 07:01  -  15 Sep 2024 07:00  --------------------------------------------------------  IN: 760 mL / OUT: 0 mL / NET: 760 mL    15 Sep 2024 07:01  -  16 Sep 2024 06:55  --------------------------------------------------------  IN: 1040 mL / OUT: 0 mL / NET: 1040 mL        General: NAD, non-toxic appearing   HEENT: PERRLA, EOMi, no scleral icterus  CV: RRR, normal S1 and S2, no m/r/g  Lungs: normal respiratory effort. CTAB, no wheezes, rales, or rhonchi  Abd: soft, nontender, nondistended  Ext: no edema, 2+ peripheral pulses   Pysch: AAOx3, appropriate affect   Neuro: grossly non-focal, moving all extremities spontaneously   Skin: no rashes or lesions     LABS:  CAPILLARY BLOOD GLUCOSE                                  9.7    5.95  )-----------( 294      ( 15 Sep 2024 07:39 )             31.5       WBC Trend: 5.95<--, 8.58<--, 8.03<--  Hb Trend: 9.7<--, 9.0<--, 9.4<--, 9.1<--, 10.6<--    09-15    139  |  105  |  13  ----------------------------<  96  4.6   |  21<L>  |  0.41<L>    Ca    9.2      15 Sep 2024 07:38  Phos  2.7     09-15  Mg     1.5     09-15            Urinalysis Basic - ( 15 Sep 2024 07:38 )    Color: x / Appearance: x / SG: x / pH: x  Gluc: 96 mg/dL / Ketone: x  / Bili: x / Urobili: x   Blood: x / Protein: x / Nitrite: x   Leuk Esterase: x / RBC: x / WBC x   Sq Epi: x / Non Sq Epi: x / Bacteria: x        Culture - Abscess with Gram Stain (collected 14 Sep 2024 18:43)  Source: .Abscess Drainage  Gram Stain (15 Sep 2024 02:33):    Rare polymorphonuclear leukocytes seen per low power field    Moderate Gram positive cocci in pairs seen per oil power field  Preliminary Report (15 Sep 2024 19:15):    No growth          RADIOLOGY & ADDITIONAL TESTS: Reviewed Progress Note    09-13-24 (3d)    Patient is a 44y old  Female who presents with a chief complaint of coffee-ground emesis (15 Sep 2024 12:25)      Subjective / Overnight Events :  No events overnight.     Additional ROS (if any):    MEDICATIONS  (STANDING):  acetaminophen   IVPB .. 675 milliGRAM(s) IV Intermittent once  folic acid 1 milliGRAM(s) Oral daily  lidocaine 1% Injectable 30 milliLiter(s) Local Injection once  pantoprazole  Injectable 40 milliGRAM(s) IV Push two times a day  senna 2 Tablet(s) Oral at bedtime  sucralfate 1 Gram(s) Oral two times a day  trimethoprim  160 mG/sulfamethoxazole 800 mG 1 Tablet(s) Oral two times a day    MEDICATIONS  (PRN):  LORazepam   Injectable 2 milliGRAM(s) IV Push every 1 hour PRN CIWA-Ar score 8 or greater  melatonin 3 milliGRAM(s) Oral at bedtime PRN Insomnia  polyethylene glycol 3350 17 Gram(s) Oral daily PRN Constipation          PHYSICAL EXAM:  Vital Signs Last 24 Hrs  T(C): 37.1 (16 Sep 2024 05:02), Max: 37.1 (15 Sep 2024 10:50)  T(F): 98.7 (16 Sep 2024 05:02), Max: 98.7 (15 Sep 2024 10:50)  HR: 58 (16 Sep 2024 05:02) (58 - 76)  BP: 99/65 (16 Sep 2024 05:02) (91/59 - 99/65)  BP(mean): --  RR: 18 (16 Sep 2024 05:02) (17 - 18)  SpO2: 98% (16 Sep 2024 05:02) (97% - 98%)    Parameters below as of 16 Sep 2024 05:02  Patient On (Oxygen Delivery Method): room air        I&O's Summary    14 Sep 2024 07:01  -  15 Sep 2024 07:00  --------------------------------------------------------  IN: 760 mL / OUT: 0 mL / NET: 760 mL    15 Sep 2024 07:01  -  16 Sep 2024 06:55  --------------------------------------------------------  IN: 1040 mL / OUT: 0 mL / NET: 1040 mL        General: NAD, non-toxic appearing   HEENT: PERRLA, EOMi, no scleral icterus  CV: RRR, normal S1 and S2, no m/r/g  Lungs: normal respiratory effort. CTAB, no wheezes, rales, or rhonchi  Abd: soft, nontender, nondistended  Ext: no edema, 2+ peripheral pulses   Pysch: AAOx3, appropriate affect   Neuro: grossly non-focal, moving all extremities spontaneously   Skin: no rashes or lesions     LABS:  CAPILLARY BLOOD GLUCOSE                                  9.7    5.95  )-----------( 294      ( 15 Sep 2024 07:39 )             31.5       WBC Trend: 5.95<--, 8.58<--, 8.03<--  Hb Trend: 9.7<--, 9.0<--, 9.4<--, 9.1<--, 10.6<--    09-15    139  |  105  |  13  ----------------------------<  96  4.6   |  21<L>  |  0.41<L>    Ca    9.2      15 Sep 2024 07:38  Phos  2.7     09-15  Mg     1.5     09-15            Urinalysis Basic - ( 15 Sep 2024 07:38 )    Color: x / Appearance: x / SG: x / pH: x  Gluc: 96 mg/dL / Ketone: x  / Bili: x / Urobili: x   Blood: x / Protein: x / Nitrite: x   Leuk Esterase: x / RBC: x / WBC x   Sq Epi: x / Non Sq Epi: x / Bacteria: x        Culture - Abscess with Gram Stain (collected 14 Sep 2024 18:43)  Source: .Abscess Drainage  Gram Stain (15 Sep 2024 02:33):    Rare polymorphonuclear leukocytes seen per low power field    Moderate Gram positive cocci in pairs seen per oil power field  Preliminary Report (15 Sep 2024 19:15):    No growth          RADIOLOGY & ADDITIONAL TESTS: Reviewed

## 2024-09-16 NOTE — PROGRESS NOTE ADULT - PROBLEM SELECTOR PLAN 2
Pt with few days of vomiting starting on 9/10, mostly dark green color with a "coffee-like" appearance, was unable to keep food and water down.  Prior endoscopy from 11/20/2023: normal esophagus but a medium amt of food found in stomach, rest of study was aborted, gastric emptying study recommended for gastroparesis.  GI also consulted on past admission in 11/2023 for odynophagia after vomiting, at that time stated emesis was likely 2/2 AUD given that she is able to tolerate PO intake when not actively withdrawing.  - IV PPI BID  - Trend CBC, transfuse if Hgb <7  - Advance as tolerated  - GI consulted, appreciate recs Pt with few days of vomiting starting on 9/10, mostly dark green color with a "coffee-like" appearance, was unable to keep food and water down.  Prior endoscopy from 11/20/2023: normal esophagus but a medium amt of food found in stomach, rest of study was aborted, gastric emptying study recommended for gastroparesis.  GI also consulted on past admission in 11/2023 for odynophagia after vomiting, at that time stated emesis was likely 2/2 AUD given that she is able to tolerate PO intake when not actively withdrawing.  - low concern, to follow-up with GI outpatient   - pantoprazole 40mg daily, will write script on d/c

## 2024-09-16 NOTE — PROGRESS NOTE ADULT - PROBLEM SELECTOR PLAN 3
Pt with abscess in R midline of chest/breast, reportedly appeared 1 yr ago when she had it drained, and it recurred. Painful to touch, erythematous, edematous. Pt afebrile & without leukocytosis  CTA Chest on admission: 3.7 x 2.4 cm superficial subcutaneous collection within the anterior chest wall.  POCUS TTE: 3.92 x 1.28 x 3.81cm collection of the right chest wall, consistent with abscess, but with one region of internal color flow. Cannot exclude vascular component.  Surgery consulted, I&D on 9/14, send for culture and MRSA swab.  - Empiric bactrim PO bid (9/14- ). Plan for dc with 10 day course Hx AUD with prior admissions for withdrawal. Pt admitted for coffee-ground emesis 2/2 alcohol use, last drink was 1 pint of vodka on 9/12.  - d/c CIWA, not receiving ativan   - SBIRT  - Thiamine supplementation  - SW consult, cessation counseling

## 2024-09-16 NOTE — PHARMACOTHERAPY INTERVENTION NOTE - COMMENTS
Performed medication reconciliation and home medication list updated in prescription writer/ outpatient medication review.     Patient was not taking any medications prior to admission; this was verified with the patient herself.    Bushra HoranD  PGY-1 Pharmacy Resident  Teams (preferred) or [phone]     Performed medication reconciliation and home medication list updated in prescription writer/ outpatient medication review. Patient was not taking any medications prior to admission; this was verified with the patient herself.    Counseled patient on the following inpatient/discharge medications names (brand/generic), indication, and possible side effects:    folic acid 1 mg oral tablet: 1 tab(s) orally once a day  Multiple Vitamins oral tablet: 1 tab(s) orally once a day  Protonix 40 mg oral delayed release tablet: 1 tab(s) orally 2 times a day  sulfamethoxazole-trimethoprim 800 mg-160 mg oral tablet: 1 tab(s) orally 2 times a day    Patient was provided with a medication card for their new medication. Patient questions and concerns were answered and addressed. Patient demonstrated understanding.    Chelsea Amaral, PharmD  PGY-1 Pharmacy Resident  Teams (preferred) or [phone] Performed medication reconciliation and home medication list updated in prescription writer/ outpatient medication review. Patient was not taking any medications prior to admission; this was verified with the patient herself.    Counseled patient on the following inpatient/discharge medications names (brand/generic), indication, and possible side effects:    folic acid 1 mg oral tablet: 1 tab(s) orally once a day  Multiple Vitamins oral tablet: 1 tab(s) orally once a day  Protonix 40 mg oral delayed release tablet: 1 tab(s) orally 2 times a day  sulfamethoxazole-trimethoprim 800 mg-160 mg oral tablet: 1 tab(s) orally 2 times a day to complete a 10 day course for chest wall abscess    Patient was provided with a medication card for their new medication. Patient questions and concerns were answered and addressed. Patient demonstrated understanding.    Chelsea Amaral, Naren  PGY-1 Pharmacy Resident  Teams (preferred) or [phone]    Adilia Jj PharmD, [facility]  Clinical Pharmacy Specialist  Teams (preferred) or [phone]

## 2024-09-17 ENCOUNTER — TRANSCRIPTION ENCOUNTER (OUTPATIENT)
Age: 44
End: 2024-09-17

## 2024-09-17 VITALS
HEART RATE: 57 BPM | SYSTOLIC BLOOD PRESSURE: 93 MMHG | TEMPERATURE: 98 F | DIASTOLIC BLOOD PRESSURE: 57 MMHG | OXYGEN SATURATION: 97 % | RESPIRATION RATE: 18 BRPM

## 2024-09-17 LAB
ANION GAP SERPL CALC-SCNC: 13 MMOL/L — SIGNIFICANT CHANGE UP (ref 5–17)
BUN SERPL-MCNC: 10 MG/DL — SIGNIFICANT CHANGE UP (ref 7–23)
CALCIUM SERPL-MCNC: 9.7 MG/DL — SIGNIFICANT CHANGE UP (ref 8.4–10.5)
CHLORIDE SERPL-SCNC: 101 MMOL/L — SIGNIFICANT CHANGE UP (ref 96–108)
CO2 SERPL-SCNC: 20 MMOL/L — LOW (ref 22–31)
CREAT SERPL-MCNC: 0.55 MG/DL — SIGNIFICANT CHANGE UP (ref 0.5–1.3)
EGFR: 115 ML/MIN/1.73M2 — SIGNIFICANT CHANGE UP
GLUCOSE SERPL-MCNC: 96 MG/DL — SIGNIFICANT CHANGE UP (ref 70–99)
HCT VFR BLD CALC: 31.6 % — LOW (ref 34.5–45)
HGB BLD-MCNC: 10.1 G/DL — LOW (ref 11.5–15.5)
MAGNESIUM SERPL-MCNC: 1.8 MG/DL — SIGNIFICANT CHANGE UP (ref 1.6–2.6)
MCHC RBC-ENTMCNC: 24.2 PG — LOW (ref 27–34)
MCHC RBC-ENTMCNC: 32 GM/DL — SIGNIFICANT CHANGE UP (ref 32–36)
MCV RBC AUTO: 75.8 FL — LOW (ref 80–100)
NRBC # BLD: 0 /100 WBCS — SIGNIFICANT CHANGE UP (ref 0–0)
PHOSPHATE SERPL-MCNC: 3.3 MG/DL — SIGNIFICANT CHANGE UP (ref 2.5–4.5)
PLATELET # BLD AUTO: 426 K/UL — HIGH (ref 150–400)
POTASSIUM SERPL-MCNC: 4.7 MMOL/L — SIGNIFICANT CHANGE UP (ref 3.5–5.3)
POTASSIUM SERPL-SCNC: 4.7 MMOL/L — SIGNIFICANT CHANGE UP (ref 3.5–5.3)
RBC # BLD: 4.17 M/UL — SIGNIFICANT CHANGE UP (ref 3.8–5.2)
RBC # FLD: 17 % — HIGH (ref 10.3–14.5)
SODIUM SERPL-SCNC: 134 MMOL/L — LOW (ref 135–145)
WBC # BLD: 6.22 K/UL — SIGNIFICANT CHANGE UP (ref 3.8–10.5)
WBC # FLD AUTO: 6.22 K/UL — SIGNIFICANT CHANGE UP (ref 3.8–10.5)

## 2024-09-17 PROCEDURE — 82330 ASSAY OF CALCIUM: CPT

## 2024-09-17 PROCEDURE — 36415 COLL VENOUS BLD VENIPUNCTURE: CPT

## 2024-09-17 PROCEDURE — 82728 ASSAY OF FERRITIN: CPT

## 2024-09-17 PROCEDURE — 85027 COMPLETE CBC AUTOMATED: CPT

## 2024-09-17 PROCEDURE — 83540 ASSAY OF IRON: CPT

## 2024-09-17 PROCEDURE — 83735 ASSAY OF MAGNESIUM: CPT

## 2024-09-17 PROCEDURE — 83880 ASSAY OF NATRIURETIC PEPTIDE: CPT

## 2024-09-17 PROCEDURE — 82947 ASSAY GLUCOSE BLOOD QUANT: CPT

## 2024-09-17 PROCEDURE — 96374 THER/PROPH/DIAG INJ IV PUSH: CPT

## 2024-09-17 PROCEDURE — 80048 BASIC METABOLIC PNL TOTAL CA: CPT

## 2024-09-17 PROCEDURE — 87641 MR-STAPH DNA AMP PROBE: CPT

## 2024-09-17 PROCEDURE — 93005 ELECTROCARDIOGRAM TRACING: CPT

## 2024-09-17 PROCEDURE — 84295 ASSAY OF SERUM SODIUM: CPT

## 2024-09-17 PROCEDURE — 87637 SARSCOV2&INF A&B&RSV AMP PRB: CPT

## 2024-09-17 PROCEDURE — 87640 STAPH A DNA AMP PROBE: CPT

## 2024-09-17 PROCEDURE — 84100 ASSAY OF PHOSPHORUS: CPT

## 2024-09-17 PROCEDURE — 96375 TX/PRO/DX INJ NEW DRUG ADDON: CPT

## 2024-09-17 PROCEDURE — 87075 CULTR BACTERIA EXCEPT BLOOD: CPT

## 2024-09-17 PROCEDURE — 84484 ASSAY OF TROPONIN QUANT: CPT

## 2024-09-17 PROCEDURE — 83690 ASSAY OF LIPASE: CPT

## 2024-09-17 PROCEDURE — 82746 ASSAY OF FOLIC ACID SERUM: CPT

## 2024-09-17 PROCEDURE — 87205 SMEAR GRAM STAIN: CPT

## 2024-09-17 PROCEDURE — 96376 TX/PRO/DX INJ SAME DRUG ADON: CPT

## 2024-09-17 PROCEDURE — 84702 CHORIONIC GONADOTROPIN TEST: CPT

## 2024-09-17 PROCEDURE — 85014 HEMATOCRIT: CPT

## 2024-09-17 PROCEDURE — 84443 ASSAY THYROID STIM HORMONE: CPT

## 2024-09-17 PROCEDURE — 71275 CT ANGIOGRAPHY CHEST: CPT | Mod: MC

## 2024-09-17 PROCEDURE — 71045 X-RAY EXAM CHEST 1 VIEW: CPT

## 2024-09-17 PROCEDURE — 82607 VITAMIN B-12: CPT

## 2024-09-17 PROCEDURE — 85018 HEMOGLOBIN: CPT

## 2024-09-17 PROCEDURE — 83550 IRON BINDING TEST: CPT

## 2024-09-17 PROCEDURE — 82803 BLOOD GASES ANY COMBINATION: CPT

## 2024-09-17 PROCEDURE — 83036 HEMOGLOBIN GLYCOSYLATED A1C: CPT

## 2024-09-17 PROCEDURE — 99285 EMERGENCY DEPT VISIT HI MDM: CPT | Mod: 25

## 2024-09-17 PROCEDURE — 99232 SBSQ HOSP IP/OBS MODERATE 35: CPT | Mod: GC

## 2024-09-17 PROCEDURE — 82962 GLUCOSE BLOOD TEST: CPT

## 2024-09-17 PROCEDURE — 84132 ASSAY OF SERUM POTASSIUM: CPT

## 2024-09-17 PROCEDURE — 80053 COMPREHEN METABOLIC PANEL: CPT

## 2024-09-17 PROCEDURE — 87077 CULTURE AEROBIC IDENTIFY: CPT

## 2024-09-17 PROCEDURE — 85025 COMPLETE CBC W/AUTO DIFF WBC: CPT

## 2024-09-17 PROCEDURE — 82435 ASSAY OF BLOOD CHLORIDE: CPT

## 2024-09-17 PROCEDURE — 87070 CULTURE OTHR SPECIMN AEROBIC: CPT

## 2024-09-17 PROCEDURE — 83605 ASSAY OF LACTIC ACID: CPT

## 2024-09-17 PROCEDURE — 93308 TTE F-UP OR LMTD: CPT

## 2024-09-17 RX ORDER — SULFAMETHOXAZOLE AND TRIMETHOPRIM 800; 160 MG/1; MG/1
1 TABLET ORAL
Qty: 18 | Refills: 0
Start: 2024-09-17 | End: 2024-09-25

## 2024-09-17 RX ORDER — ACETAMINOPHEN 325 MG/1
675 TABLET ORAL ONCE
Refills: 0 | Status: COMPLETED | OUTPATIENT
Start: 2024-09-17 | End: 2024-09-17

## 2024-09-17 RX ORDER — PANTOPRAZOLE SODIUM 40 MG
1 TABLET, DELAYED RELEASE (ENTERIC COATED) ORAL
Qty: 60 | Refills: 0
Start: 2024-09-17 | End: 2024-10-16

## 2024-09-17 RX ORDER — SENNA 187 MG
2 TABLET ORAL
Qty: 60 | Refills: 0
Start: 2024-09-17 | End: 2024-10-16

## 2024-09-17 RX ORDER — FOLIC ACID 1 MG
1 TABLET ORAL
Qty: 30 | Refills: 0
Start: 2024-09-17 | End: 2024-10-16

## 2024-09-17 RX ORDER — POLYETHYLENE GLYCOL 3350 17 G/17G
17 POWDER, FOR SOLUTION ORAL
Qty: 510 | Refills: 0
Start: 2024-09-17 | End: 2024-10-16

## 2024-09-17 RX ADMIN — ACETAMINOPHEN 675 MILLIGRAM(S): 325 TABLET ORAL at 10:00

## 2024-09-17 RX ADMIN — ACETAMINOPHEN 270 MILLIGRAM(S): 325 TABLET ORAL at 09:32

## 2024-09-17 RX ADMIN — Medication 1 MILLIGRAM(S): at 11:20

## 2024-09-17 RX ADMIN — SULFAMETHOXAZOLE AND TRIMETHOPRIM 1 TABLET(S): 800; 160 TABLET ORAL at 05:10

## 2024-09-17 NOTE — PROGRESS NOTE ADULT - ATTENDING COMMENTS
Poornima Zepeda is a 44F with PMHx alcohol use disorder, alcohol withdrawal, active smoker with 15 pack-year hx, presenting with acute-onset chest pain starting last night associated with coffee ground emesis x4days.    #Coffee ground emesis, resolved  - intial concern for UGIB, no further episodes since admission and hgb stable. Will hold off on GI consult at this time  - Seen by GI, no acute intervention. Continue PO PPI qd.    #R Breast abscess  - General surgery consulted and s/p I&D 9/14  - Abscess cx GPC, continue Bactrim  - CM to assist with HH wound care, patient states she is unable to dress wound herself    #Hypokalemia  #Hypophosphatemia  - Resolved    #ETOH use disorder  -CIWA monitoring for alc w/d. Vitamin supplementation.   -SW consult. Cessation counseling.     - Reviewing, and interpreting labs and testing.  - Independently obtaining a review of systems and performing a physical exam  - Reviewing consultant documentation/recommendations in addition to discussing plan of care with consultants.  - Counselling and educating patient and family regarding interpretation of aforementioned items    Time Spent=54 minutes
44F with PMHx alcohol use disorder, alcohol withdrawal, active smoker with 15 pack-year hx, presenting with acute-onset chest pain starting last night associated with coffee ground emesis x4days.    #Coffee ground emesis, resolved  - intial concern for UGIB, no further episodes since admission and hgb stable.   - Seen by GI, no acute intervention. Continue PO PPI qd.    #R Breast abscess  - General surgery consulted and s/p I&D 9/14  - Abscess cx GPC, continue Bactrim  -  to assist with HH wound care, patient states she is unable to dress wound herself.     #Hypokalemia  #Hypophosphatemia  - Resolved    #ETOH use disorder  -[facility] monitoring for alc w/d. Vitamin supplementation.   - consult. Cessation counseling.     DC today.
44F with PMHx alcohol use disorder, alcohol withdrawal, active smoker with 15 pack-year hx, presenting with acute-onset chest pain starting last night associated with coffee ground emesis x4days.    #Coffee ground emesis, resolved  - intial concern for UGIB, no further episodes since admission and hgb stable.   - Seen by GI, no acute intervention. Continue PO PPI qd.    #R Breast abscess  - General surgery consulted and s/p I&D 9/14  - Abscess cx GPC, continue Bactrim  -  to assist with HH wound care, patient states she is unable to dress wound herself.     #Hypokalemia  #Hypophosphatemia  - Resolved    #ETOH use disorder  -[facility] monitoring for alc w/d. Vitamin supplementation.   - consult. Cessation counseling.     Dispo planning.
Poornima Zepeda is a 44F with PMHx alcohol use disorder, alcohol withdrawal, active smoker with 15 pack-year hx, presenting with acute-onset chest pain starting last night associated with coffee ground emesis x4days.    #Coffee ground emesis  - Concern for UGIB, no further episodes since admission and hgb stable. Will hold off on GI consult at this time  - Advanced to regular diet, tolerating  - c/w IV PPI BID  - Monitor cbc    #R Breast abscess  - General surgery consulted, plan for I&D today, will send cx  - Start abx    #Hypokalemia  #Hypophosphatemia  - Replete. Repeat labs this afternoon.    #ETOH use disorder  -CIWA monitoring for alc w/d. Vitamin supplementation.   -SW consult. Cessation counseling.

## 2024-09-17 NOTE — DISCHARGE NOTE NURSING/CASE MANAGEMENT/SOCIAL WORK - PATIENT PORTAL LINK FT
You can access the FollowMyHealth Patient Portal offered by [facility] by registering at the following website: [email or web address]. By joining [employer]’s FollowMyHealth portal, you will also be able to view your health information using other applications (apps) compatible with our system.

## 2024-09-17 NOTE — PROGRESS NOTE ADULT - PROBLEM SELECTOR PLAN 3
Hx AUD with prior admissions for withdrawal. Pt admitted for coffee-ground emesis 2/2 alcohol use, last drink was 1 pint of vodka on 9/12.  - d/c CIWA, not receiving ativan   - SBIRT  - Thiamine supplementation  - SW consult, cessation counseling

## 2024-09-17 NOTE — PROGRESS NOTE ADULT - TIME BILLING
Time-based billing (NON-critical care).     The necessity of the time spent during the encounter on this date of service was due to:     - Ordering, reviewing, and interpreting labs, testing, and imaging.  - Independently obtaining a review of systems and performing a physical exam  - Reviewing prior hospitalization and where necessary, outpatient records.  - Counselling and educating patient  regarding interpretation of aforementioned items and plan of care.
Time-based billing (NON-critical care).     The necessity of the time spent during the encounter on this date of service was due to:     - Ordering, reviewing, and interpreting labs, testing, and imaging.  - Independently obtaining a review of systems and performing a physical exam  - Reviewing prior hospitalization and where necessary, outpatient records.  - Counselling and educating patient  regarding interpretation of aforementioned items and plan of care.

## 2024-09-17 NOTE — PROGRESS NOTE ADULT - PROBLEM SELECTOR PLAN 2
Pt with few days of vomiting starting on 9/10, mostly dark green color with a "coffee-like" appearance, was unable to keep food and water down.  Prior endoscopy from 11/20/2023: normal esophagus but a medium amt of food found in stomach, rest of study was aborted, gastric emptying study recommended for gastroparesis.  GI also consulted on past admission in 11/2023 for odynophagia after vomiting, at that time stated emesis was likely 2/2 AUD given that she is able to tolerate PO intake when not actively withdrawing.  - low concern, to follow-up with GI outpatient   - pantoprazole 40mg daily, will write script on d/c Pt with few days of vomiting starting on 9/10, mostly dark green color with a "coffee-like" appearance, was unable to keep food and water down.  Prior endoscopy from 11/20/2023: normal esophagus but a medium amt of food found in stomach, rest of study was aborted, gastric emptying study recommended for gastroparesis.  GI also consulted on past admission in 11/2023 for odynophagia after vomiting, at that time stated emesis was likely 2/2 AUD given that she is able to tolerate PO intake when not actively withdrawing.  GI consulted, appreciate recs--> low concern for bleed at this time; n/v likely due to recent alcohol use which is resolved now; no indication for EGD at this time, PO PPI daily for heartburn; outpt EGD and colonoscopy for follow up of anemia  - Pantoprazole 40mg daily, will prescribe on d/c  - F/u with GI outpt

## 2024-09-17 NOTE — DISCHARGE NOTE NURSING/CASE MANAGEMENT/SOCIAL WORK - NSDCPEEMAIL_GEN_ALL_CORE
[facility] email [email or web address]

## 2024-09-17 NOTE — PROGRESS NOTE ADULT - PROBLEM SELECTOR PLAN 1
Pt with abscess in R midline of chest/breast, reportedly appeared 1 yr ago when she had it drained, and it recurred. Painful to touch, erythematous, edematous. Pt afebrile & without leukocytosis  CTA Chest on admission: 3.7 x 2.4 cm superficial subcutaneous collection within the anterior chest wall.  POCUS TTE: 3.92 x 1.28 x 3.81cm collection of the right chest wall, consistent with abscess, but with one region of internal color flow. Cannot exclude vascular component.  Surgery consulted, I&D on 9/14, send for culture and MRSA swab.  - Empiric bactrim PO bid (9/14- ). Plan for dc with 10 day course  - nursing home care to be set up Pt with abscess in R midline of chest/breast, reportedly appeared 1 yr ago when she had it drained, and it recurred. Painful to touch, erythematous, edematous. Pt afebrile & without leukocytosis  CTA Chest on admission: 3.7 x 2.4 cm superficial subcutaneous collection within the anterior chest wall.  POCUS TTE: 3.92 x 1.28 x 3.81cm collection of the right chest wall, consistent with abscess, but with one region of internal color flow. Cannot exclude vascular component.  Surgery consulted, I&D on 9/14, send for culture and MRSA swab.  - Empiric bactrim PO bid (9/14- ). Plan for dc with 10 day course  - Nursing home care to be set up

## 2024-09-17 NOTE — PROGRESS NOTE ADULT - ASSESSMENT
Ms. Poornima Zepeda is a 44F with PMHx alcohol use disorder, alcohol withdrawal, active smoker with 15 pack-year hx, chest wall abscess that returned s/p I&D, presenting with acute-onset chest pain starting last night associated with coffee ground emesis x4days. Admitted for alc withdrawal, symptom-triggered CIWA protocol initiated, surgery consulted for I&D of chest wall abscess, GI consulted. Ms. Poornima Zepeda is a 44F with PMHx alcohol use disorder, alcohol withdrawal, active smoker with 15 pack-year hx, chest wall abscess that returned s/p I&D, presenting with acute-onset chest pain starting last night associated with coffee ground emesis x4days. Admitted for alc withdrawal, symptom-triggered CIWA protocol initiated. Intial concern for UGIB, no further episodes since admission and hgb stable; Seen by GI, no acute intervention, continue PO PPI qd. S/p I&D of chest wall abscess with Surgery on 9/14.

## 2024-09-17 NOTE — SBIRT NOTE ADULT - NSSBIRTALCPASSREFTXDET_GEN_A_CORE
Reviewed results with patient, offered to assist in locating treatment.  Patient declined inpatient treatment reports two prior inpatient treatment.  Patient offered assistance in locating outpatient treatment for which she declined.  Patient offered list of outpatient treatment in her area and SBIRT brochure for which she accepted resources.

## 2024-09-17 NOTE — DISCHARGE NOTE NURSING/CASE MANAGEMENT/SOCIAL WORK - NSDCFUADDAPPT_GEN_ALL_CORE_FT
APPTS ARE READY TO BE MADE: [X] YES    Best Family or Patient Contact (if needed):    Additional Information about above appointments (if needed):    1: Wound care   2: PCP   3: GI     Other comments or requests:    Please use the SBIRT brochure and out patient treatment list for substance treatment choices     APPTS ARE READY TO BE MADE: [X] YES    Best Family or Patient Contact (if needed):    Additional Information about above appointments (if needed):    1: Wound care   2: PCP   3: GI     Other comments or requests:

## 2024-09-17 NOTE — SBIRT NOTE ADULT - NSSBIRTDRGACTION/INTER_GEN_A_CORE
Physician Documentation                                                                           

 [facility]                                                                

Name: Virginie Beatty                                                                              

Age: 12 yrs                                                                                       

Sex: Female                                                                                       

: 2005                                                                                   

MRN: [id number]                                                                                   

Arrival Date: 2018                                                                          

Time: 20:47                                                                                       

Account#: [id number]                                                                            

Bed 27                                                                                            

Private MD:                                                                                       

ED Physician Leo Canchola                                                                      

HPI:                                                                                              

                                                                                             

21:39 This 12 yrs old  Female presents to ER via Ambulatory with complaints of       jr8 

      Fever, Tachycardia.                                                                         

21:39 The patient reports fever, that was measured at 103 degrees Fahrenheit. Onset: The      jr8 

      symptoms/episode began/occurred acutely, 3 day(s) ago. Modifying factors: there are no      

      obvious modifying factors. Associated signs and symptoms: Pertinent positives: cough,       

      that is dry, runny nose, sore throat. Severity of symptoms: At their worst the symptoms     

      were moderate in the emergency department the symptoms are unchanged. The patient has       

      not experienced similar symptoms in the past. The patient has not recently seen a           

      physician.                                                                                  

                                                                                                  

OB/GYN:                                                                                           

20:54 LMP 2018                                                                            aa1 

                                                                                                  

Historical:                                                                                       

- Allergies:                                                                                      

20:54 PENICILLINS;                                                                            aa1 

- Home Meds:                                                                                      

20:54 clonidine HCl 0.1 mg Oral tab 2 tabs [Active]; Lexapro 5 mg Oral tab [Active];          aa1 

      Minocycline Oral [Active];                                                                  

- PMHx:                                                                                           

20:54 Anxiety; ASBERGER'S; ocd;                                                               aa1 

- PSHx:                                                                                           

20:54 None;                                                                                   aa1 

                                                                                                  

- Immunization history:: Childhood immunizations are up to date.                                  

- Social history:: Smoking status: Patient uses tobacco products.                                 

                                                                                                  

                                                                                                  

ROS:                                                                                              

21:39 Eyes: Negative for injury, pain, redness, and discharge, Neck: Negative for injury,     jr8 

      pain, and swelling, Cardiovascular: Negative for chest pain, palpitations, and edema,       

      Abdomen/GI: Negative for abdominal pain, nausea, vomiting, diarrhea, and constipation,      

      Back: Negative for injury and pain, MS/Extremity: Negative for injury and deformity,        

      Skin: Negative for injury, rash, and discoloration, Neuro: Negative for headache,           

      weakness, numbness, tingling, and seizure.                                                  

21:39 Constitutional: Positive for fever, malaise.                                                

21:39 ENT: Positive for rhinorrhea, sore throat, Negative for drainage from ear(s), ear pain.     

21:39 Respiratory: Positive for cough, with no reported sputum, Negative for shortness of         

      breath, sputum production, wheezing.                                                        

                                                                                                  

Exam:                                                                                             

21:39 Eyes:  Pupils equal round and reactive to light, extra-ocular motions intact.  Lids and jr8 

      lashes normal.  Conjunctiva and sclera are non-icteric and not injected.  Cornea within     

      normal limits.  Periorbital areas with no swelling, redness, or edema. Neck:  Trachea       

      midline, no thyromegaly or masses palpated, and no cervical lymphadenopathy.  Supple,       

      full range of motion without nuchal rigidity, or vertebral point tenderness.  No            

      Meningismus. Cardiovascular:  Regular rate and rhythm with a normal S1 and S2.  No          

      gallops, murmurs, or rubs.  Normal PMI, no JVD.  No pulse deficits. Respiratory:  Lungs     

      have equal breath sounds bilaterally, clear to auscultation and percussion.  No rales,      

      rhonchi or wheezes noted.  No increased work of breathing, no retractions or nasal          

      flaring. Abdomen/GI:  Soft, non-tender with normal bowel sounds.  No distension,            

      tympany or bruits.  No guarding, rebound or rigidity.  No palpable masses or evidence       

      of tenderness with thorough palpation. Back:  No spinal tenderness.  No costovertebral      

      tenderness.  Full range of motion. Skin:  Warm and dry with excellent turgor.               

      capillary refill <2 seconds.  No cyanosis, pallor, rash or edema. MS/ Extremity:            

      Pulses equal, no cyanosis.  Neurovascular intact.  Full, normal range of motion. Neuro:     

       Awake and alert, GCS 15, oriented to person, place, time, and situation.  Cranial          

      nerves II-XII grossly intact.  Motor strength 5/5 in all extremities.  Sensory grossly      

      intact.  Cerebellar exam normal.  Normal gait.                                              

21:39 ENT: Exam is negative for earache, ear discharge, TM abnormalities, nasal discharge,        

      enlarged tonsils, Posterior pharynx: Airway: patent, Tonsils: with erythema, no             

      enlargement, no exudate, no ulcerations, Uvula: midline, non-edematous, no erythema,        

      swelling, is not appreciated, erythema, that is mild.                                       

                                                                                                  

Vital Signs:                                                                                      

20:54  / 79; Pulse 107; Resp 20; Temp 99.9(O); Pulse Ox 98% on R/A; Weight 56.7 kg;     aa1 

      Height 5 ft. 0 in. (152.40 cm); Pain 4/10;                                                  

22:16  / 72; Pulse 101; Resp 22; Pulse Ox 100% on R/A;                                  lk1 

20:54 Body Mass Index 24.41 (56.70 kg, 152.40 cm)                                             aa1 

                                                                                                  

MDM:                                                                                              

20:58 Patient medically screened.                                                             jr8 

22:02 Data reviewed: vital signs, nurses notes, lab test result(s), Flu: positive. Data       jr8 

      interpreted: Pulse oximetry: on room air is 98 %. Interpretation: normal. Counseling: I     

      had a detailed discussion with the patient and/or guardian regarding: the historical        

      points, exam findings, and any diagnostic results supporting the discharge/admit            

      diagnosis, lab results, the need for outpatient follow up, a pediatrician, to return to     

      the emergency department if symptoms worsen or persist or if there are any questions or     

      concerns that arise at home.                                                                

                                                                                                  

                                                                                             

21:14 Order name: Strep; Complete Time: 21:55                                                 jr8 

                                                                                             

21:14 Order name: Influenza Screen (a \T\ B); Complete Time: 21:55                              jr8

                                                                                             

21:49 Order name: Throat Culture                                                              EDMS

                                                                                                  

Administered Medications:                                                                         

No medications were administered                                                                  

                                                                                                  

                                                                                                  

Disposition:                                                                                      

                                                                                             

09:13 Co-signature as Attending Physician, Leo Canchola MD I agree with the assessment and  padmini 

      plan of care.                                                                               

                                                                                                  

Disposition:                                                                                      

18 22:03 Discharged to Home. Impression: Influenza due to certain identified influenza      

  viruses.                                                                                        

- Condition is Stable.                                                                            

- Discharge Instructions: Influenza, Child.                                                       

                                                                                                  

- School release form, Medication Reconciliation Form, Thank You Letter, Antibiotic               

  Education, Prescription Opioid Use form.                                                        

- Follow up: Private Physician; When: As needed; Reason: Recheck today's complaints,              

  Continuance of care, Re-evaluation by your physician.                                           

- Problem is new.                                                                                 

- Symptoms have improved.                                                                         

                                                                                                  

                                                                                                  

                                                                                                  

Signatures:                                                                                       

Dispatcher [employer]                           EDMS                                                 

Luz Maria Faust, RN                        RN   aa1                                                  

Leo Canchola MD MD cha Roszak, Josh, PA PA   jr8                                                  

Roseanna David RN                         RN   lk1                                                  

                                                                                                  

************************************************************************************************** None

## 2024-09-17 NOTE — PROGRESS NOTE ADULT - SUBJECTIVE AND OBJECTIVE BOX
****************************************************  Ling Ng MD | PGY-1 Internal Medicine | TEAMS  ****************************************************    PATIENT: ARABELLA BHANDARI, MRN: [id number]    CHIEF COMPLAINT: Patient is a 44y old  Female who presents with a chief complaint of coffee-ground emesis (16 Sep 2024 06:55)      INTERVAL HISTORY/OVERNIGHT EVENTS: No overnight events.       MEDICATIONS:  MEDICATIONS  (STANDING):  folic acid 1 milliGRAM(s) Oral daily  melatonin 3 milliGRAM(s) Oral at bedtime  senna 2 Tablet(s) Oral at bedtime  trimethoprim  160 mG/sulfamethoxazole 800 mG 1 Tablet(s) Oral two times a day    MEDICATIONS  (PRN):  bisacodyl 5 milliGRAM(s) Oral every 12 hours PRN Constipation  polyethylene glycol 3350 17 Gram(s) Oral daily PRN Constipation      ALLERGIES: Allergies    No Known Allergies    Intolerances        OBJECTIVE:  ICU Vital Signs Last 24 Hrs  T(C): 36.8 (17 Sep 2024 04:15), Max: 37.1 (16 Sep 2024 12:29)  T(F): 98.2 (17 Sep 2024 04:15), Max: 98.7 (16 Sep 2024 12:29)  HR: 55 (17 Sep 2024 04:15) (55 - 59)  BP: 94/60 (17 Sep 2024 04:15) (91/46 - 94/60)  BP(mean): --  ABP: --  ABP(mean): --  RR: 18 (17 Sep 2024 04:15) (18 - 18)  SpO2: 98% (17 Sep 2024 04:15) (97% - 98%)    O2 Parameters below as of 17 Sep 2024 04:15  Patient On (Oxygen Delivery Method): room air            CAPILLARY BLOOD GLUCOSE        CAPILLARY BLOOD GLUCOSE        I&O's Summary    15 Sep 2024 07:01  -  16 Sep 2024 07:00  --------------------------------------------------------  IN: 1040 mL / OUT: 0 mL / NET: 1040 mL    16 Sep 2024 07:01  -  17 Sep 2024 06:49  --------------------------------------------------------  IN: 720 mL / OUT: 0 mL / NET: 720 mL      Daily     Daily     PHYSICAL EXAMINATION:  General: Comfortable, no acute distress, cooperative with exam.  HEENT: EOMI, PERRLA, conjunctiva clear.  Respiratory: CTA b/l, normal respiratory effort, no coughing, wheezes, crackles, or rales.  CV: RRR, no murmurs, rubs or gallops. No JVD. Distal pulses intact.  Abdominal: Soft, nontender, nondistended; no rebound or guarding; +bowel sounds.  Neurology: AOx3, no focal neuro defects, BOWER.  Extremities: No pitting edema, +peripheral pulses.      LABS:                          9.9    6.85  )-----------( 381      ( 16 Sep 2024 07:33 )             30.4     09-16    135  |  98  |  8   ----------------------------<  92  4.4   |  22  |  0.47<L>    Ca    9.4      16 Sep 2024 07:32  Phos  3.3     09-16  Mg     2.0     09-16                Urinalysis Basic - ( 16 Sep 2024 07:32 )    Color: x / Appearance: x / SG: x / pH: x  Gluc: 92 mg/dL / Ketone: x  / Bili: x / Urobili: x   Blood: x / Protein: x / Nitrite: x   Leuk Esterase: x / RBC: x / WBC x   Sq Epi: x / Non Sq Epi: x / Bacteria: x     ****************************************************  Ling Ng MD | PGY-1 Internal Medicine | TEAMS  ****************************************************    PATIENT: ARABELLA BHANDARI, MRN: [id number]    CHIEF COMPLAINT: Patient is a 44y old  Female who presents with a chief complaint of coffee-ground emesis (16 Sep 2024 06:55)      INTERVAL HISTORY/OVERNIGHT EVENTS: No overnight events. Patient seen and examined at bedside. States she has significant pain 8/10 at site of chest wall abscess unrelieved by ofirmev, otherwise no acute symptoms at this time. Eager to go home but concerned about dressing of the wound.      MEDICATIONS:  MEDICATIONS  (STANDING):  folic acid 1 milliGRAM(s) Oral daily  melatonin 3 milliGRAM(s) Oral at bedtime  senna 2 Tablet(s) Oral at bedtime  trimethoprim  160 mG/sulfamethoxazole 800 mG 1 Tablet(s) Oral two times a day    MEDICATIONS  (PRN):  bisacodyl 5 milliGRAM(s) Oral every 12 hours PRN Constipation  polyethylene glycol 3350 17 Gram(s) Oral daily PRN Constipation      ALLERGIES: Allergies    No Known Allergies    Intolerances        OBJECTIVE:  ICU Vital Signs Last 24 Hrs  T(C): 36.8 (17 Sep 2024 04:15), Max: 37.1 (16 Sep 2024 12:29)  T(F): 98.2 (17 Sep 2024 04:15), Max: 98.7 (16 Sep 2024 12:29)  HR: 55 (17 Sep 2024 04:15) (55 - 59)  BP: 94/60 (17 Sep 2024 04:15) (91/46 - 94/60)  BP(mean): --  ABP: --  ABP(mean): --  RR: 18 (17 Sep 2024 04:15) (18 - 18)  SpO2: 98% (17 Sep 2024 04:15) (97% - 98%)    O2 Parameters below as of 17 Sep 2024 04:15  Patient On (Oxygen Delivery Method): room air            CAPILLARY BLOOD GLUCOSE        CAPILLARY BLOOD GLUCOSE        I&O's Summary    15 Sep 2024 07:01  -  16 Sep 2024 07:00  --------------------------------------------------------  IN: 1040 mL / OUT: 0 mL / NET: 1040 mL    16 Sep 2024 07:01  -  17 Sep 2024 06:49  --------------------------------------------------------  IN: 720 mL / OUT: 0 mL / NET: 720 mL      Daily     Daily     PHYSICAL EXAMINATION:  General: Comfortable, no acute distress, cooperative with exam.  HEENT: EOMI, PERRLA, conjunctiva clear.  Respiratory: CTA b/l, normal respiratory effort, no coughing, wheezes, crackles, or rales.  CV: RRR, no murmurs, rubs or gallops. No JVD. Distal pulses intact.  Abdominal: Soft, nontender, nondistended; no rebound or guarding; +bowel sounds.  Neurology: AOx3, no focal neuro defects, BOWER.  Extremities: No pitting edema, +peripheral pulses.  SKIN: Chest wall abscess drainage site covered with dressing, no oozing or bleeding noted.      LABS:                          9.9    6.85  )-----------( 381      ( 16 Sep 2024 07:33 )             30.4     09-16    135  |  98  |  8   ----------------------------<  92  4.4   |  22  |  0.47<L>    Ca    9.4      16 Sep 2024 07:32  Phos  3.3     09-16  Mg     2.0     09-16                Urinalysis Basic - ( 16 Sep 2024 07:32 )    Color: x / Appearance: x / SG: x / pH: x  Gluc: 92 mg/dL / Ketone: x  / Bili: x / Urobili: x   Blood: x / Protein: x / Nitrite: x   Leuk Esterase: x / RBC: x / WBC x   Sq Epi: x / Non Sq Epi: x / Bacteria: x

## 2024-09-17 NOTE — DISCHARGE NOTE NURSING/CASE MANAGEMENT/SOCIAL WORK - NSDCPEWEB_GEN_ALL_CORE
[facility] for Tobacco Control website --- [email or web address] --- [email or web address]

## 2024-11-06 NOTE — H&P ADULT - NSICDXFAMILYHX_GEN_ALL_CORE_FT
FAMILY HISTORY:  Father  Still living? Unknown  FH: type 2 diabetes, Age at diagnosis: Age Unknown    Mother  Still living? Unknown  FH: hypertension, Age at diagnosis: Age Unknown     7

## 2024-11-19 NOTE — DISCHARGE NOTE NURSING/CASE MANAGEMENT/SOCIAL WORK - NSDCCRTYPESERV_GEN_ALL_CORE_FT
Pt here today for a flu vaccine. Administered on Left deltoid. Pt tolerated well.Pt had no complaints.   
in-patient substance use treatment program

## 2025-03-05 NOTE — PROGRESS NOTE ADULT - TIME-BASED BILLING (NON-CRITICAL CARE)
"   Specialty Pharmacy Patient Management Program  Refill Outreach     Dayana \"Sherri\" was contacted today regarding refills of their medication(s).        Delivery Questions      Flowsheet Row Most Recent Value   Delivery method [employer]   Delivery address verified with patient/caregiver? Yes   Delivery address Home   Number of medications in delivery 2   Medication(s) being filled and delivered Insulin Lispro (HUMALOG), Insulin Glargine (Lantus SoloStar)   Doses left of specialty medications N/A   Copay verified? Yes   Copay amount $180.00   Copay form of payment HSA/FSA on file   Delivery Date Selection 03/06/25   Signature Required No                 Follow-up: 26 day(s)     Annalise Powers, Pharmacy Technician  3/5/2025  08:33 EST    "
Time-based billing (NON-critical care)

## 2025-04-04 NOTE — PROGRESS NOTE ADULT - ASSESSMENT
No 44yo F w AUD with recurrent hospitalizations for alcohol withdrawal and associated n/v most recently August 2023 and October 2023, now presents to the ED with a couple of days of severe nausea and NBNB vomiting. GI consulted for dark emesis and anemia.    # N/V c/b epigastric pain  # Microcytic Anemia (unclear if real based on trend)  Unclear etiology, possibly 2/2 AUD, erosive esophagitis, PUD, less likely due to motility d/o. Reasonable pursue endoscopic evaluation. If negative, sx may be related to AUD or MSK, anterior cutaneous nerve entrapment at which point, if the latter, may benefit from trial localized anesthetic to trigger point.     Recommendations:  - Plan for EGD Friday  - 3 AM CBC, CMP, coags; correct electrolytes via IV  - Transfuse if Hgb < 7  - PPI 40 IV BID for 2-3 days while admitted, then PO bid for 6-8 weeks empirically  - Magic mouth wash  - Carafate, 7-10 days  - Alcohol cessation; provide / services - 3rd admit since August for EtOH withdrawal and associated UGI symptoms  - Antiemetics as needed  - Rest of care per primary    Preliminary note until signed by Attending.    Thank you for involving us in this patient's care. Please reach out if any further questions or concerns.    Елена Thacker MD  Gastroenterology/Hepatology Fellow, PGY-7    NON-URGENT CONSULTS:  Please email [email or web address]  AT NIGHT AND ON WEEKENDS:  Contact on-call GI fellow via answering service ([phone]) from 5pm-8am and on weekends/holidays  MONDAY-FRIDAY 8AM-5PM:  Pager: [phone] ([facility])  Pager: [phone] ([facility])   42yo F w AUD with recurrent hospitalizations for alcohol withdrawal and associated n/v most recently August 2023 and October 2023, now presents to the ED with a couple of days of severe nausea and NBNB vomiting. GI consulted for dark emesis and anemia.    # N/V c/b epigastric pain  # Microcytic Anemia (unclear if real based on trend)  Unclear etiology, possibly 2/2 AUD, erosive esophagitis, PUD, less likely due to motility d/o. Reasonable pursue endoscopic evaluation. If negative, sx may be related to AUD or MSK, anterior cutaneous nerve entrapment at which point, if the latter, may benefit from trial localized anesthetic to trigger point.     Recommendations:  - Plan for EGD Friday  - 3 AM CBC, CMP, coags; correct electrolytes via IV  - Transfuse if Hgb < 7  - Diet as tolerated  - NPO after midnight  - Ensure adequate hydration   - PPI 40 IV BID for 2-3 days while admitted, then PO bid for 6-8 weeks empirically  - Magic mouth wash  - Carafate, 7-10 days  - Alcohol cessation; provide / services - 3rd admit since August for EtOH withdrawal and associated UGI symptoms  - Antiemetics as needed  - Rest of care per primary    Preliminary note until signed by Attending.    Thank you for involving us in this patient's care. Please reach out if any further questions or concerns.    Елена Thacker MD  Gastroenterology/Hepatology Fellow, PGY-7    NON-URGENT CONSULTS:  Please email [email or web address]  [email or web address]  AT NIGHT AND ON WEEKENDS:  Contact on-call GI fellow via answering service ([phone]) from 5pm-8am and on weekends/holidays  MONDAY-FRIDAY 8AM-5PM:  Pager: [phone] ([facility])  Pager: [phone] ([facility])

## 2025-07-21 NOTE — ED ADULT TRIAGE NOTE - MEANS OF ARRIVAL
Attempted to call [employer] and unable to leave message, ok to discharge home health due to stopping wound vac.    wheelchair

## (undated) DEVICE — SYR ALLIANCE II INFLATION 60ML

## (undated) DEVICE — TUBING IV SET GRAVITY 3Y 100" MACRO

## (undated) DEVICE — FOLEY HOLDER STATLOCK 2 WAY ADULT

## (undated) DEVICE — PACK IV START WITH CHG

## (undated) DEVICE — SUCTION YANKAUER NO CONTROL VENT

## (undated) DEVICE — BITE BLOCK ADULT 20 X 27MM (GREEN)

## (undated) DEVICE — TUBING SUCTION 20FT

## (undated) DEVICE — SENSOR O2 FINGER ADULT

## (undated) DEVICE — SOL INJ NS 0.9% 500ML 2 PORT

## (undated) DEVICE — COLONOSCOPE 2416901: Type: DURABLE MEDICAL EQUIPMENT

## (undated) DEVICE — CATH IV SAFE BC 20G X 1.16" (PINK)

## (undated) DEVICE — BALLOON US ENDO

## (undated) DEVICE — CATH IV SAFE BC 22G X 1" (BLUE)

## (undated) DEVICE — TUBING SUCTION CONN 6FT STERILE